# Patient Record
Sex: MALE | Race: BLACK OR AFRICAN AMERICAN | Employment: OTHER | URBAN - METROPOLITAN AREA
[De-identification: names, ages, dates, MRNs, and addresses within clinical notes are randomized per-mention and may not be internally consistent; named-entity substitution may affect disease eponyms.]

---

## 2017-01-09 RX ORDER — FUROSEMIDE 20 MG/1
TABLET ORAL
Qty: 30 TABLET | Refills: 5 | Status: SHIPPED | OUTPATIENT
Start: 2017-01-09 | End: 2017-07-06 | Stop reason: SDUPTHER

## 2017-01-09 RX ORDER — LISINOPRIL 40 MG/1
TABLET ORAL
Qty: 30 TABLET | Refills: 5 | Status: SHIPPED | OUTPATIENT
Start: 2017-01-09 | End: 2017-07-06 | Stop reason: SDUPTHER

## 2017-01-13 RX ORDER — HYDROCODONE BITARTRATE AND ACETAMINOPHEN 5; 325 MG/1; MG/1
TABLET ORAL
Qty: 120 TABLET | Refills: 0 | Status: SHIPPED | OUTPATIENT
Start: 2017-01-13 | End: 2017-02-13 | Stop reason: SDUPTHER

## 2017-01-26 RX ORDER — SPIRONOLACTONE 25 MG/1
25 TABLET ORAL DAILY
Qty: 30 TABLET | Refills: 3 | Status: SHIPPED | OUTPATIENT
Start: 2017-01-26 | End: 2017-05-26 | Stop reason: SDUPTHER

## 2017-01-26 RX ORDER — PRAVASTATIN SODIUM 40 MG
40 TABLET ORAL DAILY
Qty: 30 TABLET | Refills: 3 | Status: SHIPPED | OUTPATIENT
Start: 2017-01-26 | End: 2017-05-26 | Stop reason: SDUPTHER

## 2017-02-13 RX ORDER — HYDROCODONE BITARTRATE AND ACETAMINOPHEN 5; 325 MG/1; MG/1
TABLET ORAL
Qty: 120 TABLET | Refills: 0 | Status: SHIPPED | OUTPATIENT
Start: 2017-02-13 | End: 2017-03-13 | Stop reason: SDUPTHER

## 2017-03-08 RX ORDER — OMEPRAZOLE 20 MG/1
CAPSULE, DELAYED RELEASE ORAL
Qty: 30 CAPSULE | Refills: 3 | Status: SHIPPED | OUTPATIENT
Start: 2017-03-08 | End: 2017-07-06 | Stop reason: SDUPTHER

## 2017-03-13 RX ORDER — HYDROCODONE BITARTRATE AND ACETAMINOPHEN 5; 325 MG/1; MG/1
TABLET ORAL
Qty: 120 TABLET | Refills: 0 | Status: SHIPPED | OUTPATIENT
Start: 2017-03-13 | End: 2017-04-12 | Stop reason: SDUPTHER

## 2017-03-24 ENCOUNTER — CARE COORDINATION (OUTPATIENT)
Dept: CARE COORDINATION | Age: 52
End: 2017-03-24

## 2017-03-27 RX ORDER — HYDRALAZINE HYDROCHLORIDE 50 MG/1
TABLET, FILM COATED ORAL
Qty: 90 TABLET | Refills: 3 | Status: SHIPPED | OUTPATIENT
Start: 2017-03-27 | End: 2017-08-10 | Stop reason: SDUPTHER

## 2017-03-27 RX ORDER — DILTIAZEM HYDROCHLORIDE 180 MG/1
CAPSULE, COATED, EXTENDED RELEASE ORAL
Qty: 30 CAPSULE | Refills: 3 | Status: SHIPPED | OUTPATIENT
Start: 2017-03-27 | End: 2017-08-05 | Stop reason: SDUPTHER

## 2017-03-27 RX ORDER — CARVEDILOL 25 MG/1
TABLET ORAL
Qty: 120 TABLET | Refills: 0 | Status: SHIPPED | OUTPATIENT
Start: 2017-03-27 | End: 2017-04-26 | Stop reason: SDUPTHER

## 2017-03-27 RX ORDER — ISOSORBIDE MONONITRATE 30 MG/1
TABLET, EXTENDED RELEASE ORAL
Qty: 30 TABLET | Refills: 3 | Status: SHIPPED | OUTPATIENT
Start: 2017-03-27 | End: 2017-08-05 | Stop reason: SDUPTHER

## 2017-03-28 ENCOUNTER — OFFICE VISIT (OUTPATIENT)
Dept: FAMILY MEDICINE CLINIC | Age: 52
End: 2017-03-28
Payer: COMMERCIAL

## 2017-03-28 ENCOUNTER — HOSPITAL ENCOUNTER (OUTPATIENT)
Age: 52
Setting detail: SPECIMEN
Discharge: HOME | End: 2017-03-28
Payer: COMMERCIAL

## 2017-03-28 VITALS
SYSTOLIC BLOOD PRESSURE: 100 MMHG | HEART RATE: 84 BPM | WEIGHT: 251 LBS | BODY MASS INDEX: 33.27 KG/M2 | DIASTOLIC BLOOD PRESSURE: 62 MMHG | HEIGHT: 73 IN

## 2017-03-28 DIAGNOSIS — R74.8 ELEVATED LIPASE: ICD-10-CM

## 2017-03-28 DIAGNOSIS — I10 ESSENTIAL HYPERTENSION: ICD-10-CM

## 2017-03-28 DIAGNOSIS — Z23 NEED FOR PNEUMOCOCCAL VACCINATION: ICD-10-CM

## 2017-03-28 DIAGNOSIS — E11.42 CONTROLLED TYPE 2 DIABETES MELLITUS WITH DIABETIC POLYNEUROPATHY, WITH LONG-TERM CURRENT USE OF INSULIN (HCC): Primary | ICD-10-CM

## 2017-03-28 DIAGNOSIS — Z13.9 SCREENING: ICD-10-CM

## 2017-03-28 DIAGNOSIS — Z79.4 CONTROLLED TYPE 2 DIABETES MELLITUS WITH DIABETIC POLYNEUROPATHY, WITH LONG-TERM CURRENT USE OF INSULIN (HCC): Primary | ICD-10-CM

## 2017-03-28 DIAGNOSIS — I50.22 CHRONIC SYSTOLIC HEART FAILURE (HCC): ICD-10-CM

## 2017-03-28 LAB
ANION GAP SERPL CALCULATED.3IONS-SCNC: 18 MMOL/L (ref 9–17)
BUN BLDV-MCNC: 24 MG/DL (ref 6–20)
BUN/CREAT BLD: ABNORMAL (ref 9–20)
CALCIUM SERPL-MCNC: 8.9 MG/DL (ref 8.6–10.4)
CHLORIDE BLD-SCNC: 100 MMOL/L (ref 98–107)
CO2: 20 MMOL/L (ref 20–31)
CREAT SERPL-MCNC: 1.44 MG/DL (ref 0.7–1.2)
GFR AFRICAN AMERICAN: >60 ML/MIN
GFR NON-AFRICAN AMERICAN: 52 ML/MIN
GFR SERPL CREATININE-BSD FRML MDRD: ABNORMAL ML/MIN/{1.73_M2}
GFR SERPL CREATININE-BSD FRML MDRD: ABNORMAL ML/MIN/{1.73_M2}
GLUCOSE BLD-MCNC: 280 MG/DL (ref 70–99)
HBA1C MFR BLD: 7.3 %
LIPASE: 45 U/L (ref 13–60)
POTASSIUM SERPL-SCNC: 5.7 MMOL/L (ref 3.7–5.3)
SODIUM BLD-SCNC: 138 MMOL/L (ref 135–144)

## 2017-03-28 PROCEDURE — 83036 HEMOGLOBIN GLYCOSYLATED A1C: CPT | Performed by: FAMILY MEDICINE

## 2017-03-28 PROCEDURE — G0009 ADMIN PNEUMOCOCCAL VACCINE: HCPCS | Performed by: FAMILY MEDICINE

## 2017-03-28 PROCEDURE — 90670 PCV13 VACCINE IM: CPT | Performed by: FAMILY MEDICINE

## 2017-03-28 PROCEDURE — 99214 OFFICE O/P EST MOD 30 MIN: CPT | Performed by: FAMILY MEDICINE

## 2017-03-28 PROCEDURE — 36415 COLL VENOUS BLD VENIPUNCTURE: CPT | Performed by: FAMILY MEDICINE

## 2017-03-28 ASSESSMENT — ENCOUNTER SYMPTOMS
ABDOMINAL PAIN: 0
SHORTNESS OF BREATH: 0
EYES NEGATIVE: 1
COUGH: 0
CONSTIPATION: 0
BLURRED VISION: 0

## 2017-03-29 LAB
HEPATITIS C ANTIBODY: NONREACTIVE
HIV AG/AB: NONREACTIVE

## 2017-04-04 ENCOUNTER — CARE COORDINATION (OUTPATIENT)
Dept: CARE COORDINATION | Age: 52
End: 2017-04-04

## 2017-04-11 ENCOUNTER — CARE COORDINATION (OUTPATIENT)
Dept: CARE COORDINATION | Age: 52
End: 2017-04-11

## 2017-04-11 DIAGNOSIS — E08.42 DIABETIC POLYNEUROPATHY ASSOCIATED WITH DIABETES MELLITUS DUE TO UNDERLYING CONDITION (HCC): ICD-10-CM

## 2017-04-11 DIAGNOSIS — N28.9 NEPHROPATHY: ICD-10-CM

## 2017-04-12 RX ORDER — HYDROCODONE BITARTRATE AND ACETAMINOPHEN 5; 325 MG/1; MG/1
TABLET ORAL
Qty: 120 TABLET | Refills: 0 | Status: SHIPPED | OUTPATIENT
Start: 2017-04-12 | End: 2017-05-12 | Stop reason: SDUPTHER

## 2017-04-12 RX ORDER — HYDROCODONE BITARTRATE AND ACETAMINOPHEN 5; 325 MG/1; MG/1
TABLET ORAL
Qty: 120 TABLET | Refills: 0 | Status: SHIPPED | OUTPATIENT
Start: 2017-04-12 | End: 2017-04-13 | Stop reason: SDUPTHER

## 2017-04-13 RX ORDER — HYDROCODONE BITARTRATE AND ACETAMINOPHEN 5; 325 MG/1; MG/1
TABLET ORAL
Qty: 120 TABLET | Refills: 0 | Status: SHIPPED | OUTPATIENT
Start: 2017-04-13 | End: 2017-06-13 | Stop reason: SDUPTHER

## 2017-04-19 ENCOUNTER — CARE COORDINATION (OUTPATIENT)
Dept: CARE COORDINATION | Age: 52
End: 2017-04-19

## 2017-04-25 ENCOUNTER — CARE COORDINATION (OUTPATIENT)
Dept: CARE COORDINATION | Age: 52
End: 2017-04-25

## 2017-04-26 RX ORDER — GLIMEPIRIDE 4 MG/1
TABLET ORAL
Qty: 30 TABLET | Refills: 3 | Status: SHIPPED | OUTPATIENT
Start: 2017-04-26 | End: 2017-05-02 | Stop reason: SDUPTHER

## 2017-04-26 RX ORDER — CARVEDILOL 25 MG/1
TABLET ORAL
Qty: 120 TABLET | Refills: 3 | Status: SHIPPED | OUTPATIENT
Start: 2017-04-26 | End: 2017-08-24 | Stop reason: SDUPTHER

## 2017-04-28 ENCOUNTER — CARE COORDINATION (OUTPATIENT)
Dept: CARE COORDINATION | Age: 52
End: 2017-04-28

## 2017-05-02 RX ORDER — GLIMEPIRIDE 4 MG/1
TABLET ORAL
Qty: 30 TABLET | Refills: 5 | Status: SHIPPED | OUTPATIENT
Start: 2017-05-02 | End: 2018-01-24 | Stop reason: SDUPTHER

## 2017-05-09 ENCOUNTER — CARE COORDINATION (OUTPATIENT)
Dept: CARE COORDINATION | Age: 52
End: 2017-05-09

## 2017-05-12 RX ORDER — HYDROCODONE BITARTRATE AND ACETAMINOPHEN 5; 325 MG/1; MG/1
TABLET ORAL
Qty: 120 TABLET | Refills: 0 | Status: SHIPPED | OUTPATIENT
Start: 2017-05-12 | End: 2017-06-13 | Stop reason: SDUPTHER

## 2017-05-23 ENCOUNTER — CARE COORDINATION (OUTPATIENT)
Dept: CARE COORDINATION | Age: 52
End: 2017-05-23

## 2017-05-26 RX ORDER — SPIRONOLACTONE 25 MG/1
TABLET ORAL
Qty: 30 TABLET | Refills: 3 | Status: SHIPPED | OUTPATIENT
Start: 2017-05-26 | End: 2017-07-26 | Stop reason: ALTCHOICE

## 2017-05-26 RX ORDER — PRAVASTATIN SODIUM 40 MG
TABLET ORAL
Qty: 30 TABLET | Refills: 3 | Status: SHIPPED | OUTPATIENT
Start: 2017-05-26 | End: 2017-09-23 | Stop reason: SDUPTHER

## 2017-06-13 ENCOUNTER — CARE COORDINATION (OUTPATIENT)
Dept: CARE COORDINATION | Age: 52
End: 2017-06-13

## 2017-06-13 RX ORDER — HYDROCODONE BITARTRATE AND ACETAMINOPHEN 5; 325 MG/1; MG/1
TABLET ORAL
Qty: 120 TABLET | Refills: 0 | Status: SHIPPED | OUTPATIENT
Start: 2017-06-13 | End: 2017-07-13 | Stop reason: SDUPTHER

## 2017-06-19 ENCOUNTER — TELEPHONE (OUTPATIENT)
Dept: FAMILY MEDICINE CLINIC | Age: 52
End: 2017-06-19

## 2017-06-21 ENCOUNTER — CARE COORDINATION (OUTPATIENT)
Dept: CARE COORDINATION | Age: 52
End: 2017-06-21

## 2017-06-26 ENCOUNTER — TELEPHONE (OUTPATIENT)
Dept: FAMILY MEDICINE CLINIC | Age: 52
End: 2017-06-26

## 2017-06-29 ENCOUNTER — TELEPHONE (OUTPATIENT)
Dept: FAMILY MEDICINE CLINIC | Age: 52
End: 2017-06-29

## 2017-06-29 DIAGNOSIS — I95.9 HYPOTENSION, UNSPECIFIED HYPOTENSION TYPE: Primary | ICD-10-CM

## 2017-07-05 ENCOUNTER — CARE COORDINATION (OUTPATIENT)
Dept: CARE COORDINATION | Age: 52
End: 2017-07-05

## 2017-07-06 RX ORDER — LISINOPRIL 40 MG/1
TABLET ORAL
Qty: 30 TABLET | Refills: 1 | Status: SHIPPED | OUTPATIENT
Start: 2017-07-06 | End: 2017-07-26 | Stop reason: ALTCHOICE

## 2017-07-06 RX ORDER — OMEPRAZOLE 20 MG/1
CAPSULE, DELAYED RELEASE ORAL
Qty: 30 CAPSULE | Refills: 1 | Status: SHIPPED | OUTPATIENT
Start: 2017-07-06 | End: 2017-09-05 | Stop reason: SDUPTHER

## 2017-07-06 RX ORDER — FUROSEMIDE 20 MG/1
TABLET ORAL
Qty: 30 TABLET | Refills: 1 | Status: SHIPPED | OUTPATIENT
Start: 2017-07-06 | End: 2017-09-05 | Stop reason: SDUPTHER

## 2017-07-14 RX ORDER — HYDROCODONE BITARTRATE AND ACETAMINOPHEN 5; 325 MG/1; MG/1
TABLET ORAL
Qty: 120 TABLET | Refills: 0 | Status: SHIPPED | OUTPATIENT
Start: 2017-07-14 | End: 2017-08-11 | Stop reason: SDUPTHER

## 2017-07-18 ENCOUNTER — OFFICE VISIT (OUTPATIENT)
Dept: FAMILY MEDICINE CLINIC | Age: 52
End: 2017-07-18
Payer: COMMERCIAL

## 2017-07-18 VITALS
DIASTOLIC BLOOD PRESSURE: 76 MMHG | WEIGHT: 234 LBS | BODY MASS INDEX: 30.87 KG/M2 | HEART RATE: 80 BPM | SYSTOLIC BLOOD PRESSURE: 130 MMHG

## 2017-07-18 DIAGNOSIS — S22.41XD CLOSED FRACTURE OF MULTIPLE RIBS OF RIGHT SIDE WITH ROUTINE HEALING, SUBSEQUENT ENCOUNTER: ICD-10-CM
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
## 2017-07-18 PROCEDURE — 99213 OFFICE O/P EST LOW 20 MIN: CPT | Performed by: FAMILY MEDICINE

## 2017-07-18 ASSESSMENT — ENCOUNTER SYMPTOMS
EYE PAIN: 0
BLURRED VISION: 0
DOUBLE VISION: 0
BACK PAIN: 1
GASTROINTESTINAL NEGATIVE: 1
SHORTNESS OF BREATH: 1

## 2017-07-19 ENCOUNTER — CARE COORDINATION (OUTPATIENT)
Dept: CARE COORDINATION | Age: 52
End: 2017-07-19

## 2017-07-26 ENCOUNTER — CARE COORDINATION (OUTPATIENT)
Dept: CARE COORDINATION | Age: 52
End: 2017-07-26

## 2017-07-26 ENCOUNTER — TELEPHONE (OUTPATIENT)
Dept: FAMILY MEDICINE CLINIC | Age: 52
End: 2017-07-26

## 2017-08-05 RX ORDER — ISOSORBIDE MONONITRATE 30 MG/1
TABLET, EXTENDED RELEASE ORAL
Qty: 30 TABLET | Refills: 3 | Status: SHIPPED | OUTPATIENT
Start: 2017-08-05 | End: 2017-12-03 | Stop reason: SDUPTHER

## 2017-08-05 RX ORDER — DILTIAZEM HYDROCHLORIDE 180 MG/1
CAPSULE, EXTENDED RELEASE ORAL
Qty: 30 CAPSULE | Refills: 3 | Status: SHIPPED | OUTPATIENT
Start: 2017-08-05 | End: 2017-12-03 | Stop reason: SDUPTHER

## 2017-08-10 ENCOUNTER — CARE COORDINATION (OUTPATIENT)
Dept: CARE COORDINATION | Age: 52
End: 2017-08-10

## 2017-08-10 RX ORDER — HYDRALAZINE HYDROCHLORIDE 50 MG/1
TABLET, FILM COATED ORAL
Qty: 90 TABLET | Refills: 0 | Status: SHIPPED | OUTPATIENT
Start: 2017-08-10 | End: 2017-09-06 | Stop reason: SDUPTHER

## 2017-08-11 RX ORDER — HYDROCODONE BITARTRATE AND ACETAMINOPHEN 5; 325 MG/1; MG/1
TABLET ORAL
Qty: 120 TABLET | Refills: 0 | Status: SHIPPED | OUTPATIENT
Start: 2017-08-11 | End: 2017-09-12 | Stop reason: SDUPTHER

## 2017-08-18 ENCOUNTER — HOSPITAL ENCOUNTER (OUTPATIENT)
Age: 52
Setting detail: SPECIMEN
Discharge: HOME | End: 2017-08-18
Payer: COMMERCIAL

## 2017-08-18 ENCOUNTER — OFFICE VISIT (OUTPATIENT)
Dept: FAMILY MEDICINE CLINIC | Age: 52
End: 2017-08-18
Payer: COMMERCIAL

## 2017-08-18 VITALS
HEART RATE: 82 BPM | BODY MASS INDEX: 31 KG/M2 | WEIGHT: 235 LBS | SYSTOLIC BLOOD PRESSURE: 130 MMHG | DIASTOLIC BLOOD PRESSURE: 90 MMHG

## 2017-08-18 DIAGNOSIS — Z79.4 TYPE 2 DIABETES MELLITUS WITHOUT COMPLICATION, WITH LONG-TERM CURRENT USE OF INSULIN (HCC): ICD-10-CM

## 2017-08-18 DIAGNOSIS — M25.512 ACUTE PAIN OF LEFT SHOULDER: ICD-10-CM

## 2017-08-18 DIAGNOSIS — E78.2 MIXED HYPERLIPIDEMIA: ICD-10-CM

## 2017-08-18 DIAGNOSIS — G89.29 CHRONIC MIDLINE LOW BACK PAIN WITHOUT SCIATICA: ICD-10-CM

## 2017-08-18 DIAGNOSIS — I50.32 CHRONIC DIASTOLIC CONGESTIVE HEART FAILURE (HCC): ICD-10-CM

## 2017-08-18 DIAGNOSIS — E11.9 TYPE 2 DIABETES MELLITUS WITHOUT COMPLICATION, WITH LONG-TERM CURRENT USE OF INSULIN (HCC): ICD-10-CM

## 2017-08-18 DIAGNOSIS — M54.50 CHRONIC MIDLINE LOW BACK PAIN WITHOUT SCIATICA: ICD-10-CM

## 2017-08-18 DIAGNOSIS — C61 PROSTATE CANCER (HCC): ICD-10-CM

## 2017-08-18 DIAGNOSIS — G62.9 NEUROPATHY: ICD-10-CM

## 2017-08-18 PROBLEM — S22.49XA: Status: ACTIVE | Noted: 2017-06-01

## 2017-08-18 LAB
ALBUMIN SERPL-MCNC: 4.4 G/DL (ref 3.5–5.2)
ALBUMIN/GLOBULIN RATIO: 1.3 (ref 1–2.5)
ALP BLD-CCNC: 114 U/L (ref 40–129)
ALT SERPL-CCNC: 15 U/L (ref 5–41)
ANION GAP SERPL CALCULATED.3IONS-SCNC: 20 MMOL/L (ref 9–17)
AST SERPL-CCNC: 13 U/L
BILIRUB SERPL-MCNC: 0.31 MG/DL (ref 0.3–1.2)
BUN BLDV-MCNC: 14 MG/DL (ref 6–20)
BUN/CREAT BLD: ABNORMAL (ref 9–20)
CALCIUM SERPL-MCNC: 9.7 MG/DL (ref 8.6–10.4)
CHLORIDE BLD-SCNC: 97 MMOL/L (ref 98–107)
CHOLESTEROL/HDL RATIO: 4.5
CHOLESTEROL: 149 MG/DL
CO2: 23 MMOL/L (ref 20–31)
CREAT SERPL-MCNC: 0.99 MG/DL (ref 0.7–1.2)
CREATININE URINE POCT: 100
GFR AFRICAN AMERICAN: >60 ML/MIN
GFR NON-AFRICAN AMERICAN: >60 ML/MIN
GFR SERPL CREATININE-BSD FRML MDRD: ABNORMAL ML/MIN/{1.73_M2}
GFR SERPL CREATININE-BSD FRML MDRD: ABNORMAL ML/MIN/{1.73_M2}
GLUCOSE BLD-MCNC: 457 MG/DL (ref 70–99)
HBA1C MFR BLD: 8.4 %
HDLC SERPL-MCNC: 33 MG/DL
LDL CHOLESTEROL: 81 MG/DL (ref 0–130)
MICROALBUMIN/CREAT 24H UR: 10 MG/G{CREAT}
MICROALBUMIN/CREAT UR-RTO: 30
POTASSIUM SERPL-SCNC: 5.1 MMOL/L (ref 3.7–5.3)
SODIUM BLD-SCNC: 140 MMOL/L (ref 135–144)
TOTAL PROTEIN: 7.8 G/DL (ref 6.4–8.3)
TRIGL SERPL-MCNC: 175 MG/DL
VLDLC SERPL CALC-MCNC: ABNORMAL MG/DL (ref 1–30)

## 2017-08-18 PROCEDURE — 83036 HEMOGLOBIN GLYCOSYLATED A1C: CPT | Performed by: FAMILY MEDICINE

## 2017-08-18 PROCEDURE — 99215 OFFICE O/P EST HI 40 MIN: CPT | Performed by: FAMILY MEDICINE

## 2017-08-18 PROCEDURE — 82044 UR ALBUMIN SEMIQUANTITATIVE: CPT | Performed by: FAMILY MEDICINE

## 2017-08-18 PROCEDURE — 36415 COLL VENOUS BLD VENIPUNCTURE: CPT | Performed by: FAMILY MEDICINE

## 2017-08-18 ASSESSMENT — ENCOUNTER SYMPTOMS
BLOOD IN STOOL: 0
VISUAL CHANGE: 0
SHORTNESS OF BREATH: 0
EYES NEGATIVE: 1
BACK PAIN: 1
ABDOMINAL PAIN: 0
CONSTIPATION: 0
COUGH: 0

## 2017-08-24 RX ORDER — CARVEDILOL 25 MG/1
TABLET ORAL
Qty: 120 TABLET | Refills: 0 | Status: SHIPPED | OUTPATIENT
Start: 2017-08-24 | End: 2017-09-23 | Stop reason: SDUPTHER

## 2017-08-28 ENCOUNTER — CARE COORDINATION (OUTPATIENT)
Dept: CARE COORDINATION | Age: 52
End: 2017-08-28

## 2017-09-05 RX ORDER — FUROSEMIDE 20 MG/1
TABLET ORAL
Qty: 30 TABLET | Refills: 1 | Status: SHIPPED | OUTPATIENT
Start: 2017-09-05 | End: 2017-10-10 | Stop reason: SDUPTHER

## 2017-09-05 RX ORDER — OMEPRAZOLE 20 MG/1
CAPSULE, DELAYED RELEASE ORAL
Qty: 30 CAPSULE | Refills: 1 | Status: SHIPPED | OUTPATIENT
Start: 2017-09-05 | End: 2017-11-03 | Stop reason: SDUPTHER

## 2017-09-05 RX ORDER — LISINOPRIL 40 MG/1
40 TABLET ORAL DAILY
Qty: 30 TABLET | Refills: 3 | Status: SHIPPED | OUTPATIENT
Start: 2017-09-05 | End: 2018-01-02 | Stop reason: SDUPTHER

## 2017-09-06 RX ORDER — HYDRALAZINE HYDROCHLORIDE 50 MG/1
TABLET, FILM COATED ORAL
Qty: 90 TABLET | Refills: 3 | Status: SHIPPED | OUTPATIENT
Start: 2017-09-06 | End: 2018-01-16 | Stop reason: SDUPTHER

## 2017-09-12 RX ORDER — HYDROCODONE BITARTRATE AND ACETAMINOPHEN 5; 325 MG/1; MG/1
TABLET ORAL
Qty: 120 TABLET | Refills: 0 | Status: SHIPPED | OUTPATIENT
Start: 2017-09-12 | End: 2017-10-11 | Stop reason: SDUPTHER

## 2017-09-18 ENCOUNTER — CARE COORDINATION (OUTPATIENT)
Dept: CARE COORDINATION | Age: 52
End: 2017-09-18

## 2017-09-25 RX ORDER — SPIRONOLACTONE 25 MG/1
TABLET ORAL
Qty: 30 TABLET | Refills: 3 | Status: SHIPPED | OUTPATIENT
Start: 2017-09-25 | End: 2018-01-22 | Stop reason: SDUPTHER

## 2017-09-25 RX ORDER — CARVEDILOL 25 MG/1
TABLET ORAL
Qty: 120 TABLET | Refills: 3 | Status: SHIPPED | OUTPATIENT
Start: 2017-09-25 | End: 2018-01-22 | Stop reason: SDUPTHER

## 2017-09-25 RX ORDER — PRAVASTATIN SODIUM 40 MG
TABLET ORAL
Qty: 30 TABLET | Refills: 3 | Status: SHIPPED | OUTPATIENT
Start: 2017-09-25 | End: 2018-01-22 | Stop reason: SDUPTHER

## 2017-10-10 RX ORDER — FUROSEMIDE 20 MG/1
TABLET ORAL
Qty: 30 TABLET | Refills: 1 | Status: SHIPPED | OUTPATIENT
Start: 2017-10-10 | End: 2017-12-03 | Stop reason: SDUPTHER

## 2017-10-11 DIAGNOSIS — M54.50 CHRONIC MIDLINE LOW BACK PAIN WITHOUT SCIATICA: Primary | ICD-10-CM

## 2017-10-11 DIAGNOSIS — G89.29 CHRONIC MIDLINE LOW BACK PAIN WITHOUT SCIATICA: Primary | ICD-10-CM

## 2017-10-11 RX ORDER — HYDROCODONE BITARTRATE AND ACETAMINOPHEN 5; 325 MG/1; MG/1
TABLET ORAL
Qty: 120 TABLET | Refills: 0 | Status: SHIPPED | OUTPATIENT
Start: 2017-10-11 | End: 2017-11-09 | Stop reason: SDUPTHER

## 2017-10-11 NOTE — TELEPHONE ENCOUNTER
Patient Active Problem List:     Prostate cancer [facility])     Uncontrolled type 2 diabetes mellitus with complication, with long-term current use of insulin ([facility] [address]     H/O prostate cancer     Essential hypertension     Mixed hyperlipidemia     Obesity (BMI 30.0-34. 9)     Coronary artery disease involving native coronary artery of native heart without angina pectoris     Pacemaker     Obesity (BMI 30-39. 9)     Diabetic polyneuropathy associated with diabetes mellitus due to underlying condition ([address]     Systolic heart failure (HCC)     Neuropathy (HCC)     Type 2 diabetes mellitus without complication (HCC)     CHF (congestive heart failure) (HCC)     Chronic back pain     History of lung thrombosis     Traumatic closed fx of eight or more ribs with minimal displacement

## 2017-11-03 RX ORDER — OMEPRAZOLE 20 MG/1
CAPSULE, DELAYED RELEASE ORAL
Qty: 30 CAPSULE | Refills: 1 | Status: SHIPPED | OUTPATIENT
Start: 2017-11-03 | End: 2018-01-02 | Stop reason: SDUPTHER

## 2017-11-03 NOTE — TELEPHONE ENCOUNTER
Patient Active Problem List:     Prostate cancer [facility])     Uncontrolled type 2 diabetes mellitus with complication, with long-term current use of insulin ([facility] [address]     H/O prostate cancer     Essential hypertension     Mixed hyperlipidemia     Obesity (BMI 30.0-34. 9)     Coronary artery disease involving native coronary artery of native heart without angina pectoris     Pacemaker     Obesity (BMI 30-39. 9)     Diabetic polyneuropathy associated with diabetes mellitus due to underlying condition ([address]     Systolic heart failure (HCC)     Neuropathy (HCC)     Type 2 diabetes mellitus without complication (HCC)     CHF (congestive heart failure) (HCC)     Chronic back pain     History of lung thrombosis     Traumatic closed fx of eight or more ribs with minimal displacement

## 2017-11-06 NOTE — TELEPHONE ENCOUNTER
Rodolfo Mckinleyin            Patient Active Problem List:     Prostate cancer ([facility] [address]     Uncontrolled type 2 diabetes mellitus with complication, with long-term current use of insulin ([facility] [address]     H/O prostate cancer     Essential hypertension     Mixed hyperlipidemia     Obesity (BMI 30.0-34. 9)     Coronary artery disease involving native coronary artery of native heart without angina pectoris     Pacemaker     Obesity (BMI 30-39. 9)     Diabetic polyneuropathy associated with diabetes mellitus due to underlying condition ([facility] [address]     Systolic heart failure (HCC)     Neuropathy (HCC)     Type 2 diabetes mellitus without complication (HCC)     CHF (congestive heart failure) (HCC)     Chronic back pain     History of lung thrombosis     Traumatic closed fx of eight or more ribs with minimal displacement

## 2017-11-09 DIAGNOSIS — G89.29 CHRONIC MIDLINE LOW BACK PAIN WITHOUT SCIATICA: ICD-10-CM

## 2017-11-09 DIAGNOSIS — M54.50 CHRONIC MIDLINE LOW BACK PAIN WITHOUT SCIATICA: ICD-10-CM

## 2017-11-09 RX ORDER — HYDROCODONE BITARTRATE AND ACETAMINOPHEN 5; 325 MG/1; MG/1
TABLET ORAL
Qty: 120 TABLET | Refills: 0 | Status: SHIPPED | OUTPATIENT
Start: 2017-11-09 | End: 2017-12-11 | Stop reason: SDUPTHER

## 2017-11-09 NOTE — TELEPHONE ENCOUNTER
Patient requested refill last seen [phone]    Health Maintenance   Topic Date Due    Diabetic foot exam  07/13/2017    Flu vaccine (1) 09/01/2017    Diabetic retinal exam  02/01/2018 (Originally 7/22/1975)    Diabetic hemoglobin A1C test  08/18/2018    Diabetic microalbuminuria test  08/18/2018    Lipid screen  08/18/2018    Colon cancer screen colonoscopy  09/09/2024    DTaP/Tdap/Td vaccine (2 - Td) 01/01/2025    Pneumococcal med risk  Completed    Hepatitis C screen  Completed    HIV screen  Completed             (applicable per patient's age: Cancer Screenings, Depression Screening, Fall Risk Screening, Immunizations)    Hemoglobin A1C (%)   Date Value   08/18/2017 8.4   03/28/2017 7.3   11/28/2016 7.4     LDL Cholesterol (mg/dL)   Date Value   08/18/2017 81     AST (U/L)   Date Value   08/18/2017 13     ALT (U/L)   Date Value   08/18/2017 15     BUN (mg/dL)   Date Value   08/18/2017 14      (goal A1C is < 7)   (goal LDL is <100) need 30-50% reduction from baseline     BP Readings from Last 3 Encounters:   08/18/17 (!) 130/90   07/18/17 130/76   07/03/17 100/70    (goal /80)      All Future Testing planned in [facility]:  Lab Frequency Next Occurrence   Basic Metabolic Panel Once 40/88/6934   Basic Metabolic Panel Once 17/52/1155   Electrophoresis Protein, Serum without Reflex to Immunofixation Once 07/03/2017   PTH, Intact Once 10/01/2017   CBC Auto Differential Once 10/01/2017   C3 Complement Once 07/03/2017   C4 Complement Once 07/03/2017   CHAITANYA Once 07/03/2017   Anti-Neutrophilic Cytoplasmic Antibody Once 07/03/2017   Anti-DNA Antibody, Double-Stranded Once 07/03/2017   [place]/Lambda Free Lt Chains, Serum Quant Once 07/03/2017   Creatinine, Random Urine Once 07/03/2017   Protein, urine, random Once 07/03/2017   US Renal Complete Once 07/03/2017   Bladder scan Once 07/03/2017       Next Visit Date:  Future Appointments  Date Time Provider Elida Rice   11/17/2017 9:00 AM Ashish Mathew MD

## 2017-11-17 ENCOUNTER — OFFICE VISIT (OUTPATIENT)
Dept: FAMILY MEDICINE CLINIC | Age: 52
End: 2017-11-17
Payer: COMMERCIAL

## 2017-11-17 VITALS
DIASTOLIC BLOOD PRESSURE: 80 MMHG | SYSTOLIC BLOOD PRESSURE: 112 MMHG | BODY MASS INDEX: 29.29 KG/M2 | HEIGHT: 73 IN | HEART RATE: 80 BPM | WEIGHT: 221 LBS

## 2017-11-17 DIAGNOSIS — E08.42 DIABETIC POLYNEUROPATHY ASSOCIATED WITH DIABETES MELLITUS DUE TO UNDERLYING CONDITION (HCC): ICD-10-CM

## 2017-11-17 DIAGNOSIS — I25.10 CORONARY ARTERY DISEASE INVOLVING NATIVE CORONARY ARTERY OF NATIVE HEART WITHOUT ANGINA PECTORIS: ICD-10-CM

## 2017-11-17 DIAGNOSIS — E11.43 GASTROPARESIS DUE TO DM (HCC): ICD-10-CM

## 2017-11-17 DIAGNOSIS — E78.2 MIXED HYPERLIPIDEMIA: ICD-10-CM

## 2017-11-17 DIAGNOSIS — I10 ESSENTIAL HYPERTENSION: ICD-10-CM

## 2017-11-17 DIAGNOSIS — K31.84 GASTROPARESIS DUE TO DM (HCC): ICD-10-CM

## 2017-11-17 LAB — HBA1C MFR BLD: 14 %

## 2017-11-17 PROCEDURE — 83036 HEMOGLOBIN GLYCOSYLATED A1C: CPT | Performed by: FAMILY MEDICINE

## 2017-11-17 PROCEDURE — 99214 OFFICE O/P EST MOD 30 MIN: CPT | Performed by: FAMILY MEDICINE

## 2017-11-17 ASSESSMENT — ENCOUNTER SYMPTOMS
NAUSEA: 1
HEARTBURN: 1
EYES NEGATIVE: 1
BACK PAIN: 1
BLOOD IN STOOL: 0
COUGH: 0
CONSTIPATION: 1
BLURRED VISION: 0
ABDOMINAL PAIN: 0
SHORTNESS OF BREATH: 0

## 2017-11-17 ASSESSMENT — PATIENT HEALTH QUESTIONNAIRE - PHQ9
1. LITTLE INTEREST OR PLEASURE IN DOING THINGS: 0
SUM OF ALL RESPONSES TO PHQ QUESTIONS 1-9: 0
SUM OF ALL RESPONSES TO PHQ9 QUESTIONS 1 & 2: 0
2. FEELING DOWN, DEPRESSED OR HOPELESS: 0

## 2017-11-17 NOTE — PROGRESS NOTES
DIABETES and HYPERTENSION visit    BP Readings from Last 3 Encounters:   11/17/17 112/80   08/18/17 (!) 130/90   07/18/17 130/76        Hemoglobin A1C (%)   Date Value   08/18/2017 8.4   03/28/2017 7.3   11/28/2016 7.4     LDL Cholesterol (mg/dL)   Date Value   08/18/2017 81     HDL (mg/dL)   Date Value   08/18/2017 33 (L)     BUN (mg/dL)   Date Value   08/18/2017 14     CREATININE (mg/dL)   Date Value   08/18/2017 0.99     Glucose (mg/dL)   Date Value   08/18/2017 457 (HH)            Are you taking your medications for Diabetes? -  Yes   Are you having any side effects from Diabetes medications? - No    Are you taking your medications for hypertension? -   Yes   Are you having any side effects from hypertension medications? -  No    Are you taking any cholesterol medication?  -  No   Are you having any side effects from cholesterol medications? -  No    Are you taking an Aspirin daily? -  No               Are you taking all your prescribed medications?  Yes             If NO, why? -  N/A    Are you taking any over-the-counter medicines, vitamins, or herbal medicines? no     Have you changed or stopped any medications since your last visit including any over-the-counter medicines, vitamins, or herbal medicines? no     Have you seen any other physician or provider since your last visit no    Have you had any other diagnostic tests since your last visit? no    Have you had your annual diabetic retinal (eye) exam?  yes -     Diabetic Patient Self-Management Goal(s):    [] Increase activity-exercise for 30 min daily - 3 to 4 days per week   [] Improved diet - adhere to recommended diabetic diet/low cholesterol/low sodium diet    [] Take medications as prescribed and call the office if any troubles with medications develop   [] will check blood sugars at least one time daily and blood pressure weekly   [] schedule an appointment with a diabetes educator   [] schedule yearly diabetic eye exam   [] will work on weight

## 2017-11-17 NOTE — PROGRESS NOTES
blurred vision. Respiratory: Negative for cough and shortness of breath. Cardiovascular: Negative for chest pain, palpitations and leg swelling. Seeing cardiologist regularly. Gastrointestinal: Positive for constipation, heartburn and nausea. Negative for abdominal pain and blood in stool. Decreased appetite, not eating as much as previously   Genitourinary: Negative for frequency and urgency. Musculoskeletal: Positive for back pain and joint pain. Negative for falls. Skin: Negative. Neurological: Positive for dizziness and sensory change (bilat feet, stable on neurontin. ). Negative for speech change and weakness. Endo/Heme/Allergies: Negative. Does not bruise/bleed easily. Psychiatric/Behavioral: Negative for depression. The patient is not nervous/anxious and does not have insomnia.         PAST MEDICAL HISTORY    Past Medical History:   Diagnosis Date    Cardiomegaly     CHF (congestive heart failure) (HCC) EF 20%    Chronic back pain     Diabetes mellitus (HCC)     GERD (gastroesophageal reflux disease)     History of lung thrombosis     Hx of blood clots     Hyperlipidemia     Hypertension     Neuropathy ([address]     feet    Pacemaker     AICD    PE (pulmonary embolism)     \"about 5 yrs ago\"    Prostate cancer ([address] 2016    Traumatic closed fx of eight or more ribs with minimal displacement 06/2017    pneumothorax    Type 2 diabetes mellitus without complication ([address]        FAMILY HISTORY    Family History   Problem Relation Age of Onset    Diabetes Father     Hypertension Father     Heart Disease Father     Hypertension Mother     Prostate Cancer Brother     Diabetes Brother     Diabetes Brother     Diabetes Brother        SOCIAL HISTORY    Social History     Social History    Marital status:      Spouse name: N/A    Number of children: N/A    Years of education: N/A     Occupational History    disability      Social History Main Topics    180 MG extended release capsule take 1 capsule by mouth once daily 30 capsule 3    isosorbide mononitrate (IMDUR) 30 MG extended release tablet take 1 tablet by mouth once daily 30 tablet 3    glucose blood VI test strips (ASCENSIA AUTODISC VI;ONE TOUCH ULTRA TEST VI) strip 1 each by In Vitro route 3 times daily As needed. 100 each 2    glimepiride (AMARYL) 4 MG tablet take 1 tablet by mouth once daily 30 tablet 5    B-D INS SYR ULTRAFINE 1CC/31G 31G X 5/16\" 1 ML MISC use twice a day 100 each 6    tamsulosin (FLOMAX) 0.4 MG capsule take 1 capsule by mouth once daily 30 MINUTES AFTER SAME MEAL  0    docusate (COLACE, DULCOLAX) 100 MG CAPS Take 100 mg by mouth 2 times daily 20 capsule 0    gabapentin (NEURONTIN) 800 MG tablet Take 800 mg by mouth 3 times daily      DULoxetine (CYMBALTA) 60 MG capsule Take 60 mg by mouth daily      digoxin (LANOXIN) 0.125 MG tablet Take 125 mcg by mouth daily. No current facility-administered medications for this visit. ALLERGIES    Allergies   Allergen Reactions    Adhesive Tape Other (See Comments)     Blister,skin tears with silk tape       PHYSICAL EXAM   Physical Exam   Constitutional: He is oriented to person, place, and time. He appears well-developed and well-nourished. HENT:   Head: Normocephalic. Eyes: Pupils are equal, round, and reactive to light. Neck: Normal range of motion. Neck supple. No thyromegaly present. Cardiovascular: Normal rate, regular rhythm and normal heart sounds. No murmur heard. Venous stasis. Pvd. No open sores   Pulmonary/Chest: Effort normal and breath sounds normal. He has no wheezes. He has no rales. Abdominal: Soft. There is tenderness (mild epigastric). There is no rebound and no guarding. Musculoskeletal: Normal range of motion. He exhibits tenderness (low back). He exhibits no edema. Lymphadenopathy:     He has no cervical adenopathy. Neurological: He is alert and oriented to person, place, and time.

## 2017-12-04 RX ORDER — FUROSEMIDE 20 MG/1
TABLET ORAL
Qty: 30 TABLET | Refills: 3 | Status: SHIPPED | OUTPATIENT
Start: 2017-12-04 | End: 2018-03-08 | Stop reason: SDUPTHER

## 2017-12-04 RX ORDER — ISOSORBIDE MONONITRATE 30 MG/1
TABLET, EXTENDED RELEASE ORAL
Qty: 30 TABLET | Refills: 3 | Status: SHIPPED | OUTPATIENT
Start: 2017-12-04 | End: 2018-03-08 | Stop reason: SDUPTHER

## 2017-12-04 RX ORDER — DILTIAZEM HYDROCHLORIDE 180 MG/1
CAPSULE, EXTENDED RELEASE ORAL
Qty: 30 CAPSULE | Refills: 3 | Status: SHIPPED | OUTPATIENT
Start: 2017-12-04 | End: 2018-03-08 | Stop reason: SDUPTHER

## 2017-12-06 ENCOUNTER — TELEPHONE (OUTPATIENT)
Dept: FAMILY MEDICINE CLINIC | Age: 52
End: 2017-12-06

## 2017-12-06 NOTE — TELEPHONE ENCOUNTER
We received a fax stating that Humalog mix 75/25 will be removed from formulary in 2018, so patient will be switching to Novolog 70/30 Flex pen starting in 2018 per Dr Cora Avitia approval

## 2017-12-11 DIAGNOSIS — G89.29 CHRONIC MIDLINE LOW BACK PAIN WITHOUT SCIATICA: ICD-10-CM

## 2017-12-11 DIAGNOSIS — M54.50 CHRONIC MIDLINE LOW BACK PAIN WITHOUT SCIATICA: ICD-10-CM

## 2017-12-11 RX ORDER — HYDROCODONE BITARTRATE AND ACETAMINOPHEN 5; 325 MG/1; MG/1
TABLET ORAL
Qty: 120 TABLET | Refills: 0 | Status: SHIPPED | OUTPATIENT
Start: 2017-12-11 | End: 2018-01-10 | Stop reason: SDUPTHER

## 2017-12-11 NOTE — TELEPHONE ENCOUNTER
Active Problem List:     Prostate cancer ([facility] [address]     Uncontrolled type 2 diabetes mellitus with complication, with long-term current use of insulin ([facility]     H/O prostate cancer     Essential hypertension     Mixed hyperlipidemia     Obesity (BMI 30.0-34. 9)     Coronary artery disease involving native coronary artery of native heart without angina pectoris     Pacemaker     Obesity (BMI 30-39. 9)     Diabetic polyneuropathy associated with diabetes mellitus due to underlying condition ([facility] [address]     Systolic heart failure (HCC)     Neuropathy (HCC)     Type 2 diabetes mellitus without complication (HCC)     CHF (congestive heart failure) (HCC)     Chronic back pain     History of lung thrombosis     Traumatic closed fx of eight or more ribs with minimal displacement

## 2017-12-29 ENCOUNTER — TELEPHONE (OUTPATIENT)
Dept: FAMILY MEDICINE CLINIC | Age: 52
End: 2017-12-29

## 2018-01-10 DIAGNOSIS — G89.29 CHRONIC MIDLINE LOW BACK PAIN WITHOUT SCIATICA: ICD-10-CM

## 2018-01-10 DIAGNOSIS — M54.50 CHRONIC MIDLINE LOW BACK PAIN WITHOUT SCIATICA: ICD-10-CM

## 2018-01-10 RX ORDER — HYDROCODONE BITARTRATE AND ACETAMINOPHEN 5; 325 MG/1; MG/1
TABLET ORAL
Qty: 120 TABLET | Refills: 0 | Status: SHIPPED | OUTPATIENT
Start: 2018-01-10 | End: 2018-02-10

## 2018-01-10 NOTE — TELEPHONE ENCOUNTER
11/17/2017 last office visit.   Health Maintenance   Topic Date Due    Diabetic foot exam  07/13/2017    Diabetic retinal exam  02/01/2018 (Originally 7/22/1975)    A1C test (Diabetic or Prediabetic)  02/17/2018    Diabetic microalbuminuria test  08/18/2018    Lipid screen  08/18/2018    Potassium monitoring  08/18/2018    Creatinine monitoring  08/18/2018    Colon cancer screen colonoscopy  09/09/2024    DTaP/Tdap/Td vaccine (2 - Td) 01/01/2025    Flu vaccine  Completed    Pneumococcal med risk  Completed    Hepatitis C screen  Completed    HIV screen  Completed             (applicable per patient's age: Cancer Screenings, Depression Screening, Fall Risk Screening, Immunizations)    Hemoglobin A1C (%)   Date Value   11/17/2017 14.0   08/18/2017 8.4   03/28/2017 7.3     LDL Cholesterol (mg/dL)   Date Value   08/18/2017 81     AST (U/L)   Date Value   08/18/2017 13     ALT (U/L)   Date Value   08/18/2017 15     BUN (mg/dL)   Date Value   08/18/2017 14      (goal A1C is < 7)   (goal LDL is <100) need 30-50% reduction from baseline     BP Readings from Last 3 Encounters:   11/17/17 112/80   08/18/17 (!) 130/90   07/18/17 130/76    (goal /80)      All Future Testing planned in [facility]:  Lab Frequency Next Occurrence   Basic Metabolic Panel Once 41/22/2372   Electrophoresis Protein, Serum without Reflex to Immunofixation Once 07/03/2017   PTH, Intact Once 10/01/2017   CBC Auto Differential Once 10/01/2017   C3 Complement Once 07/03/2017   C4 Complement Once 07/03/2017   CHAITANYA Once 07/03/2017   Anti-Neutrophilic Cytoplasmic Antibody Once 07/03/2017   Anti-DNA Antibody, Double-Stranded Once 07/03/2017   [place]/Lambda Free Lt Chains, Serum Quant Once 07/03/2017   Creatinine, Random Urine Once 07/03/2017   Protein, urine, random Once 07/03/2017   US Renal Complete Once 07/03/2017   Bladder scan Once 07/03/2017       Next Visit Date:  Future Appointments  Date Time Provider Elida Rice   2/16/2018 9:15

## 2018-01-22 RX ORDER — CARVEDILOL 25 MG/1
TABLET ORAL
Qty: 120 TABLET | Refills: 3 | Status: SHIPPED | OUTPATIENT
Start: 2018-01-22 | End: 2018-05-22 | Stop reason: SDUPTHER

## 2018-01-22 RX ORDER — SPIRONOLACTONE 25 MG/1
TABLET ORAL
Qty: 30 TABLET | Refills: 3 | Status: SHIPPED | OUTPATIENT
Start: 2018-01-22 | End: 2018-06-10 | Stop reason: SDUPTHER

## 2018-01-22 RX ORDER — HYDRALAZINE HYDROCHLORIDE 50 MG/1
TABLET, FILM COATED ORAL
Qty: 90 TABLET | Refills: 3 | Status: SHIPPED | OUTPATIENT
Start: 2018-01-22 | End: 2018-05-10 | Stop reason: SDUPTHER

## 2018-01-22 RX ORDER — PRAVASTATIN SODIUM 40 MG
TABLET ORAL
Qty: 30 TABLET | Refills: 3 | Status: SHIPPED | OUTPATIENT
Start: 2018-01-22 | End: 2018-06-17 | Stop reason: SDUPTHER

## 2018-01-22 NOTE — TELEPHONE ENCOUNTER
MD noemi Vera [facility]            Patient Active Problem List:     Prostate cancer [facility])     Uncontrolled type 2 diabetes mellitus with complication, with long-term current use of insulin ([facility] [address]     H/O prostate cancer     Essential hypertension     Mixed hyperlipidemia     Obesity (BMI 30.0-34. 9)     Coronary artery disease involving native coronary artery of native heart without angina pectoris     Pacemaker     Obesity (BMI 30-39. 9)     Diabetic polyneuropathy associated with diabetes mellitus due to underlying condition ([facility] [address]     Systolic heart failure (HCC)     Neuropathy (HCC)     Type 2 diabetes mellitus without complication (HCC)     CHF (congestive heart failure) (HCC)     Chronic back pain     History of lung thrombosis     Traumatic closed fx of eight or more ribs with minimal displacement

## 2018-01-24 RX ORDER — GLIMEPIRIDE 4 MG/1
TABLET ORAL
Qty: 30 TABLET | Refills: 3 | Status: SHIPPED | OUTPATIENT
Start: 2018-01-24 | End: 2018-02-16 | Stop reason: ALTCHOICE

## 2018-01-24 NOTE — TELEPHONE ENCOUNTER
Fax from pharmacy   Health Maintenance   Topic Date Due    Diabetic foot exam  07/13/2017    Diabetic retinal exam  02/01/2018 (Originally 7/22/1975)    A1C test (Diabetic or Prediabetic)  02/17/2018    Diabetic microalbuminuria test  08/18/2018    Lipid screen  08/18/2018    Potassium monitoring  08/18/2018    Creatinine monitoring  08/18/2018    Colon cancer screen colonoscopy  09/09/2024    DTaP/Tdap/Td vaccine (2 - Td) 01/01/2025    Flu vaccine  Completed    Pneumococcal med risk  Completed    Hepatitis C screen  Completed    HIV screen  Completed             (applicable per patient's age: Cancer Screenings, Depression Screening, Fall Risk Screening, Immunizations)    Hemoglobin A1C (%)   Date Value   11/17/2017 14.0   08/18/2017 8.4   03/28/2017 7.3     LDL Cholesterol (mg/dL)   Date Value   08/18/2017 81     AST (U/L)   Date Value   08/18/2017 13     ALT (U/L)   Date Value   08/18/2017 15     BUN (mg/dL)   Date Value   08/18/2017 14      (goal A1C is < 7)   (goal LDL is <100) need 30-50% reduction from baseline     BP Readings from Last 3 Encounters:   11/17/17 112/80   08/18/17 (!) 130/90   07/18/17 130/76    (goal /80)      All Future Testing planned in [facility]:  Lab Frequency Next Occurrence   Basic Metabolic Panel Once 54/51/6240   Electrophoresis Protein, Serum without Reflex to Immunofixation Once 07/03/2017   PTH, Intact Once 10/01/2017   CBC Auto Differential Once 10/01/2017   C3 Complement Once 07/03/2017   C4 Complement Once 07/03/2017   CHAITANYA Once 07/03/2017   Anti-Neutrophilic Cytoplasmic Antibody Once 07/03/2017   Anti-DNA Antibody, Double-Stranded Once 07/03/2017   [place]/Lambda Free Lt Chains, Serum Quant Once 07/03/2017   Creatinine, Random Urine Once 07/03/2017   Protein, urine, random Once 07/03/2017   US Renal Complete Once 07/03/2017   Bladder scan Once 07/03/2017       Next Visit Date:  Future Appointments  Date Time Provider Elida Rice   2/16/2018 9:15 AM Keshav Shell MD noemi Villatoro  [facility]            Patient Active Problem List:     Prostate cancer [facility])     Uncontrolled type 2 diabetes mellitus with complication, with long-term current use of insulin ([facility] [address]     H/O prostate cancer     Essential hypertension     Mixed hyperlipidemia     Obesity (BMI 30.0-34. 9)     Coronary artery disease involving native coronary artery of native heart without angina pectoris     Pacemaker     Obesity (BMI 30-39. 9)     Diabetic polyneuropathy associated with diabetes mellitus due to underlying condition ([facility] [address]     Systolic heart failure (HCC)     Neuropathy (HCC)     Type 2 diabetes mellitus without complication (HCC)     CHF (congestive heart failure) (HCC)     Chronic back pain     History of lung thrombosis     Traumatic closed fx of eight or more ribs with minimal displacement

## 2018-02-12 DIAGNOSIS — M54.50 CHRONIC MIDLINE LOW BACK PAIN WITHOUT SCIATICA: Primary | ICD-10-CM

## 2018-02-12 DIAGNOSIS — G89.29 CHRONIC MIDLINE LOW BACK PAIN WITHOUT SCIATICA: Primary | ICD-10-CM

## 2018-02-12 RX ORDER — HYDROCODONE BITARTRATE AND ACETAMINOPHEN 5; 325 MG/1; MG/1
TABLET ORAL
Qty: 120 TABLET | Refills: 0 | Status: SHIPPED | OUTPATIENT
Start: 2018-02-12 | End: 2018-03-09 | Stop reason: SDUPTHER

## 2018-02-12 NOTE — TELEPHONE ENCOUNTER
11/17/2017 last seen in office.   Health Maintenance   Topic Date Due    Diabetic retinal exam  07/22/1975    Diabetic foot exam  07/13/2017    A1C test (Diabetic or Prediabetic)  02/17/2018    Diabetic microalbuminuria test  08/18/2018    Lipid screen  08/18/2018    Potassium monitoring  08/18/2018    Creatinine monitoring  08/18/2018    Colon cancer screen colonoscopy  09/09/2024    DTaP/Tdap/Td vaccine (2 - Td) 01/01/2025    Flu vaccine  Completed    Pneumococcal med risk  Completed    Hepatitis C screen  Completed    HIV screen  Completed             (applicable per patient's age: Cancer Screenings, Depression Screening, Fall Risk Screening, Immunizations)    Hemoglobin A1C (%)   Date Value   11/17/2017 14.0   08/18/2017 8.4   03/28/2017 7.3     LDL Cholesterol (mg/dL)   Date Value   08/18/2017 81     AST (U/L)   Date Value   08/18/2017 13     ALT (U/L)   Date Value   08/18/2017 15     BUN (mg/dL)   Date Value   08/18/2017 14      (goal A1C is < 7)   (goal LDL is <100) need 30-50% reduction from baseline     BP Readings from Last 3 Encounters:   11/17/17 112/80   08/18/17 (!) 130/90   07/18/17 130/76    (goal /80)      All Future Testing planned in [facility]:  Lab Frequency Next Occurrence   Basic Metabolic Panel Once 86/82/8479   Electrophoresis Protein, Serum without Reflex to Immunofixation Once 07/03/2017   PTH, Intact Once 10/01/2017   CBC Auto Differential Once 10/01/2017   C3 Complement Once 07/03/2017   C4 Complement Once 07/03/2017   CHAITANYA Once 07/03/2017   Anti-Neutrophilic Cytoplasmic Antibody Once 07/03/2017   Anti-DNA Antibody, Double-Stranded Once 07/03/2017   [place]/Lambda Free Lt Chains, Serum Quant Once 07/03/2017   Creatinine, Random Urine Once 07/03/2017   Protein, urine, random Once 07/03/2017   US Renal Complete Once 07/03/2017   Bladder scan Once 07/03/2017       Next Visit Date:  Future Appointments  Date Time Provider Elida Rice   2/16/2018 9:15 AM Keshav Esquivel MD

## 2018-02-16 ENCOUNTER — OFFICE VISIT (OUTPATIENT)
Dept: FAMILY MEDICINE CLINIC | Age: 53
End: 2018-02-16
Payer: COMMERCIAL

## 2018-02-16 VITALS
DIASTOLIC BLOOD PRESSURE: 70 MMHG | SYSTOLIC BLOOD PRESSURE: 110 MMHG | BODY MASS INDEX: 31.93 KG/M2 | HEART RATE: 78 BPM | WEIGHT: 242 LBS

## 2018-02-16 DIAGNOSIS — G89.29 CHRONIC MIDLINE LOW BACK PAIN WITHOUT SCIATICA: ICD-10-CM

## 2018-02-16 DIAGNOSIS — E78.2 MIXED HYPERLIPIDEMIA: ICD-10-CM

## 2018-02-16 DIAGNOSIS — Z86.711: ICD-10-CM

## 2018-02-16 DIAGNOSIS — M54.50 CHRONIC MIDLINE LOW BACK PAIN WITHOUT SCIATICA: ICD-10-CM

## 2018-02-16 DIAGNOSIS — I10 ESSENTIAL HYPERTENSION: ICD-10-CM

## 2018-02-16 DIAGNOSIS — C61 PROSTATE CANCER (HCC): ICD-10-CM

## 2018-02-16 DIAGNOSIS — I50.22 CHRONIC SYSTOLIC HEART FAILURE (HCC): ICD-10-CM

## 2018-02-16 PROBLEM — S22.49XA: Status: RESOLVED | Noted: 2017-06-01 | Resolved: 2018-02-16

## 2018-02-16 LAB — HBA1C MFR BLD: 8 %

## 2018-02-16 PROCEDURE — 99214 OFFICE O/P EST MOD 30 MIN: CPT | Performed by: FAMILY MEDICINE

## 2018-02-16 PROCEDURE — 83036 HEMOGLOBIN GLYCOSYLATED A1C: CPT | Performed by: FAMILY MEDICINE

## 2018-02-16 RX ORDER — ASPIRIN 325 MG
TABLET, DELAYED RELEASE (ENTERIC COATED) ORAL
Qty: 30 TABLET | Refills: 3 | COMMUNITY
Start: 2018-02-16 | End: 2020-01-15

## 2018-02-16 ASSESSMENT — ENCOUNTER SYMPTOMS
CONSTIPATION: 0
DIARRHEA: 0
BLOOD IN STOOL: 0
SHORTNESS OF BREATH: 0
HEARTBURN: 0
ABDOMINAL PAIN: 0
EYES NEGATIVE: 1
COUGH: 0

## 2018-03-09 DIAGNOSIS — G89.29 CHRONIC MIDLINE LOW BACK PAIN WITHOUT SCIATICA: ICD-10-CM

## 2018-03-09 DIAGNOSIS — M54.50 CHRONIC MIDLINE LOW BACK PAIN WITHOUT SCIATICA: ICD-10-CM

## 2018-03-09 RX ORDER — HYDROCODONE BITARTRATE AND ACETAMINOPHEN 5; 325 MG/1; MG/1
TABLET ORAL
Qty: 120 TABLET | Refills: 0 | Status: SHIPPED | OUTPATIENT
Start: 2018-03-09 | End: 2018-04-06

## 2018-03-09 NOTE — TELEPHONE ENCOUNTER
02/16/2018 last in office visit.   Health Maintenance   Topic Date Due    Shingles Vaccine (1 of 2 - 2 Dose Series) 07/22/2014    Diabetic retinal exam  06/01/2018    Diabetic microalbuminuria test  08/18/2018    Lipid screen  08/18/2018    Potassium monitoring  08/18/2018    Creatinine monitoring  08/18/2018    Diabetic foot exam  01/24/2019    A1C test (Diabetic or Prediabetic)  02/16/2019    Colon cancer screen colonoscopy  09/09/2024    DTaP/Tdap/Td vaccine (2 - Td) 01/01/2025    Flu vaccine  Completed    Pneumococcal med risk  Completed    Hepatitis C screen  Completed    HIV screen  Completed             (applicable per patient's age: Cancer Screenings, Depression Screening, Fall Risk Screening, Immunizations)    Hemoglobin A1C (%)   Date Value   02/16/2018 8.0   11/17/2017 14.0   08/18/2017 8.4     LDL Cholesterol (mg/dL)   Date Value   08/18/2017 81     AST (U/L)   Date Value   08/18/2017 13     ALT (U/L)   Date Value   08/18/2017 15     BUN (mg/dL)   Date Value   08/18/2017 14      (goal A1C is < 7)   (goal LDL is <100) need 30-50% reduction from baseline     BP Readings from Last 3 Encounters:   02/16/18 110/70   11/17/17 112/80   08/18/17 (!) 130/90    (goal /80)      All Future Testing planned in [facility]:  Lab Frequency Next Occurrence   Basic Metabolic Panel Once 22/30/2961   Electrophoresis Protein, Serum without Reflex to Immunofixation Once 07/03/2017   PTH, Intact Once 10/01/2017   CBC Auto Differential Once 10/01/2017   C3 Complement Once 07/03/2017   C4 Complement Once 07/03/2017   CHAITANYA Once 07/03/2017   Anti-Neutrophilic Cytoplasmic Antibody Once 07/03/2017   Anti-DNA Antibody, Double-Stranded Once 07/03/2017   [place]/Lambda Free Lt Chains, Serum Quant Once 07/03/2017   Creatinine, Random Urine Once 07/03/2017   Protein, urine, random Once 07/03/2017   US Renal Complete Once 07/03/2017   Bladder scan Once 07/03/2017       Next Visit Date:  Future Appointments  Date Time Provider Elida Camargoi   6/18/2018 9:15 AM MD noemi Key  [facility]            Patient Active Problem List:     Prostate cancer [facility])     Uncontrolled type 2 diabetes mellitus with complication, with long-term current use of insulin ([facility] [address]     H/O prostate cancer     Essential hypertension     Mixed hyperlipidemia     Obesity (BMI 30.0-34. 9)     Coronary artery disease involving native coronary artery of native heart without angina pectoris     Pacemaker     Diabetic polyneuropathy associated with diabetes mellitus due to underlying condition (HCC)     Systolic heart failure (HCC)     CHF (congestive heart failure) (HCC)     Chronic back pain     History of lung thrombosis

## 2018-03-23 ENCOUNTER — TELEPHONE (OUTPATIENT)
Dept: FAMILY MEDICINE CLINIC | Age: 53
End: 2018-03-23

## 2018-03-26 RX ORDER — INSULIN ASPART 100 [IU]/ML
45 INJECTION, SUSPENSION SUBCUTANEOUS 2 TIMES DAILY WITH MEALS
Qty: 1 VIAL | Refills: 3 | Status: SHIPPED | OUTPATIENT
Start: 2018-03-26 | End: 2018-05-22 | Stop reason: SDUPTHER

## 2018-04-09 RX ORDER — LISINOPRIL 40 MG/1
TABLET ORAL
Qty: 30 TABLET | Refills: 3 | Status: SHIPPED | OUTPATIENT
Start: 2018-04-09 | End: 2018-08-06 | Stop reason: SDUPTHER

## 2018-04-09 RX ORDER — SITAGLIPTIN 100 MG/1
TABLET, FILM COATED ORAL
Qty: 30 TABLET | Refills: 3 | Status: SHIPPED | OUTPATIENT
Start: 2018-04-09 | End: 2018-06-18 | Stop reason: SDUPTHER

## 2018-04-09 RX ORDER — OMEPRAZOLE 20 MG/1
CAPSULE, DELAYED RELEASE ORAL
Qty: 30 CAPSULE | Refills: 3 | Status: SHIPPED | OUTPATIENT
Start: 2018-04-09 | End: 2018-06-18 | Stop reason: SDUPTHER

## 2018-04-12 DIAGNOSIS — M54.50 CHRONIC MIDLINE LOW BACK PAIN WITHOUT SCIATICA: Primary | ICD-10-CM

## 2018-04-12 DIAGNOSIS — G89.29 CHRONIC MIDLINE LOW BACK PAIN WITHOUT SCIATICA: Primary | ICD-10-CM

## 2018-04-12 RX ORDER — HYDROCODONE BITARTRATE AND ACETAMINOPHEN 5; 325 MG/1; MG/1
1 TABLET ORAL EVERY 6 HOURS PRN
Qty: 120 TABLET | Refills: 0 | Status: SHIPPED | OUTPATIENT
Start: 2018-04-12 | End: 2018-05-11 | Stop reason: SDUPTHER

## 2018-05-10 RX ORDER — HYDRALAZINE HYDROCHLORIDE 50 MG/1
TABLET, FILM COATED ORAL
Qty: 90 TABLET | Refills: 0 | Status: SHIPPED | OUTPATIENT
Start: 2018-05-10 | End: 2018-10-30 | Stop reason: SDUPTHER

## 2018-05-11 DIAGNOSIS — G89.29 CHRONIC MIDLINE LOW BACK PAIN WITHOUT SCIATICA: ICD-10-CM

## 2018-05-11 DIAGNOSIS — M54.50 CHRONIC MIDLINE LOW BACK PAIN WITHOUT SCIATICA: ICD-10-CM

## 2018-05-11 RX ORDER — HYDROCODONE BITARTRATE AND ACETAMINOPHEN 5; 325 MG/1; MG/1
1 TABLET ORAL EVERY 6 HOURS PRN
Qty: 120 TABLET | Refills: 0 | Status: SHIPPED | OUTPATIENT
Start: 2018-05-11 | End: 2018-06-11 | Stop reason: SDUPTHER

## 2018-05-22 RX ORDER — GLIMEPIRIDE 4 MG/1
TABLET ORAL
Qty: 30 TABLET | Refills: 3 | Status: SHIPPED | OUTPATIENT
Start: 2018-05-22 | End: 2018-09-23 | Stop reason: SDUPTHER

## 2018-05-22 RX ORDER — CARVEDILOL 25 MG/1
TABLET ORAL
Qty: 120 TABLET | Refills: 3 | Status: SHIPPED | OUTPATIENT
Start: 2018-05-22 | End: 2018-09-23 | Stop reason: SDUPTHER

## 2018-05-23 RX ORDER — INSULIN ASPART 100 [IU]/ML
INJECTION, SUSPENSION SUBCUTANEOUS
Qty: 10 ML | Refills: 2 | Status: SHIPPED | OUTPATIENT
Start: 2018-05-23 | End: 2018-06-18 | Stop reason: SDUPTHER

## 2018-06-10 RX ORDER — SITAGLIPTIN 100 MG/1
TABLET, FILM COATED ORAL
Qty: 30 TABLET | Refills: 5 | Status: SHIPPED | OUTPATIENT
Start: 2018-06-10 | End: 2018-10-08 | Stop reason: SDUPTHER

## 2018-06-10 RX ORDER — SPIRONOLACTONE 25 MG/1
TABLET ORAL
Qty: 30 TABLET | Refills: 3 | Status: SHIPPED | OUTPATIENT
Start: 2018-06-10 | End: 2018-06-24 | Stop reason: SDUPTHER

## 2018-06-10 RX ORDER — OMEPRAZOLE 20 MG/1
CAPSULE, DELAYED RELEASE ORAL
Qty: 30 CAPSULE | Refills: 3 | Status: SHIPPED | OUTPATIENT
Start: 2018-06-10 | End: 2018-07-11 | Stop reason: SDUPTHER

## 2018-06-11 DIAGNOSIS — M54.50 CHRONIC MIDLINE LOW BACK PAIN WITHOUT SCIATICA: ICD-10-CM

## 2018-06-11 DIAGNOSIS — G89.29 CHRONIC MIDLINE LOW BACK PAIN WITHOUT SCIATICA: ICD-10-CM

## 2018-06-11 RX ORDER — BLOOD SUGAR DIAGNOSTIC
STRIP MISCELLANEOUS
Qty: 100 EACH | Refills: 0 | Status: SHIPPED | OUTPATIENT
Start: 2018-06-11 | End: 2018-10-11 | Stop reason: SDUPTHER

## 2018-06-11 RX ORDER — HYDROCODONE BITARTRATE AND ACETAMINOPHEN 5; 325 MG/1; MG/1
1 TABLET ORAL EVERY 6 HOURS PRN
Qty: 120 TABLET | Refills: 0 | Status: SHIPPED | OUTPATIENT
Start: 2018-06-11 | End: 2018-07-11 | Stop reason: SDUPTHER

## 2018-06-12 DIAGNOSIS — M54.50 CHRONIC MIDLINE LOW BACK PAIN WITHOUT SCIATICA: ICD-10-CM

## 2018-06-12 DIAGNOSIS — G89.29 CHRONIC MIDLINE LOW BACK PAIN WITHOUT SCIATICA: ICD-10-CM

## 2018-06-12 RX ORDER — HYDROCODONE BITARTRATE AND ACETAMINOPHEN 5; 325 MG/1; MG/1
1 TABLET ORAL EVERY 6 HOURS PRN
Qty: 120 TABLET | Refills: 0 | OUTPATIENT
Start: 2018-06-12 | End: 2018-07-12

## 2018-06-12 RX ORDER — BLOOD SUGAR DIAGNOSTIC
STRIP MISCELLANEOUS
Qty: 100 EACH | Refills: 0 | OUTPATIENT
Start: 2018-06-12

## 2018-06-18 ENCOUNTER — OFFICE VISIT (OUTPATIENT)
Dept: FAMILY MEDICINE CLINIC | Age: 53
End: 2018-06-18
Payer: COMMERCIAL

## 2018-06-18 VITALS
HEART RATE: 80 BPM | WEIGHT: 250 LBS | SYSTOLIC BLOOD PRESSURE: 102 MMHG | DIASTOLIC BLOOD PRESSURE: 74 MMHG | BODY MASS INDEX: 32.98 KG/M2

## 2018-06-18 DIAGNOSIS — I10 ESSENTIAL HYPERTENSION: ICD-10-CM

## 2018-06-18 DIAGNOSIS — I50.22 CHRONIC SYSTOLIC HEART FAILURE (HCC): ICD-10-CM

## 2018-06-18 DIAGNOSIS — E11.9 TYPE 2 DIABETES MELLITUS WITHOUT COMPLICATION, WITH LONG-TERM CURRENT USE OF INSULIN (HCC): Primary | ICD-10-CM

## 2018-06-18 DIAGNOSIS — M54.50 CHRONIC MIDLINE LOW BACK PAIN WITHOUT SCIATICA: ICD-10-CM

## 2018-06-18 DIAGNOSIS — E08.42 DIABETIC POLYNEUROPATHY ASSOCIATED WITH DIABETES MELLITUS DUE TO UNDERLYING CONDITION (HCC): ICD-10-CM

## 2018-06-18 DIAGNOSIS — G89.29 CHRONIC MIDLINE LOW BACK PAIN WITHOUT SCIATICA: ICD-10-CM

## 2018-06-18 DIAGNOSIS — E78.2 MIXED HYPERLIPIDEMIA: ICD-10-CM

## 2018-06-18 DIAGNOSIS — Z79.4 TYPE 2 DIABETES MELLITUS WITHOUT COMPLICATION, WITH LONG-TERM CURRENT USE OF INSULIN (HCC): Primary | ICD-10-CM

## 2018-06-18 LAB — HBA1C MFR BLD: 6.4 %

## 2018-06-18 PROCEDURE — 99214 OFFICE O/P EST MOD 30 MIN: CPT | Performed by: FAMILY MEDICINE

## 2018-06-18 PROCEDURE — 83036 HEMOGLOBIN GLYCOSYLATED A1C: CPT | Performed by: FAMILY MEDICINE

## 2018-06-18 RX ORDER — PRAVASTATIN SODIUM 40 MG
TABLET ORAL
Qty: 30 TABLET | Refills: 3 | Status: SHIPPED | OUTPATIENT
Start: 2018-06-18 | End: 2018-07-26 | Stop reason: SDUPTHER

## 2018-06-18 RX ORDER — INSULIN ASPART 100 [IU]/ML
INJECTION, SUSPENSION SUBCUTANEOUS
Qty: 10 ML | Refills: 5 | Status: SHIPPED | OUTPATIENT
Start: 2018-06-18 | End: 2018-09-17 | Stop reason: SDUPTHER

## 2018-06-18 ASSESSMENT — ENCOUNTER SYMPTOMS
BLOOD IN STOOL: 0
SPUTUM PRODUCTION: 0
EYES NEGATIVE: 1
BACK PAIN: 1
ABDOMINAL PAIN: 0
COUGH: 0
DIARRHEA: 0
HEARTBURN: 1
SHORTNESS OF BREATH: 1
CONSTIPATION: 0

## 2018-06-24 RX ORDER — SPIRONOLACTONE 25 MG/1
TABLET ORAL
Qty: 30 TABLET | Refills: 3 | Status: SHIPPED | OUTPATIENT
Start: 2018-06-24 | End: 2018-10-30 | Stop reason: SDUPTHER

## 2018-07-08 RX ORDER — FUROSEMIDE 20 MG/1
TABLET ORAL
Qty: 30 TABLET | Refills: 3 | Status: SHIPPED | OUTPATIENT
Start: 2018-07-08 | End: 2018-12-03 | Stop reason: SDUPTHER

## 2018-07-08 RX ORDER — DILTIAZEM HYDROCHLORIDE 180 MG/1
CAPSULE, EXTENDED RELEASE ORAL
Qty: 30 CAPSULE | Refills: 3 | Status: SHIPPED | OUTPATIENT
Start: 2018-07-08 | End: 2018-11-25 | Stop reason: SDUPTHER

## 2018-07-08 RX ORDER — ISOSORBIDE MONONITRATE 30 MG/1
TABLET, EXTENDED RELEASE ORAL
Qty: 30 TABLET | Refills: 3 | Status: SHIPPED | OUTPATIENT
Start: 2018-07-08 | End: 2018-11-25 | Stop reason: SDUPTHER

## 2018-07-11 DIAGNOSIS — M54.50 CHRONIC MIDLINE LOW BACK PAIN WITHOUT SCIATICA: ICD-10-CM

## 2018-07-11 DIAGNOSIS — G89.29 CHRONIC MIDLINE LOW BACK PAIN WITHOUT SCIATICA: ICD-10-CM

## 2018-07-11 RX ORDER — HYDROCODONE BITARTRATE AND ACETAMINOPHEN 5; 325 MG/1; MG/1
1 TABLET ORAL EVERY 6 HOURS PRN
Qty: 120 TABLET | Refills: 0 | Status: SHIPPED | OUTPATIENT
Start: 2018-07-11 | End: 2018-08-13 | Stop reason: SDUPTHER

## 2018-07-11 RX ORDER — OMEPRAZOLE 20 MG/1
CAPSULE, DELAYED RELEASE ORAL
Qty: 30 CAPSULE | Refills: 0 | Status: SHIPPED | OUTPATIENT
Start: 2018-07-11 | End: 2018-11-13 | Stop reason: SDUPTHER

## 2018-07-26 RX ORDER — PRAVASTATIN SODIUM 40 MG
TABLET ORAL
Qty: 30 TABLET | Refills: 3 | Status: SHIPPED | OUTPATIENT
Start: 2018-07-26 | End: 2019-02-20 | Stop reason: SDUPTHER

## 2018-08-06 RX ORDER — LISINOPRIL 40 MG/1
TABLET ORAL
Qty: 30 TABLET | Refills: 5 | Status: SHIPPED | OUTPATIENT
Start: 2018-08-06 | End: 2019-01-25 | Stop reason: SDUPTHER

## 2018-08-13 DIAGNOSIS — G89.29 CHRONIC MIDLINE LOW BACK PAIN WITHOUT SCIATICA: ICD-10-CM

## 2018-08-13 DIAGNOSIS — M54.50 CHRONIC MIDLINE LOW BACK PAIN WITHOUT SCIATICA: ICD-10-CM

## 2018-08-13 RX ORDER — HYDROCODONE BITARTRATE AND ACETAMINOPHEN 5; 325 MG/1; MG/1
1 TABLET ORAL EVERY 6 HOURS PRN
Qty: 120 TABLET | Refills: 0 | Status: SHIPPED | OUTPATIENT
Start: 2018-08-13 | End: 2018-09-13 | Stop reason: SDUPTHER

## 2018-08-13 NOTE — TELEPHONE ENCOUNTER
Patient request, last appt 6/18/18    Health Maintenance   Topic Date Due    Shingles Vaccine (1 of 2 - 2 Dose Series) 07/22/2014    Diabetic retinal exam  06/01/2018    Diabetic microalbuminuria test  08/18/2018    Lipid screen  08/18/2018    Potassium monitoring  08/18/2018    Creatinine monitoring  08/18/2018    Flu vaccine (1) 09/01/2018    Diabetic foot exam  01/24/2019    A1C test (Diabetic or Prediabetic)  06/18/2019    Colon cancer screen colonoscopy  09/09/2024    DTaP/Tdap/Td vaccine (2 - Td) 01/01/2025    Pneumococcal med risk  Completed    Hepatitis C screen  Completed    HIV screen  Completed             (applicable per patient's age: Cancer Screenings, Depression Screening, Fall Risk Screening, Immunizations)    Hemoglobin A1C (%)   Date Value   06/18/2018 6.4   02/16/2018 8.0   11/17/2017 14.0     LDL Cholesterol (mg/dL)   Date Value   08/18/2017 81     AST (U/L)   Date Value   08/18/2017 13     ALT (U/L)   Date Value   08/18/2017 15     BUN (mg/dL)   Date Value   08/18/2017 14      (goal A1C is < 7)   (goal LDL is <100) need 30-50% reduction from baseline     BP Readings from Last 3 Encounters:   06/18/18 102/74   02/16/18 110/70   11/17/17 112/80    (goal /80)      All Future Testing planned in [facility]:  Lab Frequency Next Occurrence       Next Visit Date:  Future Appointments  Date Time Provider Elida Rice   10/18/2018 9:30 AM [facility]            Patient Active Problem List:     Prostate cancer [facility])     Essential hypertension     Mixed hyperlipidemia     Obesity (BMI 30.0-34. 9)     Coronary artery disease involving native coronary artery of native heart without angina pectoris     Pacemaker     Diabetic polyneuropathy associated with diabetes mellitus due to underlying condition ([address]     Systolic heart failure (HCC)     Chronic back pain     History of lung thrombosis

## 2018-09-13 DIAGNOSIS — M54.50 CHRONIC MIDLINE LOW BACK PAIN WITHOUT SCIATICA: ICD-10-CM

## 2018-09-13 DIAGNOSIS — G89.29 CHRONIC MIDLINE LOW BACK PAIN WITHOUT SCIATICA: ICD-10-CM

## 2018-09-13 RX ORDER — HYDROCODONE BITARTRATE AND ACETAMINOPHEN 5; 325 MG/1; MG/1
1 TABLET ORAL EVERY 6 HOURS PRN
Qty: 120 TABLET | Refills: 0 | Status: SHIPPED | OUTPATIENT
Start: 2018-09-13 | End: 2018-10-11 | Stop reason: SDUPTHER

## 2018-09-23 RX ORDER — GLIMEPIRIDE 4 MG/1
TABLET ORAL
Qty: 30 TABLET | Refills: 3 | Status: SHIPPED | OUTPATIENT
Start: 2018-09-23 | End: 2019-01-13 | Stop reason: SDUPTHER

## 2018-09-23 RX ORDER — CARVEDILOL 25 MG/1
TABLET ORAL
Qty: 120 TABLET | Refills: 3 | Status: SHIPPED | OUTPATIENT
Start: 2018-09-23 | End: 2018-10-18 | Stop reason: SDUPTHER

## 2018-10-18 ENCOUNTER — OFFICE VISIT (OUTPATIENT)
Dept: FAMILY MEDICINE CLINIC | Age: 53
End: 2018-10-18

## 2018-10-18 VITALS
DIASTOLIC BLOOD PRESSURE: 54 MMHG | HEIGHT: 73 IN | SYSTOLIC BLOOD PRESSURE: 92 MMHG | HEART RATE: 76 BPM | BODY MASS INDEX: 33.8 KG/M2 | WEIGHT: 255 LBS

## 2018-10-18 DIAGNOSIS — E78.2 MIXED HYPERLIPIDEMIA: ICD-10-CM

## 2018-10-18 DIAGNOSIS — Z95.0 PACEMAKER: ICD-10-CM

## 2018-10-18 DIAGNOSIS — Z79.4 CONTROLLED TYPE 2 DIABETES MELLITUS WITH DIABETIC POLYNEUROPATHY, WITH LONG-TERM CURRENT USE OF INSULIN (HCC): Primary | ICD-10-CM

## 2018-10-18 DIAGNOSIS — M54.41 CHRONIC MIDLINE LOW BACK PAIN WITH RIGHT-SIDED SCIATICA: ICD-10-CM

## 2018-10-18 DIAGNOSIS — E66.09 CLASS 1 OBESITY DUE TO EXCESS CALORIES WITH SERIOUS COMORBIDITY AND BODY MASS INDEX (BMI) OF 33.0 TO 33.9 IN ADULT: ICD-10-CM

## 2018-10-18 DIAGNOSIS — I10 ESSENTIAL HYPERTENSION: ICD-10-CM

## 2018-10-18 DIAGNOSIS — I50.22 CHRONIC SYSTOLIC HEART FAILURE (HCC): ICD-10-CM

## 2018-10-18 DIAGNOSIS — G89.29 CHRONIC MIDLINE LOW BACK PAIN WITH RIGHT-SIDED SCIATICA: ICD-10-CM

## 2018-10-18 DIAGNOSIS — Z23 NEED FOR PNEUMOCOCCAL VACCINATION: ICD-10-CM

## 2018-10-18 DIAGNOSIS — I25.10 CORONARY ARTERY DISEASE INVOLVING NATIVE CORONARY ARTERY OF NATIVE HEART WITHOUT ANGINA PECTORIS: ICD-10-CM

## 2018-10-18 DIAGNOSIS — E11.42 CONTROLLED TYPE 2 DIABETES MELLITUS WITH DIABETIC POLYNEUROPATHY, WITH LONG-TERM CURRENT USE OF INSULIN (HCC): Primary | ICD-10-CM

## 2018-10-18 LAB — HBA1C MFR BLD: 6.4 %

## 2018-10-18 RX ORDER — CARVEDILOL 25 MG/1
25 TABLET ORAL 2 TIMES DAILY WITH MEALS
Qty: 60 TABLET | Refills: 3
Start: 2018-10-18 | End: 2019-02-20 | Stop reason: SDUPTHER

## 2018-10-18 ASSESSMENT — PATIENT HEALTH QUESTIONNAIRE - PHQ9
SUM OF ALL RESPONSES TO PHQ9 QUESTIONS 1 & 2: 0
2. FEELING DOWN, DEPRESSED OR HOPELESS: 0
1. LITTLE INTEREST OR PLEASURE IN DOING THINGS: 0
SUM OF ALL RESPONSES TO PHQ QUESTIONS 1-9: 0
SUM OF ALL RESPONSES TO PHQ QUESTIONS 1-9: 0

## 2018-10-18 ASSESSMENT — ENCOUNTER SYMPTOMS
SHORTNESS OF BREATH: 1
BACK PAIN: 1
COUGH: 0
EYES NEGATIVE: 1
BLOOD IN STOOL: 0
CONSTIPATION: 0
BOWEL INCONTINENCE: 0
DIARRHEA: 0
ABDOMINAL PAIN: 0

## 2018-10-18 NOTE — PROGRESS NOTES
History:   Diagnosis Date    Cardiomegaly     CHF (congestive heart failure) (HCC) EF 20%    Chronic back pain     Diabetes mellitus (HCC)     GERD (gastroesophageal reflux disease)     History of lung thrombosis     Hx of blood clots     Hyperlipidemia     Hypertension     Neuropathy     feet    Obesity     Pacemaker     AICD    PE (pulmonary embolism)     \"about 5 yrs ago\"    Prostate cancer ([facility] [address] 2016    Traumatic closed fx of eight or more ribs with minimal displacement 2017    pneumothorax    [facility]        FAMILY HISTORY    Family History   Problem Relation Age of Onset    Diabetes Father     Hypertension Father     Heart Disease Father     Hypertension Mother     Prostate Cancer Brother     Diabetes Brother     Diabetes Brother     Diabetes Brother        SOCIAL HISTORY    Social History     Social History    Marital status:      Spouse name: N/A    Number of children: N/A    Years of education: N/A     Occupational History    disability      Social History Main Topics    Smoking status: Current Some Day Smoker     Packs/day: 0.50     Years: 20.00     Last attempt to quit: 2017    Smokeless tobacco: Never Used    Alcohol use No    Drug use: No    Sexual activity: Yes     Partners: Female     Other Topics Concern    None     Social History Narrative    , grandchildren from  daughter live with him and his wife. SURGICAL HISTORY    Past Surgical History:   Procedure Laterality Date    CHEST TUBE INSERTION  2017    COLONOSCOPY  2016    EYE SURGERY Right     injury    OTHER SURGICAL HISTORY      [employer]     PACEMAKER PLACEMENT      ICD;  battery change ;  Dr. Killian Kilpatrick.  DEFIB NOT SAFE MRI    PROSTATE BIOPSY      PROSTATECTOMY  2016    PROSTATECTOMY  2016    carmen lymph node dissection    VENA CAVA FILTER PLACEMENT         CURRENT MEDICATIONS    Current Outpatient Prescriptions   Medication Sig Dispense Refill    carvedilol (COREG) 25 MG tablet Take 1 tablet by mouth 2 times daily (with meals) 60 tablet 3    metFORMIN (GLUCOPHAGE) 500 MG tablet take 1 tablet by mouth twice a day with food 60 tablet 3    B-D INS SYR ULTRAFINE 1CC/31G 31G X 5/16\" 1 ML MISC use twice a day 100 each 0    HYDROcodone-acetaminophen (NORCO) 5-325 MG per tablet Take 1 tablet by mouth every 6 hours as needed for Pain for up to 30 days. Intended supply: 30 days. 120 tablet 0    SITagliptin (JANUVIA) 100 MG tablet take 1 tablet by mouth once daily 30 tablet 3    glimepiride (AMARYL) 4 MG tablet take 1 tablet by mouth once daily 30 tablet 3    NOVOLOG MIX 70/30 (70-30) 100 UNIT/ML injection inject 45 units subcutaneously twice a day with food 10 mL 3    lisinopril (PRINIVIL;ZESTRIL) 40 MG tablet take 1 tablet by mouth once daily 30 tablet 5    pravastatin (PRAVACHOL) 40 MG tablet take 1 tablet by mouth once daily 30 tablet 3    omeprazole (PRILOSEC) 20 MG delayed release capsule take 1 capsule by mouth once daily 30 MINUTES PRIOR TO A MEAL 30 capsule 0    furosemide (LASIX) 20 MG tablet take 1 tablet by mouth once daily 30 tablet 3    CARTIA  MG extended release capsule take 1 capsule by mouth once daily 30 capsule 3    isosorbide mononitrate (IMDUR) 30 MG extended release tablet take 1 tablet by mouth once daily 30 tablet 3    Insulin Pen Needle 31G X 8 MM MISC 1 each by Does not apply route 2 times daily 100 each 3    aspirin 325 MG EC tablet 1/2 tab daily 30 tablet 3    glucose blood VI test strips (ASCENSIA AUTODISC VI;ONE TOUCH ULTRA TEST VI) strip 1 each by In Vitro route 3 times daily As needed.  100 each 2    tamsulosin (FLOMAX) 0.4 MG capsule take 1 capsule by mouth once daily 30 MINUTES AFTER SAME MEAL  0    docusate (COLACE, DULCOLAX) 100 MG CAPS Take 100 mg by mouth 2 times daily 20 capsule 0    gabapentin (NEURONTIN) 800 MG tablet Take 800 mg by mouth 3 stable, continue current medication and/or plan  Reduce dose of coreg due to hypotension and fatigue.   - Comprehensive Metabolic Panel; Future  - carvedilol (COREG) 25 MG tablet; Take 1 tablet by mouth 2 times daily (with meals)  Dispense: 60 tablet; Refill: 3    4. Mixed hyperlipidemia  Chronic, stable, continue current medication and/or plan    - Lipid Panel; Future    5. Chronic systolic heart failure (HCC)  Cont seeing cardiologist.   - CBC Auto Differential; Future  - Comprehensive Metabolic Panel; Future    6. Coronary artery disease involving native coronary artery of native heart without angina pectoris  See cardio     7. Pacemaker  Stable. 8. Class 1 obesity due to excess calories with serious comorbidity and body mass index (BMI) of 33.0 to 33.9 in adult  Cont weight loss efforts. Ray Jain received counseling on the following healthy behaviors: nutrition, exercise and medication adherence  Reviewed prior labs and health maintenance  Continue current medications, diet and exercise. Discussed use, benefit, and side effects of prescribed medications. Barriers to medication compliance addressed. Patient given educational materials - see patient instructions  Was a self-tracking handout given in paper form or via [employer]? Yes    Requested Prescriptions     Signed Prescriptions Disp Refills    carvedilol (COREG) 25 MG tablet 60 tablet 3     Sig: Take 1 tablet by mouth 2 times daily (with meals)       All patient questions answered. Patient voiced understanding. Quality Measures    Body mass index is 33.64 kg/m². Elevated. Weight control planned discussed Healthy diet and regular exercise. BP: (!) 92/54 Blood pressure is low. Treatment plan consists of bp med reduced. discuss with cardio also.     Lab Results   Component Value Date    LDLCHOLESTEROL 81 08/18/2017    (goal LDL reduction with dx if diabetes is 50% LDL reduction)      PHQ Scores 10/18/2018 11/17/2017 9/19/2016   PHQ2 Score 0 0 0

## 2018-10-19 ENCOUNTER — TELEPHONE (OUTPATIENT)
Dept: FAMILY MEDICINE CLINIC | Age: 53
End: 2018-10-19

## 2018-10-19 NOTE — TELEPHONE ENCOUNTER
Clive Batista is pt care cordinator and did request the notes from pt last two office visits.  These notes have been faxed to [phone]

## 2018-10-30 RX ORDER — SPIRONOLACTONE 25 MG/1
TABLET ORAL
Qty: 30 TABLET | Refills: 3 | Status: SHIPPED | OUTPATIENT
Start: 2018-10-30 | End: 2019-02-26 | Stop reason: ALTCHOICE

## 2018-10-30 RX ORDER — HYDRALAZINE HYDROCHLORIDE 50 MG/1
TABLET, FILM COATED ORAL
Qty: 90 TABLET | Refills: 3 | Status: SHIPPED | OUTPATIENT
Start: 2018-10-30 | End: 2019-02-26 | Stop reason: ALTCHOICE

## 2018-11-12 DIAGNOSIS — G89.29 CHRONIC MIDLINE LOW BACK PAIN WITHOUT SCIATICA: ICD-10-CM

## 2018-11-12 DIAGNOSIS — M54.50 CHRONIC MIDLINE LOW BACK PAIN WITHOUT SCIATICA: ICD-10-CM

## 2018-11-12 RX ORDER — HYDROCODONE BITARTRATE AND ACETAMINOPHEN 5; 325 MG/1; MG/1
1 TABLET ORAL EVERY 6 HOURS PRN
Qty: 120 TABLET | Refills: 0 | Status: SHIPPED | OUTPATIENT
Start: 2018-11-12 | End: 2018-12-11 | Stop reason: SDUPTHER

## 2018-11-25 RX ORDER — ISOSORBIDE MONONITRATE 30 MG/1
TABLET, EXTENDED RELEASE ORAL
Qty: 30 TABLET | Refills: 3 | Status: SHIPPED | OUTPATIENT
Start: 2018-11-25 | End: 2019-03-25 | Stop reason: SDUPTHER

## 2018-11-25 RX ORDER — DILTIAZEM HYDROCHLORIDE 180 MG/1
CAPSULE, EXTENDED RELEASE ORAL
Qty: 30 CAPSULE | Refills: 3 | Status: SHIPPED | OUTPATIENT
Start: 2018-11-25 | End: 2019-02-26 | Stop reason: ALTCHOICE

## 2018-12-05 LAB
ALBUMIN SERPL-MCNC: 4 G/DL
ALP BLD-CCNC: 52 U/L
ALT SERPL-CCNC: 10 U/L
ANION GAP SERPL CALCULATED.3IONS-SCNC: 7 MMOL/L
AST SERPL-CCNC: 13 U/L
BASOPHILS ABSOLUTE: 0.1 /ΜL
BASOPHILS RELATIVE PERCENT: 0.7 %
BILIRUB SERPL-MCNC: 0.3 MG/DL (ref 0.1–1.4)
BUN BLDV-MCNC: 46 MG/DL
CALCIUM SERPL-MCNC: 9.5 MG/DL
CHLORIDE BLD-SCNC: 108 MMOL/L
CHOLESTEROL, TOTAL: 130 MG/DL
CHOLESTEROL/HDL RATIO: 5.7
CO2: 23 MMOL/L
CREAT SERPL-MCNC: 2.23 MG/DL
EOSINOPHILS ABSOLUTE: 0.3 /ΜL
EOSINOPHILS RELATIVE PERCENT: 3.7 %
GFR CALCULATED: 37
GLUCOSE BLD-MCNC: 109 MG/DL
HCT VFR BLD CALC: 34 % (ref 41–53)
HDLC SERPL-MCNC: 23 MG/DL (ref 35–70)
HEMOGLOBIN: 11.4 G/DL (ref 13.5–17.5)
LDL CHOLESTEROL CALCULATED: 61 MG/DL (ref 0–160)
LYMPHOCYTES ABSOLUTE: 1.9 /ΜL
LYMPHOCYTES RELATIVE PERCENT: 21.8 %
MCH RBC QN AUTO: 29.2 PG
MCHC RBC AUTO-ENTMCNC: 33.6 G/DL
MCV RBC AUTO: 87 FL
MONOCYTES ABSOLUTE: 0.8 /ΜL
MONOCYTES RELATIVE PERCENT: 9.5 %
NEUTROPHILS ABSOLUTE: 5.6 /ΜL
NEUTROPHILS RELATIVE PERCENT: 64.3 %
PDW BLD-RTO: 14.7 %
PLATELET # BLD: 216 K/ΜL
PMV BLD AUTO: 7.6 FL
POTASSIUM SERPL-SCNC: 5.6 MMOL/L
RBC # BLD: 3.91 10^6/ΜL
SODIUM BLD-SCNC: 138 MMOL/L
TOTAL PROTEIN: 7.1
TRIGL SERPL-MCNC: 229 MG/DL
VLDLC SERPL CALC-MCNC: 46 MG/DL
WBC # BLD: 8.7 10^3/ML

## 2018-12-06 DIAGNOSIS — G89.29 CHRONIC MIDLINE LOW BACK PAIN WITH RIGHT-SIDED SCIATICA: ICD-10-CM

## 2018-12-06 DIAGNOSIS — M54.41 CHRONIC MIDLINE LOW BACK PAIN WITH RIGHT-SIDED SCIATICA: ICD-10-CM

## 2018-12-10 ENCOUNTER — TELEPHONE (OUTPATIENT)
Dept: FAMILY MEDICINE CLINIC | Age: 53
End: 2018-12-10

## 2018-12-10 DIAGNOSIS — N18.30 STAGE 3 CHRONIC KIDNEY DISEASE (HCC): ICD-10-CM

## 2018-12-10 DIAGNOSIS — I50.22 CHRONIC SYSTOLIC HEART FAILURE (HCC): ICD-10-CM

## 2018-12-10 DIAGNOSIS — I10 ESSENTIAL HYPERTENSION: ICD-10-CM

## 2018-12-10 DIAGNOSIS — E78.2 MIXED HYPERLIPIDEMIA: ICD-10-CM

## 2018-12-11 DIAGNOSIS — M54.50 CHRONIC MIDLINE LOW BACK PAIN WITHOUT SCIATICA: ICD-10-CM

## 2018-12-11 DIAGNOSIS — G89.29 CHRONIC MIDLINE LOW BACK PAIN WITHOUT SCIATICA: ICD-10-CM

## 2018-12-11 RX ORDER — HYDROCODONE BITARTRATE AND ACETAMINOPHEN 5; 325 MG/1; MG/1
1 TABLET ORAL EVERY 6 HOURS PRN
Qty: 120 TABLET | Refills: 0 | Status: SHIPPED | OUTPATIENT
Start: 2018-12-11 | End: 2019-01-11 | Stop reason: SDUPTHER

## 2019-01-07 DIAGNOSIS — Z79.4 TYPE 2 DIABETES MELLITUS WITHOUT COMPLICATION, WITH LONG-TERM CURRENT USE OF INSULIN (HCC): ICD-10-CM

## 2019-01-07 DIAGNOSIS — E11.9 TYPE 2 DIABETES MELLITUS WITHOUT COMPLICATION, WITH LONG-TERM CURRENT USE OF INSULIN (HCC): ICD-10-CM

## 2019-01-07 RX ORDER — INSULIN ASPART 100 [IU]/ML
INJECTION, SUSPENSION SUBCUTANEOUS
Qty: 10 ML | Refills: 3 | Status: SHIPPED | OUTPATIENT
Start: 2019-01-07 | End: 2019-03-14 | Stop reason: SDUPTHER

## 2019-01-11 DIAGNOSIS — M54.50 CHRONIC MIDLINE LOW BACK PAIN WITHOUT SCIATICA: ICD-10-CM

## 2019-01-11 DIAGNOSIS — G89.29 CHRONIC MIDLINE LOW BACK PAIN WITHOUT SCIATICA: ICD-10-CM

## 2019-01-11 RX ORDER — HYDROCODONE BITARTRATE AND ACETAMINOPHEN 5; 325 MG/1; MG/1
1 TABLET ORAL EVERY 6 HOURS PRN
Qty: 120 TABLET | Refills: 0 | Status: SHIPPED | OUTPATIENT
Start: 2019-01-11 | End: 2019-02-11 | Stop reason: SDUPTHER

## 2019-01-14 RX ORDER — GLIMEPIRIDE 4 MG/1
TABLET ORAL
Qty: 90 TABLET | Refills: 0 | Status: SHIPPED | OUTPATIENT
Start: 2019-01-14 | End: 2019-02-20 | Stop reason: SDUPTHER

## 2019-02-11 DIAGNOSIS — M54.50 CHRONIC MIDLINE LOW BACK PAIN WITHOUT SCIATICA: ICD-10-CM

## 2019-02-11 DIAGNOSIS — G89.29 CHRONIC MIDLINE LOW BACK PAIN WITHOUT SCIATICA: ICD-10-CM

## 2019-02-11 RX ORDER — HYDROCODONE BITARTRATE AND ACETAMINOPHEN 5; 325 MG/1; MG/1
1 TABLET ORAL EVERY 6 HOURS PRN
Qty: 120 TABLET | Refills: 0 | Status: SHIPPED | OUTPATIENT
Start: 2019-02-11 | End: 2019-03-11 | Stop reason: SDUPTHER

## 2019-02-18 ENCOUNTER — TELEPHONE (OUTPATIENT)
Dept: FAMILY MEDICINE CLINIC | Age: 54
End: 2019-02-18

## 2019-02-18 ENCOUNTER — HOSPITAL ENCOUNTER (OUTPATIENT)
Age: 54
Setting detail: SPECIMEN
Discharge: HOME | End: 2019-02-18
Payer: COMMERCIAL

## 2019-02-18 ENCOUNTER — OFFICE VISIT (OUTPATIENT)
Dept: FAMILY MEDICINE CLINIC | Age: 54
End: 2019-02-18
Payer: COMMERCIAL

## 2019-02-18 VITALS
WEIGHT: 247 LBS | HEART RATE: 92 BPM | DIASTOLIC BLOOD PRESSURE: 56 MMHG | SYSTOLIC BLOOD PRESSURE: 90 MMHG | BODY MASS INDEX: 32.59 KG/M2

## 2019-02-18 DIAGNOSIS — N18.30 STAGE 3 CHRONIC KIDNEY DISEASE (HCC): ICD-10-CM

## 2019-02-18 DIAGNOSIS — Z79.4 CONTROLLED TYPE 2 DIABETES MELLITUS WITH DIABETIC NEPHROPATHY, WITH LONG-TERM CURRENT USE OF INSULIN (HCC): Primary | ICD-10-CM

## 2019-02-18 DIAGNOSIS — I10 ESSENTIAL HYPERTENSION: ICD-10-CM

## 2019-02-18 DIAGNOSIS — E11.21 CONTROLLED TYPE 2 DIABETES MELLITUS WITH DIABETIC NEPHROPATHY, WITH LONG-TERM CURRENT USE OF INSULIN (HCC): ICD-10-CM

## 2019-02-18 DIAGNOSIS — I50.22 CHRONIC SYSTOLIC HEART FAILURE (HCC): ICD-10-CM

## 2019-02-18 DIAGNOSIS — Z79.4 CONTROLLED TYPE 2 DIABETES MELLITUS WITH DIABETIC NEPHROPATHY, WITH LONG-TERM CURRENT USE OF INSULIN (HCC): ICD-10-CM

## 2019-02-18 DIAGNOSIS — E08.42 DIABETIC POLYNEUROPATHY ASSOCIATED WITH DIABETES MELLITUS DUE TO UNDERLYING CONDITION (HCC): ICD-10-CM

## 2019-02-18 DIAGNOSIS — E11.21 CONTROLLED TYPE 2 DIABETES MELLITUS WITH DIABETIC NEPHROPATHY, WITH LONG-TERM CURRENT USE OF INSULIN (HCC): Primary | ICD-10-CM

## 2019-02-18 DIAGNOSIS — E78.2 MIXED HYPERLIPIDEMIA: ICD-10-CM

## 2019-02-18 LAB
ANION GAP SERPL CALCULATED.3IONS-SCNC: 16 MMOL/L (ref 9–17)
BUN BLDV-MCNC: 43 MG/DL (ref 6–20)
BUN/CREAT BLD: ABNORMAL (ref 9–20)
CALCIUM SERPL-MCNC: 9.4 MG/DL (ref 8.6–10.4)
CHLORIDE BLD-SCNC: 106 MMOL/L (ref 98–107)
CO2: 15 MMOL/L (ref 20–31)
CREAT SERPL-MCNC: 2.4 MG/DL (ref 0.7–1.2)
CREATININE URINE POCT: NORMAL
GFR AFRICAN AMERICAN: 34 ML/MIN
GFR NON-AFRICAN AMERICAN: 28 ML/MIN
GFR SERPL CREATININE-BSD FRML MDRD: ABNORMAL ML/MIN/{1.73_M2}
GFR SERPL CREATININE-BSD FRML MDRD: ABNORMAL ML/MIN/{1.73_M2}
GLUCOSE BLD-MCNC: 278 MG/DL (ref 70–99)
HBA1C MFR BLD: 6.5 %
MICROALBUMIN/CREAT 24H UR: NORMAL MG/G{CREAT}
MICROALBUMIN/CREAT UR-RTO: <30
POTASSIUM SERPL-SCNC: 7 MMOL/L (ref 3.7–5.3)
SODIUM BLD-SCNC: 137 MMOL/L (ref 135–144)

## 2019-02-18 PROCEDURE — 99214 OFFICE O/P EST MOD 30 MIN: CPT | Performed by: FAMILY MEDICINE

## 2019-02-18 PROCEDURE — 36415 COLL VENOUS BLD VENIPUNCTURE: CPT | Performed by: FAMILY MEDICINE

## 2019-02-18 PROCEDURE — 83036 HEMOGLOBIN GLYCOSYLATED A1C: CPT | Performed by: FAMILY MEDICINE

## 2019-02-18 PROCEDURE — 82044 UR ALBUMIN SEMIQUANTITATIVE: CPT | Performed by: FAMILY MEDICINE

## 2019-02-18 RX ORDER — BLOOD SUGAR DIAGNOSTIC
STRIP MISCELLANEOUS
Qty: 100 EACH | Refills: 3 | Status: SHIPPED | OUTPATIENT
Start: 2019-02-18 | End: 2020-05-26 | Stop reason: SDUPTHER

## 2019-02-18 ASSESSMENT — ENCOUNTER SYMPTOMS
EYES NEGATIVE: 1
VISUAL CHANGE: 0
CONSTIPATION: 0
SHORTNESS OF BREATH: 1
COUGH: 0
DIARRHEA: 0
ABDOMINAL PAIN: 0
WHEEZING: 0

## 2019-02-18 ASSESSMENT — PATIENT HEALTH QUESTIONNAIRE - PHQ9
SUM OF ALL RESPONSES TO PHQ QUESTIONS 1-9: 0
1. LITTLE INTEREST OR PLEASURE IN DOING THINGS: 0
SUM OF ALL RESPONSES TO PHQ9 QUESTIONS 1 & 2: 0
2. FEELING DOWN, DEPRESSED OR HOPELESS: 0
SUM OF ALL RESPONSES TO PHQ QUESTIONS 1-9: 0

## 2019-02-20 ENCOUNTER — TELEPHONE (OUTPATIENT)
Dept: FAMILY MEDICINE CLINIC | Age: 54
End: 2019-02-20

## 2019-02-26 ENCOUNTER — OFFICE VISIT (OUTPATIENT)
Dept: FAMILY MEDICINE CLINIC | Age: 54
End: 2019-02-26
Payer: COMMERCIAL

## 2019-02-26 VITALS
HEART RATE: 88 BPM | HEIGHT: 73 IN | WEIGHT: 251 LBS | DIASTOLIC BLOOD PRESSURE: 70 MMHG | SYSTOLIC BLOOD PRESSURE: 100 MMHG | BODY MASS INDEX: 33.27 KG/M2

## 2019-02-26 DIAGNOSIS — I10 ESSENTIAL HYPERTENSION: ICD-10-CM

## 2019-02-26 DIAGNOSIS — E11.21 CONTROLLED TYPE 2 DIABETES MELLITUS WITH DIABETIC NEPHROPATHY, WITH LONG-TERM CURRENT USE OF INSULIN (HCC): ICD-10-CM

## 2019-02-26 DIAGNOSIS — N18.30 STAGE 3 CHRONIC KIDNEY DISEASE (HCC): ICD-10-CM

## 2019-02-26 DIAGNOSIS — Z79.4 CONTROLLED TYPE 2 DIABETES MELLITUS WITH DIABETIC NEPHROPATHY, WITH LONG-TERM CURRENT USE OF INSULIN (HCC): ICD-10-CM

## 2019-02-26 DIAGNOSIS — Z09 HOSPITAL DISCHARGE FOLLOW-UP: Primary | ICD-10-CM

## 2019-02-26 PROCEDURE — 99214 OFFICE O/P EST MOD 30 MIN: CPT | Performed by: NURSE PRACTITIONER

## 2019-02-26 PROCEDURE — 1111F DSCHRG MED/CURRENT MED MERGE: CPT | Performed by: NURSE PRACTITIONER

## 2019-02-26 RX ORDER — CARVEDILOL 6.25 MG/1
6.25 TABLET ORAL
COMMUNITY
Start: 2019-02-20 | End: 2019-03-22

## 2019-02-26 ASSESSMENT — ENCOUNTER SYMPTOMS
ABDOMINAL PAIN: 0
DIARRHEA: 0
NAUSEA: 0
CONSTIPATION: 0
RESPIRATORY NEGATIVE: 1
EYES NEGATIVE: 1
SHORTNESS OF BREATH: 0
ALLERGIC/IMMUNOLOGIC NEGATIVE: 1
COUGH: 0
GASTROINTESTINAL NEGATIVE: 1
VOMITING: 0

## 2019-03-01 LAB
BASOPHILS ABSOLUTE: NORMAL /ΜL
BASOPHILS RELATIVE PERCENT: NORMAL %
BILIRUBIN URINE: NORMAL MG/DL
BLOOD, URINE: NORMAL
BUN BLDV-MCNC: NORMAL MG/DL
CALCIUM SERPL-MCNC: NORMAL MG/DL
CHLORIDE BLD-SCNC: NORMAL MMOL/L
CLARITY: NORMAL
CO2: NORMAL MMOL/L
COLOR: NORMAL
CREAT SERPL-MCNC: NORMAL MG/DL
EOSINOPHILS ABSOLUTE: NORMAL /ΜL
EOSINOPHILS RELATIVE PERCENT: NORMAL %
GFR CALCULATED: NORMAL
GLUCOSE BLD-MCNC: NORMAL MG/DL
GLUCOSE URINE: NORMAL
HCT VFR BLD CALC: NORMAL % (ref 41–53)
HEMOGLOBIN: NORMAL G/DL (ref 13.5–17.5)
KETONES, URINE: NORMAL
LEUKOCYTE ESTERASE, URINE: NORMAL
LYMPHOCYTES ABSOLUTE: NORMAL /ΜL
LYMPHOCYTES RELATIVE PERCENT: NORMAL %
MAGNESIUM: NORMAL MG/DL
MCH RBC QN AUTO: NORMAL PG
MCHC RBC AUTO-ENTMCNC: NORMAL G/DL
MCV RBC AUTO: NORMAL FL
MONOCYTES ABSOLUTE: NORMAL /ΜL
MONOCYTES RELATIVE PERCENT: NORMAL %
NEUTROPHILS ABSOLUTE: NORMAL /ΜL
NEUTROPHILS RELATIVE PERCENT: NORMAL %
NITRITE, URINE: NORMAL
PDW BLD-RTO: NORMAL %
PH UA: NORMAL (ref 4.5–8)
PHOSPHORUS: NORMAL MG/DL
PLATELET # BLD: NORMAL K/ΜL
PMV BLD AUTO: NORMAL FL
POTASSIUM SERPL-SCNC: NORMAL MMOL/L
PROTEIN UA: NORMAL
RBC # BLD: NORMAL 10^6/ΜL
SODIUM BLD-SCNC: NORMAL MMOL/L
SPECIFIC GRAVITY UA: NORMAL (ref 1–1.03)
UROBILINOGEN, URINE: NORMAL
WBC # BLD: NORMAL 10^3/ML

## 2019-03-07 DIAGNOSIS — N18.30 STAGE 3 CHRONIC KIDNEY DISEASE (HCC): ICD-10-CM

## 2019-03-07 DIAGNOSIS — I10 ESSENTIAL HYPERTENSION: ICD-10-CM

## 2019-03-11 DIAGNOSIS — G89.29 CHRONIC MIDLINE LOW BACK PAIN WITHOUT SCIATICA: ICD-10-CM

## 2019-03-11 DIAGNOSIS — M54.50 CHRONIC MIDLINE LOW BACK PAIN WITHOUT SCIATICA: ICD-10-CM

## 2019-03-11 RX ORDER — HYDROCODONE BITARTRATE AND ACETAMINOPHEN 5; 325 MG/1; MG/1
1 TABLET ORAL EVERY 6 HOURS PRN
Qty: 120 TABLET | Refills: 0 | Status: SHIPPED | OUTPATIENT
Start: 2019-03-11 | End: 2019-04-11 | Stop reason: SDUPTHER

## 2019-03-15 RX ORDER — SPIRONOLACTONE 25 MG/1
TABLET ORAL
Qty: 30 TABLET | Refills: 3 | Status: SHIPPED | OUTPATIENT
Start: 2019-03-15 | End: 2019-06-19 | Stop reason: ALTCHOICE

## 2019-04-02 RX ORDER — FUROSEMIDE 20 MG/1
TABLET ORAL
Qty: 30 TABLET | Refills: 3 | Status: SHIPPED | OUTPATIENT
Start: 2019-04-02 | End: 2019-06-19 | Stop reason: ALTCHOICE

## 2019-04-02 RX ORDER — SITAGLIPTIN 100 MG/1
TABLET, FILM COATED ORAL
Qty: 30 TABLET | Refills: 5 | Status: SHIPPED | OUTPATIENT
Start: 2019-04-02 | End: 2019-06-19 | Stop reason: SINTOL

## 2019-04-02 NOTE — TELEPHONE ENCOUNTER
Health Maintenance   Topic Date Due    Hepatitis B Vaccine (1 of 3 - Risk 3-dose series) 07/22/1983    Shingles Vaccine (1 of 2) 07/22/2014    Diabetic retinal exam  06/01/2018    Diabetic foot exam  01/24/2019    Flu vaccine (Season Ended) 09/01/2019    Lipid screen  12/05/2019    A1C test (Diabetic or Prediabetic)  02/18/2020    Diabetic microalbuminuria test  02/18/2020    Potassium monitoring  03/01/2020    Creatinine monitoring  03/01/2020    Colon cancer screen colonoscopy  09/09/2024    DTaP/Tdap/Td vaccine (2 - Td) 01/01/2025    Pneumococcal 0-64 years at Risk Vaccine  Completed    Hepatitis C screen  Completed    HIV screen  Completed             (applicable per patient's age: Cancer Screenings, Depression Screening, Fall Risk Screening, Immunizations)    Hemoglobin A1C (%)   Date Value   02/18/2019 6.5   10/18/2018 6.4   06/18/2018 6.4     LDL Cholesterol (mg/dL)   Date Value   08/18/2017 81     LDL Calculated (mg/dL)   Date Value   12/05/2018 61     AST (U/L)   Date Value   12/05/2018 13     ALT (U/L)   Date Value   12/05/2018 10     BUN (mg/dL)   Date Value   02/18/2019 43 (H)      (goal A1C is < 7)   (goal LDL is <100) need 30-50% reduction from baseline     BP Readings from Last 3 Encounters:   02/26/19 100/70   02/18/19 (!) 90/56   10/18/18 (!) 92/54    (goal /80)      All Future Testing planned in [facility]:  Lab Frequency Next Occurrence   Protein, Urine, Random Once 04/06/2019       Next Visit Date:  Future Appointments   Date Time Provider Elida Rice   6/19/2019 10:30 AM MD noemi Mehta  [email or web address]            Patient Active Problem List:     Prostate cancer [facility])     Controlled type 2 diabetes mellitus with diabetic nephropathy, with long-term current use of insulin ([facility] [address]     Essential hypertension     Mixed hyperlipidemia     Obesity (BMI 30.0-34. 9)     Coronary artery disease involving native coronary artery of native heart without angina pectoris Pacemaker     Diabetic polyneuropathy associated with diabetes mellitus due to underlying condition ([address]     Systolic heart failure (HCC)     Chronic back pain     History of lung thrombosis     Obesity     Stage 3 chronic kidney disease ([address]

## 2019-04-11 DIAGNOSIS — G89.29 CHRONIC MIDLINE LOW BACK PAIN WITHOUT SCIATICA: ICD-10-CM

## 2019-04-11 DIAGNOSIS — M54.50 CHRONIC MIDLINE LOW BACK PAIN WITHOUT SCIATICA: ICD-10-CM

## 2019-04-11 RX ORDER — HYDROCODONE BITARTRATE AND ACETAMINOPHEN 5; 325 MG/1; MG/1
1 TABLET ORAL EVERY 6 HOURS PRN
Qty: 120 TABLET | Refills: 0 | Status: SHIPPED | OUTPATIENT
Start: 2019-04-11 | End: 2019-05-10 | Stop reason: SDUPTHER

## 2019-04-11 NOTE — TELEPHONE ENCOUNTER
Patient request, last appt 2/26/19    Health Maintenance   Topic Date Due    Hepatitis B Vaccine (1 of 3 - Risk 3-dose series) 07/22/1983    Shingles Vaccine (1 of 2) 07/22/2014    Diabetic retinal exam  06/01/2018    Diabetic foot exam  01/24/2019    Flu vaccine (Season Ended) 09/01/2019    Lipid screen  12/05/2019    A1C test (Diabetic or Prediabetic)  02/18/2020    Diabetic microalbuminuria test  02/18/2020    Potassium monitoring  03/01/2020    Creatinine monitoring  03/01/2020    Colon cancer screen colonoscopy  09/09/2024    DTaP/Tdap/Td vaccine (2 - Td) 01/01/2025    Pneumococcal 0-64 years Vaccine  Completed    Hepatitis C screen  Completed    HIV screen  Completed             (applicable per patient's age: Cancer Screenings, Depression Screening, Fall Risk Screening, Immunizations)    Hemoglobin A1C (%)   Date Value   02/18/2019 6.5   10/18/2018 6.4   06/18/2018 6.4     LDL Cholesterol (mg/dL)   Date Value   08/18/2017 81     LDL Calculated (mg/dL)   Date Value   12/05/2018 61     AST (U/L)   Date Value   12/05/2018 13     ALT (U/L)   Date Value   12/05/2018 10     BUN (mg/dL)   Date Value   02/18/2019 43 (H)      (goal A1C is < 7)   (goal LDL is <100) need 30-50% reduction from baseline     BP Readings from Last 3 Encounters:   02/26/19 100/70   02/18/19 (!) 90/56   10/18/18 (!) 92/54    (goal /80)      All Future Testing planned in [facility]:  Lab Frequency Next Occurrence   Protein, Urine, Random Once 04/06/2019       Next Visit Date:  Future Appointments   Date Time Provider Elida Rice   6/19/2019 10:30 AM [facility]            Patient Active Problem List:     Prostate cancer [facility])     Controlled type 2 diabetes mellitus with diabetic nephropathy, with long-term current use of insulin ([facility] [address]     Essential hypertension     Mixed hyperlipidemia     Obesity (BMI 30.0-34. 9)     Coronary artery disease involving native coronary artery of native heart without angina pectoris     Pacemaker     Diabetic polyneuropathy associated with diabetes mellitus due to underlying condition ([facility] [address]     Systolic heart failure (HCC)     Chronic back pain     History of lung thrombosis     Obesity     Stage 3 chronic kidney disease ([facility] [address]

## 2019-05-10 DIAGNOSIS — G89.29 CHRONIC MIDLINE LOW BACK PAIN WITHOUT SCIATICA: ICD-10-CM

## 2019-05-10 DIAGNOSIS — M54.50 CHRONIC MIDLINE LOW BACK PAIN WITHOUT SCIATICA: ICD-10-CM

## 2019-05-10 RX ORDER — HYDROCODONE BITARTRATE AND ACETAMINOPHEN 5; 325 MG/1; MG/1
1 TABLET ORAL EVERY 6 HOURS PRN
Qty: 120 TABLET | Refills: 0 | Status: SHIPPED | OUTPATIENT
Start: 2019-05-10 | End: 2019-06-11 | Stop reason: SDUPTHER

## 2019-05-10 NOTE — TELEPHONE ENCOUNTER
pectoris     Pacemaker     Diabetic polyneuropathy associated with diabetes mellitus due to underlying condition ([facility] [address]     Systolic heart failure (HCC)     Chronic back pain     History of lung thrombosis     Obesity     Stage 3 chronic kidney disease ([facility] [address]

## 2019-06-11 DIAGNOSIS — G89.29 CHRONIC MIDLINE LOW BACK PAIN WITHOUT SCIATICA: ICD-10-CM

## 2019-06-11 DIAGNOSIS — M54.50 CHRONIC MIDLINE LOW BACK PAIN WITHOUT SCIATICA: ICD-10-CM

## 2019-06-11 RX ORDER — HYDROCODONE BITARTRATE AND ACETAMINOPHEN 5; 325 MG/1; MG/1
1 TABLET ORAL EVERY 6 HOURS PRN
Qty: 120 TABLET | Refills: 0 | Status: SHIPPED | OUTPATIENT
Start: 2019-06-11 | End: 2019-07-10 | Stop reason: SDUPTHER

## 2019-06-11 NOTE — TELEPHONE ENCOUNTER
Last appointment was on 02/18/19, next one is 06/19/19-
angina pectoris     Pacemaker     Diabetic polyneuropathy associated with diabetes mellitus due to underlying condition ([facility] [address]     Systolic heart failure (HCC)     Chronic back pain     History of lung thrombosis     Obesity     Stage 3 chronic kidney disease ([facility] [address]

## 2019-06-19 ENCOUNTER — CARE COORDINATION (OUTPATIENT)
Dept: CARE COORDINATION | Age: 54
End: 2019-06-19

## 2019-06-19 ENCOUNTER — OFFICE VISIT (OUTPATIENT)
Dept: FAMILY MEDICINE CLINIC | Age: 54
End: 2019-06-19
Payer: COMMERCIAL

## 2019-06-19 VITALS
DIASTOLIC BLOOD PRESSURE: 80 MMHG | BODY MASS INDEX: 32.46 KG/M2 | WEIGHT: 246 LBS | HEART RATE: 84 BPM | SYSTOLIC BLOOD PRESSURE: 130 MMHG

## 2019-06-19 DIAGNOSIS — E11.65 UNCONTROLLED TYPE 2 DIABETES MELLITUS WITH HYPERGLYCEMIA (HCC): Primary | ICD-10-CM

## 2019-06-19 DIAGNOSIS — M75.02 ADHESIVE CAPSULITIS OF LEFT SHOULDER: ICD-10-CM

## 2019-06-19 DIAGNOSIS — G89.29 CHRONIC MIDLINE LOW BACK PAIN WITHOUT SCIATICA: ICD-10-CM

## 2019-06-19 DIAGNOSIS — I10 ESSENTIAL HYPERTENSION: ICD-10-CM

## 2019-06-19 DIAGNOSIS — G89.29 CHRONIC MIDLINE LOW BACK PAIN WITH RIGHT-SIDED SCIATICA: ICD-10-CM

## 2019-06-19 DIAGNOSIS — M54.50 CHRONIC MIDLINE LOW BACK PAIN WITHOUT SCIATICA: ICD-10-CM

## 2019-06-19 DIAGNOSIS — F17.210 CIGARETTE NICOTINE DEPENDENCE WITHOUT COMPLICATION: ICD-10-CM

## 2019-06-19 DIAGNOSIS — E78.2 MIXED HYPERLIPIDEMIA: ICD-10-CM

## 2019-06-19 DIAGNOSIS — M54.41 CHRONIC MIDLINE LOW BACK PAIN WITH RIGHT-SIDED SCIATICA: ICD-10-CM

## 2019-06-19 DIAGNOSIS — N18.30 STAGE 3 CHRONIC KIDNEY DISEASE (HCC): ICD-10-CM

## 2019-06-19 DIAGNOSIS — E08.42 DIABETIC POLYNEUROPATHY ASSOCIATED WITH DIABETES MELLITUS DUE TO UNDERLYING CONDITION (HCC): ICD-10-CM

## 2019-06-19 LAB
CREATININE URINE POCT: 100
HBA1C MFR BLD: 9.5 %
MICROALBUMIN/CREAT 24H UR: 10 MG/G{CREAT}
MICROALBUMIN/CREAT UR-RTO: <30

## 2019-06-19 PROCEDURE — 83036 HEMOGLOBIN GLYCOSYLATED A1C: CPT | Performed by: FAMILY MEDICINE

## 2019-06-19 PROCEDURE — 99214 OFFICE O/P EST MOD 30 MIN: CPT | Performed by: FAMILY MEDICINE

## 2019-06-19 PROCEDURE — 82044 UR ALBUMIN SEMIQUANTITATIVE: CPT | Performed by: FAMILY MEDICINE

## 2019-06-19 RX ORDER — GABAPENTIN 800 MG/1
800 TABLET ORAL 2 TIMES DAILY
Qty: 60 TABLET | Refills: 0 | Status: SHIPPED | OUTPATIENT
Start: 2019-06-19 | End: 2019-08-09 | Stop reason: SDUPTHER

## 2019-06-19 RX ORDER — NICOTINE 21 MG/24HR
1 PATCH, TRANSDERMAL 24 HOURS TRANSDERMAL EVERY 24 HOURS
Qty: 14 PATCH | Refills: 0 | Status: SHIPPED | OUTPATIENT
Start: 2019-06-19 | End: 2020-01-15

## 2019-06-19 RX ORDER — INSULIN ASPART 100 [IU]/ML
50 INJECTION, SUSPENSION SUBCUTANEOUS 2 TIMES DAILY WITH MEALS
Qty: 30 ML | Refills: 3 | Status: SHIPPED | OUTPATIENT
Start: 2019-06-19 | End: 2019-10-04 | Stop reason: SDUPTHER

## 2019-06-19 ASSESSMENT — ENCOUNTER SYMPTOMS
BLOOD IN STOOL: 0
COUGH: 1
ABDOMINAL PAIN: 0
CONSTIPATION: 0
SHORTNESS OF BREATH: 1
BACK PAIN: 1
EYES NEGATIVE: 1

## 2019-06-19 NOTE — PROGRESS NOTES
[facility] PHYSICIANS  CHAVA PARMAR [facility]  [address] [facility] [address]  Dept: [phone]    6/19/2019    CHIEF COMPLAINT    Chief Complaint   Patient presents with    Hypertension    Diabetes       HPI    Lucas Muniz is a 46 y.o. male who presents   Chief Complaint   Patient presents with    Hypertension    Diabetes   . Was taken off many meds when he was in the hospital a few months ago with AKD. Seeing nephrologist. bs has been running high. Believes he is following a healthy diet. Seeing cardiologist, urologist. Has not had eye appt for over a year. Hypertension   This is a chronic problem. The current episode started more than 1 year ago. The problem is controlled. Associated symptoms include malaise/fatigue, peripheral edema (improved) and shortness of breath (with exertion). Pertinent negatives include no chest pain, headaches or palpitations. Risk factors for coronary artery disease include male gender, sedentary lifestyle, diabetes mellitus and dyslipidemia. Past treatments include beta blockers and direct vasodilators. The current treatment provides moderate improvement. Compliance problems include exercise. Hypertensive end-organ damage includes kidney disease, CAD/MI and heart failure. Identifiable causes of hypertension include chronic renal disease. Diabetes   He presents for his follow-up diabetic visit. He has type 2 diabetes mellitus. His disease course has been worsening. There are no hypoglycemic associated symptoms. Pertinent negatives for hypoglycemia include no dizziness, headaches or nervousness/anxiousness. Associated symptoms include foot paresthesias. Pertinent negatives for diabetes include no chest pain and no fatigue. There are no hypoglycemic complications. Diabetic complications include heart disease, nephropathy and peripheral neuropathy.  Risk factors for coronary artery disease include hypertension, male sex, diabetes mellitus, dyslipidemia and sedentary lifestyle. Current diabetic treatment includes insulin injections. He is compliant with treatment most of the time. His weight is stable. He is following a generally healthy diet. When asked about meal planning, he reported none. He has not had a previous visit with a dietitian. He rarely participates in exercise. An ACE inhibitor/angiotensin II receptor blocker is contraindicated. Eye exam is not current. Vitals:    06/19/19 1015   BP: 130/80   Pulse: 84   Weight: 246 lb (111.6 kg)       REVIEW OF SYSTEMS    Review of Systems   Constitutional: Positive for malaise/fatigue. Negative for fatigue, fever and unexpected weight change. HENT: Negative. Eyes: Negative. Respiratory: Positive for cough (chronic. still smoking) and shortness of breath (with exertion). Cardiovascular: Positive for leg swelling (mild, intermittent). Negative for chest pain and palpitations. Gastrointestinal: Negative for abdominal pain, blood in stool and constipation. Genitourinary: Negative for frequency and urgency. Seeing urologist, hx of prostate cancer   Musculoskeletal: Positive for arthralgias (left shoulder) and back pain (chronic, on pain meds. radiates down right leg). Skin: Negative. Neurological: Positive for numbness (feet). Negative for dizziness and headaches. Psychiatric/Behavioral: The patient is not nervous/anxious.         PAST MEDICAL HISTORY    Past Medical History:   Diagnosis Date    Cardiomegaly     CHF (congestive heart failure) (HCC) EF 20%    Chronic back pain     Chronic kidney disease     Diabetes mellitus (HCC)     GERD (gastroesophageal reflux disease)     History of lung thrombosis     Hx of blood clots     Hyperlipidemia     Hypertension     Neuropathy     feet    Obesity     Pacemaker     AICD    PE (pulmonary embolism)     \"about 5 yrs ago\"    Prostate cancer ([facility] [address] 2016    Stage 3 chronic kidney disease ([facility] [address] 12/10/2018    Traumatic closed fx of eight or more ribs with minimal displacement 2017    pneumothorax    Type 2 diabetes mellitus without complication ([facility] [address]        FAMILY HISTORY    Family History   Problem Relation Age of Onset    Diabetes Father     Hypertension Father     Heart Disease Father     Hypertension Mother     Prostate Cancer Brother     Diabetes Brother     Diabetes Brother     Diabetes Brother        SOCIAL HISTORY    Social History     Socioeconomic History    Marital status:      Spouse name: None    Number of children: None    Years of education: None    Highest education level: None   Occupational History    Occupation: disability   Social Needs    Financial resource strain: None    Food insecurity:     Worry: None     Inability: None    Transportation needs:     Medical: None     Non-medical: None   Tobacco Use    Smoking status: Current Some Day Smoker     Packs/day: 0.50     Years: 20.00     Pack years: 10.00     Last attempt to quit: 2017     Years since quittin.0    Smokeless tobacco: Never Used   Substance and Sexual Activity    Alcohol use: No    Drug use: No    Sexual activity: Yes     Partners: Female   Lifestyle    Physical activity:     Days per week: None     Minutes per session: None    Stress: None   Relationships    Social connections:     Talks on phone: None     Gets together: None     Attends Spiritism service: None     Active member of club or organization: None     Attends meetings of clubs or organizations: None     Relationship status: None    Intimate partner violence:     Fear of current or ex partner: None     Emotionally abused: None     Physically abused: None     Forced sexual activity: None   Other Topics Concern    None   Social History Narrative    , grandchildren from  daughter live with him and his wife.         SURGICAL HISTORY    Past Surgical History:   Procedure Laterality Date    CHEST TUBE INSERTION  06/2017    COLONOSCOPY  2016    EYE SURGERY Right     injury    OTHER SURGICAL HISTORY  2000    [employer]     PACEMAKER PLACEMENT  2008    ICD;  battery change 2016;  Dr. [place]roz[place]. DEFIB NOT SAFE MRI    PROSTATE BIOPSY      PROSTATECTOMY  08/31/2016    PROSTATECTOMY  08/31/2016    carmen lymph node dissection    VENA CAVA FILTER PLACEMENT  2000       CURRENT MEDICATIONS    Current Outpatient Medications   Medication Sig Dispense Refill    gabapentin (NEURONTIN) 800 MG tablet Take 1 tablet by mouth 2 times daily for 30 days. 60 tablet 0    insulin aspart protamine-insulin aspart (NOVOLOG MIX 70/30) (70-30) 100 UNIT/ML injection Inject 50 Units into the skin 2 times daily (with meals) 30 mL 3    nicotine (NICODERM CQ) 14 MG/24HR Place 1 patch onto the skin every 24 hours 14 patch 0    nicotine (NICODERM CQ) 7 MG/24HR Place 1 patch onto the skin every 24 hours 30 patch 3    HYDROcodone-acetaminophen (NORCO) 5-325 MG per tablet Take 1 tablet by mouth every 6 hours as needed for Pain for up to 30 days. Intended supply: 30 days 120 tablet 0    carvedilol (COREG) 25 MG tablet take 2 tablets by mouth twice a day 120 tablet 3    isosorbide mononitrate (IMDUR) 30 MG extended release tablet take 1 tablet by mouth once daily 30 tablet 3    pravastatin (PRAVACHOL) 40 MG tablet take 1 tablet by mouth once daily 90 tablet 1    Insulin Syringe-Needle U-100 (B-D INS SYR ULTRAFINE 1CC/31G) 31G X 5/16\" 1 ML MISC use twice a day 100 each 3    omeprazole (PRILOSEC) 20 MG delayed release capsule take 1 capsule by mouth once daily 30 MINUTES PRIOR TO A MEAL 30 capsule 3    Insulin Pen Needle 31G X 8 MM MISC 1 each by Does not apply route 2 times daily 100 each 3    aspirin 325 MG EC tablet 1/2 tab daily 30 tablet 3    glucose blood VI test strips (ASCENSIA AUTODISC VI;ONE TOUCH ULTRA TEST VI) strip 1 each by In Vitro route 3 times daily As needed.  100 each 2    DULoxetine (CYMBALTA) 60 MG capsule Take 60 mg by mouth daily       No current facility-administered medications for this visit. ALLERGIES    Allergies   Allergen Reactions    Adhesive Tape Other (See Comments)     Blister,skin tears with silk tape       PHYSICAL EXAM   Physical Exam   Constitutional: He is oriented to person, place, and time. He appears well-developed and well-nourished. HENT:   Head: Normocephalic. Eyes: Pupils are equal, round, and reactive to light. Neck: Normal range of motion. Neck supple. Cardiovascular: Normal rate, regular rhythm and normal heart sounds. No murmur heard. Pulmonary/Chest: Effort normal and breath sounds normal. He has no wheezes. He has no rales. Abdominal: Soft. There is no tenderness. There is no rebound and no guarding. Musculoskeletal: He exhibits tenderness (rt lumbar spine. left anterior shoulder). He exhibits no edema or deformity. Unable to adbuct left shoulder past 90 degrees   Lymphadenopathy:     He has no cervical adenopathy. Neurological: He is alert and oriented to person, place, and time. Skin: Skin is warm and dry. Venous stasis changes   Psychiatric: He has a normal mood and affect. His behavior is normal.   Vitals reviewed. Assessment/PLan  1. Uncontrolled type 2 diabetes mellitus with hyperglycemia (HCC)  Increase insulin dose. Monitor bs. He spoke with nurse care coordinator.  - POCT glycosylated hemoglobin (Hb A1C)-9.5  - insulin aspart protamine-insulin aspart (NOVOLOG MIX 70/30) (70-30) 100 UNIT/ML injection; Inject 50 Units into the skin 2 times daily (with meals)  Dispense: 30 mL; Refill: 3  - POCT microalbumin    2. Essential hypertension  Chronic, stable, continue current medication and/or plan      3. Stage 3 chronic kidney disease (HCC)  Chronic, stable, continue current medication and/or plan  Cont seeing nephrologist.     4. Chronic midline low back pain without sciatica  See ortho    5. Mixed hyperlipidemia  Cont statin    6.  Diabetic polyneuropathy associated with diabetes mellitus due to underlying condition ([facility] [address]  Cont med, protect feet  - gabapentin (NEURONTIN) 800 MG tablet; Take 1 tablet by mouth 2 times daily for 30 days. Dispense: 60 tablet; Refill: 0    7. Cigarette nicotine dependence without complication  Encouraged to quit with patches  - nicotine (NICODERM CQ) 14 MG/24HR; Place 1 patch onto the skin every 24 hours  Dispense: 14 patch; Refill: 0  - nicotine (NICODERM CQ) 7 MG/24HR; Place 1 patch onto the skin every 24 hours  Dispense: 30 patch; Refill: 3    8. Adhesive capsulitis of left shoulder  See ortho  - [address] [facility], [address] [facility], Orthopedic Surgery, [facility]    9. Chronic midline low back pain with right-sided sciatica    - Vanna Goodwin , Orthopedic Surgery, [facility] received counseling on the following healthy behaviors: nutrition, exercise, medication adherence and tobacco cessation  Reviewed prior labs and health maintenance. Continue current medications, diet and exercise. Discussed use, benefit, and side effects of prescribed medications. Barriers to medication compliance addressed. Patient given educational materials - see patient instructions. All patient questions answered. Patient voiced understanding. Return in about 3 months (around 9/19/2019) for diabetes.         Electronically signed by Brianna Castillo MD on 6/19/19 at 10:32 AM

## 2019-06-19 NOTE — CARE COORDINATION
Ambulatory Care Coordination Note  6/19/2019  CM Risk Score: 4  Bill Mortality Risk Score:      ACC: Sonido Conrad RN    Summary Note: met with pt who is here to see his pcp. His A1C is now 9.5 this is up from 6.5. He said he thought this could be related to his hospital admit back in Feb. Although non of his dm meds were changed at that time. Talked about his current eating habits and he does skip meals. He said he typically eats in the morning and at night. 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 He will try to eat small and frequent meals. He said right now his bs are around 140-167 in the am and 200-299 after meals. He has been having problems with his back and shoulder his pcp made a referal to ortho. He also see a kidney specialist.    1) fu on bs  2) fu on diet  3) fu on ortho referral dr Demetrius Gonzalez  4) fu on nephrology dr Halie Benjamin   5) fu on cardiology dr Simon Moreno   6) need to mail zone and diet info     Ambulatory Care Coordination Assessment    Care Coordination Protocol  Program Enrollment:  Rising Risk  Referral from Primary Care Provider:  Yes  Week 1 - Initial Assessment     Do you have all of your prescriptions and are they filled?:  Yes  Barriers to medication adherence:  None  Are you able to afford your medications?:  Yes  How often do you have trouble taking your medications the way you have been told to take them?:  I always take them as prescribed.      Do you have Home O2 Therapy?:  No      Is patient able to live independently?:  Yes     Current Housing:  Private Residence        Per the Fall Risk Screening, did the patient have 2 or more falls or 1 fall with injury in the past year?:  No     Frequent urination at night?:  No  Do you use rails/bars?:  No  Do you have a non-slip tub mat?:  Yes     Are you experiencing loss of meaning?:  No  Are you experiencing loss of hope and peace?:  No     Thinking about your patient's physical health needs, are there any symptoms or problems (risk indicators) you are unsure about that require further investigation?:  No identified areas of uncertainly or problems already being investigated   Are the patients physical health problems impacting on their mental well-being?:  No identified areas of concern   Are there any problems with your patients lifestyle behaviors (alcohol, drugs, diet, exercise) that are impacting on physical or mental well-being?:  No identified areas of concern   Do you have any other concerns about your patients mental well-being? How would you rate their severity and impact on the patient?:  No identified areas of concern   How would you rate their home environment in terms of safety and stability (including domestic violence, insecure housing, neighbor harassment)?:  Consistently safe, supportive, stable, no identified problems   How do daily activities impact on the patient's well-being? (include current or anticipated unemployment, work, caregiving, access to transportation or other):  No identified problems or perceived positive benefits   How would you rate their social network (family, work, friends)?:  Good participation with social networks   How would you rate their financial resources (including ability to afford all required medical care)?:  Financially secure, resources adequate, no identified problems   How wells does the patient now understand their health and well-being (symptoms, signs or risk factors) and what they need to do to manage their health?:  Reasonable to good understanding and already engages in managing health or is willing to undertake better management   How well do you think your patient can engage in healthcare discussions?  (Barriers include language, deafness, aphasia, alcohol or drug problems, learning difficulties, concentration):  Clear and open communication, no identified barriers   Do other services need to be involved to route 2 times daily 3/27/18   Alyse Beltran MD   aspirin 325 MG EC tablet 1/2 tab daily 2/16/18   Alyse Beltran MD   glucose blood VI test strips (ASCENSIA AUTODISC VI;ONE TOUCH ULTRA TEST VI) strip 1 each by In Vitro route 3 times daily As needed. 2/1/18   Alyse Beltran MD   DULoxetine (CYMBALTA) 60 MG capsule Take 60 mg by mouth daily    Historical Provider, MD       Future Appointments   Date Time Provider Elida Rice   9/19/2019 10:15 AM [facility]     ,   Diabetes Assessment    Medic Alert ID:  No  Meal Planning:  Avoidance of concentrated sweets   How often do you test your blood sugar?:  Daily   Do you have barriers with adherence to non-pharmacologic self-management interventions?  (Nutrition/Exercise/Self-Monitoring):  No   Have you ever had to go to the ED for symptoms of low blood sugar?:  No       No patient-reported symptoms       and   Congestive Heart Failure Assessment    Are you currently restricting fluids?:  No Restriction  Do you understand a low sodium diet?:  Yes  Do you understand how to read food labels?:  No  How many restaurant meals do you eat per week?:  0  Do you salt your food before tasting it?:  No     No patient-reported symptoms      Symptoms:   None:  Yes      Symptom course:  stable  Weight trend:  stable  Salt intake watch compared to last visit:  stable

## 2019-06-20 DIAGNOSIS — M25.512 LEFT SHOULDER PAIN, UNSPECIFIED CHRONICITY: Primary | ICD-10-CM

## 2019-06-24 ENCOUNTER — OFFICE VISIT (OUTPATIENT)
Dept: ORTHOPEDIC SURGERY | Age: 54
End: 2019-06-24
Payer: COMMERCIAL

## 2019-06-24 VITALS — HEIGHT: 73 IN | WEIGHT: 246.03 LBS | BODY MASS INDEX: 32.61 KG/M2

## 2019-06-24 DIAGNOSIS — G89.29 CHRONIC RIGHT-SIDED LOW BACK PAIN WITH RIGHT-SIDED SCIATICA: ICD-10-CM

## 2019-06-24 DIAGNOSIS — M54.41 CHRONIC RIGHT-SIDED LOW BACK PAIN WITH RIGHT-SIDED SCIATICA: ICD-10-CM

## 2019-06-24 DIAGNOSIS — M25.512 LEFT SHOULDER PAIN, UNSPECIFIED CHRONICITY: Primary | ICD-10-CM

## 2019-06-24 PROCEDURE — 99203 OFFICE O/P NEW LOW 30 MIN: CPT | Performed by: STUDENT IN AN ORGANIZED HEALTH CARE EDUCATION/TRAINING PROGRAM

## 2019-06-24 NOTE — PROGRESS NOTES
[address] [place] [id number]  Dept: [phone]    Ambulatory Orthopedic Office Visit      CHIEF COMPLAINT:    Chief Complaint   Patient presents with    Shoulder Pain     left    Lower Back Pain       HISTORY OF PRESENT ILLNESS:      The patient is a 46 y.o.male who is being seen at the request of  Cori Escamilla MD for consultation and evaluation of  Left shoulder pain, low back pain. Pt with 2 year hx of left shoulder pain after fall off a roof. Pt doesn't remember what he injured when he fell, but shoulder hasn't bee the same. Pain over anterior and lateral shoulder. Pain worse with overhead activities. Pt feels like the shoulder is more weak. Pt does not take NSAIDs. Pt has not been into physical therapy. Never had injections to the area. As for back, insidious onset around 5-6 years ago after working in [address] Deep, achy pain to right lumbar back. Pain worse with prolonged activity. Intermittent radiation to right knee. Denies bladder, bowel symptoms. Of note, pt with hx of prostate cancer with removal a few years ago. Pt has been in physical therapy, steroid injections a few years ago without much relief. Pain now worsening, affecting his activities of daily.        Past Medical History:    Past Medical History:   Diagnosis Date    Cardiomegaly     CHF (congestive heart failure) (HCC) EF 20%    Chronic back pain     Chronic kidney disease     Diabetes mellitus (HCC)     GERD (gastroesophageal reflux disease)     History of lung thrombosis     Hx of blood clots     Hyperlipidemia     Hypertension     Neuropathy     feet    Obesity     Pacemaker     AICD    PE (pulmonary embolism)     \"about 5 yrs ago\"    Prostate cancer ([facility] 2016    Stage 3 chronic kidney disease ([facility] [address] 12/10/2018    Traumatic closed fx of eight or more ribs with minimal displacement 06/2017    pneumothorax    Type 2 diabetes mellitus without complication ([address] Past Surgical History:    Past Surgical History:   Procedure Laterality Date    CHEST TUBE INSERTION  06/2017    COLONOSCOPY  2016    EYE SURGERY Right     injury    OTHER SURGICAL HISTORY  2000    [employer]     PACEMAKER PLACEMENT  2008    ICD;  battery change 2016;  Dr. Nir Wilson. DEFIB NOT SAFE MRI    PROSTATE BIOPSY      PROSTATECTOMY  08/31/2016    PROSTATECTOMY  08/31/2016    carmen lymph node dissection    VENA CAVA FILTER PLACEMENT  2000       Current Medications:   Current Outpatient Medications   Medication Sig Dispense Refill    omeprazole (PRILOSEC) 20 MG delayed release capsule take 1 capsule by mouth once daily 30 MINUTES PRIOR TO A MEAL 30 capsule 5    pravastatin (PRAVACHOL) 40 MG tablet take 1 tablet by mouth once daily 90 tablet 2    spironolactone (ALDACTONE) 25 MG tablet take 1 tablet by mouth once daily 30 tablet 5    gabapentin (NEURONTIN) 800 MG tablet Take 1 tablet by mouth 2 times daily for 30 days. 60 tablet 0    insulin aspart protamine-insulin aspart (NOVOLOG MIX 70/30) (70-30) 100 UNIT/ML injection Inject 50 Units into the skin 2 times daily (with meals) 30 mL 3    nicotine (NICODERM CQ) 14 MG/24HR Place 1 patch onto the skin every 24 hours 14 patch 0    nicotine (NICODERM CQ) 7 MG/24HR Place 1 patch onto the skin every 24 hours 30 patch 3    HYDROcodone-acetaminophen (NORCO) 5-325 MG per tablet Take 1 tablet by mouth every 6 hours as needed for Pain for up to 30 days.  Intended supply: 30 days 120 tablet 0    carvedilol (COREG) 25 MG tablet take 2 tablets by mouth twice a day 120 tablet 3    isosorbide mononitrate (IMDUR) 30 MG extended release tablet take 1 tablet by mouth once daily 30 tablet 3    Insulin Syringe-Needle U-100 (B-D INS SYR ULTRAFINE 1CC/31G) 31G X 5/16\" 1 ML MISC use twice a day 100 each 3    Insulin Pen Needle 31G X 8 MM MISC 1 each by Does not apply route 2 times daily 100 each 3    aspirin 325 MG EC tablet 1/2 tab daily 30 tablet 3    glucose blood VI test strips (ASCENSIA AUTODISC VI;ONE TOUCH ULTRA TEST VI) strip 1 each by In Vitro route 3 times daily As needed. 100 each 2    DULoxetine (CYMBALTA) 60 MG capsule Take 60 mg by mouth daily       No current facility-administered medications for this visit. Allergies:    Adhesive tape    Social History:   Social History     Socioeconomic History    Marital status:      Spouse name: Not on file    Number of children: Not on file    Years of education: Not on file    Highest education level: Not on file   Occupational History    Occupation: disability   Social Needs    Financial resource strain: Not on file    Food insecurity:     Worry: Not on file     Inability: Not on file    Transportation needs:     Medical: Not on file     Non-medical: Not on file   Tobacco Use    Smoking status: Current Some Day Smoker     Packs/day: 0.50     Years: 20.00     Pack years: 10.00     Last attempt to quit: 2017     Years since quittin.0    Smokeless tobacco: Never Used   Substance and Sexual Activity    Alcohol use: No    Drug use: No    Sexual activity: Yes     Partners: Female   Lifestyle    Physical activity:     Days per week: Not on file     Minutes per session: Not on file    Stress: Not on file   Relationships    Social connections:     Talks on phone: Not on file     Gets together: Not on file     Attends Sikhism service: Not on file     Active member of club or organization: Not on file     Attends meetings of clubs or organizations: Not on file     Relationship status: Not on file    Intimate partner violence:     Fear of current or ex partner: Not on file     Emotionally abused: Not on file     Physically abused: Not on file     Forced sexual activity: Not on file   Other Topics Concern    Not on file   Social History Narrative    , grandchildren from  daughter live with him and his wife.         Family History:  Family History   Problem Relation extension views of lumbar spine shows significant degenerative changes to L5-S1 disc space with narrowing, osteophytic changes present, no instability. Impression:  -Severe degenerative osteoarthritic changes to lumbar spine      ASSESSMENT:     1. Left shoulder pain, unspecified chronicity    2. Chronic right-sided low back pain with right-sided sciatica         PLAN:      -Left shoulder pain from rotator cuff tendonitis, decent strength and ROM. [facility] [place] joint and biceps tendon.   -Subacromial injection to see how much pain is taken away. If pain remains, consider AC joint injection  -Start PT  -Can't take NSAIDs  -As for back, recommend MRI for lumbar spine with back pain and prostate cancer history  -F/u after MRI to discuss results. If negative, will start conservative management of PT    No follow-ups on file. No orders of the defined types were placed in this encounter. Orders Placed This Encounter   Procedures    XR LUMBAR SPINE (MIN 4 VIEWS)     Include flex/extension views     Order Specific Question:   Reason for exam:     Answer:   right low back pain    MRI LUMBAR SPINE W WO CONTRAST     Standing Status:   Future     Standing Expiration Date:   6/24/2020     Order Specific Question:   Reason for exam:     Answer:   low back pain, hx prostate cancer 2 years ago   [facility]     Referral Priority:   Routine     Referral Type:   Eval and Treat     Referral Reason:   Specialty Services Required     Requested Specialty:   Physical Therapy     Number of Visits Requested:   1          Reviewed Subjective section with patient who did agree and confirmed everything documented. Discussed plan and patient expressed understanding of diagnosis andprognosis with plan as stated. All questions answered.      Eamon Malik DO   Orthopedic Surgery Resident PGY-1  [address] [facility]

## 2019-06-26 ENCOUNTER — CARE COORDINATION (OUTPATIENT)
Dept: CARE COORDINATION | Age: 54
End: 2019-06-26

## 2019-06-26 NOTE — CARE COORDINATION
Ambulatory Care Coordination Note  6/26/2019  CM Risk Score: 4  Bill Mortality Risk Score:      ACC: Sri Davis RN    Summary Note: spoke with pt who said he is doing ok at home. He did see Dr Lawrence David who is going to have him go to PT he will start this on Monday. He did not know he was supposed to have an MRI. He will get this scheduled. He has been working on his diet and he is taking his medications his bs was 163 this am. He does have upcoming apt with Dr Ellen Diallo and Dr Karina Villalba but did not have those apt in front of him at the time of the call. 1) fu on bs  2) fu on diet  3) fu on ortho referral dr Avel Mckeon  4) fu on nephrology dr Phi Prater   5) fu on cardiology dr Bhavin Henry   6) need to mail zone and diet info         Care Coordination Interventions    Program Enrollment:  Rising Risk  Referral from Primary Care Provider:  Yes  Suggested Interventions and Community Resources  Diabetes Education:  Not Started  Zone Management Tools:  Completed (Comment: chf dm)         Goals Addressed                 This Visit's Progress     Nutrition Plan   Improving     I will follow a nutritional plan as directed   Calorie Controlled:   2200 Calories    Barriers: overwhelmed by complexity of regimen  Plan for overcoming my barriers: care coordination   Confidence: 8/10  Anticipated Goal Completion Date: 9/19/19            Prior to Admission medications    Medication Sig Start Date End Date Taking? Authorizing Provider   omeprazole (PRILOSEC) 20 MG delayed release capsule take 1 capsule by mouth once daily 30 MINUTES PRIOR TO A MEAL 6/23/19   Naheed Logan MD   pravastatin (PRAVACHOL) 40 MG tablet take 1 tablet by mouth once daily 6/21/19   Naheed Logan MD   spironolactone (ALDACTONE) 25 MG tablet take 1 tablet by mouth once daily 6/21/19   Naheed Logan MD   gabapentin (NEURONTIN) 800 MG tablet Take 1 tablet by mouth 2 times daily for 30 days.  6/19/19 7/19/19  Naheed Logan MD   insulin aspart protamine-insulin aspart (NOVOLOG MIX 70/30) (70-30) 100 UNIT/ML injection Inject 50 Units into the skin 2 times daily (with meals) 6/19/19   Nabeel Garber MD   nicotine (NICODERM CQ) 14 MG/24HR Place 1 patch onto the skin every 24 hours 6/19/19 6/18/20  Nabeel Garber MD   nicotine (NICODERM CQ) 7 MG/24HR Place 1 patch onto the skin every 24 hours 6/19/19 6/18/20  Nabeel Garber MD   HYDROcodone-acetaminophen (NORCO) 5-325 MG per tablet Take 1 tablet by mouth every 6 hours as needed for Pain for up to 30 days. Intended supply: 30 days 6/11/19 7/11/19  Nabeel Garber MD   carvedilol (COREG) 25 MG tablet take 2 tablets by mouth twice a day 4/19/19   Nabeel Garber MD   isosorbide mononitrate (IMDUR) 30 MG extended release tablet take 1 tablet by mouth once daily 3/25/19   Nabeel Garber MD   Insulin Syringe-Needle U-100 (B-D INS SYR ULTRAFINE 1CC/31G) 31G X 5/16\" 1 ML MISC use twice a day 2/18/19   Nabeel Garber MD   Insulin Pen Needle 31G X 8 MM MISC 1 each by Does not apply route 2 times daily 3/27/18   Nabeel Garber MD   aspirin 325 MG EC tablet 1/2 tab daily 2/16/18   Nabeel Garber MD   glucose blood VI test strips (ASCENSIA AUTODISC VI;ONE TOUCH ULTRA TEST VI) strip 1 each by In Vitro route 3 times daily As needed. 2/1/18   Nabeel Garber MD   DULoxetine (CYMBALTA) 60 MG capsule Take 60 mg by mouth daily    Historical Provider, MD       Future Appointments   Date Time Provider Elida Rice   6/28/2019 11:30 AM Esau Cogan, PT [facility]   8/5/2019 10:30 AM SCHEDULE, [facility]   9/19/2019 10:15 AM MD noemi Deshpande [facility]     ,   Diabetes Assessment    Medic Alert ID:  No  Meal Planning:  Avoidance of concentrated sweets   How often do you test your blood sugar?:  Daily   Do you have barriers with adherence to non-pharmacologic self-management interventions?  (Nutrition/Exercise/Self-Monitoring):  No   Have you ever had to go to the ED for symptoms of low blood sugar?:  No       No patient-reported symptoms       and   Congestive Heart Failure Assessment    Are you currently restricting fluids?:  No Restriction  Do you understand a low sodium diet?:  Yes  Do you understand how to read food labels?:  No  How many restaurant meals do you eat per week?:  0  Do you salt your food before tasting it?:  No     No patient-reported symptoms      Symptoms:   None:  Yes

## 2019-06-28 ENCOUNTER — HOSPITAL ENCOUNTER (OUTPATIENT)
Dept: PHYSICAL THERAPY | Age: 54
Setting detail: THERAPIES SERIES
Discharge: HOME | End: 2019-06-28
Payer: COMMERCIAL

## 2019-06-28 PROCEDURE — 97162 PT EVAL MOD COMPLEX 30 MIN: CPT

## 2019-06-28 NOTE — CONSULTS
[x] [facility] [address]  [address]  [phone]  F: [phone]     Physical Therapy Upper Extremity Evaluation    Date:  2019  Patient: Anna Arita  : 1965  MRN: [id number]  Physician: Jerri Devries DO   Insurance: [employer]  Medical Diagnosis: Left shoulder pain, unspecified chronicity (M25.512)     Rehab Codes:stiffness left shoulder (M25.612); left shoulder pan (M25.512); muscle weakness (M62.81); abnormal posture (R29.3); spasms of muscle (M62.838); history of falls (Z91.81); disturbance of skin sensation (R20.8)      Onset Date: ~   Next 's appt: 2019    Subjective:   CC:pain, difficulty lying on his left side  HPI: (onset date) fell off balcony and fractured ribs on the right side of the chest and had subsequent left shoulder pain  Had injection this week for the left shoulder and feels it is improving  Has fallen in the last year - fell going up the stairs - slipped and missed a step and fell  PMHx: [] Unremarkable [] Diabetes [] HTN  [] Pacemaker   [] MI/Heart Problems [] Cancer [] Arthritis [] Other:              [x] Refer to full medical chart  In EPIC   Tests: [x] X-Ray: [] MRI:  [] Other:    Medications: [x] Refer to full medical record [] None [] Other:  Allergies:      [x] Refer to full medical record [] None [] Other:    Function:  Hand Dominance  [x] Right  [] Left  Working:  [] Normal Duty  [] Light Duty  [] Off D/T Condition  [] Retired    [] Not Employed    [x]  Disability  [] Other:             Return to work:   [employer]: walking the dog    Pain:  [x] Yes  [] No Location: superior left shoulder Pain Rating: (0-10 scale) 3-4/10  Pain altered Tx:  [] Yes  [] No  Action:  Symptoms:  [x] Improving [] Worsening [] Same  Better:  [] AM    [] PM    [x] Sit    [] Rise/Sit    []Stand    [] Walk    [] Lying    [] Other:  Worse: [] AM    [] PM    [] Sit    [] Rise/Sit    [x]Stand    [] Walk    [] Lying    [] Bend                             [] Valsalva    [] Other:  Sleep: [] OK    [x] Disturbed    Objective:     ROM  °A/P END FEEL STRENGTH TESTS (+/-) Left Right Not Tested    Left Right  Left Right Drop Arm   []   Sit Shld Flex 140 ° 175 °  4+/5 5/5 Sulcus Sign   []   Sit Shld Abd 140 ° 175 °  4+/5 5/5 Apprehension   []   Sit Shld IR      Falguni   []   Shoulder Flex      Speeds   []   Ext      Neer   []   ABD      Wu    []   ER @ 0 45 90 85 ° 85 °  3/5 5/5 Painful Arc   []   IR 85 ° 85 °  4/5 5/5 Tinel   []   Supraspinatus    3+/5 4-/5       Infraspinatus            Serratus Ant    5-/5 5/5       Pectoralis    5/5 5/5       Lats            Mid Trap            Lower Trap            Elbow Flex. 140 ° 140 °          Elbow Ext. 0 ° 0 °          Pronation 90 ° 90 °  5/5 5/5       Supination 90 ° 90 °  5/5 5/5       Wrist Flex. Wrist Ext. Rad. Dev. Ulnar Dev.                 OBSERVATION No Deficit Deficit Not Tested Comments   Forward Head [] [x] []    Rounded Shoulders [] [x] [] mild   Kyphosis [x] [] []    Scap Height/Position [x] [] []    Winging [x] [] []    SH Rhythm [] [x] [] left   INSPECTION/PALPATION       SC/AC Joint [] [x] [] left   Supraspinatus [] [x] [] Weakness bilateral   Biceps tendon/groove [x] [] []    Posterior shld [] [x] [] Pain, tenderness    Subscapularis [x] [] []    NEUROLOGICAL       Cervical ROM/Quadrant [] [x] [] cervical extension shift to the right   Reflexes [x] [] []    Compression/Distraction [] [] [x]    Sensation [x] [] []        FUNCTION Normal Difficult Unable   Overhead reach [] [] []   Underarm reach  [] [] []   Groom/Dress [] [] []   Bra/Shirt tuck [] [] []   Lift/Carry [] [] []    [] [] []     CommentsUpper Extremity Functional Index 53%  Assessment:  Problems:    [x] ? Pain: left shoulder  [x] ? ROM: left shoulder  [x] ? Strength:left shoulder and scapular muscles  [x] ?  Function::Upper Extremity visits    Todays Treatment:  Modalities:   Specific MD instructions:left rotator cuff tendonitis. Rx: ROM, strengthening  Precautions:  Exercises:  Exercise Reps/ Time Weight/ Level Comments                                 Other:pre-authorization needed prior to treatment    Specific Instructions for next treatment:assistive left shoulder exercises progressing to active exercises, range of motion progressing to strengthening for the left shoulder       Evaluation Complexity:  History (Personal factors, comorbidities) [] 0 [] 1-2 [x] 3+   Exam (limitations, restrictions) [] 1-2 [] 3 [x] 4+   Clinical presentation (progression) [] Stable [x] Evolving  [] Unstable   Decision Making [] Low [x] Moderate [] High    [] Low Complexity [x] Moderate Complexity [] High Complexity       Treatment Charges: Mins Units   [x] Evaluation       []  Low       [x]  Moderate       []  High 30 1   []  Modalities     []  Ther Exercise     []  Manual Therapy     []  Ther Activities     []  Aquatics     []  Vasocompression     []  Other       TOTAL TREATMENT TIME: 40    Time in: 1130 am    Time Out: 1200    Electronically signed by: Feliz Escobar PT        Physician Signature:________________________________Date:__________________  By signing above or cosigning this note, I have reviewed this plan of care and certify a need for medically necessary rehabilitation services.      *PLEASE SIGN ABOVE AND FAX BACK ALL PAGES*

## 2019-06-28 NOTE — PRE-CERTIFICATION NOTE
Medicare Cap   [x] Physical Therapy  [] Speech Therapy  [] Occupational therapy  *PT and Speech caps combine      $2040 Cap limit < kx modifier needed        Patient Name: Joselyn Ripa: 1965    Note:  This is an estimate of charges billed.      Date of [address] Name # units/ charge $$$ charge Daily Total Charge Ongoing Total $$$   6/28/2019  PT eval    1    $81.73    $81.73   $81.73

## 2019-06-28 NOTE — FLOWSHEET NOTE
Raquel Fall Risk Assessment    Patient Name:  Katalina Cherry  : 1965        Risk Factor Scale  Score   History of Falls [x] Yes  [] No 25  0 25   Secondary Diagnosis [] Yes  [] No 15  0 15   Ambulatory Aid [] Furniture  [x] Crutches/cane/walker  [] None/bedrest/wheelchair/nurse 30  15  0 15   IV/Heparin Lock [] Yes  [x] No 20  0 0   Gait/Transferring [x] Impaired  [] Weak  [] Normal/bedrest/immobile 20  10  0 20   Mental Status [] Forgets limitations  [x] Oriented to own ability 15  0 0      Total:75     Based on the Assessment score: check the appropriate box. [x]  No intervention needed   Low =   Score of 0-24    [x]  Use standard prevention interventions Moderate =  Score of 24-44   [] Give patient handout and discuss fall prevention strategies   [] Establish goal of education for patient/family RE: fall prevention strategies    [x]  Use high risk prevention interventions High = Score of 45 and higher   [] Give patient handout and discuss fall prevention strategies   [] Establish goal of education for patient/family Re: fall prevention strategies   [] Discuss lifeline / other resources    Electronically signed by:    Carmina Hutchison PT  Date: 2019

## 2019-07-01 NOTE — PRE-CERTIFICATION NOTE
[x] [address] [facility]  [address]  [phone]  F: [phone]          Therapy Pre-certification Note      7/1/2019    Julio Newton  1965   [id number]      Insurance approval was received for Physical Therapy from  [place] on 7/1//2019. [employer] Approval was received for 12 visits, from 6/28/2019 to 8/21/2019. Authorization number [id number]. Authorizatin was for [id number] , [id number], [id number] x3 visits per week for 4 weeks totaal of 12 visits, and [phone] x 1 visit    Patient was contacted to be scheduled and he stated he was on his way out the door and he would call us back  Later this afternoon to schedule.       Electronically signed by Greta Herr PT on 7/1/2019 at 12:02 PM

## 2019-07-10 ENCOUNTER — CARE COORDINATION (OUTPATIENT)
Dept: CARE COORDINATION | Age: 54
End: 2019-07-10

## 2019-07-10 DIAGNOSIS — M54.50 CHRONIC MIDLINE LOW BACK PAIN WITHOUT SCIATICA: ICD-10-CM

## 2019-07-10 DIAGNOSIS — G89.29 CHRONIC MIDLINE LOW BACK PAIN WITHOUT SCIATICA: ICD-10-CM

## 2019-07-10 RX ORDER — HYDROCODONE BITARTRATE AND ACETAMINOPHEN 5; 325 MG/1; MG/1
1 TABLET ORAL EVERY 6 HOURS PRN
Qty: 120 TABLET | Refills: 0 | Status: SHIPPED | OUTPATIENT
Start: 2019-07-10 | End: 2019-08-09 | Stop reason: SDUPTHER

## 2019-07-10 NOTE — TELEPHONE ENCOUNTER
Coronary artery disease involving native coronary artery of native heart without angina pectoris     Pacemaker     Diabetic polyneuropathy associated with diabetes mellitus due to underlying condition ([facility] [address]     Systolic heart failure (HCC)     Chronic back pain     History of lung thrombosis     Obesity     Stage 3 chronic kidney disease ([facility] [address]

## 2019-07-11 DIAGNOSIS — M54.41 CHRONIC RIGHT-SIDED LOW BACK PAIN WITH RIGHT-SIDED SCIATICA: ICD-10-CM

## 2019-07-11 DIAGNOSIS — G89.29 CHRONIC RIGHT-SIDED LOW BACK PAIN WITH RIGHT-SIDED SCIATICA: ICD-10-CM

## 2019-07-11 DIAGNOSIS — M25.512 LEFT SHOULDER PAIN, UNSPECIFIED CHRONICITY: Primary | ICD-10-CM

## 2019-07-17 ENCOUNTER — CARE COORDINATION (OUTPATIENT)
Dept: CARE COORDINATION | Age: 54
End: 2019-07-17

## 2019-07-17 NOTE — CARE COORDINATION
Jose Carlos from [facility] called back and will look into getting mri order changed.  And call the pt back and let him know

## 2019-07-18 DIAGNOSIS — G89.29 CHRONIC RIGHT-SIDED LOW BACK PAIN WITH RIGHT-SIDED SCIATICA: Primary | ICD-10-CM

## 2019-07-18 DIAGNOSIS — M54.41 CHRONIC RIGHT-SIDED LOW BACK PAIN WITH RIGHT-SIDED SCIATICA: Primary | ICD-10-CM

## 2019-07-20 RX ORDER — OMEPRAZOLE 20 MG/1
CAPSULE, DELAYED RELEASE ORAL
Qty: 30 CAPSULE | Refills: 3 | Status: SHIPPED | OUTPATIENT
Start: 2019-07-20 | End: 2019-08-23 | Stop reason: CLARIF

## 2019-07-20 RX ORDER — ISOSORBIDE MONONITRATE 30 MG/1
TABLET, EXTENDED RELEASE ORAL
Qty: 30 TABLET | Refills: 3 | Status: SHIPPED | OUTPATIENT
Start: 2019-07-20 | End: 2019-10-21 | Stop reason: SDUPTHER

## 2019-07-24 ENCOUNTER — HOSPITAL ENCOUNTER (OUTPATIENT)
Dept: CT IMAGING | Age: 54
Discharge: HOME | End: 2019-07-26
Payer: COMMERCIAL

## 2019-07-24 ENCOUNTER — HOSPITAL ENCOUNTER (OUTPATIENT)
Dept: INTERVENTIONAL RADIOLOGY/VASCULAR | Age: 54
Discharge: HOME | End: 2019-07-26
Payer: COMMERCIAL

## 2019-07-24 VITALS
RESPIRATION RATE: 18 BRPM | DIASTOLIC BLOOD PRESSURE: 86 MMHG | OXYGEN SATURATION: 99 % | WEIGHT: 244 LBS | SYSTOLIC BLOOD PRESSURE: 136 MMHG | BODY MASS INDEX: 32.34 KG/M2 | HEART RATE: 74 BPM | HEIGHT: 73 IN | TEMPERATURE: 97.1 F

## 2019-07-24 DIAGNOSIS — G89.29 CHRONIC RIGHT-SIDED LOW BACK PAIN WITH RIGHT-SIDED SCIATICA: ICD-10-CM

## 2019-07-24 DIAGNOSIS — Z98.890 STATUS POST MYELOGRAM: ICD-10-CM

## 2019-07-24 DIAGNOSIS — M54.41 CHRONIC RIGHT-SIDED LOW BACK PAIN WITH RIGHT-SIDED SCIATICA: ICD-10-CM

## 2019-07-24 LAB
INR BLD: 0.9
PARTIAL THROMBOPLASTIN TIME: 22.9 SEC (ref 20.5–30.5)
PLATELET # BLD: 218 K/UL (ref 138–453)
PROTHROMBIN TIME: 9.9 SEC (ref 9–12)

## 2019-07-24 PROCEDURE — 85610 PROTHROMBIN TIME: CPT

## 2019-07-24 PROCEDURE — 7100000010 HC PHASE II RECOVERY - FIRST 15 MIN

## 2019-07-24 PROCEDURE — 85049 AUTOMATED PLATELET COUNT: CPT

## 2019-07-24 PROCEDURE — 85730 THROMBOPLASTIN TIME PARTIAL: CPT

## 2019-07-24 PROCEDURE — 72132 CT LUMBAR SPINE W/DYE: CPT

## 2019-07-24 PROCEDURE — 7100000011 HC PHASE II RECOVERY - ADDTL 15 MIN

## 2019-07-24 PROCEDURE — 2580000003 HC RX 258: Performed by: RADIOLOGY

## 2019-07-24 PROCEDURE — 6360000004 HC RX CONTRAST MEDICATION: Performed by: RADIOLOGY

## 2019-07-24 PROCEDURE — 72265 MYELOGRAPHY L-S SPINE: CPT | Performed by: RADIOLOGY

## 2019-07-24 PROCEDURE — 2709999900 HC NON-CHARGEABLE SUPPLY

## 2019-07-24 RX ORDER — ACETAMINOPHEN 325 MG/1
650 TABLET ORAL EVERY 4 HOURS PRN
Status: DISCONTINUED | OUTPATIENT
Start: 2019-07-24 | End: 2019-07-27 | Stop reason: HOSPADM

## 2019-07-24 RX ORDER — SODIUM CHLORIDE 9 MG/ML
INJECTION, SOLUTION INTRAVENOUS CONTINUOUS
Status: DISCONTINUED | OUTPATIENT
Start: 2019-07-24 | End: 2019-07-27 | Stop reason: HOSPADM

## 2019-07-24 RX ADMIN — IOPAMIDOL 11 ML: 408 INJECTION, SOLUTION INTRATHECAL at 10:39

## 2019-07-24 RX ADMIN — SODIUM CHLORIDE: 9 INJECTION, SOLUTION INTRAVENOUS at 09:16

## 2019-07-24 ASSESSMENT — PAIN SCALES - GENERAL: PAINLEVEL_OUTOF10: 6

## 2019-07-24 ASSESSMENT — PAIN - FUNCTIONAL ASSESSMENT: PAIN_FUNCTIONAL_ASSESSMENT: 0-10

## 2019-07-24 ASSESSMENT — PAIN DESCRIPTION - DESCRIPTORS: DESCRIPTORS: ACHING

## 2019-07-24 ASSESSMENT — PAIN DESCRIPTION - ORIENTATION: ORIENTATION: RIGHT

## 2019-07-24 ASSESSMENT — PAIN DESCRIPTION - PAIN TYPE: TYPE: CHRONIC PAIN

## 2019-07-24 ASSESSMENT — PAIN DESCRIPTION - LOCATION: LOCATION: BACK

## 2019-07-24 NOTE — PROGRESS NOTES
Pt sitting up drinking, one band aide remains dry and intact to low right back  Discharge instructions reviewed

## 2019-07-24 NOTE — PROGRESS NOTES
Pt sitting up drinking fluids, one band aide remains dry and intact to low right back  Discharge instructions reviewed

## 2019-07-25 RX ORDER — LISINOPRIL 40 MG/1
TABLET ORAL
Qty: 30 TABLET | Refills: 5 | Status: SHIPPED | OUTPATIENT
Start: 2019-07-25 | End: 2020-01-15

## 2019-08-07 ENCOUNTER — CARE COORDINATION (OUTPATIENT)
Dept: CARE COORDINATION | Age: 54
End: 2019-08-07

## 2019-08-07 NOTE — CARE COORDINATION
30 days 7/10/19 8/9/19  Daniel Burnham MD   ONE TOUCH ULTRA TEST strip TEST three times a day if needed 7/7/19   Daniel Burnham MD   omeprazole (PRILOSEC) 20 MG delayed release capsule take 1 capsule by mouth once daily 30 MINUTES PRIOR TO A MEAL 6/23/19   Daniel Burnham MD   pravastatin (PRAVACHOL) 40 MG tablet take 1 tablet by mouth once daily 6/21/19   Daniel Burnham MD   spironolactone (ALDACTONE) 25 MG tablet take 1 tablet by mouth once daily 6/21/19   Daniel Burnham MD   gabapentin (NEURONTIN) 800 MG tablet Take 1 tablet by mouth 2 times daily for 30 days. 6/19/19 7/19/19  Daniel Burnham MD   insulin aspart protamine-insulin aspart (NOVOLOG MIX 70/30) (70-30) 100 UNIT/ML injection Inject 50 Units into the skin 2 times daily (with meals) 6/19/19   Daniel Burnham MD   nicotine (NICODERM CQ) 14 MG/24HR Place 1 patch onto the skin every 24 hours 6/19/19 6/18/20  Daniel Burnham MD   nicotine (NICODERM CQ) 7 MG/24HR Place 1 patch onto the skin every 24 hours 6/19/19 6/18/20  Daniel Burnham MD   carvedilol (COREG) 25 MG tablet take 2 tablets by mouth twice a day 4/19/19   Daniel Burnham MD   Insulin Syringe-Needle U-100 (B-D INS SYR ULTRAFINE 1CC/31G) 31G X 5/16\" 1 ML MISC use twice a day 2/18/19   Daniel Burnham MD   Insulin Pen Needle 31G X 8 MM MISC 1 each by Does not apply route 2 times daily 3/27/18   Daniel Burnham MD   aspirin 325 MG EC tablet 1/2 tab daily 2/16/18   Daniel Burnham MD   glucose blood VI test strips (ASCENSIA AUTODISC VI;ONE TOUCH ULTRA TEST VI) strip 1 each by In Vitro route 3 times daily As needed.  2/1/18   Daniel Burnham MD   DULoxetine (CYMBALTA) 60 MG capsule Take 60 mg by mouth daily    Historical Provider, MD       Future Appointments   Date Time Provider Elida Rice   9/19/2019 10:15 AM MD noemi Vega [facility]     ,   Diabetes Assessment    Medic Alert ID:  No  Meal Planning:  Avoidance of concentrated sweets   How often do you test your blood sugar?: Daily   Do you have barriers with adherence to non-pharmacologic self-management interventions?  (Nutrition/Exercise/Self-Monitoring):  No   Have you ever had to go to the ED for symptoms of low blood sugar?:  No       No patient-reported symptoms       and   Congestive Heart Failure Assessment    Are you currently restricting fluids?:  No Restriction  Do you understand a low sodium diet?:  Yes  Do you understand how to read food labels?:  No  How many restaurant meals do you eat per week?:  0  Do you salt your food before tasting it?:  No     No patient-reported symptoms      Symptoms:   None:  Yes

## 2019-08-09 DIAGNOSIS — G89.29 CHRONIC MIDLINE LOW BACK PAIN WITHOUT SCIATICA: ICD-10-CM

## 2019-08-09 DIAGNOSIS — E08.42 DIABETIC POLYNEUROPATHY ASSOCIATED WITH DIABETES MELLITUS DUE TO UNDERLYING CONDITION (HCC): ICD-10-CM

## 2019-08-09 DIAGNOSIS — M54.50 CHRONIC MIDLINE LOW BACK PAIN WITHOUT SCIATICA: ICD-10-CM

## 2019-08-09 RX ORDER — HYDROCODONE BITARTRATE AND ACETAMINOPHEN 5; 325 MG/1; MG/1
1 TABLET ORAL EVERY 6 HOURS PRN
Qty: 120 TABLET | Refills: 0 | Status: SHIPPED | OUTPATIENT
Start: 2019-08-09 | End: 2019-09-10 | Stop reason: SDUPTHER

## 2019-08-09 RX ORDER — GABAPENTIN 800 MG/1
TABLET ORAL
Qty: 60 TABLET | Refills: 0 | Status: SHIPPED | OUTPATIENT
Start: 2019-08-09 | End: 2019-08-31 | Stop reason: SDUPTHER

## 2019-08-12 ENCOUNTER — OFFICE VISIT (OUTPATIENT)
Dept: ORTHOPEDIC SURGERY | Age: 54
End: 2019-08-12
Payer: COMMERCIAL

## 2019-08-12 VITALS — HEIGHT: 73 IN | BODY MASS INDEX: 32.34 KG/M2 | WEIGHT: 244.05 LBS

## 2019-08-12 DIAGNOSIS — M54.41 CHRONIC RIGHT-SIDED LOW BACK PAIN WITH RIGHT-SIDED SCIATICA: ICD-10-CM

## 2019-08-12 DIAGNOSIS — M25.512 LEFT SHOULDER PAIN, UNSPECIFIED CHRONICITY: Primary | ICD-10-CM

## 2019-08-12 DIAGNOSIS — G89.29 CHRONIC RIGHT-SIDED LOW BACK PAIN WITH RIGHT-SIDED SCIATICA: ICD-10-CM

## 2019-08-12 PROCEDURE — 99213 OFFICE O/P EST LOW 20 MIN: CPT | Performed by: STUDENT IN AN ORGANIZED HEALTH CARE EDUCATION/TRAINING PROGRAM

## 2019-08-12 ASSESSMENT — ENCOUNTER SYMPTOMS
NAUSEA: 0
VOMITING: 0
BACK PAIN: 0
SHORTNESS OF BREATH: 0

## 2019-08-14 DIAGNOSIS — I10 ESSENTIAL HYPERTENSION: ICD-10-CM

## 2019-08-15 RX ORDER — CARVEDILOL 25 MG/1
TABLET ORAL
Qty: 120 TABLET | Refills: 3 | Status: SHIPPED | OUTPATIENT
Start: 2019-08-15 | End: 2020-01-15

## 2019-08-21 ENCOUNTER — CARE COORDINATION (OUTPATIENT)
Dept: CARE COORDINATION | Age: 54
End: 2019-08-21

## 2019-08-21 NOTE — CARE COORDINATION
often do you test your blood sugar?:  Daily   Do you have barriers with adherence to non-pharmacologic self-management interventions?  (Nutrition/Exercise/Self-Monitoring):  No   Have you ever had to go to the ED for symptoms of low blood sugar?:  No       No patient-reported symptoms       and   Congestive Heart Failure Assessment    Are you currently restricting fluids?:  No Restriction  Do you understand a low sodium diet?:  Yes  Do you understand how to read food labels?:  No  How many restaurant meals do you eat per week?:  0  Do you salt your food before tasting it?:  No     No patient-reported symptoms      Symptoms:   None:  Yes

## 2019-08-23 ENCOUNTER — OFFICE VISIT (OUTPATIENT)
Dept: FAMILY MEDICINE CLINIC | Age: 54
End: 2019-08-23
Payer: COMMERCIAL

## 2019-08-23 VITALS
BODY MASS INDEX: 33.25 KG/M2 | DIASTOLIC BLOOD PRESSURE: 90 MMHG | SYSTOLIC BLOOD PRESSURE: 132 MMHG | WEIGHT: 252 LBS | HEART RATE: 84 BPM

## 2019-08-23 DIAGNOSIS — M54.41 CHRONIC MIDLINE LOW BACK PAIN WITH RIGHT-SIDED SCIATICA: ICD-10-CM

## 2019-08-23 DIAGNOSIS — Z13.5 SCREENING FOR DIABETIC RETINOPATHY: ICD-10-CM

## 2019-08-23 DIAGNOSIS — I10 ESSENTIAL HYPERTENSION: ICD-10-CM

## 2019-08-23 DIAGNOSIS — G89.29 CHRONIC MIDLINE LOW BACK PAIN WITH RIGHT-SIDED SCIATICA: ICD-10-CM

## 2019-08-23 DIAGNOSIS — E04.1 THYROID NODULE: Primary | ICD-10-CM

## 2019-08-23 DIAGNOSIS — E11.21 CONTROLLED TYPE 2 DIABETES MELLITUS WITH DIABETIC NEPHROPATHY, WITH LONG-TERM CURRENT USE OF INSULIN (HCC): ICD-10-CM

## 2019-08-23 DIAGNOSIS — Z79.4 CONTROLLED TYPE 2 DIABETES MELLITUS WITH DIABETIC NEPHROPATHY, WITH LONG-TERM CURRENT USE OF INSULIN (HCC): ICD-10-CM

## 2019-08-23 PROCEDURE — 99213 OFFICE O/P EST LOW 20 MIN: CPT | Performed by: FAMILY MEDICINE

## 2019-08-23 ASSESSMENT — ENCOUNTER SYMPTOMS
EYES NEGATIVE: 1
ABDOMINAL PAIN: 0
CONSTIPATION: 0
SHORTNESS OF BREATH: 0
COUGH: 0

## 2019-08-23 NOTE — PROGRESS NOTES
[facility] PHYSICIANS  [facility]  [address]  [facility] [address]  Dept: [phone]    8/23/2019    CHIEF COMPLAINT    Chief Complaint   Patient presents with    Thyroid Problem       HPI    Randa Cuevas is a 53 y.o. male who presents   Chief Complaint   Patient presents with    Thyroid Problem   . Was seen by his cardiologist who ordered a thyroid us due to enlarged rt thyroid. US showed small nodule rt lobe, absent left lobe. Pt has never had any thyroid surgery or problem that he is aware of. Thyroid function tests were normal. He has occasional hoarse voice, no dysphagia or sore neck. Vitals:    08/23/19 1126 08/23/19 1149   BP: (!) 140/90 (!) 132/90   Pulse: 84    Weight: 252 lb (114.3 kg)        REVIEW OF SYSTEMS    Review of Systems   Constitutional: Negative for diaphoresis, fatigue, fever and unexpected weight change. HENT: Negative. Eyes: Negative. Respiratory: Negative for cough and shortness of breath. Cardiovascular: Negative for chest pain and leg swelling. Gastrointestinal: Negative for abdominal pain and constipation. Musculoskeletal: Negative. Skin: Negative. Neurological: Negative for dizziness and headaches. Psychiatric/Behavioral: The patient is not nervous/anxious.         PAST MEDICAL HISTORY    Past Medical History:   Diagnosis Date    Cardiomegaly     CHF (congestive heart failure) (HCC) EF 20%    Chronic back pain     Chronic kidney disease     Diabetes mellitus (HCC)     GERD (gastroesophageal reflux disease)     History of lung thrombosis     Hx of blood clots     Hyperlipidemia     Hypertension     Neuropathy     feet    Obesity     Pacemaker     AICD    PE (pulmonary embolism)     \"about 5 yrs ago\"    Prostate cancer ([address] 2016    Stage 3 chronic kidney disease ([address] 12/10/2018    Traumatic closed fx of eight or more ribs with minimal displacement 06/2017    pneumothorax    Type 2 diabetes mellitus without complication (HCC)        FAMILY HISTORY    Family History   Problem Relation Age of Onset    Diabetes Father     Hypertension Father     Heart Disease Father     Hypertension Mother     Prostate Cancer Brother     Diabetes Brother     Diabetes Brother     Diabetes Brother        SOCIAL HISTORY    Social History     Socioeconomic History    Marital status:      Spouse name: None    Number of children: None    Years of education: None    Highest education level: None   Occupational History    Occupation: disability   Social Needs    Financial resource strain: None    Food insecurity:     Worry: None     Inability: None    Transportation needs:     Medical: None     Non-medical: None   Tobacco Use    Smoking status: Current Some Day Smoker     Packs/day: 0.50     Years: 20.00     Pack years: 10.00     Last attempt to quit: 2017     Years since quittin.1    Smokeless tobacco: Never Used   Substance and Sexual Activity    Alcohol use: No    Drug use: No    Sexual activity: Yes     Partners: Female   Lifestyle    Physical activity:     Days per week: None     Minutes per session: None    Stress: None   Relationships    Social connections:     Talks on phone: None     Gets together: None     Attends Quaker service: None     Active member of club or organization: None     Attends meetings of clubs or organizations: None     Relationship status: None    Intimate partner violence:     Fear of current or ex partner: None     Emotionally abused: None     Physically abused: None     Forced sexual activity: None   Other Topics Concern    None   Social History Narrative    , grandchildren from  daughter live with him and his wife.         SURGICAL HISTORY    Past Surgical History:   Procedure Laterality Date    CHEST TUBE INSERTION  2017    COLONOSCOPY  2016    EYE SURGERY Right     injury    OTHER SURGICAL HISTORY

## 2019-08-30 RX ORDER — FUROSEMIDE 20 MG/1
TABLET ORAL
Qty: 30 TABLET | Refills: 3 | Status: SHIPPED | OUTPATIENT
Start: 2019-08-30 | End: 2019-12-29

## 2019-08-31 DIAGNOSIS — E08.42 DIABETIC POLYNEUROPATHY ASSOCIATED WITH DIABETES MELLITUS DUE TO UNDERLYING CONDITION (HCC): ICD-10-CM

## 2019-09-01 RX ORDER — GABAPENTIN 800 MG/1
TABLET ORAL
Qty: 60 TABLET | Refills: 0 | Status: SHIPPED | OUTPATIENT
Start: 2019-09-01 | End: 2019-10-03 | Stop reason: SDUPTHER

## 2019-09-04 ENCOUNTER — CARE COORDINATION (OUTPATIENT)
Dept: CARE COORDINATION | Age: 54
End: 2019-09-04

## 2019-09-04 NOTE — CARE COORDINATION
Ambulatory Care Coordination Note  9/4/2019  CM Risk Score: 4  Charlson 10 Year Mortality Risk Score: 100%     ACC: Fitz Nunez RN    Summary Note: spoke with pt who said he is doing ok at home. He said he has all his medications and is taking them as prescribed. He said his bs are around 124-150. He will have a repeat us of thyroid in 6 months. He will see ortho sept 24. He denies any needs will graduate from care coordination if he continues to do well     1) fu on bs- better   2) fu on diet doing well   3) fu on ortho referral dr Jessika Gómez  4) fu on nephrology dr Mercy Tanner   5) fu on cardiology dr Ze Rodgers   6) fu ortho will see dr Nydia Simpson 24       Care Coordination Interventions    Program Enrollment:  Rising Risk  Referral from Primary Care Provider:  Yes  Suggested Interventions and Community Resources  Diabetes Education:  Not Started  Zone Management Tools:  Completed (Comment: chf dm)         Goals Addressed                 This Visit's Progress     Nutrition Plan   On track     I will follow a nutritional plan as directed   Calorie Controlled:   2200 Calories    Barriers: overwhelmed by complexity of regimen  Plan for overcoming my barriers: care coordination   Confidence: 8/10  Anticipated Goal Completion Date: 9/19/19            Prior to Admission medications    Medication Sig Start Date End Date Taking? Authorizing Provider   gabapentin (NEURONTIN) 800 MG tablet take 1 tablet by mouth twice a day 9/1/19 10/1/19  Cesar Pugh MD   furosemide (LASIX) 20 MG tablet take 1 tablet by mouth once daily 8/30/19   Cesar Pugh MD   carvedilol (COREG) 25 MG tablet take 2 tablets by mouth twice a day 8/15/19   Cesar Pugh MD   HYDROcodone-acetaminophen (NORCO) 5-325 MG per tablet Take 1 tablet by mouth every 6 hours as needed for Pain for up to 30 days.  Intended supply: 30 days 8/9/19 9/8/19  Cesar Pugh MD   lisinopril (PRINIVIL;ZESTRIL) 40 MG tablet take 1 tablet by mouth once daily (Nutrition/Exercise/Self-Monitoring):  No   Have you ever had to go to the ED for symptoms of low blood sugar?:  No       No patient-reported symptoms      ,   Congestive Heart Failure Assessment    Are you currently restricting fluids?:  No Restriction  Do you understand a low sodium diet?:  Yes  Do you understand how to read food labels?:  No  How many restaurant meals do you eat per week?:  0  Do you salt your food before tasting it?:  No     No patient-reported symptoms      Symptoms:   None:  Yes          and   COPD Assessment              Symptoms:

## 2019-09-10 DIAGNOSIS — G89.29 CHRONIC MIDLINE LOW BACK PAIN WITHOUT SCIATICA: ICD-10-CM

## 2019-09-10 DIAGNOSIS — M54.50 CHRONIC MIDLINE LOW BACK PAIN WITHOUT SCIATICA: ICD-10-CM

## 2019-09-10 RX ORDER — HYDROCODONE BITARTRATE AND ACETAMINOPHEN 5; 325 MG/1; MG/1
1 TABLET ORAL EVERY 6 HOURS PRN
Qty: 120 TABLET | Refills: 0 | Status: SHIPPED | OUTPATIENT
Start: 2019-09-10 | End: 2019-10-09 | Stop reason: SDUPTHER

## 2019-09-22 RX ORDER — OMEPRAZOLE 20 MG/1
CAPSULE, DELAYED RELEASE ORAL
Qty: 120 CAPSULE | Refills: 0 | Status: SHIPPED | OUTPATIENT
Start: 2019-09-22 | End: 2019-10-28 | Stop reason: SDUPTHER

## 2019-09-25 ENCOUNTER — OFFICE VISIT (OUTPATIENT)
Dept: FAMILY MEDICINE CLINIC | Age: 54
End: 2019-09-25
Payer: COMMERCIAL

## 2019-09-25 VITALS
SYSTOLIC BLOOD PRESSURE: 138 MMHG | HEART RATE: 100 BPM | BODY MASS INDEX: 33.65 KG/M2 | DIASTOLIC BLOOD PRESSURE: 80 MMHG | WEIGHT: 255 LBS

## 2019-09-25 DIAGNOSIS — Z23 FLU VACCINE NEED: ICD-10-CM

## 2019-09-25 DIAGNOSIS — M54.41 CHRONIC MIDLINE LOW BACK PAIN WITH RIGHT-SIDED SCIATICA: ICD-10-CM

## 2019-09-25 DIAGNOSIS — I10 ESSENTIAL HYPERTENSION: ICD-10-CM

## 2019-09-25 DIAGNOSIS — M48.061 SPINAL STENOSIS AT L4-L5 LEVEL: ICD-10-CM

## 2019-09-25 DIAGNOSIS — N18.30 STAGE 3 CHRONIC KIDNEY DISEASE (HCC): ICD-10-CM

## 2019-09-25 DIAGNOSIS — I50.22 CHRONIC SYSTOLIC HEART FAILURE (HCC): ICD-10-CM

## 2019-09-25 DIAGNOSIS — E78.2 MIXED HYPERLIPIDEMIA: ICD-10-CM

## 2019-09-25 DIAGNOSIS — Z79.4 CONTROLLED TYPE 2 DIABETES MELLITUS WITH DIABETIC NEPHROPATHY, WITH LONG-TERM CURRENT USE OF INSULIN (HCC): Primary | ICD-10-CM

## 2019-09-25 DIAGNOSIS — I25.10 CORONARY ARTERY DISEASE INVOLVING NATIVE CORONARY ARTERY OF NATIVE HEART WITHOUT ANGINA PECTORIS: ICD-10-CM

## 2019-09-25 DIAGNOSIS — G89.29 CHRONIC MIDLINE LOW BACK PAIN WITH RIGHT-SIDED SCIATICA: ICD-10-CM

## 2019-09-25 DIAGNOSIS — E11.21 CONTROLLED TYPE 2 DIABETES MELLITUS WITH DIABETIC NEPHROPATHY, WITH LONG-TERM CURRENT USE OF INSULIN (HCC): Primary | ICD-10-CM

## 2019-09-25 LAB — HBA1C MFR BLD: 8 %

## 2019-09-25 PROCEDURE — 99214 OFFICE O/P EST MOD 30 MIN: CPT | Performed by: FAMILY MEDICINE

## 2019-09-25 PROCEDURE — G0008 ADMIN INFLUENZA VIRUS VAC: HCPCS | Performed by: FAMILY MEDICINE

## 2019-09-25 PROCEDURE — 83036 HEMOGLOBIN GLYCOSYLATED A1C: CPT | Performed by: FAMILY MEDICINE

## 2019-09-25 PROCEDURE — 90686 IIV4 VACC NO PRSV 0.5 ML IM: CPT | Performed by: FAMILY MEDICINE

## 2019-09-25 ASSESSMENT — ENCOUNTER SYMPTOMS
BLOOD IN STOOL: 0
CONSTIPATION: 0
EYES NEGATIVE: 1
COUGH: 0
BACK PAIN: 1
WHEEZING: 0
SHORTNESS OF BREATH: 0
ABDOMINAL PAIN: 0
BLURRED VISION: 0

## 2019-09-25 NOTE — PROGRESS NOTES
Financial resource strain: None    Food insecurity:     Worry: None     Inability: None    Transportation needs:     Medical: None     Non-medical: None   Tobacco Use    Smoking status: Current Some Day Smoker     Packs/day: 0.50     Years: 20.00     Pack years: 10.00     Last attempt to quit: 2017     Years since quittin.2    Smokeless tobacco: Never Used   Substance and Sexual Activity    Alcohol use: No    Drug use: No    Sexual activity: Yes     Partners: Female   Lifestyle    Physical activity:     Days per week: None     Minutes per session: None    Stress: None   Relationships    Social connections:     Talks on phone: None     Gets together: None     Attends Mu-ism service: None     Active member of club or organization: None     Attends meetings of clubs or organizations: None     Relationship status: None    Intimate partner violence:     Fear of current or ex partner: None     Emotionally abused: None     Physically abused: None     Forced sexual activity: None   Other Topics Concern    None   Social History Narrative    , grandchildren from  daughter live with him and his wife. SURGICAL HISTORY    Past Surgical History:   Procedure Laterality Date    CHEST TUBE INSERTION  2017    COLONOSCOPY      EYE SURGERY Right     injury    OTHER SURGICAL HISTORY      [employer]     PACEMAKER PLACEMENT      ICD;  battery change ;  Dr. Janna Mayen. DEFIB NOT SAFE MRI    PROSTATE BIOPSY      PROSTATECTOMY  2016    PROSTATECTOMY  2016    carmen lymph node dissection    VENA CAVA FILTER PLACEMENT         CURRENT MEDICATIONS    Current Outpatient Medications   Medication Sig Dispense Refill    HYDROcodone-acetaminophen (NORCO) 5-325 MG per tablet Take 1 tablet by mouth every 6 hours as needed for Pain for up to 30 days.  Intended supply: 30 days 120 tablet 0    gabapentin (NEURONTIN) 800 MG tablet take 1 tablet by mouth twice a day 60 tape       PHYSICAL EXAM   Physical Exam   Constitutional: He is oriented to person, place, and time. He appears well-developed and well-nourished. HENT:   Head: Normocephalic. Mouth/Throat: Oropharynx is clear and moist.   Eyes: Pupils are equal, round, and reactive to light. Neck: Normal range of motion. Neck supple. No thyromegaly present. Cardiovascular: Normal rate, regular rhythm and normal heart sounds. No murmur heard. Pulmonary/Chest: Effort normal and breath sounds normal. He has no wheezes. He has no rales. Abdominal: Soft. There is no tenderness. There is no rebound and no guarding. Musculoskeletal: Normal range of motion. He exhibits edema (trace, rigoberto appearance. ), tenderness (low back) and deformity (oa changes). Lymphadenopathy:     He has no cervical adenopathy. Neurological: He is alert and oriented to person, place, and time. Skin: Skin is warm and dry. Psychiatric: He has a normal mood and affect. His behavior is normal.   Vitals reviewed. Assessment/PLan  1. Controlled type 2 diabetes mellitus with diabetic nephropathy, with long-term current use of insulin (HCC)  Chronic, improved. Cont insulin and diet. Activity as tolerated. - POCT glycosylated hemoglobin (Hb A1C)-8.0    2. Spinal stenosis at L4-L5 level  See specialist. May be able to have surgery as A1C has improved. 3. Stage 3 chronic kidney disease (HCC)  Cont control of bs.     4. Chronic systolic heart failure (HCC)  Stable, cont meds and cardiologist appts. 5. Mixed hyperlipidemia  Chronic, stable, continue current medication and/or plan      6. Essential hypertension  Chronic, stable, continue current medication and/or plan      7. Chronic midline low back pain with right-sided sciatica  As above. 8. Coronary artery disease involving native coronary artery of native heart without angina pectoris  No sx. Stable.      9. Flu vaccine need    - INFLUENZA, QUADV, 3 YRS AND OLDER, IM PF, PREFILL

## 2019-09-27 RX ORDER — SITAGLIPTIN 100 MG/1
TABLET, FILM COATED ORAL
Qty: 30 TABLET | Refills: 5 | Status: SHIPPED | OUTPATIENT
Start: 2019-09-27 | End: 2020-03-26

## 2019-09-27 NOTE — TELEPHONE ENCOUNTER
native heart without angina pectoris     Pacemaker     Diabetic polyneuropathy associated with diabetes mellitus due to underlying condition (HCC)     Systolic heart failure (HCC)     Chronic back pain     History of lung thrombosis     Obesity     Stage 3 chronic kidney disease ([address]     Spinal stenosis at L4-L5 level

## 2019-10-02 ENCOUNTER — CARE COORDINATION (OUTPATIENT)
Dept: CARE COORDINATION | Age: 54
End: 2019-10-02

## 2019-10-03 DIAGNOSIS — E08.42 DIABETIC POLYNEUROPATHY ASSOCIATED WITH DIABETES MELLITUS DUE TO UNDERLYING CONDITION (HCC): ICD-10-CM

## 2019-10-03 RX ORDER — GABAPENTIN 800 MG/1
TABLET ORAL
Qty: 60 TABLET | Refills: 0 | Status: SHIPPED | OUTPATIENT
Start: 2019-10-03 | End: 2019-11-05 | Stop reason: SDUPTHER

## 2019-10-09 DIAGNOSIS — M54.50 CHRONIC MIDLINE LOW BACK PAIN WITHOUT SCIATICA: ICD-10-CM

## 2019-10-09 DIAGNOSIS — G89.29 CHRONIC MIDLINE LOW BACK PAIN WITHOUT SCIATICA: ICD-10-CM

## 2019-10-09 RX ORDER — HYDROCODONE BITARTRATE AND ACETAMINOPHEN 5; 325 MG/1; MG/1
1 TABLET ORAL EVERY 6 HOURS PRN
Qty: 120 TABLET | Refills: 0 | Status: SHIPPED | OUTPATIENT
Start: 2019-10-09 | End: 2019-11-08

## 2019-10-14 NOTE — TELEPHONE ENCOUNTER
of native heart without angina pectoris     Pacemaker     Diabetic polyneuropathy associated with diabetes mellitus due to underlying condition ([facility]     Systolic heart failure (HCC)     Chronic back pain     History of lung thrombosis     Obesity     Stage 3 chronic kidney disease ([facility] contact guard

## 2019-10-21 RX ORDER — ISOSORBIDE MONONITRATE 30 MG/1
TABLET, EXTENDED RELEASE ORAL
Qty: 120 TABLET | Refills: 0 | Status: SHIPPED | OUTPATIENT
Start: 2019-10-21 | End: 2019-12-23

## 2019-10-28 RX ORDER — OMEPRAZOLE 20 MG/1
CAPSULE, DELAYED RELEASE ORAL
Qty: 120 CAPSULE | Refills: 0 | Status: SHIPPED | OUTPATIENT
Start: 2019-10-28 | End: 2020-01-15

## 2019-11-11 DIAGNOSIS — G89.29 CHRONIC MIDLINE LOW BACK PAIN WITHOUT SCIATICA: ICD-10-CM

## 2019-11-11 DIAGNOSIS — M54.50 CHRONIC MIDLINE LOW BACK PAIN WITHOUT SCIATICA: ICD-10-CM

## 2019-11-11 RX ORDER — HYDROCODONE BITARTRATE AND ACETAMINOPHEN 5; 325 MG/1; MG/1
1 TABLET ORAL EVERY 6 HOURS PRN
Qty: 120 TABLET | Refills: 0 | Status: SHIPPED | OUTPATIENT
Start: 2019-11-11 | End: 2019-12-11 | Stop reason: SDUPTHER

## 2019-12-11 ENCOUNTER — TELEPHONE (OUTPATIENT)
Dept: FAMILY MEDICINE CLINIC | Age: 54
End: 2019-12-11

## 2019-12-11 DIAGNOSIS — G89.29 CHRONIC MIDLINE LOW BACK PAIN WITHOUT SCIATICA: ICD-10-CM

## 2019-12-11 DIAGNOSIS — M54.50 CHRONIC MIDLINE LOW BACK PAIN WITHOUT SCIATICA: ICD-10-CM

## 2019-12-11 RX ORDER — HYDROCODONE BITARTRATE AND ACETAMINOPHEN 5; 325 MG/1; MG/1
1 TABLET ORAL EVERY 6 HOURS PRN
Qty: 120 TABLET | Refills: 0 | Status: SHIPPED | OUTPATIENT
Start: 2019-12-11 | End: 2020-01-10 | Stop reason: SDUPTHER

## 2019-12-23 RX ORDER — ISOSORBIDE MONONITRATE 30 MG/1
TABLET, EXTENDED RELEASE ORAL
Qty: 120 TABLET | Refills: 0 | Status: SHIPPED | OUTPATIENT
Start: 2019-12-23 | End: 2020-03-26

## 2019-12-29 RX ORDER — FUROSEMIDE 20 MG/1
TABLET ORAL
Qty: 30 TABLET | Refills: 3 | Status: SHIPPED | OUTPATIENT
Start: 2019-12-29 | End: 2020-06-15

## 2020-01-10 RX ORDER — HYDROCODONE BITARTRATE AND ACETAMINOPHEN 5; 325 MG/1; MG/1
1 TABLET ORAL EVERY 6 HOURS PRN
Qty: 120 TABLET | Refills: 0 | Status: ON HOLD | OUTPATIENT
Start: 2020-01-10 | End: 2020-01-30 | Stop reason: HOSPADM

## 2020-01-10 NOTE — TELEPHONE ENCOUNTER
Patient request, last appt 9/25/19    Health Maintenance   Topic Date Due    Shingles Vaccine (1 of 2) 07/22/2014    Diabetic retinal exam  06/01/2018    Annual Wellness Visit (AWV)  05/29/2019    Lipid screen  12/05/2019    Hepatitis B vaccine (1 of 3 - Risk 3-dose series) 08/22/2020 (Originally 7/22/1984)    Potassium monitoring  03/01/2020    Creatinine monitoring  03/01/2020    Diabetic foot exam  03/07/2020    A1C test (Diabetic or Prediabetic)  09/25/2020    Colon cancer screen colonoscopy  09/09/2024    DTaP/Tdap/Td vaccine (2 - Td) 01/01/2025    Flu vaccine  Completed    Pneumococcal 0-64 years Vaccine  Completed    Hepatitis C screen  Completed    HIV screen  Completed             (applicable per patient's age: Cancer Screenings, Depression Screening, Fall Risk Screening, Immunizations)    Hemoglobin A1C (%)   Date Value   09/25/2019 8.0   06/19/2019 9.5   02/18/2019 6.5     LDL Cholesterol (mg/dL)   Date Value   08/18/2017 81     LDL Calculated (mg/dL)   Date Value   12/05/2018 61     AST (U/L)   Date Value   12/05/2018 13     ALT (U/L)   Date Value   12/05/2018 10     BUN (mg/dL)   Date Value   02/18/2019 43 (H)      (goal A1C is < 7)   (goal LDL is <100) need 30-50% reduction from baseline     BP Readings from Last 3 Encounters:   09/25/19 138/80   08/23/19 (!) 132/90   07/24/19 136/86    (goal /80)      All Future Testing planned in [facility]:  Lab Frequency Next Occurrence   Protein, Urine, Random Once 04/06/2019   MRI LUMBAR SPINE WO CONTRAST Once 07/11/2019       Next Visit Date:  Future Appointments   Date Time Provider Elida Rice   1/15/2020 10:00 AM STA PAT RM 1 [facility]   1/17/2020 11:30 AM MD noemi Sexton [facility]   1/28/2020 10:00 [facility]            Patient Active Problem List:     Prostate cancer ([facility]     Controlled type 2 diabetes mellitus with diabetic nephropathy, with long-term current use of insulin

## 2020-01-15 ENCOUNTER — HOSPITAL ENCOUNTER (OUTPATIENT)
Dept: GENERAL RADIOLOGY | Age: 55
Discharge: HOME | End: 2020-01-17
Payer: COMMERCIAL

## 2020-01-15 ENCOUNTER — HOSPITAL ENCOUNTER (OUTPATIENT)
Dept: PREADMISSION TESTING | Age: 55
Discharge: HOME | End: 2020-01-19
Payer: COMMERCIAL

## 2020-01-15 VITALS
BODY MASS INDEX: 34.21 KG/M2 | DIASTOLIC BLOOD PRESSURE: 93 MMHG | SYSTOLIC BLOOD PRESSURE: 144 MMHG | OXYGEN SATURATION: 96 % | RESPIRATION RATE: 18 BRPM | HEART RATE: 99 BPM | WEIGHT: 258.16 LBS | HEIGHT: 73 IN

## 2020-01-15 LAB
-: NORMAL
ABO/RH: NORMAL
ABSOLUTE EOS #: 0.17 K/UL (ref 0–0.44)
ABSOLUTE IMMATURE GRANULOCYTE: 0.03 K/UL (ref 0–0.3)
ABSOLUTE LYMPH #: 2.15 K/UL (ref 1.1–3.7)
ABSOLUTE MONO #: 0.72 K/UL (ref 0.1–1.2)
AMORPHOUS: NORMAL
ANION GAP SERPL CALCULATED.3IONS-SCNC: 11 MMOL/L (ref 9–17)
ANTIBODY SCREEN: NEGATIVE
ARM BAND NUMBER: NORMAL
BACTERIA: NORMAL
BASOPHILS # BLD: 1 % (ref 0–2)
BASOPHILS ABSOLUTE: 0.04 K/UL (ref 0–0.2)
BILIRUBIN URINE: NEGATIVE
BUN BLDV-MCNC: 10 MG/DL (ref 6–20)
BUN/CREAT BLD: 10 (ref 9–20)
CALCIUM SERPL-MCNC: 9.2 MG/DL (ref 8.6–10.4)
CASTS UA: NORMAL /LPF
CHLORIDE BLD-SCNC: 100 MMOL/L (ref 98–107)
CO2: 22 MMOL/L (ref 20–31)
COLOR: YELLOW
COMMENT UA: ABNORMAL
CREAT SERPL-MCNC: 1 MG/DL (ref 0.7–1.2)
CRYSTALS, UA: NORMAL /HPF
DIFFERENTIAL TYPE: ABNORMAL
EOSINOPHILS RELATIVE PERCENT: 2 % (ref 1–4)
EPITHELIAL CELLS UA: NORMAL /HPF (ref 0–5)
EXPIRATION DATE: NORMAL
GFR AFRICAN AMERICAN: >60 ML/MIN
GFR NON-AFRICAN AMERICAN: >60 ML/MIN
GFR SERPL CREATININE-BSD FRML MDRD: ABNORMAL ML/MIN/{1.73_M2}
GFR SERPL CREATININE-BSD FRML MDRD: ABNORMAL ML/MIN/{1.73_M2}
GLUCOSE BLD-MCNC: 254 MG/DL (ref 70–99)
GLUCOSE URINE: ABNORMAL
HCT VFR BLD CALC: 46.3 % (ref 40.7–50.3)
HEMOGLOBIN: 14.6 G/DL (ref 13–17)
IMMATURE GRANULOCYTES: 0 %
INR BLD: 1
KETONES, URINE: NEGATIVE
LEUKOCYTE ESTERASE, URINE: NEGATIVE
LYMPHOCYTES # BLD: 29 % (ref 24–43)
MCH RBC QN AUTO: 27 PG (ref 25.2–33.5)
MCHC RBC AUTO-ENTMCNC: 31.5 G/DL (ref 28.4–34.8)
MCV RBC AUTO: 85.7 FL (ref 82.6–102.9)
MONOCYTES # BLD: 10 % (ref 3–12)
MRSA, DNA, NASAL: NORMAL
MUCUS: NORMAL
NITRITE, URINE: NEGATIVE
NRBC AUTOMATED: 0 PER 100 WBC
OTHER OBSERVATIONS UA: NORMAL
PARTIAL THROMBOPLASTIN TIME: 26 SEC (ref 23–31)
PDW BLD-RTO: 14.9 % (ref 11.8–14.4)
PH UA: 5.5 (ref 5–8)
PLATELET # BLD: 205 K/UL (ref 138–453)
PLATELET ESTIMATE: ABNORMAL
PMV BLD AUTO: 9 FL (ref 8.1–13.5)
POTASSIUM SERPL-SCNC: 4.4 MMOL/L (ref 3.7–5.3)
PROTEIN UA: ABNORMAL
PROTHROMBIN TIME: 10 SEC (ref 9.7–11.6)
RBC # BLD: 5.4 M/UL (ref 4.21–5.77)
RBC # BLD: ABNORMAL 10*6/UL
RBC UA: NORMAL /HPF (ref 0–2)
RENAL EPITHELIAL, UA: NORMAL /HPF
SEG NEUTROPHILS: 58 % (ref 36–65)
SEGMENTED NEUTROPHILS ABSOLUTE COUNT: 4.39 K/UL (ref 1.5–8.1)
SODIUM BLD-SCNC: 133 MMOL/L (ref 135–144)
SPECIFIC GRAVITY UA: 1.03 (ref 1–1.03)
SPECIMEN DESCRIPTION: NORMAL
TRICHOMONAS: NORMAL
TURBIDITY: CLEAR
URINE HGB: NEGATIVE
UROBILINOGEN, URINE: NORMAL
WBC # BLD: 7.5 K/UL (ref 3.5–11.3)
WBC # BLD: ABNORMAL 10*3/UL
WBC UA: NORMAL /HPF (ref 0–5)
YEAST: NORMAL

## 2020-01-15 PROCEDURE — 81001 URINALYSIS AUTO W/SCOPE: CPT

## 2020-01-15 PROCEDURE — 36415 COLL VENOUS BLD VENIPUNCTURE: CPT

## 2020-01-15 PROCEDURE — 85730 THROMBOPLASTIN TIME PARTIAL: CPT

## 2020-01-15 PROCEDURE — 85610 PROTHROMBIN TIME: CPT

## 2020-01-15 PROCEDURE — 87186 SC STD MICRODIL/AGAR DIL: CPT

## 2020-01-15 PROCEDURE — 87088 URINE BACTERIA CULTURE: CPT

## 2020-01-15 PROCEDURE — 86901 BLOOD TYPING SEROLOGIC RH(D): CPT

## 2020-01-15 PROCEDURE — 85025 COMPLETE CBC W/AUTO DIFF WBC: CPT

## 2020-01-15 PROCEDURE — 87086 URINE CULTURE/COLONY COUNT: CPT

## 2020-01-15 PROCEDURE — 80048 BASIC METABOLIC PNL TOTAL CA: CPT

## 2020-01-15 PROCEDURE — 87641 MR-STAPH DNA AMP PROBE: CPT

## 2020-01-15 PROCEDURE — 86900 BLOOD TYPING SEROLOGIC ABO: CPT

## 2020-01-15 PROCEDURE — 93005 ELECTROCARDIOGRAM TRACING: CPT | Performed by: ORTHOPAEDIC SURGERY

## 2020-01-15 PROCEDURE — 86850 RBC ANTIBODY SCREEN: CPT

## 2020-01-15 PROCEDURE — 71046 X-RAY EXAM CHEST 2 VIEWS: CPT

## 2020-01-15 RX ORDER — PRAVASTATIN SODIUM 40 MG
40 TABLET ORAL NIGHTLY
COMMUNITY
End: 2020-01-17 | Stop reason: CLARIF

## 2020-01-15 ASSESSMENT — PAIN - FUNCTIONAL ASSESSMENT: PAIN_FUNCTIONAL_ASSESSMENT: PREVENTS OR INTERFERES SOME ACTIVE ACTIVITIES AND ADLS

## 2020-01-15 ASSESSMENT — PAIN DESCRIPTION - LOCATION: LOCATION: BACK

## 2020-01-15 ASSESSMENT — PAIN DESCRIPTION - ONSET: ONSET: AWAKENED FROM SLEEP

## 2020-01-15 ASSESSMENT — PAIN SCALES - GENERAL: PAINLEVEL_OUTOF10: 8

## 2020-01-15 ASSESSMENT — PAIN DESCRIPTION - ORIENTATION: ORIENTATION: RIGHT

## 2020-01-15 ASSESSMENT — PAIN DESCRIPTION - PROGRESSION: CLINICAL_PROGRESSION: GRADUALLY WORSENING

## 2020-01-15 ASSESSMENT — PAIN DESCRIPTION - FREQUENCY: FREQUENCY: CONTINUOUS

## 2020-01-15 ASSESSMENT — PAIN DESCRIPTION - PAIN TYPE: TYPE: CHRONIC PAIN

## 2020-01-15 NOTE — PRE-PROCEDURE INSTRUCTIONS
[facility] at 11:30 AM    Once you enter the hospital lobby, take the elevators to the second floor. Check-In is at the surgery registration desk. If you have been given a blood band, you must bring it with you the day of surgery. Continue to take your home medications as you normally do up to and including the night before surgery with the exception of any blood thinning medications. Please stop any blood thinning medications as directed by your surgeon or prescribing physician. Failure to stop certain medications may interfere with your scheduled surgery. These may include:  Aspirin, Warfarin (Coumadin), Clopidogrel (Plavix), Ibuprofen (Motrin, Advil), Naproxen (Aleve), Meloxicam (Mobic), Celecoxib (Celebrex), Eliquis, Pradaxa, Xarelto, Effient, Fish Oil, Herbal supplements. Stop aspirin one week prior to surgery, tylenol is okay to take    If you are diabetic, do not take any of your diabetic medications by mouth the morning of surgery. If you are taking insulin contact the doctor that manages your diabetes for instructions about any changes to your insulin dosages the day before surgery. Do not inject insulin or other injectable diabetic medications the morning of surgery unless otherwise instructed by the doctor who manages your diabetes. Please take the following medication(s) the day of surgery with a small sip of water:  vicodin (if needed),isosorbide mononitrate,cymbalta, and coreg  Please use your inhalers at home the day of surgery. PREPARING FOR YOUR SURGERY:     Before surgery, you can play an important role in your own health. Because skin is not sterile, we need to be sure that your skin is as free of germs as possible before surgery by carefully washing before surgery. Preparing or prepping skin before surgery can reduce the risk of a surgical site infection.   Do not shave the area of your body where your surgery will be performed unless you received specific permission from your physician. You will need to shower at home the night before surgery and the morning of surgery with a special soap called chlorhexidine gluconate (CHG*). *Not to be used by people allergic to Chlorhexidine Gluconate (CHG). Following these instructions will help you be sure that your skin is clean before surgery. Instructions on cleaning your skin before surgery: The night before your surgery:      You will need to shower with warm water (not hot) and the CHG soap.  Use a clean wash cloth and a clean towel. Have clean clothes available to put on after the shower.    First wash your hair with regular shampoo. Rinse your hair and body thoroughly to remove the shampoo. [facility] Wash your face with your regular soap or water only. Thoroughly rinse your body with warm water from the neck down.  Turn water off to prevent rinsing the soap off too soon.  With a clean wet washcloth and half of the CHG soap in the bottle, lather your entire body from the neck down. Do not use CHG soap near your eyes or ears to avoid injury to those areas.  Wash thoroughly, paying special attention to the area where your surgery will be performed.  Wash your body gently for five (5) minutes. Avoid scrubbing your skin too hard.  Turn the water back on and rinse your body thoroughly.  Pat yourself dry with a clean, soft towel. Do not apply lotion, cream or powder.  Dress with clean freshly washed clothes. The morning of surgery:     Repeat shower following steps above - using remaining half of CHG soap in bottle. Patient Instructions:    [facility] If you are having any type of anesthesia you are to have nothing to eat or drink after midnight the night before your surgery.   This includes gum, mints, water or smoking or chewing tobacco.  The only exception to this is a small sip of water to take with any morning dose of heart, blood

## 2020-01-15 NOTE — H&P
History and Physical Service   [facility] - INPATIENT    HISTORY AND PHYSICAL EXAMINATION            Date of Evaluation: 1/15/2020  Patient name:  Rachael Charles  MRN:   [id number]  YOB: 1965  PCP:    Allison Cartwright MD    History Obtained From:     Patient    History of Present Illness: This is Rachael Charles a 53 y.o. male who presents for a pre-admission testing appointment for an upcoming minimally invasive right L5-S1 TLIF by Dr. Carol Burton scheduled on 01/29/2020 at 1330 due to lumbar spondylolisthesis. The patient's chief complaint is constant, 8-10/10 low back pain that radiates down the right leg. The back pain has progressively worsened over the past several years. The pain became significantly worse after the pt fell 50 feet from a balcony in 06/2017. Back pain is aggravated by standing; and is minimally relieved with Norco. The right leg has numbness and tingling. Pt denies loss of bowel or urinary function. Pt tripped over his dog 2 days ago, but denies injuries from the fall. History of CHF, cardiomegaly, irregular heart beat, hyperlipidemia, hypertension, WILLA, PE in 2001, stage 3 CKD, and diabetes. S/p vena cava filter placement in 2000. S/p AICD placement in 2008. Alert and oriented without apparent distress. Denies chest pain, dyspnea at rest, dizziness, and palpitations. Pt follows-up with Dr. LAMIN SEO from cardiology and a nephrologist at [facility]. Functional Capacity:   1) Pt is not able to walk 2 city blocks or more on level ground due to SOB. Pt becomes SOB while doing household chores. 2) Pt is not able to climb 2 flights of stairs due to SOB. 3) Pt is not able to walk up a hill for 1-2 city blocks due to SOB.     Past Medical History:     Past Medical History:   Diagnosis Date    Anxiety     no treatment    Arthritis     left shoulder    Cardiomegaly     CHF (congestive heart failure) (HCC) EF 20%    Chronic back pain     Diabetes mellitus ([facility] [address]     GERD (gastroesophageal reflux disease)     History of lung thrombosis     Hx of blood clots     Hyperkalemia     Hyperlipidemia     Hypertension     Irregular heart beat     Neuropathy     feet    Obesity     WILLA (obstructive sleep apnea)     not using the machine    Pacemaker     AICD    PE (pulmonary embolism) 2001    \"about 5 yrs ago\"    Prostate cancer ([facility] [address] 2016    Spinal stenosis at L4-L5 level 2019    Stage 3 chronic kidney disease ([facility] [address] 12/10/2018    Traumatic closed fx of eight or more ribs with minimal displacement 06/2017    pneumothorax. Pt fell 50 feet from a balcony. Pt denies history of a head injury.  Type 2 diabetes mellitus without complication [facility])         Past Surgical History:     Past Surgical History:   Procedure Laterality Date    CHEST TUBE INSERTION  06/2017    COLONOSCOPY  2016    EYE SURGERY Right     injury    OTHER SURGICAL HISTORY  2000    Greenfilter     PACEMAKER PLACEMENT  2008    ICD;  battery change 2016;  Dr. Angelito Noland. DEFIB NOT SAFE MRI    PROSTATE BIOPSY      PROSTATECTOMY  08/31/2016    PROSTATECTOMY  08/31/2016    carmen lymph node dissection    VENA CAVA FILTER PLACEMENT  2000        Medications Prior to Admission:     Prior to Admission medications    Medication Sig Start Date End Date Taking? Authorizing Provider   pravastatin (PRAVACHOL) 40 MG tablet Take 40 mg by mouth nightly   Yes Historical Provider, MD   HYDROcodone-acetaminophen (NORCO) 5-325 MG per tablet Take 1 tablet by mouth every 6 hours as needed for Pain for up to 30 days.  Intended supply: 30 days 1/10/20 2/9/20  Lea Loera MD   furosemide (LASIX) 20 MG tablet take 1 tablet by mouth once daily 12/29/19   Lea Loera MD   isosorbide mononitrate (IMDUR) 30 MG extended release tablet take 1 tablet by mouth once daily 12/23/19   Lea Loera MD   gabapentin (NEURONTIN) 800 MG tablet take 1 tablet by mouth twice a day 11/5/19 12/5/19  GWENDOLYN Pena - CNP   carvedilol (COREG) 6.25 MG tablet take 1 tablet by mouth twice a day 10/23/19   Marissa Rojas MD   NOVOLOG MIX 70/30 (70-30) 100 UNIT/ML injection inject 50 units subcutaneously twice a day with meals 10/4/19   Marissa Rojas MD   JANUVIA 100 MG tablet take 1 tablet by mouth once daily 9/27/19   Marissa Rojas MD   ONE TOUCH ULTRA TEST strip TEST three times a day if needed 7/7/19   Marissa Rojas MD   spironolactone (ALDACTONE) 25 MG tablet take 1 tablet by mouth once daily 6/21/19   Marissa Rojas MD   nicotine (NICODERM CQ) 7 MG/24HR Place 1 patch onto the skin every 24 hours 6/19/19 6/18/20  Marissa Rojas MD   Insulin Syringe-Needle U-100 (B-D INS SYR ULTRAFINE 1CC/31G) 31G X 5/16\" 1 ML MISC use twice a day 2/18/19   Marissa Rojas MD   Insulin Pen Needle 31G X 8 MM MISC 1 each by Does not apply route 2 times daily 3/27/18   Marissa Rojas MD   glucose blood VI test strips (ASCENSIA AUTODISC VI;ONE TOUCH ULTRA TEST VI) strip 1 each by In Vitro route 3 times daily As needed. 2/1/18   Marissa Rojas MD   DULoxetine (CYMBALTA) 60 MG capsule Take 60 mg by mouth daily    Historical Provider, MD        Allergies:     Adhesive tape    Social History:     Tobacco:    reports that he has been smoking. He has a 10.00 pack-year smoking history. He has never used smokeless tobacco.  Alcohol:      reports no history of alcohol use. Drug Use:  reports no history of drug use. Family History:     Family History   Problem Relation Age of Onset    Diabetes Father     Hypertension Father     Heart Disease Father     Hypertension Mother     Prostate Cancer Brother     Diabetes Brother     Diabetes Brother     Diabetes Brother        Review of Systems:     Positive and Negative as described in HPI. CONSTITUTIONAL: Negative for fevers, chills, sweats, fatigue, and weight loss. HEENT: Negative for glasses, hearing changes, rhinorrhea, and throat pain. RESPIRATORY: SOB with exertion.  Negative for current shortness of breath, cough, congestion, and wheezing. Pt denies history of asthma and COPD. CARDIOVASCULAR: History of blood clots and irregular heartbeat. Negative for chest pain and palpitations. Pt denies history of A-fib. GASTROINTESTINAL: GERD. Negative for nausea, vomiting, diarrhea, constipation, change in bowel habits, and abdominal pain. GENITOURINARY: Negative for difficulty of urination, burning with urination, and frequency. INTEGUMENT: Negative for rash, skin lesions, and easy bruising. HEMATOLOGIC/LYMPHATIC: Negative for swelling/edema. ALLERGIC/IMMUNOLOGIC: Negative for urticaria and itching. ENDOCRINE: Diabetes. Last A1C was around 7.0% per pt. Negative for increase in thirst, increase in urination, and heat or cold intolerance. MUSCULOSKELETAL: See HPI. NEUROLOGICAL: Numbness and tingling in the right leg. Negative for headaches, dizziness, and lightheadedness. Pt denies history of strokes and seizures. BEHAVIOR/PSYCH: Anxiety. Negative for depression. Physical Exam:   BP (!) 144/93   Pulse 99   Resp 18   Ht 6' 1\" (1.854 m)   Wt 258 lb 2.5 oz (117.1 kg)   SpO2 96%   BMI 34.06 kg/m²     No results for input(s): POCGLU in the last 72 hours. General Appearance:  Alert, well appearing, and in no acute distress. Obese. Mental status:  Oriented to person, place, and time. Head:  Normocephalic and atraumatic. Eye:  No icterus, redness, pupils equal and reactive, extraocular eye movements intact, and conjunctiva clear. Ear:  Hearing grossly intact. Nose:  No drainage noted. Mouth:  Mucous membranes moist.  Neck: Distant bilateral carotid sounds. Supple and no carotid bruits noted. Lungs: Bilateral equal air entry, clear to auscultation, no wheezing, rales or rhonchi, and normal effort. Cardiovascular: AICD in the left upper chest. Normal rate, regular rhythm, no murmur, gallop, or rub.   Abdomen:  Soft, non-tender, non-distended, and active bowel GWENDOLYN Max - CNP  1/15/2020  11:21 AM

## 2020-01-16 LAB
CULTURE: ABNORMAL
EKG ATRIAL RATE: 99 BPM
EKG P AXIS: 62 DEGREES
EKG P-R INTERVAL: 144 MS
EKG Q-T INTERVAL: 374 MS
EKG QRS DURATION: 88 MS
EKG QTC CALCULATION (BAZETT): 479 MS
EKG R AXIS: 46 DEGREES
EKG T AXIS: 53 DEGREES
EKG VENTRICULAR RATE: 99 BPM
Lab: ABNORMAL
SPECIMEN DESCRIPTION: ABNORMAL

## 2020-01-16 PROCEDURE — 93010 ELECTROCARDIOGRAM REPORT: CPT | Performed by: INTERNAL MEDICINE

## 2020-01-17 ENCOUNTER — PROCEDURE VISIT (OUTPATIENT)
Dept: FAMILY MEDICINE CLINIC | Age: 55
End: 2020-01-17
Payer: COMMERCIAL

## 2020-01-17 VITALS
BODY MASS INDEX: 34.3 KG/M2 | DIASTOLIC BLOOD PRESSURE: 88 MMHG | SYSTOLIC BLOOD PRESSURE: 120 MMHG | WEIGHT: 260 LBS | HEART RATE: 80 BPM

## 2020-01-17 PROBLEM — N52.31 ERECTILE DYSFUNCTION FOLLOWING RADICAL PROSTATECTOMY: Status: ACTIVE | Noted: 2020-01-17

## 2020-01-17 PROBLEM — E11.42 TYPE 2 DIABETES MELLITUS WITH DIABETIC POLYNEUROPATHY (HCC): Status: ACTIVE | Noted: 2020-01-17

## 2020-01-17 PROBLEM — I10 HTN (HYPERTENSION): Status: ACTIVE | Noted: 2020-01-17

## 2020-01-17 PROBLEM — M54.41 CHRONIC RIGHT-SIDED LOW BACK PAIN WITH RIGHT-SIDED SCIATICA: Status: ACTIVE | Noted: 2020-01-17

## 2020-01-17 PROBLEM — G89.29 CHRONIC RIGHT-SIDED LOW BACK PAIN WITH RIGHT-SIDED SCIATICA: Status: ACTIVE | Noted: 2020-01-17

## 2020-01-17 LAB — HBA1C MFR BLD: 8.4 %

## 2020-01-17 PROCEDURE — 83036 HEMOGLOBIN GLYCOSYLATED A1C: CPT | Performed by: FAMILY MEDICINE

## 2020-01-17 PROCEDURE — 99214 OFFICE O/P EST MOD 30 MIN: CPT | Performed by: FAMILY MEDICINE

## 2020-01-17 RX ORDER — COLESEVELAM 180 1/1
1875 TABLET ORAL 2 TIMES DAILY WITH MEALS
COMMUNITY
End: 2020-07-14 | Stop reason: ALTCHOICE

## 2020-01-17 RX ORDER — INSULIN ASPART 100 [IU]/ML
INJECTION, SUSPENSION SUBCUTANEOUS
Qty: 30 ML | Refills: 3
Start: 2020-01-17 | End: 2020-01-21

## 2020-01-17 RX ORDER — LISINOPRIL 2.5 MG/1
2.5 TABLET ORAL DAILY
COMMUNITY
End: 2021-06-24

## 2020-01-17 RX ORDER — OMEPRAZOLE 20 MG/1
20 CAPSULE, DELAYED RELEASE ORAL DAILY
COMMUNITY
End: 2020-01-31

## 2020-01-17 RX ORDER — TAMSULOSIN HYDROCHLORIDE 0.4 MG/1
0.4 CAPSULE ORAL DAILY
Status: ON HOLD | COMMUNITY
End: 2021-08-26 | Stop reason: HOSPADM

## 2020-01-17 ASSESSMENT — ENCOUNTER SYMPTOMS
ALLERGIC/IMMUNOLOGIC NEGATIVE: 1
SHORTNESS OF BREATH: 1
ABDOMINAL PAIN: 0
DIARRHEA: 0
BACK PAIN: 1
CONSTIPATION: 0
COUGH: 0
EYES NEGATIVE: 1
BLOOD IN STOOL: 0
WHEEZING: 0

## 2020-01-17 ASSESSMENT — PATIENT HEALTH QUESTIONNAIRE - PHQ9
SUM OF ALL RESPONSES TO PHQ QUESTIONS 1-9: 0
SUM OF ALL RESPONSES TO PHQ9 QUESTIONS 1 & 2: 0
1. LITTLE INTEREST OR PLEASURE IN DOING THINGS: 0
2. FEELING DOWN, DEPRESSED OR HOPELESS: 0
SUM OF ALL RESPONSES TO PHQ QUESTIONS 1-9: 0

## 2020-01-17 NOTE — LETTER
[facility], [facility], [address] [place] [address], [facility]  [phone]    Dr. Ellis Sharma MD        2020      RE: Efren BILLS 1965    Dear Dr. Marcos Lora was evaluated in my office for pre-operative evaluation. Based on review of information available to me at this time, the patient appears to be at moderate increased risk for the planned procedure. Pre op labs have been reviewed. EKG has been reviewed and unchanged. Pt has hx of heart failure, htn, diabetes and lung thrombosis. He is seeing a cardiologist who should also clear him. Based on the time of his surgery, 1:30pm, I have advised him to have a small breakfast at 2-3 am and use half of his usual insulin dose at that time. Then remain NPO. This will give him at least 10 hours NPO prior to his surgery. He will take other meds as advised on his pre-op form with a small amount of water also at 2am.   He should receive prophylactic anticoagulant which I will presume you will arrange. Current Outpatient Medications on File Prior to Visit   Medication Sig Dispense Refill    omeprazole (PRILOSEC) 20 MG delayed release capsule Take 20 mg by mouth daily      tamsulosin (FLOMAX) 0.4 MG capsule Take 0.4 mg by mouth daily      lisinopril (PRINIVIL;ZESTRIL) 2.5 MG tablet Take 2.5 mg by mouth daily      colesevelam (WELCHOL) 625 MG tablet Take 1,875 mg by mouth 2 times daily (with meals) Three tablets bid      HYDROcodone-acetaminophen (NORCO) 5-325 MG per tablet Take 1 tablet by mouth every 6 hours as needed for Pain for up to 30 days.  Intended supply: 30 days 120 tablet 0    furosemide (LASIX) 20 MG tablet take 1 tablet by mouth once daily 30 tablet 3    isosorbide mononitrate (IMDUR) 30 MG extended release tablet take 1 tablet by mouth once daily 120 tablet 0    gabapentin (NEURONTIN) 800 MG tablet take 1 tablet by mouth twice a day 60 tablet 5    carvedilol (COREG) 6.25 MG tablet take 1 tablet by mouth twice a day 60 tablet 3    JANUVIA 100 MG tablet take 1 tablet by mouth once daily 30 tablet 5    ONE TOUCH ULTRA TEST strip TEST three times a day if needed 100 strip 2    Insulin Syringe-Needle U-100 (B-D INS SYR ULTRAFINE 1CC/31G) 31G X 5/16\" 1 ML MISC use twice a day 100 each 3    Insulin Pen Needle 31G X 8 MM MISC 1 each by Does not apply route 2 times daily 100 each 3    glucose blood VI test strips (ASCENSIA AUTODISC VI;ONE TOUCH ULTRA TEST VI) strip 1 each by In Vitro route 3 times daily As needed. 100 each 2    DULoxetine (CYMBALTA) 60 MG capsule Take 60 mg by mouth daily       No current facility-administered medications on file prior to visit. Past Surgical History:   Procedure Laterality Date    CHEST TUBE INSERTION  06/2017    COLONOSCOPY  2016    EYE SURGERY Right     injury    OTHER SURGICAL HISTORY  2000    Greenfilter     PACEMAKER PLACEMENT  2008    ICD;  battery change 2016;  Dr. Shelva Ahumada. DEFIB NOT SAFE MRI    PROSTATE BIOPSY      PROSTATECTOMY  08/31/2016    PROSTATECTOMY  08/31/2016    carmen lymph node dissection    VENA CAVA FILTER PLACEMENT  2000         If you have any questions, please feel free to contact me.     Sincerely,        Dr. Martin Arriola MD

## 2020-01-17 NOTE — PROGRESS NOTES
[facility]  [facility]  [address]  [facility] [address] [place] [id number]  Dept: [phone]    1/17/2020    CHIEF COMPLAINT    Chief Complaint   Patient presents with   Santosh Rivera Exam       HPI    Rachael Charles is a 53 y.o. male who presents   Chief Complaint   Patient presents with    Pre-op Exam   .  Pre-op clearance for lumbar laminectomy on 1/29 with Dr. Nayeli Ledesma at [address]. Has had general anesthesia in the past with no complications. Hx of WILLA, not using cpap. Using insulin and oral meds for diabetes. Smoking 6 cigs per day. Trying to quit. Has nicoderm patches. Hx of lung thrombosis. Has used lovenox in the after surgery. Seeing cardiologist for hx of heart failure, sx controlled. Diabetes   Pertinent negatives for hypoglycemia include no dizziness, headaches or nervousness/anxiousness. Associated symptoms include fatigue. Pertinent negatives for diabetes include no chest pain. Hypertension   Associated symptoms include shortness of breath (with exertion). Pertinent negatives include no chest pain, headaches or palpitations. Vitals:    01/17/20 1105   BP: 120/88   Pulse: 80   Weight: 260 lb (117.9 kg)       REVIEW OF SYSTEMS    Review of Systems   Constitutional: Positive for fatigue. Negative for fever and unexpected weight change. HENT: Positive for congestion. Eyes: Negative. Respiratory: Positive for shortness of breath (with exertion). Negative for cough and wheezing. Cardiovascular: Positive for leg swelling (mild, wearing compression sock on rt lower leg). Negative for chest pain and palpitations. Gastrointestinal: Negative for abdominal pain, blood in stool, constipation and diarrhea. Endocrine: Negative. Genitourinary: Negative for frequency and urgency. Musculoskeletal: Positive for arthralgias, back pain and gait problem. Skin: Negative. Allergic/Immunologic: Negative.     Neurological: 2017     Years since quittin.6    Smokeless tobacco: Never Used   Substance and Sexual Activity    Alcohol use: No    Drug use: No    Sexual activity: Yes     Partners: Female   Lifestyle    Physical activity:     Days per week: None     Minutes per session: None    Stress: None   Relationships    Social connections:     Talks on phone: None     Gets together: None     Attends Buddhism service: None     Active member of club or organization: None     Attends meetings of clubs or organizations: None     Relationship status: None    Intimate partner violence:     Fear of current or ex partner: None     Emotionally abused: None     Physically abused: None     Forced sexual activity: None   Other Topics Concern    None   Social History Narrative    , grandchildren from  daughter live with him and his wife. SURGICAL HISTORY    Past Surgical History:   Procedure Laterality Date    CHEST TUBE INSERTION  2017    COLONOSCOPY      EYE SURGERY Right     injury    OTHER SURGICAL HISTORY      Greenfilter     PACEMAKER PLACEMENT      ICD;  battery change ;  Dr. Michelle Giordano.  DEFIB NOT SAFE MRI    PROSTATE BIOPSY      PROSTATECTOMY  2016    PROSTATECTOMY  2016    carmen lymph node dissection    VENA CAVA FILTER PLACEMENT         CURRENT MEDICATIONS    Current Outpatient Medications   Medication Sig Dispense Refill    omeprazole (PRILOSEC) 20 MG delayed release capsule Take 20 mg by mouth daily      tamsulosin (FLOMAX) 0.4 MG capsule Take 0.4 mg by mouth daily      lisinopril (PRINIVIL;ZESTRIL) 2.5 MG tablet Take 2.5 mg by mouth daily      colesevelam (WELCHOL) 625 MG tablet Take 1,875 mg by mouth 2 times daily (with meals) Three tablets bid      nicotine (NICODERM CQ) 7 MG/24HR Place 1 patch onto the skin every 24 hours 30 patch 3    insulin aspart protamine-insulin aspart (NOVOLOG MIX 70/30) (70-30) 100 UNIT/ML injection 60 units am, 50 units pm prior to meals 30 mL 3    HYDROcodone-acetaminophen (NORCO) 5-325 MG per tablet Take 1 tablet by mouth every 6 hours as needed for Pain for up to 30 days. Intended supply: 30 days 120 tablet 0    furosemide (LASIX) 20 MG tablet take 1 tablet by mouth once daily 30 tablet 3    isosorbide mononitrate (IMDUR) 30 MG extended release tablet take 1 tablet by mouth once daily 120 tablet 0    gabapentin (NEURONTIN) 800 MG tablet take 1 tablet by mouth twice a day 60 tablet 5    carvedilol (COREG) 6.25 MG tablet take 1 tablet by mouth twice a day 60 tablet 3    JANUVIA 100 MG tablet take 1 tablet by mouth once daily 30 tablet 5    ONE TOUCH ULTRA TEST strip TEST three times a day if needed 100 strip 2    Insulin Syringe-Needle U-100 (B-D INS SYR ULTRAFINE 1CC/31G) 31G X 5/16\" 1 ML MISC use twice a day 100 each 3    Insulin Pen Needle 31G X 8 MM MISC 1 each by Does not apply route 2 times daily 100 each 3    glucose blood VI test strips (ASCENSIA AUTODISC VI;ONE TOUCH ULTRA TEST VI) strip 1 each by In Vitro route 3 times daily As needed. 100 each 2    DULoxetine (CYMBALTA) 60 MG capsule Take 60 mg by mouth daily       No current facility-administered medications for this visit. ALLERGIES    Allergies   Allergen Reactions    Adhesive Tape Other (See Comments)     Blister,skin tears with silk tape       PHYSICAL EXAM   Physical Exam  Vitals signs reviewed. Constitutional:       Appearance: He is well-developed. He is obese. HENT:      Head: Normocephalic. Nose: Congestion present. Eyes:      Conjunctiva/sclera: Conjunctivae normal.      Pupils: Pupils are equal, round, and reactive to light. Neck:      Musculoskeletal: Normal range of motion and neck supple. Thyroid: No thyromegaly. Cardiovascular:      Rate and Rhythm: Normal rate and regular rhythm. Heart sounds: Normal heart sounds. No murmur.    Pulmonary:      Effort: Pulmonary effort is normal.      Breath sounds: Normal breath sounds. No wheezing or rales. Comments: Coarse breath sounds  Abdominal:      Palpations: Abdomen is soft. Tenderness: There is no tenderness. There is no guarding or rebound. Musculoskeletal: Normal range of motion. General: Tenderness (oa changes, tender low back. ) and deformity (using a cane for stability. ) present. Right lower le+ Edema present. Left lower le+ Edema present. Lymphadenopathy:      Cervical: No cervical adenopathy. Skin:     General: Skin is warm and dry. Neurological:      Mental Status: He is alert and oriented to person, place, and time. Psychiatric:         Behavior: Behavior normal.         Assessment/PLan  1. Controlled type 2 diabetes mellitus with diabetic nephropathy, with long-term current use of insulin ([facility] [address]  Not as well controlled as needed. Increase insulin to 60 units in am, cont 50 units in pm. Monitor for low bs.   - POCT glycosylated hemoglobin (Hb A1C)-8.4    2. Pre-operative clearance  Advised to eat small breakfast at 2am and use half the dose of insulin then remain NPO until surgery at 1:30pm.   Discussed need for prophylactic anticoagulation due to hx of thrombosis. Cont compression socks. 3. DDD (degenerative disc disease), lumbar  Proceed with planned procedure. 4. Essential hypertension  Chronic, stable, continue current medication and/or plan      5. Cigarette nicotine dependence without complication  Encouraged to quit completely prior to surgery. He will use patches to assist  - nicotine (NICODERM CQ) 7 MG/24HR; Place 1 patch onto the skin every 24 hours  Dispense: 30 patch; Refill: 3    6. Chronic systolic heart failure (HCC)  Cont meds and see cardiologist.     7. Prostate cancer ([facility] [address]  No current sx    8.  Uncontrolled type 2 diabetes mellitus with hyperglycemia ([facility] [address]  See #1  - insulin aspart protamine-insulin aspart (NOVOLOG MIX 70/30) (70-30) 100 UNIT/ML injection; 60 units am, 50 units pm prior to meals

## 2020-01-17 NOTE — PATIENT INSTRUCTIONS
good, quick way to make sure that you have a balanced meal. It also helps you spread carbs throughout the day. ? Divide your plate by types of foods. Put non-starchy vegetables on half the plate, meat or other protein food on one-quarter of the plate, and a grain or starchy vegetable in the final quarter of the plate. To this you can add a small piece of fruit and 1 cup of milk or yogurt, depending on how many carbs you are supposed to eat at a meal.  · Try to eat about the same amount of carbs at each meal. Do not \"save up\" your daily allowance of carbs to eat at one meal.  · Proteins have very little or no carbs per serving. Examples of proteins are beef, chicken, turkey, fish, eggs, tofu, cheese, cottage cheese, and peanut butter. A serving size of meat is 3 ounces, which is about the size of a deck of cards. Examples of meat substitute serving sizes (equal to 1 ounce of meat) are 1/4 cup of cottage cheese, 1 egg, 1 tablespoon of peanut butter, and ½ cup of tofu. How can you eat out and still eat healthy? · Learn to estimate the serving sizes of foods that have carbohydrate. If you measure food at home, it will be easier to estimate the amount in a serving of restaurant food. · If the meal you order has too much carbohydrate (such as potatoes, corn, or baked beans), ask to have a low-carbohydrate food instead. Ask for a salad or green vegetables. · If you use insulin, check your blood sugar before and after eating out to help you plan how much to eat in the future. · If you eat more carbohydrate at a meal than you had planned, take a walk or do other exercise. This will help lower your blood sugar. What else should you know? · Limit saturated fat, such as the fat from meat and dairy products. This is a healthy choice because people who have diabetes are at higher risk of heart disease. So choose lean cuts of meat and nonfat or low-fat dairy products.  Use olive or canola oil instead of butter or shortening snacks too. · Use cookbooks or online recipes to plan several main meals. Plan some quick meals for busy nights. You also can double some recipes that freeze well. Then you can save half for other busy nights when you don't have time to cook. · Make sure you have the ingredients you need for your recipes. If you're running low on basic items, put these items on your shopping list too. · List foods that you use to make breakfasts, lunches, and snacks. List plenty of fruits and vegetables. · Post this list on the refrigerator. Add to it as you think of more things you need. · Take the list to the store to do your weekly shopping. Follow-up care is a key part of your treatment and safety. Be sure to make and go to all appointments, and call your doctor if you are having problems. It's also a good idea to know your test results and keep a list of the medicines you take. Where can you learn more? Go to https://Wh[employer]charlyTrekCafe.[employer]. org and sign in to your [employer] account. Enter R118 in the BuzzElement box to learn more about \"Learning About Meal Planning for Diabetes. \"     If you do not have an account, please click on the \"Sign Up Now\" link. Current as of: April 16, 2019  Content Version: 12.3  © 5662-4898 Healthwise, Incorporated. Care instructions adapted under license by [facility] ([facility]). If you have questions about a medical condition or this instruction, always ask your healthcare professional. [place] [address] any warranty or liability for your use of this information. Patient Education        Learning About Low-Carbohydrate Foods  What foods are low in carbohydrate? The foods you eat contain nutrients, such as vitamins and minerals. Carbohydrate is a nutrient. Your body needs the right amount to stay healthy and work as it should. You can use the list below to help you make choices about which foods to eat.   Some foods that are lower in carbohydrate

## 2020-01-28 ENCOUNTER — ANESTHESIA EVENT (OUTPATIENT)
Dept: OPERATING ROOM | Age: 55
DRG: 454 | End: 2020-01-28
Payer: COMMERCIAL

## 2020-01-29 ENCOUNTER — APPOINTMENT (OUTPATIENT)
Dept: GENERAL RADIOLOGY | Age: 55
DRG: 454 | End: 2020-01-29
Attending: ORTHOPAEDIC SURGERY
Payer: COMMERCIAL

## 2020-01-29 ENCOUNTER — ANESTHESIA (OUTPATIENT)
Dept: OPERATING ROOM | Age: 55
DRG: 454 | End: 2020-01-29
Payer: COMMERCIAL

## 2020-01-29 ENCOUNTER — HOSPITAL ENCOUNTER (INPATIENT)
Age: 55
LOS: 1 days | Discharge: HOME | DRG: 454 | End: 2020-01-30
Attending: ORTHOPAEDIC SURGERY | Admitting: ORTHOPAEDIC SURGERY
Payer: COMMERCIAL

## 2020-01-29 VITALS — OXYGEN SATURATION: 98 % | TEMPERATURE: 99.7 F | SYSTOLIC BLOOD PRESSURE: 164 MMHG | DIASTOLIC BLOOD PRESSURE: 80 MMHG

## 2020-01-29 PROBLEM — M48.061 FORAMINAL STENOSIS OF LUMBAR REGION: Chronic | Status: ACTIVE | Noted: 2020-01-29

## 2020-01-29 PROBLEM — M43.10 SPONDYLOLISTHESIS, ACQUIRED: Chronic | Status: ACTIVE | Noted: 2020-01-29

## 2020-01-29 LAB
GLUCOSE BLD-MCNC: 144 MG/DL (ref 75–110)
GLUCOSE BLD-MCNC: 164 MG/DL (ref 75–110)
GLUCOSE BLD-MCNC: 191 MG/DL (ref 75–110)
GLUCOSE BLD-MCNC: 232 MG/DL (ref 75–110)
GLUCOSE BLD-MCNC: 300 MG/DL (ref 75–110)
GLUCOSE BLD-MCNC: 67 MG/DL (ref 75–110)
HCT VFR BLD CALC: 43 % (ref 40.7–50.3)
HEMOGLOBIN: 14 G/DL (ref 13–17)
LACTIC ACID: 2.9 MMOL/L (ref 0.5–2.2)
MCH RBC QN AUTO: 27.7 PG (ref 25.2–33.5)
MCHC RBC AUTO-ENTMCNC: 32.6 G/DL (ref 28.4–34.8)
MCV RBC AUTO: 85.1 FL (ref 82.6–102.9)
NRBC AUTOMATED: 0 PER 100 WBC
PDW BLD-RTO: 14.7 % (ref 11.8–14.4)
PLATELET # BLD: 193 K/UL (ref 138–453)
PMV BLD AUTO: 9.1 FL (ref 8.1–13.5)
RBC # BLD: 5.05 M/UL (ref 4.21–5.77)
WBC # BLD: 10.3 K/UL (ref 3.5–11.3)

## 2020-01-29 PROCEDURE — 6360000002 HC RX W HCPCS: Performed by: NURSE ANESTHETIST, CERTIFIED REGISTERED

## 2020-01-29 PROCEDURE — 3700000000 HC ANESTHESIA ATTENDED CARE: Performed by: ORTHOPAEDIC SURGERY

## 2020-01-29 PROCEDURE — 01NR0ZZ RELEASE SACRAL NERVE, OPEN APPROACH: ICD-10-PCS | Performed by: ORTHOPAEDIC SURGERY

## 2020-01-29 PROCEDURE — 6370000000 HC RX 637 (ALT 250 FOR IP): Performed by: ORTHOPAEDIC SURGERY

## 2020-01-29 PROCEDURE — 1200000000 HC SEMI PRIVATE

## 2020-01-29 PROCEDURE — 01NB0ZZ RELEASE LUMBAR NERVE, OPEN APPROACH: ICD-10-PCS | Performed by: ORTHOPAEDIC SURGERY

## 2020-01-29 PROCEDURE — 6360000002 HC RX W HCPCS: Performed by: ORTHOPAEDIC SURGERY

## 2020-01-29 PROCEDURE — 7100000001 HC PACU RECOVERY - ADDTL 15 MIN: Performed by: ORTHOPAEDIC SURGERY

## 2020-01-29 PROCEDURE — 7100000000 HC PACU RECOVERY - FIRST 15 MIN: Performed by: ORTHOPAEDIC SURGERY

## 2020-01-29 PROCEDURE — 2580000003 HC RX 258: Performed by: ANESTHESIOLOGY

## 2020-01-29 PROCEDURE — C1713 ANCHOR/SCREW BN/BN,TIS/BN: HCPCS | Performed by: ORTHOPAEDIC SURGERY

## 2020-01-29 PROCEDURE — 87040 BLOOD CULTURE FOR BACTERIA: CPT

## 2020-01-29 PROCEDURE — 2720000010 HC SURG SUPPLY STERILE: Performed by: ORTHOPAEDIC SURGERY

## 2020-01-29 PROCEDURE — 94660 CPAP INITIATION&MGMT: CPT

## 2020-01-29 PROCEDURE — 3600000014 HC SURGERY LEVEL 4 ADDTL 15MIN: Performed by: ORTHOPAEDIC SURGERY

## 2020-01-29 PROCEDURE — 72100 X-RAY EXAM L-S SPINE 2/3 VWS: CPT

## 2020-01-29 PROCEDURE — 2580000003 HC RX 258: Performed by: ORTHOPAEDIC SURGERY

## 2020-01-29 PROCEDURE — 2580000003 HC RX 258: Performed by: NURSE ANESTHETIST, CERTIFIED REGISTERED

## 2020-01-29 PROCEDURE — 3209999900 FLUORO FOR SURGICAL PROCEDURES

## 2020-01-29 PROCEDURE — 3700000001 HC ADD 15 MINUTES (ANESTHESIA): Performed by: ORTHOPAEDIC SURGERY

## 2020-01-29 PROCEDURE — 2500000003 HC RX 250 WO HCPCS: Performed by: ORTHOPAEDIC SURGERY

## 2020-01-29 PROCEDURE — 82947 ASSAY GLUCOSE BLOOD QUANT: CPT

## 2020-01-29 PROCEDURE — 2709999900 HC NON-CHARGEABLE SUPPLY: Performed by: ORTHOPAEDIC SURGERY

## 2020-01-29 PROCEDURE — 0SB40ZZ EXCISION OF LUMBOSACRAL DISC, OPEN APPROACH: ICD-10-PCS | Performed by: ORTHOPAEDIC SURGERY

## 2020-01-29 PROCEDURE — 3600000004 HC SURGERY LEVEL 4 BASE: Performed by: ORTHOPAEDIC SURGERY

## 2020-01-29 PROCEDURE — 6370000000 HC RX 637 (ALT 250 FOR IP): Performed by: NURSE ANESTHETIST, CERTIFIED REGISTERED

## 2020-01-29 PROCEDURE — 6360000002 HC RX W HCPCS: Performed by: ANESTHESIOLOGY

## 2020-01-29 PROCEDURE — 83036 HEMOGLOBIN GLYCOSYLATED A1C: CPT

## 2020-01-29 PROCEDURE — 36415 COLL VENOUS BLD VENIPUNCTURE: CPT

## 2020-01-29 PROCEDURE — 2780000010 HC IMPLANT OTHER: Performed by: ORTHOPAEDIC SURGERY

## 2020-01-29 PROCEDURE — 85027 COMPLETE CBC AUTOMATED: CPT

## 2020-01-29 PROCEDURE — 0SG3071 FUSION OF LUMBOSACRAL JOINT WITH AUTOLOGOUS TISSUE SUBSTITUTE, POSTERIOR APPROACH, POSTERIOR COLUMN, OPEN APPROACH: ICD-10-PCS | Performed by: ORTHOPAEDIC SURGERY

## 2020-01-29 PROCEDURE — 83605 ASSAY OF LACTIC ACID: CPT

## 2020-01-29 PROCEDURE — 2500000003 HC RX 250 WO HCPCS: Performed by: ANESTHESIOLOGY

## 2020-01-29 PROCEDURE — 0SG30AJ FUSION OF LUMBOSACRAL JOINT WITH INTERBODY FUSION DEVICE, POSTERIOR APPROACH, ANTERIOR COLUMN, OPEN APPROACH: ICD-10-PCS | Performed by: ORTHOPAEDIC SURGERY

## 2020-01-29 PROCEDURE — 2500000003 HC RX 250 WO HCPCS: Performed by: NURSE ANESTHETIST, CERTIFIED REGISTERED

## 2020-01-29 PROCEDURE — 6370000000 HC RX 637 (ALT 250 FOR IP): Performed by: INTERNAL MEDICINE

## 2020-01-29 DEVICE — TIM-TISSUE CANCELLOUS 30.0CC CRUSHED: Type: IMPLANTABLE DEVICE | Site: BACK | Status: FUNCTIONAL

## 2020-01-29 DEVICE — SCREW SPNL L45MM DIA6.5MM POST THORACOLUMBOSACRAL MOD SHANK: Type: IMPLANTABLE DEVICE | Site: BACK | Status: FUNCTIONAL

## 2020-01-29 DEVICE — K WIRE FIX NIT BVL BLNT TIP PRECEPT: Type: IMPLANTABLE DEVICE | Site: BACK | Status: FUNCTIONAL

## 2020-01-29 DEVICE — SCREW SPNL MOD TULIP RELINE: Type: IMPLANTABLE DEVICE | Site: BACK | Status: FUNCTIONAL

## 2020-01-29 DEVICE — GRAFT BNE SUB 5ML MTRX CELLULAR OSTEOCEL +: Type: IMPLANTABLE DEVICE | Site: BACK | Status: FUNCTIONAL

## 2020-01-29 DEVICE — ROD SPNL L45MM DIA5.5MM POST THORACOLUMBOSACRAL TI LORD: Type: IMPLANTABLE DEVICE | Site: BACK | Status: FUNCTIONAL

## 2020-01-29 DEVICE — SCREW SPNL DIA5.5MM OPN TULIP LOK RELINE: Type: IMPLANTABLE DEVICE | Site: BACK | Status: FUNCTIONAL

## 2020-01-29 DEVICE — CAGE SPNL 4 W8XH10XL30MM OBLQ TRANSFORAMINAL LUM INTBDY FUS: Type: IMPLANTABLE DEVICE | Site: BACK | Status: FUNCTIONAL

## 2020-01-29 DEVICE — SCREW SPNL L40MM DIA6.5MM POLYAX 2C RELINE MAS: Type: IMPLANTABLE DEVICE | Site: BACK | Status: FUNCTIONAL

## 2020-01-29 DEVICE — SCREW SPNL L50MM DIA6.5MM 2C POLYAX RELINE MAS: Type: IMPLANTABLE DEVICE | Site: BACK | Status: FUNCTIONAL

## 2020-01-29 DEVICE — AGENT HEMSTAT 8ML FLX TIP MTRX + DISP SURGIFLO: Type: IMPLANTABLE DEVICE | Site: BACK | Status: FUNCTIONAL

## 2020-01-29 DEVICE — SCREW SPNL L40MM DIA6.5MM POST THORACOLUMBOSACRAL MOD SHANK: Type: IMPLANTABLE DEVICE | Site: BACK | Status: FUNCTIONAL

## 2020-01-29 RX ORDER — SODIUM CHLORIDE, SODIUM LACTATE, POTASSIUM CHLORIDE, CALCIUM CHLORIDE 600; 310; 30; 20 MG/100ML; MG/100ML; MG/100ML; MG/100ML
INJECTION, SOLUTION INTRAVENOUS CONTINUOUS PRN
Status: DISCONTINUED | OUTPATIENT
Start: 2020-01-29 | End: 2020-01-29 | Stop reason: SDUPTHER

## 2020-01-29 RX ORDER — ONDANSETRON 2 MG/ML
4 INJECTION INTRAMUSCULAR; INTRAVENOUS EVERY 6 HOURS PRN
Status: DISCONTINUED | OUTPATIENT
Start: 2020-01-29 | End: 2020-01-30 | Stop reason: HOSPADM

## 2020-01-29 RX ORDER — ONDANSETRON 2 MG/ML
INJECTION INTRAMUSCULAR; INTRAVENOUS PRN
Status: DISCONTINUED | OUTPATIENT
Start: 2020-01-29 | End: 2020-01-29 | Stop reason: SDUPTHER

## 2020-01-29 RX ORDER — TAMSULOSIN HYDROCHLORIDE 0.4 MG/1
0.4 CAPSULE ORAL DAILY
Status: DISCONTINUED | OUTPATIENT
Start: 2020-01-29 | End: 2020-01-30 | Stop reason: HOSPADM

## 2020-01-29 RX ORDER — ONDANSETRON 2 MG/ML
4 INJECTION INTRAMUSCULAR; INTRAVENOUS EVERY 6 HOURS PRN
Status: DISCONTINUED | OUTPATIENT
Start: 2020-01-29 | End: 2020-01-29 | Stop reason: CLARIF

## 2020-01-29 RX ORDER — SODIUM CHLORIDE 0.9 % (FLUSH) 0.9 %
10 SYRINGE (ML) INJECTION PRN
Status: DISCONTINUED | OUTPATIENT
Start: 2020-01-29 | End: 2020-01-30 | Stop reason: HOSPADM

## 2020-01-29 RX ORDER — LIDOCAINE HYDROCHLORIDE 10 MG/ML
1 INJECTION, SOLUTION EPIDURAL; INFILTRATION; INTRACAUDAL; PERINEURAL
Status: DISCONTINUED | OUTPATIENT
Start: 2020-01-30 | End: 2020-01-29 | Stop reason: HOSPADM

## 2020-01-29 RX ORDER — DOCUSATE SODIUM 100 MG/1
100 CAPSULE, LIQUID FILLED ORAL 2 TIMES DAILY
Status: DISCONTINUED | OUTPATIENT
Start: 2020-01-29 | End: 2020-01-30 | Stop reason: HOSPADM

## 2020-01-29 RX ORDER — ONDANSETRON 2 MG/ML
4 INJECTION INTRAMUSCULAR; INTRAVENOUS
Status: DISCONTINUED | OUTPATIENT
Start: 2020-01-29 | End: 2020-01-29 | Stop reason: HOSPADM

## 2020-01-29 RX ORDER — SODIUM CHLORIDE, SODIUM LACTATE, POTASSIUM CHLORIDE, CALCIUM CHLORIDE 600; 310; 30; 20 MG/100ML; MG/100ML; MG/100ML; MG/100ML
INJECTION, SOLUTION INTRAVENOUS CONTINUOUS
Status: DISCONTINUED | OUTPATIENT
Start: 2020-01-30 | End: 2020-01-29

## 2020-01-29 RX ORDER — SODIUM CHLORIDE 9 MG/ML
INJECTION, SOLUTION INTRAVENOUS CONTINUOUS
Status: DISCONTINUED | OUTPATIENT
Start: 2020-01-30 | End: 2020-01-29

## 2020-01-29 RX ORDER — DULOXETIN HYDROCHLORIDE 60 MG/1
60 CAPSULE, DELAYED RELEASE ORAL DAILY
Status: DISCONTINUED | OUTPATIENT
Start: 2020-01-29 | End: 2020-01-30 | Stop reason: HOSPADM

## 2020-01-29 RX ORDER — DEXTROSE MONOHYDRATE 50 MG/ML
100 INJECTION, SOLUTION INTRAVENOUS PRN
Status: DISCONTINUED | OUTPATIENT
Start: 2020-01-29 | End: 2020-01-30 | Stop reason: HOSPADM

## 2020-01-29 RX ORDER — TIZANIDINE 4 MG/1
4 TABLET ORAL EVERY 8 HOURS PRN
Status: DISCONTINUED | OUTPATIENT
Start: 2020-01-29 | End: 2020-01-30 | Stop reason: HOSPADM

## 2020-01-29 RX ORDER — MIDAZOLAM HYDROCHLORIDE 1 MG/ML
INJECTION INTRAMUSCULAR; INTRAVENOUS PRN
Status: DISCONTINUED | OUTPATIENT
Start: 2020-01-29 | End: 2020-01-29 | Stop reason: SDUPTHER

## 2020-01-29 RX ORDER — POLYETHYLENE GLYCOL 3350 17 G/17G
17 POWDER, FOR SOLUTION ORAL DAILY PRN
Status: DISCONTINUED | OUTPATIENT
Start: 2020-01-29 | End: 2020-01-30 | Stop reason: HOSPADM

## 2020-01-29 RX ORDER — FUROSEMIDE 20 MG/1
20 TABLET ORAL DAILY
Status: DISCONTINUED | OUTPATIENT
Start: 2020-01-29 | End: 2020-01-30 | Stop reason: HOSPADM

## 2020-01-29 RX ORDER — ESMOLOL HYDROCHLORIDE 10 MG/ML
INJECTION INTRAVENOUS PRN
Status: DISCONTINUED | OUTPATIENT
Start: 2020-01-29 | End: 2020-01-29 | Stop reason: SDUPTHER

## 2020-01-29 RX ORDER — OXYCODONE HYDROCHLORIDE AND ACETAMINOPHEN 5; 325 MG/1; MG/1
2 TABLET ORAL EVERY 4 HOURS PRN
Status: DISCONTINUED | OUTPATIENT
Start: 2020-01-29 | End: 2020-01-30 | Stop reason: HOSPADM

## 2020-01-29 RX ORDER — CARVEDILOL 6.25 MG/1
6.25 TABLET ORAL 2 TIMES DAILY WITH MEALS
Status: DISCONTINUED | OUTPATIENT
Start: 2020-01-29 | End: 2020-01-30 | Stop reason: HOSPADM

## 2020-01-29 RX ORDER — OXYCODONE HYDROCHLORIDE AND ACETAMINOPHEN 5; 325 MG/1; MG/1
1 TABLET ORAL EVERY 4 HOURS PRN
Status: DISCONTINUED | OUTPATIENT
Start: 2020-01-29 | End: 2020-01-30 | Stop reason: HOSPADM

## 2020-01-29 RX ORDER — SODIUM CHLORIDE 0.9 % (FLUSH) 0.9 %
10 SYRINGE (ML) INJECTION PRN
Status: DISCONTINUED | OUTPATIENT
Start: 2020-01-29 | End: 2020-01-29 | Stop reason: HOSPADM

## 2020-01-29 RX ORDER — SODIUM CHLORIDE 0.9 % (FLUSH) 0.9 %
10 SYRINGE (ML) INJECTION EVERY 12 HOURS SCHEDULED
Status: DISCONTINUED | OUTPATIENT
Start: 2020-01-29 | End: 2020-01-30 | Stop reason: HOSPADM

## 2020-01-29 RX ORDER — MORPHINE SULFATE 2 MG/ML
2 INJECTION, SOLUTION INTRAMUSCULAR; INTRAVENOUS
Status: DISCONTINUED | OUTPATIENT
Start: 2020-01-29 | End: 2020-01-30 | Stop reason: HOSPADM

## 2020-01-29 RX ORDER — DEXTROSE MONOHYDRATE 25 G/50ML
12.5 INJECTION, SOLUTION INTRAVENOUS ONCE
Status: COMPLETED | OUTPATIENT
Start: 2020-01-29 | End: 2020-01-29

## 2020-01-29 RX ORDER — KETAMINE HYDROCHLORIDE 100 MG/ML
25 INJECTION, SOLUTION INTRAMUSCULAR; INTRAVENOUS ONCE
Status: COMPLETED | OUTPATIENT
Start: 2020-01-29 | End: 2020-01-29

## 2020-01-29 RX ORDER — PROPOFOL 10 MG/ML
INJECTION, EMULSION INTRAVENOUS PRN
Status: DISCONTINUED | OUTPATIENT
Start: 2020-01-29 | End: 2020-01-29 | Stop reason: SDUPTHER

## 2020-01-29 RX ORDER — LISINOPRIL 2.5 MG/1
2.5 TABLET ORAL DAILY
Status: DISCONTINUED | OUTPATIENT
Start: 2020-01-29 | End: 2020-01-30 | Stop reason: HOSPADM

## 2020-01-29 RX ORDER — LIDOCAINE HYDROCHLORIDE 20 MG/ML
INJECTION, SOLUTION EPIDURAL; INFILTRATION; INTRACAUDAL; PERINEURAL PRN
Status: DISCONTINUED | OUTPATIENT
Start: 2020-01-29 | End: 2020-01-29 | Stop reason: SDUPTHER

## 2020-01-29 RX ORDER — PHENYLEPHRINE HCL IN 0.9% NACL 1 MG/10 ML
SYRINGE (ML) INTRAVENOUS PRN
Status: DISCONTINUED | OUTPATIENT
Start: 2020-01-29 | End: 2020-01-29 | Stop reason: SDUPTHER

## 2020-01-29 RX ORDER — ACETAMINOPHEN 10 MG/ML
1000 INJECTION, SOLUTION INTRAVENOUS ONCE
Status: COMPLETED | OUTPATIENT
Start: 2020-01-29 | End: 2020-01-29

## 2020-01-29 RX ORDER — FENTANYL CITRATE 50 UG/ML
25 INJECTION, SOLUTION INTRAMUSCULAR; INTRAVENOUS EVERY 5 MIN PRN
Status: COMPLETED | OUTPATIENT
Start: 2020-01-29 | End: 2020-01-29

## 2020-01-29 RX ORDER — ALBUTEROL SULFATE 90 UG/1
AEROSOL, METERED RESPIRATORY (INHALATION) PRN
Status: DISCONTINUED | OUTPATIENT
Start: 2020-01-29 | End: 2020-01-29 | Stop reason: SDUPTHER

## 2020-01-29 RX ORDER — ISOSORBIDE MONONITRATE 30 MG/1
30 TABLET, EXTENDED RELEASE ORAL DAILY
Status: DISCONTINUED | OUTPATIENT
Start: 2020-01-29 | End: 2020-01-30 | Stop reason: HOSPADM

## 2020-01-29 RX ORDER — MORPHINE SULFATE 15 MG/1
15 TABLET, FILM COATED, EXTENDED RELEASE ORAL EVERY 12 HOURS SCHEDULED
Status: DISCONTINUED | OUTPATIENT
Start: 2020-01-29 | End: 2020-01-30 | Stop reason: HOSPADM

## 2020-01-29 RX ORDER — BUPIVACAINE HYDROCHLORIDE AND EPINEPHRINE 5; 5 MG/ML; UG/ML
INJECTION, SOLUTION EPIDURAL; INTRACAUDAL; PERINEURAL PRN
Status: DISCONTINUED | OUTPATIENT
Start: 2020-01-29 | End: 2020-01-29 | Stop reason: HOSPADM

## 2020-01-29 RX ORDER — NALOXONE HYDROCHLORIDE 0.4 MG/ML
INJECTION, SOLUTION INTRAMUSCULAR; INTRAVENOUS; SUBCUTANEOUS PRN
Status: DISCONTINUED | OUTPATIENT
Start: 2020-01-29 | End: 2020-01-29 | Stop reason: SDUPTHER

## 2020-01-29 RX ORDER — HYDROMORPHONE HCL 110MG/55ML
0.5 PATIENT CONTROLLED ANALGESIA SYRINGE INTRAVENOUS EVERY 5 MIN PRN
Status: DISCONTINUED | OUTPATIENT
Start: 2020-01-29 | End: 2020-01-29 | Stop reason: HOSPADM

## 2020-01-29 RX ORDER — PANTOPRAZOLE SODIUM 40 MG/1
40 TABLET, DELAYED RELEASE ORAL
Status: DISCONTINUED | OUTPATIENT
Start: 2020-01-30 | End: 2020-01-30 | Stop reason: HOSPADM

## 2020-01-29 RX ORDER — FENTANYL CITRATE 50 UG/ML
INJECTION, SOLUTION INTRAMUSCULAR; INTRAVENOUS PRN
Status: DISCONTINUED | OUTPATIENT
Start: 2020-01-29 | End: 2020-01-29 | Stop reason: SDUPTHER

## 2020-01-29 RX ORDER — CHOLESTYRAMINE LIGHT 4 G/5.7G
4 POWDER, FOR SUSPENSION ORAL DAILY
Status: DISCONTINUED | OUTPATIENT
Start: 2020-01-29 | End: 2020-01-30 | Stop reason: HOSPADM

## 2020-01-29 RX ORDER — MORPHINE SULFATE 4 MG/ML
4 INJECTION, SOLUTION INTRAMUSCULAR; INTRAVENOUS
Status: DISCONTINUED | OUTPATIENT
Start: 2020-01-29 | End: 2020-01-30 | Stop reason: HOSPADM

## 2020-01-29 RX ORDER — FENTANYL CITRATE 50 UG/ML
50 INJECTION, SOLUTION INTRAMUSCULAR; INTRAVENOUS EVERY 5 MIN PRN
Status: DISCONTINUED | OUTPATIENT
Start: 2020-01-29 | End: 2020-01-29 | Stop reason: HOSPADM

## 2020-01-29 RX ORDER — SODIUM CHLORIDE 0.9 % (FLUSH) 0.9 %
10 SYRINGE (ML) INJECTION EVERY 12 HOURS SCHEDULED
Status: DISCONTINUED | OUTPATIENT
Start: 2020-01-29 | End: 2020-01-29 | Stop reason: HOSPADM

## 2020-01-29 RX ORDER — SUCCINYLCHOLINE/SOD CL,ISO/PF 100 MG/5ML
SYRINGE (ML) INTRAVENOUS PRN
Status: DISCONTINUED | OUTPATIENT
Start: 2020-01-29 | End: 2020-01-29 | Stop reason: SDUPTHER

## 2020-01-29 RX ORDER — NICOTINE POLACRILEX 4 MG
15 LOZENGE BUCCAL PRN
Status: DISCONTINUED | OUTPATIENT
Start: 2020-01-29 | End: 2020-01-30 | Stop reason: HOSPADM

## 2020-01-29 RX ORDER — DEXTROSE MONOHYDRATE 25 G/50ML
12.5 INJECTION, SOLUTION INTRAVENOUS PRN
Status: DISCONTINUED | OUTPATIENT
Start: 2020-01-29 | End: 2020-01-30 | Stop reason: HOSPADM

## 2020-01-29 RX ORDER — DEXAMETHASONE SODIUM PHOSPHATE 10 MG/ML
INJECTION INTRAMUSCULAR; INTRAVENOUS PRN
Status: DISCONTINUED | OUTPATIENT
Start: 2020-01-29 | End: 2020-01-29 | Stop reason: SDUPTHER

## 2020-01-29 RX ORDER — ONDANSETRON 4 MG/1
4 TABLET, ORALLY DISINTEGRATING ORAL EVERY 6 HOURS PRN
Status: DISCONTINUED | OUTPATIENT
Start: 2020-01-29 | End: 2020-01-30 | Stop reason: HOSPADM

## 2020-01-29 RX ORDER — SODIUM CHLORIDE 9 MG/ML
INJECTION, SOLUTION INTRAVENOUS CONTINUOUS
Status: DISCONTINUED | OUTPATIENT
Start: 2020-01-29 | End: 2020-01-30 | Stop reason: HOSPADM

## 2020-01-29 RX ORDER — HYDROMORPHONE HCL 110MG/55ML
0.25 PATIENT CONTROLLED ANALGESIA SYRINGE INTRAVENOUS EVERY 5 MIN PRN
Status: COMPLETED | OUTPATIENT
Start: 2020-01-29 | End: 2020-01-29

## 2020-01-29 RX ADMIN — HYDROMORPHONE HYDROCHLORIDE 0.25 MG: 2 INJECTION, SOLUTION INTRAMUSCULAR; INTRAVENOUS; SUBCUTANEOUS at 15:50

## 2020-01-29 RX ADMIN — PROPOFOL 100 MG: 10 INJECTION, EMULSION INTRAVENOUS at 11:12

## 2020-01-29 RX ADMIN — CARVEDILOL 6.25 MG: 6.25 TABLET, FILM COATED ORAL at 18:06

## 2020-01-29 RX ADMIN — Medication 100 MCG: at 11:36

## 2020-01-29 RX ADMIN — FENTANYL CITRATE 150 MCG: 50 INJECTION, SOLUTION INTRAMUSCULAR; INTRAVENOUS at 11:01

## 2020-01-29 RX ADMIN — MIDAZOLAM 2 MG: 1 INJECTION INTRAMUSCULAR; INTRAVENOUS at 10:59

## 2020-01-29 RX ADMIN — Medication 100 MCG: at 11:34

## 2020-01-29 RX ADMIN — HYDROMORPHONE HYDROCHLORIDE 0.25 MG: 2 INJECTION, SOLUTION INTRAMUSCULAR; INTRAVENOUS; SUBCUTANEOUS at 16:20

## 2020-01-29 RX ADMIN — FENTANYL CITRATE 25 MCG: 50 INJECTION, SOLUTION INTRAMUSCULAR; INTRAVENOUS at 15:04

## 2020-01-29 RX ADMIN — FENTANYL CITRATE 100 MCG: 50 INJECTION, SOLUTION INTRAMUSCULAR; INTRAVENOUS at 12:56

## 2020-01-29 RX ADMIN — Medication 6 PUFF: at 13:57

## 2020-01-29 RX ADMIN — FENTANYL CITRATE 25 MCG: 50 INJECTION, SOLUTION INTRAMUSCULAR; INTRAVENOUS at 14:38

## 2020-01-29 RX ADMIN — ONDANSETRON 4 MG: 2 INJECTION, SOLUTION INTRAMUSCULAR; INTRAVENOUS at 13:30

## 2020-01-29 RX ADMIN — FENTANYL CITRATE 25 MCG: 50 INJECTION, SOLUTION INTRAMUSCULAR; INTRAVENOUS at 14:47

## 2020-01-29 RX ADMIN — INSULIN LISPRO 2 UNITS: 100 INJECTION, SOLUTION INTRAVENOUS; SUBCUTANEOUS at 18:19

## 2020-01-29 RX ADMIN — TAMSULOSIN HYDROCHLORIDE 0.4 MG: 0.4 CAPSULE ORAL at 20:26

## 2020-01-29 RX ADMIN — FENTANYL CITRATE 25 MCG: 50 INJECTION, SOLUTION INTRAMUSCULAR; INTRAVENOUS at 14:53

## 2020-01-29 RX ADMIN — HYDROMORPHONE HYDROCHLORIDE 0.25 MG: 2 INJECTION, SOLUTION INTRAMUSCULAR; INTRAVENOUS; SUBCUTANEOUS at 15:39

## 2020-01-29 RX ADMIN — DEXAMETHASONE SODIUM PHOSPHATE 10 MG: 10 INJECTION INTRAMUSCULAR; INTRAVENOUS at 11:01

## 2020-01-29 RX ADMIN — OXYCODONE AND ACETAMINOPHEN 2 TABLET: 5; 325 TABLET ORAL at 22:49

## 2020-01-29 RX ADMIN — FUROSEMIDE 20 MG: 20 TABLET ORAL at 18:05

## 2020-01-29 RX ADMIN — POLYETHYLENE GLYCOL (3350) 17 G: 17 POWDER, FOR SOLUTION ORAL at 18:06

## 2020-01-29 RX ADMIN — ESMOLOL HYDROCHLORIDE 40 MG: 10 INJECTION, SOLUTION INTRAVENOUS at 14:10

## 2020-01-29 RX ADMIN — FENTANYL CITRATE 25 MCG: 50 INJECTION, SOLUTION INTRAMUSCULAR; INTRAVENOUS at 15:12

## 2020-01-29 RX ADMIN — DEXTROSE 50 % IN WATER (D50W) INTRAVENOUS SYRINGE 12.5 G: at 10:43

## 2020-01-29 RX ADMIN — DEXTROSE MONOHYDRATE 2 G: 50 INJECTION, SOLUTION INTRAVENOUS at 11:11

## 2020-01-29 RX ADMIN — PROPOFOL 200 MG: 10 INJECTION, EMULSION INTRAVENOUS at 11:01

## 2020-01-29 RX ADMIN — DOCUSATE SODIUM 100 MG: 100 CAPSULE, LIQUID FILLED ORAL at 20:26

## 2020-01-29 RX ADMIN — Medication 100 MCG: at 11:30

## 2020-01-29 RX ADMIN — HYDROMORPHONE HYDROCHLORIDE 0.5 MG: 2 INJECTION, SOLUTION INTRAMUSCULAR; INTRAVENOUS; SUBCUTANEOUS at 16:56

## 2020-01-29 RX ADMIN — Medication 100 MG: at 11:01

## 2020-01-29 RX ADMIN — Medication 100 MCG: at 12:09

## 2020-01-29 RX ADMIN — DEXTROSE MONOHYDRATE 2 G: 50 INJECTION, SOLUTION INTRAVENOUS at 17:41

## 2020-01-29 RX ADMIN — Medication 100 MCG: at 12:19

## 2020-01-29 RX ADMIN — LIDOCAINE HYDROCHLORIDE 60 MG: 20 INJECTION, SOLUTION EPIDURAL; INFILTRATION; INTRACAUDAL; PERINEURAL at 11:01

## 2020-01-29 RX ADMIN — INSULIN LISPRO 40 UNITS: 100 INJECTION, SUSPENSION SUBCUTANEOUS at 20:33

## 2020-01-29 RX ADMIN — KETAMINE HYDROCHLORIDE 25 MG: 100 INJECTION INTRAMUSCULAR; INTRAVENOUS at 14:42

## 2020-01-29 RX ADMIN — Medication 100 MCG: at 11:31

## 2020-01-29 RX ADMIN — LINAGLIPTIN 5 MG: 5 TABLET, FILM COATED ORAL at 18:19

## 2020-01-29 RX ADMIN — LISINOPRIL 2.5 MG: 2.5 TABLET ORAL at 18:05

## 2020-01-29 RX ADMIN — HYDROMORPHONE HYDROCHLORIDE 0.25 MG: 2 INJECTION, SOLUTION INTRAMUSCULAR; INTRAVENOUS; SUBCUTANEOUS at 16:08

## 2020-01-29 RX ADMIN — MORPHINE SULFATE 15 MG: 15 TABLET, FILM COATED, EXTENDED RELEASE ORAL at 20:26

## 2020-01-29 RX ADMIN — SODIUM CHLORIDE: 9 INJECTION, SOLUTION INTRAVENOUS at 17:43

## 2020-01-29 RX ADMIN — FENTANYL CITRATE 25 MCG: 50 INJECTION, SOLUTION INTRAMUSCULAR; INTRAVENOUS at 14:31

## 2020-01-29 RX ADMIN — FENTANYL CITRATE 25 MCG: 50 INJECTION, SOLUTION INTRAMUSCULAR; INTRAVENOUS at 15:31

## 2020-01-29 RX ADMIN — NALOXONE HYDROCHLORIDE 0.4 MG: 0.4 INJECTION, SOLUTION INTRAMUSCULAR; INTRAVENOUS; SUBCUTANEOUS at 14:10

## 2020-01-29 RX ADMIN — SODIUM CHLORIDE, POTASSIUM CHLORIDE, SODIUM LACTATE AND CALCIUM CHLORIDE: 600; 310; 30; 20 INJECTION, SOLUTION INTRAVENOUS at 12:02

## 2020-01-29 RX ADMIN — INSULIN LISPRO 4 UNITS: 100 INJECTION, SOLUTION INTRAVENOUS; SUBCUTANEOUS at 20:32

## 2020-01-29 RX ADMIN — SODIUM CHLORIDE, POTASSIUM CHLORIDE, SODIUM LACTATE AND CALCIUM CHLORIDE: 600; 310; 30; 20 INJECTION, SOLUTION INTRAVENOUS at 10:59

## 2020-01-29 RX ADMIN — FENTANYL CITRATE 25 MCG: 50 INJECTION, SOLUTION INTRAMUSCULAR; INTRAVENOUS at 16:30

## 2020-01-29 RX ADMIN — HYDROMORPHONE HYDROCHLORIDE 0.5 MG: 2 INJECTION, SOLUTION INTRAMUSCULAR; INTRAVENOUS; SUBCUTANEOUS at 17:04

## 2020-01-29 RX ADMIN — ACETAMINOPHEN 1000 MG: 10 INJECTION, SOLUTION INTRAVENOUS at 14:28

## 2020-01-29 RX ADMIN — SODIUM CHLORIDE, POTASSIUM CHLORIDE, SODIUM LACTATE AND CALCIUM CHLORIDE: 600; 310; 30; 20 INJECTION, SOLUTION INTRAVENOUS at 10:36

## 2020-01-29 RX ADMIN — ESMOLOL HYDROCHLORIDE 40 MG: 10 INJECTION, SOLUTION INTRAVENOUS at 14:14

## 2020-01-29 RX ADMIN — ISOSORBIDE MONONITRATE 30 MG: 30 TABLET ORAL at 18:06

## 2020-01-29 RX ADMIN — DULOXETINE HYDROCHLORIDE 60 MG: 60 CAPSULE, DELAYED RELEASE ORAL at 18:05

## 2020-01-29 RX ADMIN — CHOLESTYRAMINE 4 G: 4 POWDER, FOR SUSPENSION ORAL at 18:06

## 2020-01-29 RX ADMIN — HYDROMORPHONE HYDROCHLORIDE 0.5 MG: 2 INJECTION, SOLUTION INTRAMUSCULAR; INTRAVENOUS; SUBCUTANEOUS at 16:44

## 2020-01-29 RX ADMIN — TIZANIDINE 4 MG: 4 TABLET ORAL at 17:41

## 2020-01-29 RX ADMIN — OXYCODONE AND ACETAMINOPHEN 2 TABLET: 5; 325 TABLET ORAL at 17:41

## 2020-01-29 RX ADMIN — FENTANYL CITRATE 100 MCG: 50 INJECTION, SOLUTION INTRAMUSCULAR; INTRAVENOUS at 11:12

## 2020-01-29 ASSESSMENT — PULMONARY FUNCTION TESTS
PIF_VALUE: 3
PIF_VALUE: 2
PIF_VALUE: 11
PIF_VALUE: 22
PIF_VALUE: 23
PIF_VALUE: 22
PIF_VALUE: 0
PIF_VALUE: 3
PIF_VALUE: 3
PIF_VALUE: 20
PIF_VALUE: 23
PIF_VALUE: 21
PIF_VALUE: 22
PIF_VALUE: 23
PIF_VALUE: 24
PIF_VALUE: 32
PIF_VALUE: 23
PIF_VALUE: 21
PIF_VALUE: 22
PIF_VALUE: 23
PIF_VALUE: 23
PIF_VALUE: 20
PIF_VALUE: 29
PIF_VALUE: 22
PIF_VALUE: 24
PIF_VALUE: 23
PIF_VALUE: 16
PIF_VALUE: 1
PIF_VALUE: 23
PIF_VALUE: 24
PIF_VALUE: 22
PIF_VALUE: 30
PIF_VALUE: 34
PIF_VALUE: 20
PIF_VALUE: 23
PIF_VALUE: 23
PIF_VALUE: 3
PIF_VALUE: 6
PIF_VALUE: 22
PIF_VALUE: 20
PIF_VALUE: 20
PIF_VALUE: 23
PIF_VALUE: 29
PIF_VALUE: 23
PIF_VALUE: 23
PIF_VALUE: 22
PIF_VALUE: 23
PIF_VALUE: 22
PIF_VALUE: 21
PIF_VALUE: 22
PIF_VALUE: 3
PIF_VALUE: 29
PIF_VALUE: 20
PIF_VALUE: 12
PIF_VALUE: 28
PIF_VALUE: 22
PIF_VALUE: 3
PIF_VALUE: 23
PIF_VALUE: 25
PIF_VALUE: 21
PIF_VALUE: 2
PIF_VALUE: 22
PIF_VALUE: 23
PIF_VALUE: 23
PIF_VALUE: 25
PIF_VALUE: 23
PIF_VALUE: 22
PIF_VALUE: 22
PIF_VALUE: 21
PIF_VALUE: 23
PIF_VALUE: 22
PIF_VALUE: 21
PIF_VALUE: 25
PIF_VALUE: 22
PIF_VALUE: 0
PIF_VALUE: 23
PIF_VALUE: 22
PIF_VALUE: 2
PIF_VALUE: 2
PIF_VALUE: 22
PIF_VALUE: 0
PIF_VALUE: 20
PIF_VALUE: 30
PIF_VALUE: 24
PIF_VALUE: 22
PIF_VALUE: 22
PIF_VALUE: 1
PIF_VALUE: 23
PIF_VALUE: 0
PIF_VALUE: 9
PIF_VALUE: 30
PIF_VALUE: 8
PIF_VALUE: 21
PIF_VALUE: 21
PIF_VALUE: 23
PIF_VALUE: 23
PIF_VALUE: 20
PIF_VALUE: 25
PIF_VALUE: 22
PIF_VALUE: 0
PIF_VALUE: 23
PIF_VALUE: 20
PIF_VALUE: 22
PIF_VALUE: 23
PIF_VALUE: 22
PIF_VALUE: 24
PIF_VALUE: 18
PIF_VALUE: 22
PIF_VALUE: 4
PIF_VALUE: 22
PIF_VALUE: 21
PIF_VALUE: 23
PIF_VALUE: 23
PIF_VALUE: 22
PIF_VALUE: 1
PIF_VALUE: 22
PIF_VALUE: 21
PIF_VALUE: 22
PIF_VALUE: 22
PIF_VALUE: 23
PIF_VALUE: 28
PIF_VALUE: 12
PIF_VALUE: 22
PIF_VALUE: 22
PIF_VALUE: 29
PIF_VALUE: 25
PIF_VALUE: 20
PIF_VALUE: 22
PIF_VALUE: 21
PIF_VALUE: 21
PIF_VALUE: 22
PIF_VALUE: 22
PIF_VALUE: 23
PIF_VALUE: 2
PIF_VALUE: 22
PIF_VALUE: 24
PIF_VALUE: 23
PIF_VALUE: 22
PIF_VALUE: 0
PIF_VALUE: 18
PIF_VALUE: 36
PIF_VALUE: 26
PIF_VALUE: 2
PIF_VALUE: 21
PIF_VALUE: 22
PIF_VALUE: 22
PIF_VALUE: 29
PIF_VALUE: 23
PIF_VALUE: 12
PIF_VALUE: 3
PIF_VALUE: 22
PIF_VALUE: 14
PIF_VALUE: 23
PIF_VALUE: 19
PIF_VALUE: 21
PIF_VALUE: 23
PIF_VALUE: 0
PIF_VALUE: 24
PIF_VALUE: 22
PIF_VALUE: 21
PIF_VALUE: 23
PIF_VALUE: 13
PIF_VALUE: 22
PIF_VALUE: 21
PIF_VALUE: 21
PIF_VALUE: 10
PIF_VALUE: 22
PIF_VALUE: 2
PIF_VALUE: 22
PIF_VALUE: 31
PIF_VALUE: 21
PIF_VALUE: 10
PIF_VALUE: 22
PIF_VALUE: 22
PIF_VALUE: 4
PIF_VALUE: 34
PIF_VALUE: 22
PIF_VALUE: 23
PIF_VALUE: 22
PIF_VALUE: 20
PIF_VALUE: 25
PIF_VALUE: 23
PIF_VALUE: 23
PIF_VALUE: 1
PIF_VALUE: 2
PIF_VALUE: 22
PIF_VALUE: 30
PIF_VALUE: 22
PIF_VALUE: 23
PIF_VALUE: 21
PIF_VALUE: 3
PIF_VALUE: 24
PIF_VALUE: 11
PIF_VALUE: 22
PIF_VALUE: 23
PIF_VALUE: 22
PIF_VALUE: 22
PIF_VALUE: 5
PIF_VALUE: 34
PIF_VALUE: 1
PIF_VALUE: 23

## 2020-01-29 ASSESSMENT — PAIN SCALES - GENERAL
PAINLEVEL_OUTOF10: 10
PAINLEVEL_OUTOF10: 8
PAINLEVEL_OUTOF10: 6
PAINLEVEL_OUTOF10: 7
PAINLEVEL_OUTOF10: 10
PAINLEVEL_OUTOF10: 10
PAINLEVEL_OUTOF10: 8
PAINLEVEL_OUTOF10: 10
PAINLEVEL_OUTOF10: 8
PAINLEVEL_OUTOF10: 10
PAINLEVEL_OUTOF10: 8
PAINLEVEL_OUTOF10: 10
PAINLEVEL_OUTOF10: 10
PAINLEVEL_OUTOF10: 8
PAINLEVEL_OUTOF10: 10
PAINLEVEL_OUTOF10: 5
PAINLEVEL_OUTOF10: 10
PAINLEVEL_OUTOF10: 8
PAINLEVEL_OUTOF10: 10
PAINLEVEL_OUTOF10: 8
PAINLEVEL_OUTOF10: 10

## 2020-01-29 ASSESSMENT — PAIN DESCRIPTION - LOCATION
LOCATION: BACK

## 2020-01-29 ASSESSMENT — PAIN DESCRIPTION - PAIN TYPE
TYPE: SURGICAL PAIN
TYPE: CHRONIC PAIN;SURGICAL PAIN

## 2020-01-29 ASSESSMENT — PAIN DESCRIPTION - DESCRIPTORS
DESCRIPTORS: ACHING

## 2020-01-29 ASSESSMENT — LIFESTYLE VARIABLES: SMOKING_STATUS: 1

## 2020-01-29 ASSESSMENT — PAIN DESCRIPTION - ORIENTATION
ORIENTATION: MID
ORIENTATION: LOWER

## 2020-01-29 ASSESSMENT — PAIN DESCRIPTION - FREQUENCY
FREQUENCY: CONTINUOUS

## 2020-01-29 ASSESSMENT — PAIN DESCRIPTION - PROGRESSION
CLINICAL_PROGRESSION: NOT CHANGED
CLINICAL_PROGRESSION: GRADUALLY WORSENING
CLINICAL_PROGRESSION: NOT CHANGED

## 2020-01-29 ASSESSMENT — PAIN - FUNCTIONAL ASSESSMENT
PAIN_FUNCTIONAL_ASSESSMENT: PREVENTS OR INTERFERES SOME ACTIVE ACTIVITIES AND ADLS

## 2020-01-29 ASSESSMENT — PAIN DESCRIPTION - ONSET: ONSET: ON-GOING

## 2020-01-29 NOTE — H&P
History and Physical Update    Pt Name: Aster Francis  MRN: [id number]  YOB: 1965  Date of evaluation: 1/29/2020      [x] I have reviewed the H&P by Jonatan Carballo NP on 1/15/20  which meets the criteria for an Interval History and Physical note and is attached below. [x] I have examined  Aster Francis  There are no changes to the patient who is scheduled for a right L5-S1 TLIF by Dr. Willie Topete. The patient denies health changes, fever, chills, productive cough, SOB, skin changes or chest pain. Vital signs: BP (!) 135/91   Pulse 98   Temp 98.2 °F (36.8 °C) (Oral)   Resp 16   Ht 6' 1\" (1.854 m)   Wt 258 lb 2.5 oz (117.1 kg)   SpO2 96%   BMI 34.06 kg/m²     Allergies:  Adhesive tape    Medications:    Prior to Admission medications    Medication Sig Start Date End Date Taking? Authorizing Provider   insulin aspart protamine-insulin aspart (NOVOLOG MIX 70/30) (70-30) 100 UNIT/ML injection inject 50 units subcutaneously twice a day with meals 1/21/20   Terrell Hi MD   omeprazole (PRILOSEC) 20 MG delayed release capsule Take 20 mg by mouth daily    Historical Provider, MD   tamsulosin (FLOMAX) 0.4 MG capsule Take 0.4 mg by mouth daily    Historical Provider, MD   lisinopril (PRINIVIL;ZESTRIL) 2.5 MG tablet Take 2.5 mg by mouth daily    Historical Provider, MD   colesevelam (WELCHOL) 625 MG tablet Take 1,875 mg by mouth 2 times daily (with meals) Three tablets bid    Historical Provider, MD   nicotine (NICODERM CQ) 7 MG/24HR Place 1 patch onto the skin every 24 hours 1/17/20 1/16/21  Terrell Hi MD   HYDROcodone-acetaminophen (NORCO) 5-325 MG per tablet Take 1 tablet by mouth every 6 hours as needed for Pain for up to 30 days.  Intended supply: 30 days 1/10/20 2/9/20  Terrell Hi MD   furosemide (LASIX) 20 MG tablet take 1 tablet by mouth once daily 12/29/19   Terrell Hi MD   isosorbide mononitrate (IMDUR) 30 MG extended release tablet take 1 tablet by mouth once daily Diagnosis Date    Anxiety       no treatment    Arthritis       left shoulder    Cardiomegaly      CHF (congestive heart failure) (HCC) EF 20%    Chronic back pain      Diabetes mellitus (HCC)      GERD (gastroesophageal reflux disease)      History of lung thrombosis      Hx of blood clots      Hyperkalemia      Hyperlipidemia      Hypertension      Irregular heart beat      Neuropathy       feet    Obesity      WILLA (obstructive sleep apnea)       not using the machine    Pacemaker       AICD    PE (pulmonary embolism) 2001     \"about 5 yrs ago\"    Prostate cancer ([facility] [address] 2016    Spinal stenosis at L4-L5 level 2019    Stage 3 chronic kidney disease ([facility] [address] 12/10/2018    Traumatic closed fx of eight or more ribs with minimal displacement 06/2017     pneumothorax. Pt fell 50 feet from a balcony. Pt denies history of a head injury.  Type 2 diabetes mellitus without complication [facility])              Past Surgical History:      Past Surgical History         Past Surgical History:   Procedure Laterality Date    CHEST TUBE INSERTION   06/2017    COLONOSCOPY   2016    EYE SURGERY Right       injury    OTHER SURGICAL HISTORY   2000     Greenfilter     PACEMAKER PLACEMENT   2008     ICD;  battery change 2016;  Dr. Wil Etienne. DEFIB NOT SAFE MRI    PROSTATE BIOPSY        PROSTATECTOMY   08/31/2016    PROSTATECTOMY   08/31/2016     carmen lymph node dissection    VENA CAVA FILTER PLACEMENT   2000            Medications Prior to Admission:      Home Medications           Prior to Admission medications    Medication Sig Start Date End Date Taking? Authorizing Provider   pravastatin (PRAVACHOL) 40 MG tablet Take 40 mg by mouth nightly     Yes Historical Provider, MD   HYDROcodone-acetaminophen (NORCO) 5-325 MG per tablet Take 1 tablet by mouth every 6 hours as needed for Pain for up to 30 days.  Intended supply: 30 days 1/10/20 2/9/20   Tash Prado MD   furosemide (LASIX) 20 MG tablet take 1 tablet by Diabetes Brother      Diabetes Brother      Diabetes Brother              Review of Systems:      Positive and Negative as described in HPI. CONSTITUTIONAL: Negative for fevers, chills, sweats, fatigue, and weight loss. HEENT: Negative for glasses, hearing changes, rhinorrhea, and throat pain. RESPIRATORY: SOB with exertion. Negative for current shortness of breath, cough, congestion, and wheezing. Pt denies history of asthma and COPD. CARDIOVASCULAR: History of blood clots and irregular heartbeat. Negative for chest pain and palpitations. Pt denies history of A-fib. GASTROINTESTINAL: GERD. Negative for nausea, vomiting, diarrhea, constipation, change in bowel habits, and abdominal pain. GENITOURINARY: Negative for difficulty of urination, burning with urination, and frequency. INTEGUMENT: Negative for rash, skin lesions, and easy bruising. HEMATOLOGIC/LYMPHATIC: Negative for swelling/edema. ALLERGIC/IMMUNOLOGIC: Negative for urticaria and itching. ENDOCRINE: Diabetes. Last A1C was around 7.0% per pt. Negative for increase in thirst, increase in urination, and heat or cold intolerance. MUSCULOSKELETAL: See HPI. NEUROLOGICAL: Numbness and tingling in the right leg. Negative for headaches, dizziness, and lightheadedness. Pt denies history of strokes and seizures. BEHAVIOR/PSYCH: Anxiety. Negative for depression. Physical Exam:   BP (!) 144/93   Pulse 99   Resp 18   Ht 6' 1\" (1.854 m)   Wt 258 lb 2.5 oz (117.1 kg)   SpO2 96%   BMI 34.06 kg/m²     No results for input(s): POCGLU in the last 72 hours. General Appearance:  Alert, well appearing, and in no acute distress. Obese. Mental status:  Oriented to person, place, and time. Head:  Normocephalic and atraumatic. Eye:  No icterus, redness, pupils equal and reactive, extraocular eye movements intact, and conjunctiva clear. Ear:  Hearing grossly intact. Nose:  No drainage noted.   Mouth:  Mucous membranes moist.  Neck: Distant bilateral carotid sounds. Supple and no carotid bruits noted. Lungs: Bilateral equal air entry, clear to auscultation, no wheezing, rales or rhonchi, and normal effort. Cardiovascular: AICD in the left upper chest. Normal rate, regular rhythm, no murmur, gallop, or rub. Abdomen:  Soft, non-tender, non-distended, and active bowel sounds. Neurologic:  Normal speech and cranial nerves II through XII grossly intact. Strength 5/5 bilaterally. Skin:  No gross lesions, rashes, bruising, or bleeding on exposed skin area. Extremities:  Posterior tibial pulses 2+ bilaterally. No pedal edema. No calf tenderness with palpation. Psych:  Normal affect.       Investigations:       Laboratory Testing:  Recent Results         Recent Results (from the past 24 hour(s))   EKG 12 Lead     Collection Time: 01/15/20 10:58 AM   Result Value Ref Range     Ventricular Rate 99 BPM     Atrial Rate 99 BPM     P-R Interval 144 ms     QRS Duration 88 ms     Q-T Interval 374 ms     QTc Calculation (Bazett) 479 ms     P Axis 62 degrees     R Axis 46 degrees     T Axis 53 degrees   CBC Auto Differential     Collection Time: 01/15/20 10:59 AM   Result Value Ref Range     WBC 7.5 3.5 - 11.3 k/uL     RBC 5.40 4.21 - 5.77 m/uL     Hemoglobin 14.6 13.0 - 17.0 g/dL     Hematocrit 46.3 40.7 - 50.3 %     MCV 85.7 82.6 - 102.9 fL     MCH 27.0 25.2 - 33.5 pg     MCHC 31.5 28.4 - 34.8 g/dL     RDW 14.9 (H) 11.8 - 14.4 %     Platelets 953 639 - 298 k/uL     MPV 9.0 8.1 - 13.5 fL     NRBC Automated 0.0 0.0 per 100 WBC     Differential Type NOT REPORTED       Seg Neutrophils 58 36 - 65 %     Lymphocytes 29 24 - 43 %     Monocytes 10 3 - 12 %     Eosinophils % 2 1 - 4 %     Basophils 1 0 - 2 %     Immature Granulocytes 0 0 %     Segs Absolute 4.39 1.50 - 8.10 k/uL     Absolute Lymph # 2.15 1.10 - 3.70 k/uL     Absolute Mono # 0.72 0.10 - 1.20 k/uL     Absolute Eos # 0.17 0.00 - 0.44 k/uL     Basophils Absolute 0.04 0.00 - 0.20 k/uL     Absolute

## 2020-01-29 NOTE — ANESTHESIA POSTPROCEDURE EVALUATION
Department of Anesthesiology  Postprocedure Note    Patient: Radha Devries  MRN: [id number]  YOB: 1965  Date of evaluation: 1/29/2020  Time:  4:05 PM     Procedure Summary     Date:  01/29/20 Room / Location:  [facility] OR 01 / [facility] - INPATIENT    Anesthesia Start:  1059 Anesthesia Stop:  2705    Procedure:  RIGHT L5 -S1 TLIF MINIMALLY INVASIVE- 2CARMS,  NUVASIVE, NO CELLSAVER (Right Back) Diagnosis:  (DX LUMBAR SPONDYLOLISTHESIS   PT HAS PACEMAKER  DEFIBULATOR AND SALINAS FILTER)    Surgeon:  Pernell Farmer MD Responsible Provider:  Andi Hu MD    Anesthesia Type:  general ASA Status:  3          Anesthesia Type: general    Patrick Phase I: Patrick Score: 9    Patrick Phase II:      Last vitals: Reviewed and per EMR flowsheets.        Anesthesia Post Evaluation    Patient location during evaluation: PACU  Level of consciousness: awake and alert  Complications: no

## 2020-01-29 NOTE — ANESTHESIA PRE PROCEDURE
times a day if needed 7/7/19   Roxann Booth MD   Insulin Syringe-Needle U-100 (B-D INS SYR ULTRAFINE 1CC/31G) 31G X 5/16\" 1 ML MISC use twice a day 2/18/19   Roxann Booth MD   Insulin Pen Needle 31G X 8 MM MISC 1 each by Does not apply route 2 times daily 3/27/18   Roxann Booth MD   glucose blood VI test strips (ASCENSIA AUTODISC VI;ONE TOUCH ULTRA TEST VI) strip 1 each by In Vitro route 3 times daily As needed. 2/1/18   Roxann Booth MD   DULoxetine (CYMBALTA) 60 MG capsule Take 60 mg by mouth daily    Historical Provider, MD       Current medications:    No current facility-administered medications for this encounter. Allergies: Allergies   Allergen Reactions    Adhesive Tape Other (See Comments)     Blister,skin tears with silk tape       Problem List:    Patient Active Problem List   Diagnosis Code    Prostate cancer ([facility] [address] C61    Controlled type 2 diabetes mellitus with diabetic nephropathy, with long-term current use of insulin ([facility] [address] E11.21, Z79.4    Mixed hyperlipidemia E78.2    Obesity (BMI 30.0-34. 9) E66.9    Coronary artery disease involving native coronary artery of native heart without angina pectoris I25.10    Pacemaker P74.5    Systolic heart failure (HCC) I50.20    Chronic back pain M54.9, G89.29    History of lung thrombosis Z86.711    Obesity E66.9    Stage 3 chronic kidney disease (HCC) N18.3    Spinal stenosis at L4-L5 level M48.061    Chronic right-sided low back pain with right-sided sciatica M54.41, G89.29    Erectile dysfunction following radical prostatectomy N52.31    Type 2 diabetes mellitus with diabetic polyneuropathy (HCC) E11.42    HTN (hypertension) I10       Past Medical History:        Diagnosis Date    Anxiety     no treatment    Arthritis     left shoulder    Cardiomegaly     CHF (congestive heart failure) (HCC) EF 20%    Chronic back pain     Diabetes mellitus (HCC)     GERD (gastroesophageal reflux disease)     History of lung thrombosis     Hx of blood clots     Hyperkalemia     Hyperlipidemia     Hypertension     Irregular heart beat     Neuropathy     feet    Obesity     WILLA (obstructive sleep apnea)     not using the machine    Pacemaker     AICD    PE (pulmonary embolism)     \"about 5 yrs ago\"    Prostate cancer ([facility] [address] 2016    Spinal stenosis at L4-L5 level 2019    Stage 3 chronic kidney disease ([facility] [address] 12/10/2018    Traumatic closed fx of eight or more ribs with minimal displacement 2017    pneumothorax. Pt fell 50 feet from a balcony. Pt denies history of a head injury.  Type 2 diabetes mellitus without complication [facility])        Past Surgical History:        Procedure Laterality Date    CHEST TUBE INSERTION  2017    COLONOSCOPY      EYE SURGERY Right     injury    OTHER SURGICAL HISTORY      Greenfilter     PACEMAKER PLACEMENT      ICD;  battery change ;  Dr. Franko Poole. DEFIB NOT SAFE MRI    PROSTATE BIOPSY      PROSTATECTOMY  2016    PROSTATECTOMY  2016    carmen lymph node dissection    VENA CAVA FILTER PLACEMENT         Social History:    Social History     Tobacco Use    Smoking status: Current Some Day Smoker     Packs/day: 0.50     Years: 20.00     Pack years: 10.00     Last attempt to quit: 2017     Years since quittin.6    Smokeless tobacco: Never Used   Substance Use Topics    Alcohol use: No                                Ready to quit: Not Answered  Counseling given: Not Answered      Vital Signs (Current): There were no vitals filed for this visit.                                            BP Readings from Last 3 Encounters:   01/15/20 (!) 144/93   20 120/88   19 138/80       NPO Status:                                                                                 BMI:   Wt Readings from Last 3 Encounters:   01/15/20 258 lb 2.5 oz (117.1 kg)   20 260 lb (117.9 kg)   19 255 lb (115.7 kg)     There is no height or weight on file to calculate BMI.    CBC:   Lab Results   Component Value Date    WBC 7.5 01/15/2020    RBC 5.40 01/15/2020    HGB 14.6 01/15/2020    HCT 46.3 01/15/2020    MCV 85.7 01/15/2020    RDW 14.9 01/15/2020     01/15/2020       CMP:   Lab Results   Component Value Date     01/15/2020    K 4.4 01/15/2020     01/15/2020    CO2 22 01/15/2020    BUN 10 01/15/2020    CREATININE 1.00 01/15/2020    GFRAA >60 01/15/2020    LABGLOM >60 01/15/2020    GLUCOSE 254 01/15/2020    PROT 7.8 08/18/2017    CALCIUM 9.2 01/15/2020    BILITOT 0.3 12/05/2018    ALKPHOS 52 12/05/2018    AST 13 12/05/2018    ALT 10 12/05/2018       POC Tests: No results for input(s): POCGLU, POCNA, POCK, POCCL, POCBUN, POCHEMO, POCHCT in the last 72 hours. Coags:   Lab Results   Component Value Date    PROTIME 10.0 01/15/2020    INR 1.0 01/15/2020    APTT 26.0 01/15/2020       HCG (If Applicable): No results found for: PREGTESTUR, PREGSERUM, HCG, HCGQUANT     ABGs: No results found for: PHART, PO2ART, FSN9UPK, NAU3KKC, BEART, O3BBFUOG     Type & Screen (If Applicable):  No results found for: LABABO, LABRH    Anesthesia Evaluation   no history of anesthetic complications:   Airway: Mallampati: II  TM distance: >3 FB   Neck ROM: full  Mouth opening: > = 3 FB Dental:          Pulmonary: breath sounds clear to auscultation  (+) sleep apnea: on CPAP and noncompliant,  current smoker                           Cardiovascular:    (+) hypertension:, pacemaker: AICD, CAD:,     (-)  angina and  MURRAY    ECG reviewed  Rhythm: regular  Rate: normal  Echocardiogram reviewed    Cleared by cardiology              Neuro/Psych:               GI/Hepatic/Renal:   (+) GERD: well controlled,           Endo/Other:    (+) DiabetesType II DM, , .                 Abdominal:           Vascular:   + DVT, . Anesthesia Plan      general     ASA 3       Induction: intravenous. Anesthetic plan and risks discussed with patient.     Use

## 2020-01-29 NOTE — BRIEF OP NOTE
5.5X45MM  NUVASIVE INC  Right 2         Drains:   Urethral Catheter Non-latex;Straight-tip 16 fr (Active)         Jonathan Hartman MD  Date: 1/29/2020  Time: 1:58 PM

## 2020-01-30 VITALS
BODY MASS INDEX: 34.21 KG/M2 | WEIGHT: 258.16 LBS | SYSTOLIC BLOOD PRESSURE: 111 MMHG | RESPIRATION RATE: 18 BRPM | DIASTOLIC BLOOD PRESSURE: 67 MMHG | HEIGHT: 73 IN | OXYGEN SATURATION: 95 % | HEART RATE: 107 BPM | TEMPERATURE: 98.8 F

## 2020-01-30 LAB
ANION GAP SERPL CALCULATED.3IONS-SCNC: 11 MMOL/L (ref 9–17)
BUN BLDV-MCNC: 15 MG/DL (ref 6–20)
BUN/CREAT BLD: 17 (ref 9–20)
CALCIUM SERPL-MCNC: 8.7 MG/DL (ref 8.6–10.4)
CHLORIDE BLD-SCNC: 101 MMOL/L (ref 98–107)
CO2: 23 MMOL/L (ref 20–31)
CREAT SERPL-MCNC: 0.9 MG/DL (ref 0.7–1.2)
ESTIMATED AVERAGE GLUCOSE: 186 MG/DL
GFR AFRICAN AMERICAN: >60 ML/MIN
GFR NON-AFRICAN AMERICAN: >60 ML/MIN
GFR SERPL CREATININE-BSD FRML MDRD: ABNORMAL ML/MIN/{1.73_M2}
GFR SERPL CREATININE-BSD FRML MDRD: ABNORMAL ML/MIN/{1.73_M2}
GLUCOSE BLD-MCNC: 159 MG/DL (ref 75–110)
GLUCOSE BLD-MCNC: 167 MG/DL (ref 75–110)
GLUCOSE BLD-MCNC: 227 MG/DL (ref 70–99)
HBA1C MFR BLD: 8.1 % (ref 4–6)
HCT VFR BLD CALC: 38.4 % (ref 40.7–50.3)
HEMOGLOBIN: 12.5 G/DL (ref 13–17)
LACTIC ACID: 2.2 MMOL/L (ref 0.5–2.2)
MCH RBC QN AUTO: 27.7 PG (ref 25.2–33.5)
MCHC RBC AUTO-ENTMCNC: 32.6 G/DL (ref 28.4–34.8)
MCV RBC AUTO: 85.1 FL (ref 82.6–102.9)
NRBC AUTOMATED: 0 PER 100 WBC
PDW BLD-RTO: 14.6 % (ref 11.8–14.4)
PLATELET # BLD: 167 K/UL (ref 138–453)
PMV BLD AUTO: 9.2 FL (ref 8.1–13.5)
POTASSIUM SERPL-SCNC: 4.1 MMOL/L (ref 3.7–5.3)
RBC # BLD: 4.51 M/UL (ref 4.21–5.77)
SODIUM BLD-SCNC: 135 MMOL/L (ref 135–144)
WBC # BLD: 11.5 K/UL (ref 3.5–11.3)

## 2020-01-30 PROCEDURE — 2580000003 HC RX 258: Performed by: ORTHOPAEDIC SURGERY

## 2020-01-30 PROCEDURE — 97162 PT EVAL MOD COMPLEX 30 MIN: CPT

## 2020-01-30 PROCEDURE — 2580000003 HC RX 258: Performed by: INTERNAL MEDICINE

## 2020-01-30 PROCEDURE — 36415 COLL VENOUS BLD VENIPUNCTURE: CPT

## 2020-01-30 PROCEDURE — 97530 THERAPEUTIC ACTIVITIES: CPT

## 2020-01-30 PROCEDURE — 80048 BASIC METABOLIC PNL TOTAL CA: CPT

## 2020-01-30 PROCEDURE — 83605 ASSAY OF LACTIC ACID: CPT

## 2020-01-30 PROCEDURE — 85027 COMPLETE CBC AUTOMATED: CPT

## 2020-01-30 PROCEDURE — 97116 GAIT TRAINING THERAPY: CPT

## 2020-01-30 PROCEDURE — 6370000000 HC RX 637 (ALT 250 FOR IP): Performed by: ORTHOPAEDIC SURGERY

## 2020-01-30 PROCEDURE — 82947 ASSAY GLUCOSE BLOOD QUANT: CPT

## 2020-01-30 PROCEDURE — 6360000002 HC RX W HCPCS: Performed by: ORTHOPAEDIC SURGERY

## 2020-01-30 RX ORDER — PSEUDOEPHEDRINE HCL 30 MG
100 TABLET ORAL 2 TIMES DAILY
Qty: 60 CAPSULE | Refills: 0 | Status: SHIPPED | OUTPATIENT
Start: 2020-01-30 | End: 2021-06-30

## 2020-01-30 RX ORDER — MORPHINE SULFATE 15 MG/1
15 TABLET, FILM COATED, EXTENDED RELEASE ORAL EVERY 12 HOURS SCHEDULED
Qty: 6 TABLET | Refills: 0 | Status: SHIPPED | OUTPATIENT
Start: 2020-01-30 | End: 2020-02-02

## 2020-01-30 RX ORDER — OXYCODONE HYDROCHLORIDE AND ACETAMINOPHEN 5; 325 MG/1; MG/1
1-2 TABLET ORAL EVERY 4 HOURS PRN
Qty: 70 TABLET | Refills: 0 | Status: SHIPPED | OUTPATIENT
Start: 2020-01-30 | End: 2020-02-06

## 2020-01-30 RX ORDER — TIZANIDINE 4 MG/1
4 TABLET ORAL EVERY 8 HOURS PRN
Qty: 50 TABLET | Refills: 0 | Status: SHIPPED | OUTPATIENT
Start: 2020-01-30 | End: 2021-06-24

## 2020-01-30 RX ADMIN — PANTOPRAZOLE SODIUM 40 MG: 40 TABLET, DELAYED RELEASE ORAL at 05:51

## 2020-01-30 RX ADMIN — INSULIN LISPRO 40 UNITS: 100 INJECTION, SUSPENSION SUBCUTANEOUS at 17:07

## 2020-01-30 RX ADMIN — MORPHINE SULFATE 15 MG: 15 TABLET, FILM COATED, EXTENDED RELEASE ORAL at 09:47

## 2020-01-30 RX ADMIN — FUROSEMIDE 20 MG: 20 TABLET ORAL at 08:17

## 2020-01-30 RX ADMIN — CARVEDILOL 6.25 MG: 6.25 TABLET, FILM COATED ORAL at 08:17

## 2020-01-30 RX ADMIN — DULOXETINE HYDROCHLORIDE 60 MG: 60 CAPSULE, DELAYED RELEASE ORAL at 08:17

## 2020-01-30 RX ADMIN — DEXTROSE MONOHYDRATE 2 G: 50 INJECTION, SOLUTION INTRAVENOUS at 00:48

## 2020-01-30 RX ADMIN — OXYCODONE AND ACETAMINOPHEN 2 TABLET: 5; 325 TABLET ORAL at 17:02

## 2020-01-30 RX ADMIN — ISOSORBIDE MONONITRATE 30 MG: 30 TABLET ORAL at 08:16

## 2020-01-30 RX ADMIN — OXYCODONE AND ACETAMINOPHEN 2 TABLET: 5; 325 TABLET ORAL at 08:17

## 2020-01-30 RX ADMIN — LINAGLIPTIN 5 MG: 5 TABLET, FILM COATED ORAL at 08:17

## 2020-01-30 RX ADMIN — MORPHINE SULFATE 2 MG: 2 INJECTION, SOLUTION INTRAMUSCULAR; INTRAVENOUS at 02:06

## 2020-01-30 RX ADMIN — CARVEDILOL 6.25 MG: 6.25 TABLET, FILM COATED ORAL at 17:02

## 2020-01-30 RX ADMIN — OXYCODONE AND ACETAMINOPHEN 2 TABLET: 5; 325 TABLET ORAL at 03:47

## 2020-01-30 RX ADMIN — INSULIN LISPRO 40 UNITS: 100 INJECTION, SUSPENSION SUBCUTANEOUS at 08:20

## 2020-01-30 RX ADMIN — LISINOPRIL 2.5 MG: 2.5 TABLET ORAL at 08:17

## 2020-01-30 RX ADMIN — INSULIN LISPRO 2 UNITS: 100 INJECTION, SOLUTION INTRAVENOUS; SUBCUTANEOUS at 17:07

## 2020-01-30 RX ADMIN — DOCUSATE SODIUM 100 MG: 100 CAPSULE, LIQUID FILLED ORAL at 08:17

## 2020-01-30 RX ADMIN — INSULIN LISPRO 2 UNITS: 100 INJECTION, SOLUTION INTRAVENOUS; SUBCUTANEOUS at 12:38

## 2020-01-30 RX ADMIN — INSULIN LISPRO 4 UNITS: 100 INJECTION, SOLUTION INTRAVENOUS; SUBCUTANEOUS at 08:20

## 2020-01-30 RX ADMIN — SODIUM CHLORIDE: 9 INJECTION, SOLUTION INTRAVENOUS at 05:57

## 2020-01-30 RX ADMIN — OXYCODONE AND ACETAMINOPHEN 2 TABLET: 5; 325 TABLET ORAL at 12:35

## 2020-01-30 RX ADMIN — CHOLESTYRAMINE 4 G: 4 POWDER, FOR SUSPENSION ORAL at 08:17

## 2020-01-30 ASSESSMENT — PAIN DESCRIPTION - PAIN TYPE
TYPE: SURGICAL PAIN

## 2020-01-30 ASSESSMENT — PAIN DESCRIPTION - ONSET
ONSET: ON-GOING

## 2020-01-30 ASSESSMENT — PAIN DESCRIPTION - PROGRESSION
CLINICAL_PROGRESSION: NOT CHANGED
CLINICAL_PROGRESSION: GRADUALLY IMPROVING
CLINICAL_PROGRESSION: NOT CHANGED
CLINICAL_PROGRESSION: NOT CHANGED

## 2020-01-30 ASSESSMENT — PAIN SCALES - GENERAL
PAINLEVEL_OUTOF10: 8
PAINLEVEL_OUTOF10: 5
PAINLEVEL_OUTOF10: 10
PAINLEVEL_OUTOF10: 3
PAINLEVEL_OUTOF10: 8
PAINLEVEL_OUTOF10: 10
PAINLEVEL_OUTOF10: 8

## 2020-01-30 ASSESSMENT — PAIN DESCRIPTION - DESCRIPTORS
DESCRIPTORS: CONSTANT
DESCRIPTORS: PINS AND NEEDLES;SHARP
DESCRIPTORS: CONSTANT
DESCRIPTORS: PINS AND NEEDLES;SHARP
DESCRIPTORS: ACHING
DESCRIPTORS: ACHING

## 2020-01-30 ASSESSMENT — PAIN DESCRIPTION - FREQUENCY
FREQUENCY: CONTINUOUS

## 2020-01-30 ASSESSMENT — PAIN DESCRIPTION - LOCATION
LOCATION: BACK

## 2020-01-30 ASSESSMENT — PAIN DESCRIPTION - ORIENTATION
ORIENTATION: LOWER

## 2020-01-30 NOTE — PLAN OF CARE
Problem: Falls - Risk of:  Goal: Will remain free from falls  Description  Will remain free from falls  1/30/2020 1222 by Linnette Murillo RN  Outcome: Ongoing     Problem: Falls - Risk of:  Goal: Absence of physical injury  Description  Absence of physical injury  1/30/2020 1222 by Linnette Murillo RN  Outcome: Ongoing     Problem: Pain:  Goal: Pain level will decrease  Description  Pain level will decrease  1/30/2020 1222 by Linnette Murillo RN  Outcome: Ongoing     Problem: Pain:  Goal: Control of acute pain  Description  Control of acute pain  1/30/2020 1222 by Linnette Murillo RN  Outcome: Ongoing     Problem: Cerebrospinal Fluid Leakage - Risk Of:  Goal: Absence of cerebrospinal fluid drainage at surgical site  Description  Absence of cerebrospinal fluid drainage at surgical site  Outcome: Ongoing     Problem: Infection - Surgical Site:  Goal: Will show no infection signs and symptoms  Description  Will show no infection signs and symptoms  Outcome: Ongoing     Problem: Mobility - Impaired:  Goal: Mobility will improve to maximum level  Description  Mobility will improve to maximum level  Outcome: Ongoing     Problem: Sensory Perception - Impaired:  Goal: Sensory function intact, lower extremity  Description  Sensory function intact, lower extremity  Outcome: Ongoing     Problem: Tobacco Use:  Goal: Inpatient tobacco use cessation counseling participation  Description  Inpatient tobacco use cessation counseling participation  Outcome: Ongoing

## 2020-01-30 NOTE — PROGRESS NOTES
Physical Therapy  Facility/Department: [facility] MED SURG  Daily Treatment Note  NAME: Tom Thornton  : 1965  MRN: [id number]    Date of Service: 2020    Discharge Recommendations:  Home with assist PRN, Outpatient PT        Assessment   Body structures, Functions, Activity limitations: Decreased functional mobility ; Decreased strength;Decreased endurance  Assessment: Pt able to progress mobility, & by end of visit pt demonstrating safe functional mobility, gait & steps. Pt Ed on spinal precautions, safety & energy principles, prevention of sedentary complications & home walking program. Pt issued written pt education. Pt is safe to D/C home & progression to OP PT for reconditioing when appropriate by Dr Marisabel Wall                           Prognosis: Good  Decision Making: Medium Complexity  Exam: ROM, MMT, functional mobility, activity tolerance, Balance, & [employer] AM-PAC 6 Clicks Basic Mobility   Clinical Presentation: stable  PT Education: Functional Mobility Training;Transfer Training;Gait Training  Patient Education: safe functional mobility, spinal precautions, safety & energy principles, prevention of sedentary complications & home walking program  REQUIRES PT FOLLOW UP: Yes  Activity Tolerance  Activity Tolerance: Patient limited by endurance; Patient limited by fatigue;Patient limited by pain     Patient Diagnosis(es): The primary encounter diagnosis was Spondylolisthesis, acquired. Diagnoses of Chronic midline low back pain with right-sided sciatica and Chronic right-sided low back pain with right-sided sciatica were also pertinent to this visit.      has a past medical history of Anxiety, Arthritis, Cardiomegaly, CHF (congestive heart failure) ([address] Chronic back pain, Diabetes mellitus ([address] GERD (gastroesophageal reflux disease), History of lung thrombosis, Hx of blood clots, Hyperkalemia, Hyperlipidemia, Hypertension, Irregular heart beat, Neuropathy, Obesity, WILLA (obstructive sleep Sit: Contact guard assistance  Sit to Supine: Minimal assistance(to bring legs into bed & for technique)  Comment: Ed log rolling for adherence to spinal precautions, cues/assist to complete correctly  Transfers  Sit to Stand: Stand by assistance  Stand to sit: Supervision  Bed to Chair: Supervision  Stand Pivot Transfers: Supervision  Lateral Transfers: Supervision  Comment: good use of UB forsupport with correct hand placement  Ambulation  Ambulation?: Yes  More Ambulation?: Yes  Ambulation 1  Surface: level tile  Device: Rolling Walker  Assistance: Supervision  Quality of Gait: step through pattern  Gait Deviations: Slow Anita  Distance: 200ft  Ambulation 2  Surface - 2: level tile  Device 2: Rolling Walker  Assistance 2: Supervision  Quality of Gait 2: step through pattern  Gait Deviations: Slow Anita  Distance: 400ft  Stairs/Curb  Stairs?: Yes  Stairs  # Steps : 9  Stairs Height: 6\"  Rails: Left ascending  Assistance: Stand by assistance  Comment: used RAMOS UE support on same side rail     Balance  Sitting - Static: Good  Sitting - Dynamic: Good  Standing - Static: Good;-  Standing - Dynamic: Good;-  Comments: stand balance assessed with R/walker  Exercises  Comments: spinal precautions, donning & doffing of lumbar corsett brace, posture, energy conservation & safety principles, prevention sedentary complications & home walking program   AROM RLE (degrees)  RLE AROM: WFL  AROM LLE (degrees)  LLE AROM : WFL  AROM RUE (degrees)  RUE AROM : WFL  AROM LUE (degrees)  LUE AROM : WFL  Strength RLE  Comment: 4/5 hip  Strength LLE  Strength LLE: WFL  Strength RUE  Strength RUE: WFL  Strength LUE  Strength LUE: WFL                 G-Code     OutComes Score                                                     AM-PAC Score  AM-PAC Inpatient Mobility Raw Score : 24 (01/30/20 1325)  AM-PAC Inpatient T-Scale Score : 61.14 (01/30/20 1325)  Mobility Inpatient CMS 0-100% Score: 0 (01/30/20 1325)  Mobility Inpatient CMS G-Code

## 2020-01-30 NOTE — CARE COORDINATION
Follow Up  Future Appointments   Date Time Provider Elida Rice   4/17/2020 10:45 AM MD noemi Gar [address] Maintenance  Health Maintenance Due   Topic Date Due    Lipid screen  12/05/2019       Alecia Albert RN

## 2020-01-30 NOTE — PROGRESS NOTES
Traumatic closed fx of eight or more ribs with minimal displacement, and Type 2 diabetes mellitus without complication ([facility] [address] has a past surgical history that includes pacemaker placement (2008); Prostate biopsy; Eye surgery (Right); other surgical history (2000); Prostatectomy (08/31/2016); Prostatectomy (08/31/2016); Colonoscopy (2016); chest tube insertion (06/2017); Vena Cava Filter Placement (2000); lumbar fusion (01/29/2020); and Lumbar spine surgery (Right, 1/29/2020).     Restrictions  Restrictions/Precautions  Restrictions/Precautions: General Precautions, Fall Risk, Surgical Protocols  Required Braces or Orthoses?: Yes  Implants present? : Pacemaker  Required Braces or Orthoses  Spinal: Lumbar Corset  Position Activity Restriction  Spinal Precautions: No Bending, No Lifting, No Twisting  Other position/activity restrictions: ambulate, activity as tolerated, pt to wear brace, telemetry  Vision/Hearing  Vision: Within Functional Limits  Hearing: Within functional limits     Subjective  General  Chart Reviewed: Yes  Patient assessed for rehabilitation services?: Yes  Additional Pertinent Hx: CHF, chronic back pain, DM, neuropathy feet, WILLA, pacemaker, CKD3, DM  Response To Previous Treatment: Not applicable  General Comment  Comments: RN, Fabricio Llanos PT  Subjective  Subjective: Pt agreeable to PT  Pain Screening  Patient Currently in Pain: Yes  Pain Assessment  Pain Assessment: 0-10  Pain Type: Surgical pain  Pain Location: Back  Vital Signs  Patient Currently in Pain: Yes       Orientation  Orientation  Overall Orientation Status: Within Normal Limits  Social/Functional History  Social/Functional History  Lives With: Spouse(& 4 children ages 11-13)  Type of Home: House  Home Layout: One level  Home Access: Stairs to enter with rails  Entrance Stairs - Number of Steps: 17(has landing partway)  Entrance Stairs - Rails: Left  Bathroom Shower/Tub: Tub/Shower unit  Bathroom Toilet: Standard  Bathroom Equipment: Grab bars in shower  Home Equipment: Rolling walker, Cane  ADL Assistance: Needs assistance(for lower body dressing)  Homemaking Assistance: Needs assistance  Homemaking Responsibilities: No  Ambulation Assistance: Independent(cane)  Transfer Assistance: Independent  Active : Yes  Mode of Transportation: Car  Occupation: On disability  Cognition   Cognition  Overall Cognitive Status: WFL    Objective          AROM RLE (degrees)  RLE AROM: WFL  AROM LLE (degrees)  LLE AROM : WFL  AROM RUE (degrees)  RUE AROM : WFL  AROM LUE (degrees)  LUE AROM : WFL  Strength RLE  Comment: 4/5 hip  Strength LLE  Strength LLE: WFL  Strength RUE  Strength RUE: WFL  Strength LUE  Strength LUE: WFL  Tone RLE  RLE Tone: Normotonic  Tone LLE  LLE Tone: Normotonic  Coordination  Movements Are Fluid And Coordinated: Yes  Motor Control  Gross Motor?: WFL  Sensation  Overall Sensation Status: WFL  Bed mobility  Bridging: Minimal assistance  Rolling to Left: Contact guard assistance  Rolling to Right: Contact guard assistance  Sit to Supine: Minimal assistance(to bring legs into bed & for technique)  Comment: Ed log rolling for adherence to spinal precautions, cues/assist to complete correctly  Transfers  Sit to Stand: Minimal Assistance  Stand to sit: Contact guard assistance  Bed to Chair: Contact guard assistance  Stand Pivot Transfers: Stand by assistance  Lateral Transfers: Stand by assistance  Comment: Ed use of UB for correct hand placement  Ambulation  Ambulation?: Yes  More Ambulation?: Yes  Ambulation 1  Surface: level tile  Device: Rolling Walker  Assistance: Stand by assistance  Quality of Gait: step through pattern  Gait Deviations: Slow Anita  Distance: 150ft  Ambulation 2  Surface - 2: level tile  Device 2: Rolling Walker  Assistance 2: Stand by assistance  Quality of Gait 2: step through pattern  Gait Deviations: Slow Anita  Distance: 300ft     Balance  Sitting - Static: Good  Sitting - Dynamic: Good  Standing - Static: Good;-  Standing - Dynamic: Good;-  Comments: stand balance assessed with R/walker   Pt sat EOB after gait, Ed technique to don/doff lumbar corset & for bed mobility with spinal precautions. Pt returned to bed & pillows propped under knees to relieve spinal presure in supine  Exercises  Comments: spinal precautions, donning & doffing of lumbar corsett brace, & safety principles     Plan   Plan  Times per week: 1-2x/D,6-7D/week  Current Treatment Recommendations: Transfer Training, Functional Mobility Training, Gait Training, Stair training, Safety Education & Training, Patient/Caregiver Education & Training  Safety Devices  Type of devices: All fall risk precautions in place, Call light within reach, Gait belt, Patient at risk for falls, Left in bed    G-Code       OutComes Score                                                  AM-PAC Score  AM-PAC Inpatient Mobility Raw Score : 16 (01/30/20 0907)  AM-PAC Inpatient T-Scale Score : 40.78 (01/30/20 0907)  Mobility Inpatient CMS 0-100% Score: 54.16 (01/30/20 0907)  Mobility Inpatient CMS G-Code Modifier : CK (01/30/20 0907)          Goals  Short term goals  Time Frame for Short term goals: 4 visits  Short term goal 1: Inc   Short term goal 2: Inc bed-mobility & transfers to independent to enable pt to safely get in/OOB   Short term goal 3: Inc gait to amb 400ft or > indep w/ RW to enable pt to return to previous level of independence & for promotion of home walking program;  Short term goal 4: Pt able to go up/down 10 steps with one rail & supervision;   Short term goal 5: Ed spinal precautions, donning & doffing of lumbar corsett brace, posture, energy conservation & safety principles, prevention sedentary complications & home walking program, & issue written pt eduction       Therapy Time   Individual Concurrent Group Co-treatment   Time In 0830         Time Out 0908         Minutes 38         Timed Code Treatment Minutes: 30 Minutes    Additional 10 minutes for chart review          [address], PT

## 2020-01-30 NOTE — OP NOTE
his bed. He was intubated and placed under  general anesthesia by anesthesiologist. He was given  preoperative antibiotic prophylaxis. Moreira catheter was placed. Neuromonitoring leads were placed in his bilateral lower extremities. At this point, he was then placed  prone on the Vivek table. All bony prominences were padded. Eyes were kept free of any pressure and brachial plexus and  elbows were padded as well. Back was then prepped and draped in  a sterile fashion. Biplanar fluoroscopy was then brought in and  localized over the L5-S1 level. Pedicles were then marked with a  marking pen bilaterally and this area was then infiltrated with  0.5% Marcaine with epinephrine. Approximately 3 cm incisions  were made lateral to the pedicles at L5-S1. Dissection was  carried down through the lumbar fascia. At this point, Jamshidi needles  were used to place percutaneous screws with guidewires at  L5 and S1 and under standard conditions, the neuromonitoring was  utilized with the Jamshidi needles and remained stable throughout  placement and guidewires were then placed. Screws were then  placed over the guidewires and guidewires were then removed. Retractor blades were replaced with this screw shanks at the right  L5-S1 and tower devices on the left side. At this point, the  retractor was then assembled at the right L5-S1 level and attached  to the table arm. Medial blade was then inserted. Dissection was then performed. The L5-S1 facet was then exposed and osteotome was used to remove the  inferior articular process and this bone was harvested for later  use. A bur with a bone trap was then used to further remove the  lamina and the superior articular process and this bone was again  also harvested. A Kerrison was then used to remove further bone  as well as the ligamentum flavum and expose the underlying dura,  as well as the exiting and traversing nerve roots, which were  completely freed at this point in time. Once this was done, the  disk space was then incised. Disk was then removed partially. Sizers were then utilized and it was found that a 8 x 10 x 30 mm  4-degree cage would be appropriate. The disk space prep was then  completed and bone graft was then placed into the disk space  until fully packed with bone utilizing the Osteocel Plus and the  local bone, which was previously harvested. Once this was fully  packed with bone and the cage was also packed with the same bone  mixture, the cage was then inserted under C-arm guidance into the  L5-S1 disk space until it was fully seated. Once this was done,   was then removed. Normal saline with bacitracin was  used to irrigate the wound. Gel-Foam with thrombin, neuro  patties, and FloSeal were used to maintain epidural hemostasis. At this point, posterolateral dissection was then performed  exposing the transverse process of L5, as well as the sacral ala. This area was then burred. The remainder of the bone graft was  then placed in the posterolateral gutter for posterolateral  fusion from L5-S1. Once this was done, the shims were then  removed from the screw shanks. Screw heads were then placed on  the screw shanks and the screws were then fully seated with the  screwdriver. Charanjit was then measured and placed into the heads of  the screws and set screws were then placed and subsequently final  tightened. At this point, the Cleotha Riedel was used to verify that  the decompression was complete and the retractor was then  removed. At this point, the charanjit was measured for the left side. It was placed on a percutaneous  and placed through the  tower devices and set screws were then placed and subsequently  final tightened. At this point, C-arm confirmed that the rods  were in good position. The tower devices were removed and charanjit   was removed.  Final pictures were then taken in AP and  lateral views, which showed all instrumentation to be in  excellent position at L5-S1. At this point, the wounds were then  irrigated with sterile saline. Closure with 0-Vicryl, 2-0 Vicryl, and a running 4-0  Monocryl suture with Dermabond glue. Standard dressings were  then applied. He was then placed supine on her bed, extubated,  and taken to recovery room in stable condition.

## 2020-01-31 ENCOUNTER — CARE COORDINATION (OUTPATIENT)
Dept: CASE MANAGEMENT | Age: 55
End: 2020-01-31

## 2020-01-31 PROCEDURE — 1111F DSCHRG MED/CURRENT MED MERGE: CPT | Performed by: FAMILY MEDICINE

## 2020-02-05 LAB
CULTURE: NORMAL
CULTURE: NORMAL
Lab: NORMAL
Lab: NORMAL
SPECIMEN DESCRIPTION: NORMAL
SPECIMEN DESCRIPTION: NORMAL

## 2020-02-07 ENCOUNTER — CARE COORDINATION (OUTPATIENT)
Dept: CASE MANAGEMENT | Age: 55
End: 2020-02-07

## 2020-02-11 ENCOUNTER — CARE COORDINATION (OUTPATIENT)
Dept: CASE MANAGEMENT | Age: 55
End: 2020-02-11

## 2020-02-11 NOTE — CARE COORDINATION
[facility] Transitions Follow Up Call- final call     2020    Patient: Cielo Reyes  Patient : 1965   MRN: [id number]  Reason for Admission: right L5- S1 TLIF (Ji Jones    Discharge Date: 20 RARS: Readmission Risk Score: 15         Spoke with: wife Nayla Lunch     Call to pt wife who states pt is doing well. States he is getting around well and is now using cane more. States he is walking a bit faster but not too fast  States pain is more controlled by muscle relaxer than pain pill   Confirms appt with Dr Leidy Pérez on Mindi Morales  but may reschedule depending on snow (expected a lot coming in over night Wed to [place])  Denies needs  Writer informs this is final ([facility]) phone call- v/u. Encouraged call writer/ [facility] or providers if questions or concerns- v/u. Episode closed      Care Transitions Subsequent and Final Call    Subsequent and Final Calls  Do you have any ongoing symptoms?:  No  Have your medications changed?:  No  Do you have any questions related to your medications?:  No  Do you currently have any active services?:  No  Do you have any needs or concerns that I can assist you with?:  No  Identified Barriers:  Lack of Education  Care Transitions Interventions                          Other Interventions:             Follow Up  Future Appointments   Date Time Provider Elida Rice   2020 10:15 AM MD noemi Jeter [facility]   2020 10:45 AM MD noemi Jeter RN

## 2020-02-13 RX ORDER — OXYCODONE HYDROCHLORIDE AND ACETAMINOPHEN 5; 325 MG/1; MG/1
1-2 TABLET ORAL EVERY 4 HOURS PRN
Qty: 70 TABLET | Refills: 0 | OUTPATIENT
Start: 2020-02-13 | End: 2020-02-20

## 2020-02-20 ENCOUNTER — OFFICE VISIT (OUTPATIENT)
Dept: FAMILY MEDICINE CLINIC | Age: 55
End: 2020-02-20
Payer: COMMERCIAL

## 2020-02-20 VITALS
BODY MASS INDEX: 33.13 KG/M2 | DIASTOLIC BLOOD PRESSURE: 89 MMHG | SYSTOLIC BLOOD PRESSURE: 120 MMHG | HEIGHT: 73 IN | WEIGHT: 250 LBS

## 2020-02-20 PROCEDURE — 99213 OFFICE O/P EST LOW 20 MIN: CPT | Performed by: FAMILY MEDICINE

## 2020-02-20 PROCEDURE — 3052F HG A1C>EQUAL 8.0%<EQUAL 9.0%: CPT | Performed by: FAMILY MEDICINE

## 2020-02-20 RX ORDER — NICOTINE 21 MG/24HR
1 PATCH, TRANSDERMAL 24 HOURS TRANSDERMAL EVERY 24 HOURS
Qty: 14 PATCH | Refills: 3 | Status: SHIPPED | OUTPATIENT
Start: 2020-02-20 | End: 2021-06-24 | Stop reason: ALTCHOICE

## 2020-02-20 RX ORDER — OXYCODONE HYDROCHLORIDE AND ACETAMINOPHEN 5; 325 MG/1; MG/1
TABLET ORAL
COMMUNITY
Start: 2020-02-13 | End: 2020-07-14 | Stop reason: ALTCHOICE

## 2020-02-20 ASSESSMENT — ENCOUNTER SYMPTOMS
SHORTNESS OF BREATH: 0
CONSTIPATION: 0
EYES NEGATIVE: 1
ALLERGIC/IMMUNOLOGIC NEGATIVE: 1
DIARRHEA: 0
COUGH: 0
BLOOD IN STOOL: 0
BACK PAIN: 1
ABDOMINAL PAIN: 0

## 2020-02-20 NOTE — PROGRESS NOTES
[facility]  [facility]  [address]  [address]  [place] [address]  Dept: [phone]    2/20/2020    CHIEF COMPLAINT    Chief Complaint   Patient presents with    Post-Op Check     Back surgery 1/29/2020       HPI    Marlen Beltrán is a 53 y.o. male who presents   Chief Complaint   Patient presents with    Post-Op Check     Back surgery 1/29/2020   . Recheck following lumbar fusion 3 weeks ago. Continues to take percocet per surgeon, Dr. Ghulam Hurd. bs has improved. Not due for A1C. Stopped smoking for 9 days then restarted. Vitals:    02/20/20 1016   BP: 120/89   Weight: 250 lb (113.4 kg)   Height: 6' 1\" (1.854 m)       REVIEW OF SYSTEMS    Review of Systems   Constitutional: Negative for fatigue, fever and unexpected weight change. HENT: Negative. Eyes: Negative. Respiratory: Negative for cough and shortness of breath. Cardiovascular: Positive for leg swelling (mild). Negative for chest pain and palpitations. Gastrointestinal: Negative for abdominal pain, blood in stool, constipation and diarrhea. Endocrine: Negative. Genitourinary: Negative for frequency and urgency. Musculoskeletal: Positive for back pain. Skin: Negative. Allergic/Immunologic: Negative. Neurological: Negative for dizziness and headaches. Hematological: Negative. Psychiatric/Behavioral: Negative for sleep disturbance. The patient is not nervous/anxious.         PAST MEDICAL HISTORY    Past Medical History:   Diagnosis Date    Anxiety     no treatment    Arthritis     left shoulder    Cardiomegaly     CHF (congestive heart failure) (HCC) EF 20%    Chronic back pain     Diabetes mellitus (HCC)     GERD (gastroesophageal reflux disease)     History of lung thrombosis     Hx of blood clots     Hyperkalemia     Hyperlipidemia     Hypertension     Irregular heart beat     Neuropathy     feet    Obesity     WILLA (obstructive sleep apnea)     not using the machine    Pacemaker     AICD    PE (pulmonary embolism)     \"about 5 yrs ago\"    Prostate cancer ([facility] [address] 2016    Spinal stenosis at L4-L5 level 2019    Stage 3 chronic kidney disease ([facility] [address] 12/10/2018    Traumatic closed fx of eight or more ribs with minimal displacement 2017    pneumothorax. Pt fell 50 feet from a balcony. Pt denies history of a head injury.     Type 2 diabetes mellitus without complication ([facility] [address]        FAMILY HISTORY    Family History   Problem Relation Age of Onset    Diabetes Father     Hypertension Father     Heart Disease Father     Hypertension Mother     Prostate Cancer Brother     Diabetes Brother     Diabetes Brother     Diabetes Brother        SOCIAL HISTORY    Social History     Socioeconomic History    Marital status:      Spouse name: Not on file    Number of children: Not on file    Years of education: Not on file    Highest education level: Not on file   Occupational History    Occupation: disability   Social Needs    Financial resource strain: Not on file    Food insecurity:     Worry: Not on file     Inability: Not on file    Transportation needs:     Medical: Not on file     Non-medical: Not on file   Tobacco Use    Smoking status: Current Some Day Smoker     Packs/day: 0.50     Years: 20.00     Pack years: 10.00     Last attempt to quit: 2017     Years since quittin.6    Smokeless tobacco: Never Used   Substance and Sexual Activity    Alcohol use: No    Drug use: No    Sexual activity: Yes     Partners: Female   Lifestyle    Physical activity:     Days per week: Not on file     Minutes per session: Not on file    Stress: Not on file   Relationships    Social connections:     Talks on phone: Not on file     Gets together: Not on file     Attends Hindu service: Not on file     Active member of club or organization: Not on file     Attends meetings of clubs or organizations: Not on file tablet Take 1,875 mg by mouth 2 times daily (with meals) Three tablets bid      nicotine (NICODERM CQ) 7 MG/24HR Place 1 patch onto the skin every 24 hours 30 patch 3    furosemide (LASIX) 20 MG tablet take 1 tablet by mouth once daily 30 tablet 3    isosorbide mononitrate (IMDUR) 30 MG extended release tablet take 1 tablet by mouth once daily 120 tablet 0    gabapentin (NEURONTIN) 800 MG tablet take 1 tablet by mouth twice a day 60 tablet 5    carvedilol (COREG) 6.25 MG tablet take 1 tablet by mouth twice a day 60 tablet 3    JANUVIA 100 MG tablet take 1 tablet by mouth once daily 30 tablet 5    ONE TOUCH ULTRA TEST strip TEST three times a day if needed 100 strip 2    Insulin Syringe-Needle U-100 (B-D INS SYR ULTRAFINE 1CC/31G) 31G X 5/16\" 1 ML MISC use twice a day 100 each 3    Insulin Pen Needle 31G X 8 MM MISC 1 each by Does not apply route 2 times daily 100 each 3    glucose blood VI test strips (ASCENSIA AUTODISC VI;ONE TOUCH ULTRA TEST VI) strip 1 each by In Vitro route 3 times daily As needed. 100 each 2    DULoxetine (CYMBALTA) 60 MG capsule Take 60 mg by mouth daily       No current facility-administered medications for this visit. ALLERGIES    Allergies   Allergen Reactions    Adhesive Tape Other (See Comments)     Blister,skin tears with silk tape       PHYSICAL EXAM   Physical Exam  Vitals signs reviewed. Constitutional:       Appearance: He is well-developed. He is obese. HENT:      Head: Normocephalic. Eyes:      Pupils: Pupils are equal, round, and reactive to light. Neck:      Musculoskeletal: Normal range of motion and neck supple. Thyroid: No thyromegaly. Cardiovascular:      Rate and Rhythm: Normal rate and regular rhythm. Heart sounds: Normal heart sounds. No murmur. Pulmonary:      Effort: Pulmonary effort is normal.      Breath sounds: Normal breath sounds. No wheezing or rales. Abdominal:      Palpations: Abdomen is soft. Tenderness:  There is no abdominal tenderness. There is no guarding or rebound. Musculoskeletal: Normal range of motion. General: No tenderness or deformity. Right lower le+ Edema present. Left lower le+ Edema present. Lymphadenopathy:      Cervical: No cervical adenopathy. Skin:     General: Skin is warm and dry. Neurological:      Mental Status: He is alert and oriented to person, place, and time. Psychiatric:         Behavior: Behavior normal.         Assessment/PLan  1. Post-operative pain  Cont percocet per surgeon for now. Cont gentle activity. 2. Essential hypertension  Chronic, stable, continue current medication and/or plan      3. Cigarette nicotine dependence without complication  Use patches and continue efforts to quit completely. - nicotine (NICODERM CQ) 14 MG/24HR; Place 1 patch onto the skin every 24 hours for 14 days  Dispense: 14 patch; Refill: 3    4. Controlled type 2 diabetes mellitus with diabetic nephropathy, with long-term current use of insulin ([address]  Cont to monitor and recheck a1c next appt    5. Chronic midline low back pain without sciatica  Plans to do water therapy when released. Molly Colbertald received counseling on the following healthy behaviors: nutrition, exercise, medication adherence and tobacco cessation  Reviewed prior labs and health maintenance. Continue current medications, diet and exercise. Discussed use, benefit, and side effects of prescribed medications. Barriers to medication compliance addressed. Patient given educational materials - see patient instructions. All patient questions answered. Patient voiced understanding. Return if symptoms worsen or fail to improve.         Electronically signed by Tash Prado MD on 20 at 10:34 AM

## 2020-02-24 ENCOUNTER — TELEPHONE (OUTPATIENT)
Dept: FAMILY MEDICINE CLINIC | Age: 55
End: 2020-02-24

## 2020-02-24 NOTE — TELEPHONE ENCOUNTER
Referral faxed, patient spouse informed
printed
Date:  Future Appointments   Date Time Provider Elida Rice   4/17/2020 10:45 AM MD noemi Muñiz fp [facility]            Patient Active Problem List:     Prostate cancer [facility])     Controlled type 2 diabetes mellitus with diabetic nephropathy, with long-term current use of insulin ([address]     Mixed hyperlipidemia     Obesity (BMI 30.0-34. 9)     Coronary artery disease involving native coronary artery of native heart without angina pectoris     Pacemaker     Systolic heart failure (HCC)     Chronic back pain     History of lung thrombosis     Obesity     Stage 3 chronic kidney disease ([address]     Spinal stenosis at L4-L5 level     Chronic right-sided low back pain with right-sided sciatica     Erectile dysfunction following radical prostatectomy     Type 2 diabetes mellitus with diabetic polyneuropathy (HCC)     HTN (hypertension)     Spondylolisthesis, acquired     Foraminal stenosis of lumbar region

## 2020-03-03 NOTE — TELEPHONE ENCOUNTER
Pt was requesting refill of pain meds pt was informed that he can only get his pain medication from one Dr.

## 2020-03-04 RX ORDER — CARVEDILOL 6.25 MG/1
TABLET ORAL
Qty: 60 TABLET | Refills: 3 | Status: SHIPPED | OUTPATIENT
Start: 2020-03-04 | End: 2020-06-17

## 2020-03-11 RX ORDER — DULOXETIN HYDROCHLORIDE 60 MG/1
60 CAPSULE, DELAYED RELEASE ORAL DAILY
Qty: 30 CAPSULE | Refills: 5 | Status: SHIPPED | OUTPATIENT
Start: 2020-03-11 | End: 2020-08-17

## 2020-03-11 NOTE — TELEPHONE ENCOUNTER
Patient request, last appt 2/2020    Health Maintenance   Topic Date Due    Shingles Vaccine (1 of 2) 07/22/2014    PSA counseling  09/19/2017    Diabetic retinal exam  06/01/2018    Annual Wellness Visit (AWV)  05/29/2019    Lipid screen  12/05/2019    Diabetic foot exam  03/07/2020    Hepatitis B vaccine (1 of 3 - Risk 3-dose series) 08/22/2020 (Originally 7/22/1984)    A1C test (Diabetic or Prediabetic)  01/29/2021    Potassium monitoring  01/30/2021    Creatinine monitoring  01/30/2021    Colon cancer screen colonoscopy  09/09/2024    DTaP/Tdap/Td vaccine (2 - Td) 01/01/2025    Flu vaccine  Completed    Pneumococcal 0-64 years Vaccine  Completed    Hepatitis C screen  Completed    HIV screen  Completed    Hepatitis A vaccine  Aged Out    Hib vaccine  Aged Out    Meningococcal (ACWY) vaccine  Aged Out             (applicable per patient's age: Cancer Screenings, Depression Screening, Fall Risk Screening, Immunizations)    Hemoglobin A1C (%)   Date Value   01/29/2020 8.1 (H)   01/17/2020 8.4   09/25/2019 8.0     LDL Cholesterol (mg/dL)   Date Value   08/18/2017 81     LDL Calculated (mg/dL)   Date Value   12/05/2018 61     AST (U/L)   Date Value   12/05/2018 13     ALT (U/L)   Date Value   12/05/2018 10     BUN (mg/dL)   Date Value   01/30/2020 15      (goal A1C is < 7)   (goal LDL is <100) need 30-50% reduction from baseline     BP Readings from Last 3 Encounters:   02/20/20 120/89   01/30/20 111/67   01/29/20 (!) 164/80    (goal /80)      All Future Testing planned in [facility]:  Lab Frequency Next Occurrence   MRI LUMBAR SPINE WO CONTRAST Once 07/11/2019       Next Visit Date:  Future Appointments   Date Time Provider Elida Rice   4/17/2020 10:45 AM MD noemi Villar  [facility]            Patient Active Problem List:     Prostate cancer [facility])     Controlled type 2 diabetes mellitus with diabetic nephropathy, with long-term current use of insulin ([address]     Mixed hyperlipidemia     Obesity (BMI 30.0-34. 9)     Coronary artery disease involving native coronary artery of native heart without angina pectoris     Pacemaker     Systolic heart failure (HCC)     Chronic back pain     History of lung thrombosis     Obesity     Stage 3 chronic kidney disease ([address]     Spinal stenosis at L4-L5 level     Chronic right-sided low back pain with right-sided sciatica     Erectile dysfunction following radical prostatectomy     Type 2 diabetes mellitus with diabetic polyneuropathy (HCC)     HTN (hypertension)     Spondylolisthesis, acquired     Foraminal stenosis of lumbar region

## 2020-03-13 ENCOUNTER — OFFICE VISIT (OUTPATIENT)
Dept: FAMILY MEDICINE CLINIC | Age: 55
End: 2020-03-13
Payer: COMMERCIAL

## 2020-03-13 VITALS
SYSTOLIC BLOOD PRESSURE: 130 MMHG | HEART RATE: 86 BPM | DIASTOLIC BLOOD PRESSURE: 86 MMHG | BODY MASS INDEX: 35.09 KG/M2 | WEIGHT: 266 LBS

## 2020-03-13 PROCEDURE — 99213 OFFICE O/P EST LOW 20 MIN: CPT | Performed by: FAMILY MEDICINE

## 2020-03-13 ASSESSMENT — ENCOUNTER SYMPTOMS
EYES NEGATIVE: 1
COUGH: 0
ALLERGIC/IMMUNOLOGIC NEGATIVE: 1
BACK PAIN: 1
SHORTNESS OF BREATH: 0
ABDOMINAL PAIN: 0

## 2020-03-13 NOTE — PROGRESS NOTES
Forced sexual activity: None   Other Topics Concern    None   Social History Narrative    , grandchildren from  daughter live with him and his wife. SURGICAL HISTORY    Past Surgical History:   Procedure Laterality Date    CHEST TUBE INSERTION  2017    COLONOSCOPY  2016    EYE SURGERY Right     injury    LUMBAR FUSION  2020    L5-S1    LUMBAR SPINE SURGERY Right 2020    RIGHT L5 -S1 TLIF MINIMALLY INVASIVE- 2CARMS,  NUVASIVE, NO CELLSAVER performed by Stephanie Walker MD at [address] [place] [address]      ICD;  battery change ;  Dr. Nena Sevilla.  DEFIB NOT SAFE MRI    PROSTATE BIOPSY      PROSTATECTOMY  2016    PROSTATECTOMY  2016    carmen lymph node dissection    VENA CAVA FILTER PLACEMENT         CURRENT MEDICATIONS    Current Outpatient Medications   Medication Sig Dispense Refill    DULoxetine (CYMBALTA) 60 MG extended release capsule Take 1 capsule by mouth daily 30 capsule 5    carvedilol (COREG) 6.25 MG tablet take 1 tablet by mouth twice a day 60 tablet 3    ciclopirox (LOPROX) 0.77 % cream Apply cream to both feet twice daily 1 Tube 0    oxyCODONE-acetaminophen (PERCOCET) 5-325 MG per tablet       omeprazole (PRILOSEC) 20 MG delayed release capsule take 1 capsule by mouth once daily 30 MINUTES before meals 120 capsule 2    docusate sodium (COLACE, DULCOLAX) 100 MG CAPS Take 100 mg by mouth 2 times daily 60 capsule 0    tiZANidine (ZANAFLEX) 4 MG tablet Take 1 tablet by mouth every 8 hours as needed (muscle spasm) 50 tablet 0    insulin aspart protamine-insulin aspart (NOVOLOG MIX 70/30) (70-30) 100 UNIT/ML injection inject 50 units subcutaneously twice a day with meals 30 mL 5    tamsulosin (FLOMAX) 0.4 MG capsule Take 0.4 mg by mouth daily      lisinopril (PRINIVIL;ZESTRIL) 2.5 MG tablet Take 2.5 mg by mouth daily      colesevelam (WELCHOL) 625 MG tablet Take 1,875 mg by mouth 2 is present. Genitourinary:     Scrotum/Testes: Normal.       Musculoskeletal: Normal range of motion. General: No tenderness or deformity. Lymphadenopathy:      Cervical: No cervical adenopathy. Skin:     General: Skin is warm and dry. Neurological:      Mental Status: He is alert and oriented to person, place, and time. Psychiatric:         Behavior: Behavior normal.         Assessment/PLan  1. Groin strain, right, initial encounter  Monitor for swelling, tenderness. If severe go to ER. 2. Essential hypertension  Chronic, stable, continue current medication and/or plan      3. Tobacco abuse  Cont nicotine patches and abstain from smoking. Aguila Winters received counseling on the following healthy behaviors: nutrition, exercise, medication adherence and tobacco cessation  Reviewed prior labs and health maintenance. Continue current medications, diet and exercise. Discussed use, benefit, and side effects of prescribed medications. Barriers to medication compliance addressed. Patient given educational materials - see patient instructions. All patient questions answered. Patient voiced understanding. Return if symptoms worsen or fail to improve.         Electronically signed by Riky Goldsmith MD on 3/13/20 at 11:44 AM EDT

## 2020-04-21 RX ORDER — PRAVASTATIN SODIUM 40 MG
40 TABLET ORAL NIGHTLY
Qty: 90 TABLET | Refills: 0 | Status: SHIPPED | OUTPATIENT
Start: 2020-04-21

## 2020-04-21 NOTE — TELEPHONE ENCOUNTER
hyperlipidemia     Obesity (BMI 30.0-34. 9)     Coronary artery disease involving native coronary artery of native heart without angina pectoris     Pacemaker     Systolic heart failure (HCC)     Chronic back pain     History of lung thrombosis     Obesity     Stage 3 chronic kidney disease ([address]     Spinal stenosis at L4-L5 level     Chronic right-sided low back pain with right-sided sciatica     Erectile dysfunction following radical prostatectomy     Type 2 diabetes mellitus with diabetic polyneuropathy (HCC)     HTN (hypertension)     Spondylolisthesis, acquired     Foraminal stenosis of lumbar region

## 2020-05-15 RX ORDER — GABAPENTIN 800 MG/1
TABLET ORAL
Qty: 60 TABLET | Refills: 3 | Status: SHIPPED | OUTPATIENT
Start: 2020-05-15 | End: 2020-08-28

## 2020-05-26 RX ORDER — BLOOD SUGAR DIAGNOSTIC
STRIP MISCELLANEOUS
Qty: 100 EACH | Refills: 3 | Status: SHIPPED | OUTPATIENT
Start: 2020-05-26 | End: 2021-07-27

## 2020-06-04 RX ORDER — INSULIN ASPART 100 [IU]/ML
INJECTION, SUSPENSION SUBCUTANEOUS
Qty: 30 ML | Refills: 5 | Status: SHIPPED | OUTPATIENT
Start: 2020-06-04 | End: 2020-07-02 | Stop reason: SDUPTHER

## 2020-06-15 RX ORDER — FUROSEMIDE 20 MG/1
TABLET ORAL
Qty: 30 TABLET | Refills: 3 | Status: SHIPPED | OUTPATIENT
Start: 2020-06-15

## 2020-06-17 RX ORDER — CARVEDILOL 6.25 MG/1
TABLET ORAL
Qty: 60 TABLET | Refills: 3 | Status: SHIPPED | OUTPATIENT
Start: 2020-06-17 | End: 2021-06-24

## 2020-07-02 RX ORDER — INSULIN ASPART 100 [IU]/ML
INJECTION, SUSPENSION SUBCUTANEOUS
Qty: 30 ML | Refills: 0 | Status: SHIPPED | OUTPATIENT
Start: 2020-07-02 | End: 2020-07-14 | Stop reason: SDUPTHER

## 2020-07-02 RX ORDER — BLOOD SUGAR DIAGNOSTIC
STRIP MISCELLANEOUS
Qty: 100 STRIP | Refills: 2 | Status: SHIPPED | OUTPATIENT
Start: 2020-07-02

## 2020-07-02 NOTE — TELEPHONE ENCOUNTER
Pt called requesting refill for Novolog Mix 70/30 and test strips be sent to [facility] [phone]    Last ov-3/13/20  Next ov-7/14/20

## 2020-07-02 NOTE — TELEPHONE ENCOUNTER
Last visit: 3/13/2020  Last Med refill: 7/7/19  Does patient have enough medication for 72 hours: No:     Next Visit Date:  Future Appointments   Date Time Provider Elida Rice   7/14/2020 10:00 AM MD noemi [address] Maintenance   Topic Date Due    Shingles Vaccine (1 of 2) 07/22/2014    PSA counseling  09/19/2017    Diabetic retinal exam  06/01/2018    Annual Wellness Visit (AWV)  05/29/2019    Lipid screen  12/05/2019    Diabetic foot exam  03/07/2020    Hepatitis B vaccine (1 of 3 - Risk 3-dose series) 08/22/2020 (Originally 7/22/1984)    Flu vaccine (1) 09/01/2020    A1C test (Diabetic or Prediabetic)  01/29/2021    Potassium monitoring  01/30/2021    Creatinine monitoring  01/30/2021    Colon cancer screen colonoscopy  09/09/2024    DTaP/Tdap/Td vaccine (2 - Td) 01/01/2025    Pneumococcal 0-64 years Vaccine  Completed    Hepatitis C screen  Completed    HIV screen  Completed    Hepatitis A vaccine  Aged Out    Hib vaccine  Aged Out    Meningococcal (ACWY) vaccine  Aged Out       Hemoglobin A1C (%)   Date Value   01/29/2020 8.1 (H)   01/17/2020 8.4   09/25/2019 8.0             ( goal A1C is < 7)   No results found for: LABMICR  LDL Cholesterol (mg/dL)   Date Value   08/18/2017 81   09/19/2016 65     LDL Calculated (mg/dL)   Date Value   12/05/2018 61       (goal LDL is <100)   AST (U/L)   Date Value   12/05/2018 13     ALT (U/L)   Date Value   12/05/2018 10     BUN (mg/dL)   Date Value   01/30/2020 15     BP Readings from Last 3 Encounters:   03/13/20 130/86   02/20/20 120/89   01/30/20 111/67          (goal 120/80)    All Future Testing planned in [facility]  Lab Frequency Next Occurrence   MRI LUMBAR SPINE WO CONTRAST Once 07/11/2019               Patient Active Problem List:     Prostate cancer ([address]     Controlled type 2 diabetes mellitus with diabetic nephropathy, with long-term current use of insulin ([address]     Mixed hyperlipidemia     Obesity (BMI

## 2020-07-02 NOTE — TELEPHONE ENCOUNTER
Health Maintenance   Topic Date Due    Shingles Vaccine (1 of 2) 07/22/2014    PSA counseling  09/19/2017    Diabetic retinal exam  06/01/2018    Annual Wellness Visit (AWV)  05/29/2019    Lipid screen  12/05/2019    Diabetic foot exam  03/07/2020    Hepatitis B vaccine (1 of 3 - Risk 3-dose series) 08/22/2020 (Originally 7/22/1984)    Flu vaccine (1) 09/01/2020    A1C test (Diabetic or Prediabetic)  01/29/2021    Potassium monitoring  01/30/2021    Creatinine monitoring  01/30/2021    Colon cancer screen colonoscopy  09/09/2024    DTaP/Tdap/Td vaccine (2 - Td) 01/01/2025    Pneumococcal 0-64 years Vaccine  Completed    Hepatitis C screen  Completed    HIV screen  Completed    Hepatitis A vaccine  Aged Out    Hib vaccine  Aged Out    Meningococcal (ACWY) vaccine  Aged Out             (applicable per patient's age: Cancer Screenings, Depression Screening, Fall Risk Screening, Immunizations)    Hemoglobin A1C (%)   Date Value   01/29/2020 8.1 (H)   01/17/2020 8.4   09/25/2019 8.0     LDL Cholesterol (mg/dL)   Date Value   08/18/2017 81     LDL Calculated (mg/dL)   Date Value   12/05/2018 61     AST (U/L)   Date Value   12/05/2018 13     ALT (U/L)   Date Value   12/05/2018 10     BUN (mg/dL)   Date Value   01/30/2020 15      (goal A1C is < 7)   (goal LDL is <100) need 30-50% reduction from baseline     BP Readings from Last 3 Encounters:   03/13/20 130/86   02/20/20 120/89   01/30/20 111/67    (goal /80)      All Future Testing planned in [facility]:  Lab Frequency Next Occurrence   MRI LUMBAR SPINE WO CONTRAST Once 07/11/2019       Next Visit Date:  Future Appointments   Date Time Provider Elida Rice   7/14/2020 10:00 AM [facility]            Patient Active Problem List:     Prostate cancer [facility])     Controlled type 2 diabetes mellitus with diabetic nephropathy, with long-term current use of insulin ([address]     Mixed hyperlipidemia     Obesity (BMI 30.0-34. 9) Coronary artery disease involving native coronary artery of native heart without angina pectoris     Pacemaker     Systolic heart failure (HCC)     Chronic back pain     History of lung thrombosis     Obesity     Stage 3 chronic kidney disease ([address]     Spinal stenosis at L4-L5 level     Chronic right-sided low back pain with right-sided sciatica     Erectile dysfunction following radical prostatectomy     Type 2 diabetes mellitus with diabetic polyneuropathy (HCC)     HTN (hypertension)     Spondylolisthesis, acquired     Foraminal stenosis of lumbar region

## 2020-07-14 ENCOUNTER — OFFICE VISIT (OUTPATIENT)
Dept: FAMILY MEDICINE CLINIC | Age: 55
End: 2020-07-14
Payer: COMMERCIAL

## 2020-07-14 VITALS
SYSTOLIC BLOOD PRESSURE: 120 MMHG | DIASTOLIC BLOOD PRESSURE: 80 MMHG | HEART RATE: 97 BPM | BODY MASS INDEX: 35.92 KG/M2 | HEIGHT: 73 IN | TEMPERATURE: 97.1 F | WEIGHT: 271 LBS | OXYGEN SATURATION: 99 %

## 2020-07-14 PROBLEM — R35.1 NOCTURIA: Status: ACTIVE | Noted: 2020-07-14

## 2020-07-14 PROBLEM — B35.3 TINEA PEDIS: Status: ACTIVE | Noted: 2020-07-14

## 2020-07-14 LAB — HBA1C MFR BLD: 8.6 %

## 2020-07-14 PROCEDURE — 99214 OFFICE O/P EST MOD 30 MIN: CPT | Performed by: FAMILY MEDICINE

## 2020-07-14 PROCEDURE — 83036 HEMOGLOBIN GLYCOSYLATED A1C: CPT | Performed by: FAMILY MEDICINE

## 2020-07-14 PROCEDURE — 3052F HG A1C>EQUAL 8.0%<EQUAL 9.0%: CPT | Performed by: FAMILY MEDICINE

## 2020-07-14 RX ORDER — INSULIN ASPART 100 [IU]/ML
60 INJECTION, SUSPENSION SUBCUTANEOUS 2 TIMES DAILY WITH MEALS
Qty: 30 ML | Refills: 0
Start: 2020-07-14 | End: 2020-07-22 | Stop reason: SDUPTHER

## 2020-07-14 ASSESSMENT — ENCOUNTER SYMPTOMS
EYES NEGATIVE: 1
SHORTNESS OF BREATH: 0
ABDOMINAL PAIN: 0
BLOOD IN STOOL: 0
ALLERGIC/IMMUNOLOGIC NEGATIVE: 1
DIARRHEA: 0
COUGH: 0
CONSTIPATION: 0
BACK PAIN: 1

## 2020-07-14 NOTE — PROGRESS NOTES
[facility]  [facility]  [address]  [facility] [address]  Dept: [phone]    7/14/2020    CHIEF COMPLAINT    Chief Complaint   Patient presents with    Hypertension    Diabetes       HPI    Kori Matias is a 53 y.o. male who presents   Chief Complaint   Patient presents with    Hypertension    Diabetes   . Recheck chronic medications and conditions. Taking medications as prescribed for diabetes, htn, cholesterol with no side effects and good effectiveness. Seeing cardiologist for hx of cad, pacemaker, heart failure. No chest pain or sob. Is quitting smoking with nicotine. Has gained weight. Hypertension   This is a chronic problem. The current episode started more than 1 year ago. The problem is controlled. Associated symptoms include peripheral edema (varies). Pertinent negatives include no chest pain, headaches, malaise/fatigue, palpitations or shortness of breath. Risk factors for coronary artery disease include male gender, obesity, diabetes mellitus, dyslipidemia and smoking/tobacco exposure. Past treatments include ACE inhibitors, beta blockers and diuretics. The current treatment provides moderate improvement. Compliance problems include exercise and diet. Hypertensive end-organ damage includes kidney disease, CAD/MI and heart failure. Diabetes   He presents for his follow-up diabetic visit. He has type 2 diabetes mellitus. His disease course has been fluctuating. There are no hypoglycemic associated symptoms. Pertinent negatives for hypoglycemia include no dizziness, headaches or nervousness/anxiousness. Associated symptoms include foot paresthesias. Pertinent negatives for diabetes include no chest pain, no fatigue and no foot ulcerations. There are no hypoglycemic complications. Symptoms are stable. Diabetic complications include heart disease, nephropathy and peripheral neuropathy.  Risk factors for coronary artery disease include hypertension, male sex, obesity, dyslipidemia, diabetes mellitus, sedentary lifestyle and tobacco exposure. Current diabetic treatment includes insulin injections and oral agent (monotherapy). He is compliant with treatment most of the time. His weight is increasing steadily. He is following a generally healthy diet. He has not had a previous visit with a dietitian. He rarely participates in exercise. An ACE inhibitor/angiotensin II receptor blocker is being taken. He sees a podiatrist.Eye exam is not current. Vitals:    07/14/20 1000   BP: 120/80   Pulse: 97   Temp: 97.1 °F (36.2 °C)   SpO2: 99%   Weight: 271 lb (122.9 kg)   Height: 6' 1\" (1.854 m)       REVIEW OF SYSTEMS    Review of Systems   Constitutional: Negative for fatigue, fever, malaise/fatigue and unexpected weight change. HENT: Negative. Eyes: Negative. Respiratory: Negative for cough and shortness of breath. Cardiovascular: Positive for leg swelling (left worse). Negative for chest pain and palpitations. Gastrointestinal: Negative for abdominal pain, blood in stool, constipation and diarrhea. Endocrine: Negative. Genitourinary: Negative for frequency and urgency. Seeing urologist for hx of prostate cancer. No current sx   Musculoskeletal: Positive for arthralgias and back pain. Stable, improved   Skin: Negative. Allergic/Immunologic: Negative. Neurological: Positive for numbness (feet). Negative for dizziness and headaches. Hematological: Negative. Psychiatric/Behavioral: Negative for sleep disturbance. The patient is not nervous/anxious.         PAST MEDICAL HISTORY    Past Medical History:   Diagnosis Date    Anxiety     no treatment    Arthritis     left shoulder    Cardiomegaly     CHF (congestive heart failure) (HCC) EF 20%    Chronic back pain     Diabetes mellitus (HCC)     GERD (gastroesophageal reflux disease)     History of lung thrombosis     Hx of blood clots     Hyperkalemia     Hyperlipidemia     Hypertension     Irregular heart beat     Neuropathy     feet    Obesity     WILLA (obstructive sleep apnea)     not using the machine    Pacemaker     AICD    PE (pulmonary embolism) 2001    \"about 5 yrs ago\"    Prostate cancer ([address] 2016    Spinal stenosis at L4-L5 level 2019    Stage 3 chronic kidney disease ([facility] 12/10/2018    Traumatic closed fx of eight or more ribs with minimal displacement 06/2017    pneumothorax. Pt fell 50 feet from a balcony. Pt denies history of a head injury.     Type 2 diabetes mellitus without complication ([address]        FAMILY HISTORY    Family History   Problem Relation Age of Onset    Diabetes Father     Hypertension Father     Heart Disease Father     Hypertension Mother     Prostate Cancer Brother     Diabetes Brother     Diabetes Brother     Diabetes Brother        SOCIAL HISTORY    Social History     Socioeconomic History    Marital status:      Spouse name: None    Number of children: None    Years of education: None    Highest education level: None   Occupational History    Occupation: disability   Social Needs    Financial resource strain: None    Food insecurity     Worry: None     Inability: None    Transportation needs     Medical: None     Non-medical: None   Tobacco Use    Smoking status: Former Smoker     Packs/day: 0.50     Years: 20.00     Pack years: 10.00     Last attempt to quit: 6/12/2017     Years since quitting: 3.0    Smokeless tobacco: Never Used   Substance and Sexual Activity    Alcohol use: No    Drug use: No    Sexual activity: Yes     Partners: Female   Lifestyle    Physical activity     Days per week: None     Minutes per session: None    Stress: None   Relationships    Social connections     Talks on phone: None     Gets together: None     Attends [employer] service: None     Active member of club or organization: None     Attends meetings of clubs or organizations: None Relationship status: None    Intimate partner violence     Fear of current or ex partner: None     Emotionally abused: None     Physically abused: None     Forced sexual activity: None   Other Topics Concern    None   Social History Narrative    , grandchildren from  daughter live with him and his wife. SURGICAL HISTORY    Past Surgical History:   Procedure Laterality Date    CHEST TUBE INSERTION  2017    COLONOSCOPY  2016    EYE SURGERY Right     injury    LUMBAR FUSION  2020    L5-S1    LUMBAR SPINE SURGERY Right 2020    RIGHT L5 -S1 TLIF MINIMALLY INVASIVE- 2CARMS,  NUVASIVE, NO CELLSAVER performed by Machelle Leger MD at [address] [place] [address]      ICD;  battery change ;  Dr. Angelic Roberts. DEFIB NOT SAFE MRI    PROSTATE BIOPSY      PROSTATECTOMY  2016    PROSTATECTOMY  2016    carmen lymph node dissection    VENA CAVA FILTER PLACEMENT         CURRENT MEDICATIONS    Current Outpatient Medications   Medication Sig Dispense Refill    ciclopirox (LOPROX) 0.77 % cream Apply topically 2 times daily To feet 90 g 2    insulin aspart protamine-insulin aspart (NOVOLOG MIX 70/30) (70-30) 100 UNIT/ML injection Inject 60 Units into the skin 2 times daily (with meals) 30 mL 0    ONETOUCH ULTRA strip TEST three times a day if needed 100 strip 2    blood glucose test strips (ASCENSIA AUTODISC VI;ONE TOUCH ULTRA TEST VI) strip 1 each by In Vitro route 3 times daily As needed.  100 each 0    carvedilol (COREG) 6.25 MG tablet take 1 tablet by mouth twice a day 60 tablet 3    furosemide (LASIX) 20 MG tablet take 1 tablet by mouth once daily 30 tablet 3    Insulin Pen Needle 31G X 8 MM MISC 1 each by Does not apply route 2 times daily 100 each 0    Insulin Syringe-Needle U-100 (B-D INS SYR ULTRAFINE 1CC/31G) 31G X 5/16\" 1 ML MISC use twice a day 100 each 3    gabapentin (NEURONTIN) 800 MG tablet take 1 tablet by mouth twice a day 60 tablet 3    pravastatin (PRAVACHOL) 40 MG tablet Take 1 tablet by mouth nightly 90 tablet 0    isosorbide mononitrate (IMDUR) 30 MG extended release tablet take 1 tablet by mouth once daily 120 tablet 0    JANUVIA 100 MG tablet take 1 tablet by mouth once daily 30 tablet 5    DULoxetine (CYMBALTA) 60 MG extended release capsule Take 1 capsule by mouth daily 30 capsule 5    nicotine (NICODERM CQ) 14 MG/24HR Place 1 patch onto the skin every 24 hours for 14 days 14 patch 3    omeprazole (PRILOSEC) 20 MG delayed release capsule take 1 capsule by mouth once daily 30 MINUTES before meals 120 capsule 2    docusate sodium (COLACE, DULCOLAX) 100 MG CAPS Take 100 mg by mouth 2 times daily 60 capsule 0    tiZANidine (ZANAFLEX) 4 MG tablet Take 1 tablet by mouth every 8 hours as needed (muscle spasm) 50 tablet 0    tamsulosin (FLOMAX) 0.4 MG capsule Take 0.4 mg by mouth daily      lisinopril (PRINIVIL;ZESTRIL) 2.5 MG tablet Take 2.5 mg by mouth daily       No current facility-administered medications for this visit. ALLERGIES    Allergies   Allergen Reactions    Adhesive Tape Other (See Comments)     Blister,skin tears with silk tape       PHYSICAL EXAM   Physical Exam  Vitals signs reviewed. Constitutional:       Appearance: He is well-developed. He is obese. HENT:      Head: Normocephalic. Eyes:      Pupils: Pupils are equal, round, and reactive to light. Neck:      Musculoskeletal: Normal range of motion and neck supple. Thyroid: No thyromegaly. Cardiovascular:      Rate and Rhythm: Normal rate and regular rhythm. Heart sounds: Normal heart sounds. No murmur. Comments: Scarring and venous stasis left lower leg, no open sores  Pulmonary:      Effort: Pulmonary effort is normal.      Breath sounds: Normal breath sounds. No wheezing or rales. Abdominal:      Palpations: Abdomen is soft. Tenderness: There is no abdominal tenderness.  There is no guarding or rebound. Musculoskeletal: Normal range of motion. General: No tenderness or deformity. Right lower le+ Edema present. Left lower le+ Edema present. Lymphadenopathy:      Cervical: No cervical adenopathy. Skin:     General: Skin is warm and dry. Neurological:      Mental Status: He is alert and oriented to person, place, and time. Psychiatric:         Behavior: Behavior normal.         Assessment/PLan  1. Controlled type 2 diabetes mellitus with diabetic nephropathy, with long-term current use of insulin (HCC)  Chronic, slightly higher A1C. Encouraged to improve diet and exercise. Results for orders placed or performed in visit on 20   POCT glycosylated hemoglobin (Hb A1C)   Result Value Ref Range    Hemoglobin A1C 8.6 %       - POCT glycosylated hemoglobin (Hb A1C)  - insulin aspart protamine-insulin aspart (NOVOLOG MIX 70/30) (70-30) 100 UNIT/ML injection; Inject 60 Units into the skin 2 times daily (with meals)  Dispense: 30 mL; Refill: 0    2. Edema of both legs  Cont current diuretic. Report open sores. 3. Essential hypertension  Chronic, stable, continue current medication and/or plan      4. Dermatitis  Cont topical  - ciclopirox (LOPROX) 0.77 % cream; Apply topically 2 times daily To feet  Dispense: 90 g; Refill: 2    5. Prostate cancer ([facility]  Stable. Seeing urologist.     6. Stage 3 chronic kidney disease ([facility]  Chronic, stable, continue current medication and/or plan  Seeing nephrologist    7. Coronary artery disease involving native coronary artery of native heart without angina pectoris  No current sx.     8. Chronic systolic heart failure (HCC)  Stable on meds. Delisa Malhotra received counseling on the following healthy behaviors: nutrition, exercise and medication adherence  Reviewed prior labs and health maintenance. Continue current medications, diet and exercise. Discussed use, benefit, and side effects of prescribed medications.  Barriers to

## 2020-07-22 RX ORDER — INSULIN ASPART 100 [IU]/ML
60 INJECTION, SUSPENSION SUBCUTANEOUS 2 TIMES DAILY WITH MEALS
Qty: 30 ML | Refills: 3 | Status: SHIPPED | OUTPATIENT
Start: 2020-07-22 | End: 2021-01-21 | Stop reason: SDUPTHER

## 2020-08-06 ENCOUNTER — APPOINTMENT (OUTPATIENT)
Dept: GENERAL RADIOLOGY | Age: 55
End: 2020-08-06
Payer: COMMERCIAL

## 2020-08-06 ENCOUNTER — HOSPITAL ENCOUNTER (EMERGENCY)
Age: 55
Discharge: HOME | End: 2020-08-06
Attending: EMERGENCY MEDICINE
Payer: COMMERCIAL

## 2020-08-06 VITALS
WEIGHT: 270 LBS | SYSTOLIC BLOOD PRESSURE: 128 MMHG | DIASTOLIC BLOOD PRESSURE: 82 MMHG | TEMPERATURE: 98.2 F | RESPIRATION RATE: 12 BRPM | BODY MASS INDEX: 35.78 KG/M2 | OXYGEN SATURATION: 95 % | HEART RATE: 102 BPM | HEIGHT: 73 IN

## 2020-08-06 PROCEDURE — 99283 EMERGENCY DEPT VISIT LOW MDM: CPT

## 2020-08-06 PROCEDURE — 73130 X-RAY EXAM OF HAND: CPT

## 2020-08-06 RX ORDER — IBUPROFEN 800 MG/1
800 TABLET ORAL EVERY 8 HOURS PRN
Qty: 20 TABLET | Refills: 0 | Status: SHIPPED | OUTPATIENT
Start: 2020-08-06 | End: 2020-11-19 | Stop reason: SDUPTHER

## 2020-08-06 RX ORDER — IBUPROFEN 800 MG/1
800 TABLET ORAL EVERY 8 HOURS PRN
Qty: 20 TABLET | Refills: 0 | Status: SHIPPED | OUTPATIENT
Start: 2020-08-06 | End: 2021-06-24 | Stop reason: ALTCHOICE

## 2020-08-06 ASSESSMENT — ENCOUNTER SYMPTOMS
SHORTNESS OF BREATH: 0
FACIAL SWELLING: 0
COUGH: 0
EYE DISCHARGE: 0
COLOR CHANGE: 0
ABDOMINAL PAIN: 0
VOMITING: 0
DIARRHEA: 0
EYE REDNESS: 0
CONSTIPATION: 0

## 2020-08-06 ASSESSMENT — PAIN DESCRIPTION - LOCATION: LOCATION: HAND

## 2020-08-06 ASSESSMENT — PAIN DESCRIPTION - DESCRIPTORS: DESCRIPTORS: ACHING

## 2020-08-06 ASSESSMENT — PAIN SCALES - GENERAL: PAINLEVEL_OUTOF10: 7

## 2020-08-06 ASSESSMENT — PAIN DESCRIPTION - PROGRESSION: CLINICAL_PROGRESSION: GRADUALLY WORSENING

## 2020-08-06 ASSESSMENT — PAIN DESCRIPTION - PAIN TYPE: TYPE: ACUTE PAIN

## 2020-08-06 ASSESSMENT — PAIN DESCRIPTION - ONSET: ONSET: ON-GOING

## 2020-08-06 ASSESSMENT — PAIN DESCRIPTION - FREQUENCY: FREQUENCY: CONTINUOUS

## 2020-08-06 ASSESSMENT — PAIN DESCRIPTION - ORIENTATION: ORIENTATION: LEFT

## 2020-08-06 NOTE — ED PROVIDER NOTES
[address] [place] [address] ED  EMERGENCY DEPARTMENT ENCOUNTER      Pt Name: Yue Montero  MRN: [id number]  [place] 1965  Date of evaluation: 8/6/2020  Provider: Helene Aceves MD    CHIEF COMPLAINT       Chief Complaint   Patient presents with    Hand Injury     fall hit hand against wall  1 week ago         HISTORY OF PRESENT ILLNESS  (Location/Symptom, Timing/Onset, Context/Setting, Quality, Duration, Modifying Factors, Severity.)   Yue Montero is a 63 y.o. male who presents to the emergency department for pain in his left hand. He is complaining of pain in the dorsum of his hand and at the base of the thumb. He was with his dog and he hit his hand up against a wall a few days ago, the dorsum of his hand. Additionally the ring finger has been locking up from time to time. That started a few weeks ago. No other injuries and no pain in the wrist and he rated the pain as a 7. Nursing Notes were reviewed. ALLERGIES     Adhesive tape    CURRENT MEDICATIONS       Previous Medications    BLOOD GLUCOSE TEST STRIPS (ASCENSIA AUTODISC VI;ONE TOUCH ULTRA TEST VI) STRIP    1 each by In Vitro route 3 times daily As needed.     CARVEDILOL (COREG) 6.25 MG TABLET    take 1 tablet by mouth twice a day    CICLOPIROX (LOPROX) 0.77 % CREAM    Apply topically 2 times daily To feet    DOCUSATE SODIUM (COLACE, DULCOLAX) 100 MG CAPS    Take 100 mg by mouth 2 times daily    DULOXETINE (CYMBALTA) 60 MG EXTENDED RELEASE CAPSULE    Take 1 capsule by mouth daily    FUROSEMIDE (LASIX) 20 MG TABLET    take 1 tablet by mouth once daily    GABAPENTIN (NEURONTIN) 800 MG TABLET    take 1 tablet by mouth twice a day    INSULIN ASPART PROTAMINE-INSULIN ASPART (NOVOLOG MIX 70/30) (70-30) 100 UNIT/ML INJECTION    Inject 60 Units into the skin 2 times daily (with meals)    INSULIN PEN NEEDLE 31G X 8 MM MISC    1 each by Does not apply route 2 times daily    INSULIN SYRINGE-NEEDLE U-100 (B-D INS SYR ULTRAFINE 1CC/31G) 31G X 5/16\" 1 ML MISC    use twice a day    ISOSORBIDE MONONITRATE (IMDUR) 30 MG EXTENDED RELEASE TABLET    take 1 tablet by mouth once daily    JANUVIA 100 MG TABLET    take 1 tablet by mouth once daily    LISINOPRIL (PRINIVIL;ZESTRIL) 2.5 MG TABLET    Take 2.5 mg by mouth daily    NICOTINE (NICODERM CQ) 14 MG/24HR    Place 1 patch onto the skin every 24 hours for 14 days    OMEPRAZOLE (PRILOSEC) 20 MG DELAYED RELEASE CAPSULE    take 1 capsule by mouth once daily 30 MINUTES before meals    ONETOUCH ULTRA STRIP    TEST three times a day if needed    PRAVASTATIN (PRAVACHOL) 40 MG TABLET    Take 1 tablet by mouth nightly    TAMSULOSIN (FLOMAX) 0.4 MG CAPSULE    Take 0.4 mg by mouth daily    TIZANIDINE (ZANAFLEX) 4 MG TABLET    Take 1 tablet by mouth every 8 hours as needed (muscle spasm)       PAST MEDICAL HISTORY         Diagnosis Date    Anxiety     no treatment    Arthritis     left shoulder    Cardiomegaly     CHF (congestive heart failure) (HCC) EF 20%    Chronic back pain     Diabetes mellitus (HCC)     GERD (gastroesophageal reflux disease)     History of lung thrombosis     Hx of blood clots     Hyperkalemia     Hyperlipidemia     Hypertension     Irregular heart beat     Neuropathy     feet    Obesity     WILLA (obstructive sleep apnea)     not using the machine    Pacemaker     AICD    PE (pulmonary embolism) 2001    \"about 5 yrs ago\"    Prostate cancer ([address] 2016    Spinal stenosis at L4-L5 level 2019    Stage 3 chronic kidney disease ([address] 12/10/2018    Traumatic closed fx of eight or more ribs with minimal displacement 06/2017    pneumothorax. Pt fell 50 feet from a balcony. Pt denies history of a head injury.     Type 2 diabetes mellitus without complication [facility])        SURGICAL HISTORY           Procedure Laterality Date    CHEST TUBE INSERTION  06/2017    COLONOSCOPY  2016    EYE SURGERY Right     injury    LUMBAR FUSION  01/29/2020    L5-S1    LUMBAR SPINE SURGERY Right 1/29/2020    RIGHT L5 -S1 TLIF MINIMALLY INVASIVE- 2CARMS,  NUVASIVE, NO CELLSAVER performed by Celeste Collier MD at [facility]      ICD;  battery change 2016;  Dr. Morillo Carry. DEFIB NOT SAFE MRI    PROSTATE BIOPSY      PROSTATECTOMY  2016    PROSTATECTOMY  2016    carmen lymph node dissection    VENA CAVA FILTER PLACEMENT           FAMILY HISTORY           Problem Relation Age of Onset    Diabetes Father     Hypertension Father     Heart Disease Father     Hypertension Mother     Prostate Cancer Brother     Diabetes Brother     Diabetes Brother     Diabetes Brother      Family Status   Relation Name Status    Father      Mother  [de-identified], age 80y    Brother  Alive    Claire      Other 2 granchildren custody Alive    Claire  Alive    Brother  Alive    Brother  Alive        SOCIAL HISTORY      reports that he quit smoking about 3 years ago. He has a 10.00 pack-year smoking history. He has never used smokeless tobacco. He reports that he does not drink alcohol or use drugs. REVIEW OF SYSTEMS    (2-9 systems for level 4, 10 or more for level 5)     Review of Systems   Constitutional: Negative for chills, fatigue and fever. HENT: Negative for congestion, ear discharge and facial swelling. Eyes: Negative for discharge and redness. Respiratory: Negative for cough and shortness of breath. Cardiovascular: Negative for chest pain. Gastrointestinal: Negative for abdominal pain, constipation, diarrhea and vomiting. Genitourinary: Negative for dysuria and hematuria. Musculoskeletal: Negative for arthralgias. Skin: Negative for color change and rash. Neurological: Negative for syncope, numbness and headaches. Hematological: Negative for adenopathy. Psychiatric/Behavioral: Negative for confusion. The patient is not nervous/anxious.          Except as noted above the remainder of the review of systems was reviewed and negative. PHYSICAL EXAM    (up to 7 for level 4, 8 or more for level 5)     Vitals:    08/06/20 0926   BP: 128/82   Pulse: 102   Resp: 12   Temp: 98.2 °F (36.8 °C)   SpO2: 95%   Weight: 270 lb (122.5 kg)   Height: 6' 1\" (1.854 m)       Physical Exam  Constitutional:       General: He is not in acute distress. Appearance: He is well-developed. He is not diaphoretic. HENT:      Head: Normocephalic and atraumatic. Eyes:      General: No scleral icterus. Right eye: No discharge. Left eye: No discharge. Neck:      Musculoskeletal: Neck supple. Cardiovascular:      Rate and Rhythm: Normal rate and regular rhythm. Pulmonary:      Effort: Pulmonary effort is normal. No respiratory distress. Breath sounds: Normal breath sounds. No stridor. No wheezing or rales. Abdominal:      General: There is no distension. Palpations: Abdomen is soft. Tenderness: There is no abdominal tenderness. Musculoskeletal: Normal range of motion. Comments: Mild swelling on the dorsum of his left hand. Skin intact. Fingers have full range of motion. Wrist nontender and has full range of motion. Lymphadenopathy:      Cervical: No cervical adenopathy. Skin:     General: Skin is warm and dry. Findings: No erythema or rash. Neurological:      Mental Status: He is alert and oriented to person, place, and time.    Psychiatric:         Behavior: Behavior normal.             DIAGNOSTIC RESULTS     EKG: All EKG's are interpreted by the Emergency Department Physician who either signs or Co-signs this chart in the absence of a cardiologist.    Not indicated    RADIOLOGY:   Non-plain film images such as CT, Ultrasound and MRI are read by the radiologist. Plain radiographic images are visualized and preliminarily interpreted by the emergency physician with the below findings:    Interpretation per the Radiologist below, if available at the time of this note:    Xr Hand Left (min 3 Views)    Result Date: 8/6/2020  EXAMINATION: THREE XRAY VIEWS OF THE LEFT HAND 8/6/2020 10:05 am COMPARISON: None HISTORY: ORDERING SYSTEM PROVIDED HISTORY: Pain TECHNOLOGIST PROVIDED HISTORY: Pain Reason for Exam: Pt c/o pain to left hand, s/p injury. Acuity: Acute Type of Exam: Initial Acute left hand pain. Initial encounter. FINDINGS: Alignment is anatomic. No fracture, dislocation, or periosteal change. Mild soft tissue swelling of the hand dorsum. 1. Mild soft tissue swelling of the hand dorsum with no associated acute osseous abnormality. LABS:  Labs Reviewed - No data to display    All other labs were within normal range or not returned as of this dictation. EMERGENCY DEPARTMENT COURSE and DIFFERENTIAL DIAGNOSIS/MDM:   Vitals:    Vitals:    08/06/20 0926   BP: 128/82   Pulse: 102   Resp: 12   Temp: 98.2 °F (36.8 °C)   SpO2: 95%   Weight: 270 lb (122.5 kg)   Height: 6' 1\" (1.854 m)       Orders Placed This Encounter   Medications    ibuprofen (ADVIL;MOTRIN) 800 MG tablet     Sig: Take 1 tablet by mouth every 8 hours as needed for Pain     Dispense:  20 tablet     Refill:  0    ibuprofen (ADVIL;MOTRIN) 800 MG tablet     Sig: Take 1 tablet by mouth every 8 hours as needed for Pain     Dispense:  20 tablet     Refill:  0       Medical Decision Making: Ace wrap applied and application checked by me and found to be appropriate, he is neurovascular intact. Trigger finger is unrelated to the hand contusion and he is referred to orthopedics for that issue. Treatment diagnosis and follow-up were discussed with the patient      CONSULTS:  None    PROCEDURES:  None    FINAL IMPRESSION      1. Contusion of left hand, initial encounter    2.  Trigger ring finger of left hand          DISPOSITION/PLAN   DISPOSITION Decision To Discharge 08/06/2020 10:23:30 AM      PATIENT REFERRED TO:   Best Lucero MD  [address] [place] [address] [phone]      As needed    [facility]

## 2020-08-28 RX ORDER — GABAPENTIN 800 MG/1
TABLET ORAL
Qty: 60 TABLET | Refills: 0 | Status: SHIPPED | OUTPATIENT
Start: 2020-08-28 | End: 2020-09-30

## 2020-08-28 NOTE — TELEPHONE ENCOUNTER
Last visit: 7/14/2020  Last Med refill: 8/15/2020  Does patient have enough medication for 72 hours: Yes    Next Visit Date:  Future Appointments   Date Time Provider Elida Rice   9/10/2020  1:45 PM MD noemi [address] fp [id number]   11/19/2020 10:00 AM MD noemi [address] fp [address] Maintenance   Topic Date Due    Hepatitis B vaccine (1 of 3 - Risk 3-dose series) 07/22/1983    Shingles Vaccine (1 of 2) 07/22/2014    PSA counseling  09/19/2017    Diabetic retinal exam  06/01/2018    Annual Wellness Visit (AWV)  05/29/2019    Lipid screen  12/05/2019    Diabetic foot exam  03/07/2020    Flu vaccine (1) 09/01/2020    Potassium monitoring  01/30/2021    Creatinine monitoring  01/30/2021    A1C test (Diabetic or Prediabetic)  07/14/2021    Colon cancer screen colonoscopy  09/09/2024    DTaP/Tdap/Td vaccine (2 - Td) 01/01/2025    Pneumococcal 0-64 years Vaccine  Completed    Hepatitis C screen  Completed    HIV screen  Completed    Hepatitis A vaccine  Aged Out    Hib vaccine  Aged Out    Meningococcal (ACWY) vaccine  Aged Out       Hemoglobin A1C (%)   Date Value   07/14/2020 8.6   01/29/2020 8.1 (H)   01/17/2020 8.4             ( goal A1C is < 7)   No results found for: LABMICR  LDL Cholesterol (mg/dL)   Date Value   08/18/2017 81   09/19/2016 65     LDL Calculated (mg/dL)   Date Value   12/05/2018 61       (goal LDL is <100)   AST (U/L)   Date Value   12/05/2018 13     ALT (U/L)   Date Value   12/05/2018 10     BUN (mg/dL)   Date Value   01/30/2020 15     BP Readings from Last 3 Encounters:   08/06/20 128/82   07/14/20 120/80   03/13/20 130/86          (goal 120/80)    All Future Testing planned in [facility]  Lab Frequency Next Occurrence               Patient Active Problem List:     Prostate cancer ([address]     Controlled type 2 diabetes mellitus with diabetic nephropathy, with long-term current use of insulin (HCC)     Mixed hyperlipidemia     Obesity (BMI 30.0-34. 9)     Coronary artery disease involving native coronary artery of native heart without angina pectoris     Pacemaker     Systolic heart failure (HCC)     Chronic back pain     History of lung thrombosis     Stage 3 chronic kidney disease ([address]     Spinal stenosis at L4-L5 level     Chronic right-sided low back pain with right-sided sciatica     Erectile dysfunction following radical prostatectomy     Type 2 diabetes mellitus with diabetic polyneuropathy (HCC)     HTN (hypertension)     Spondylolisthesis, acquired     Foraminal stenosis of lumbar region     Degeneration of intervertebral disc     Nocturia     Tinea pedis

## 2020-09-03 ENCOUNTER — TELEPHONE (OUTPATIENT)
Dept: FAMILY MEDICINE CLINIC | Age: 55
End: 2020-09-03

## 2020-09-10 ENCOUNTER — OFFICE VISIT (OUTPATIENT)
Dept: FAMILY MEDICINE CLINIC | Age: 55
End: 2020-09-10
Payer: COMMERCIAL

## 2020-09-10 VITALS
OXYGEN SATURATION: 100 % | WEIGHT: 269.2 LBS | BODY MASS INDEX: 35.52 KG/M2 | TEMPERATURE: 98.7 F | DIASTOLIC BLOOD PRESSURE: 70 MMHG | HEART RATE: 90 BPM | SYSTOLIC BLOOD PRESSURE: 110 MMHG

## 2020-09-10 PROCEDURE — 90686 IIV4 VACC NO PRSV 0.5 ML IM: CPT | Performed by: FAMILY MEDICINE

## 2020-09-10 PROCEDURE — G0008 ADMIN INFLUENZA VIRUS VAC: HCPCS | Performed by: FAMILY MEDICINE

## 2020-09-10 PROCEDURE — 99213 OFFICE O/P EST LOW 20 MIN: CPT | Performed by: FAMILY MEDICINE

## 2020-09-10 PROCEDURE — 3052F HG A1C>EQUAL 8.0%<EQUAL 9.0%: CPT | Performed by: FAMILY MEDICINE

## 2020-09-10 RX ORDER — DULOXETIN HYDROCHLORIDE 60 MG/1
60 CAPSULE, DELAYED RELEASE ORAL DAILY
Qty: 30 CAPSULE | Refills: 5 | Status: SHIPPED | OUTPATIENT
Start: 2020-09-10 | End: 2021-08-27

## 2020-09-10 ASSESSMENT — ENCOUNTER SYMPTOMS
EYES NEGATIVE: 1
COUGH: 0
SHORTNESS OF BREATH: 0
ALLERGIC/IMMUNOLOGIC NEGATIVE: 1

## 2020-09-10 NOTE — PROGRESS NOTES
[facility]  [facility]  [address]  [facility] [address]  Dept: [phone]    9/10/2020    CHIEF COMPLAINT    Chief Complaint   Patient presents with    Trigger finger     left hand       HPI    Dewey Lawrence is a 63 y.o. male who presents   Chief Complaint   Patient presents with    Trigger finger     left hand   . Presents with complaints of finger issues in his left hand. He is not able to flex or extend his left thumb compared to the right. It is painful in the MP joint. Left ring finger has been triggering. He has it taped currently to keep it stable. Vitals:    09/10/20 1358   BP: 110/70   Pulse: 90   Temp: 98.7 °F (37.1 °C)   SpO2: 100%   Weight: 269 lb 3.2 oz (122.1 kg)       REVIEW OF SYSTEMS    Review of Systems   Constitutional: Negative for fatigue, fever and unexpected weight change. HENT: Negative. Eyes: Negative. Respiratory: Negative for cough and shortness of breath. Cardiovascular: Negative for chest pain and leg swelling. Endocrine: Negative. Musculoskeletal: Positive for arthralgias (see hpi). Skin: Negative. Allergic/Immunologic: Negative. Neurological: Negative for dizziness and headaches. Hematological: Negative.         PAST MEDICAL HISTORY    Past Medical History:   Diagnosis Date    Anxiety     no treatment    Arthritis     left shoulder    Cardiomegaly     CHF (congestive heart failure) (HCC) EF 20%    Chronic back pain     Diabetes mellitus (HCC)     GERD (gastroesophageal reflux disease)     History of lung thrombosis     Hx of blood clots     Hyperkalemia     Hyperlipidemia     Hypertension     Irregular heart beat     Neuropathy     feet    Obesity     WILLA (obstructive sleep apnea)     not using the machine    Pacemaker     AICD    PE (pulmonary embolism) 2001    \"about 5 yrs ago\"    Prostate cancer ([facility] 2016    Spinal stenosis at L4-L5 level 2019    Stage 3 chronic kidney disease ([facility] 12/10/2018    Traumatic closed fx of eight or more ribs with minimal displacement 2017    pneumothorax. Pt fell 50 feet from a balcony. Pt denies history of a head injury.  Type 2 diabetes mellitus without complication (HCC)        FAMILY HISTORY    Family History   Problem Relation Age of Onset    Diabetes Father     Hypertension Father     Heart Disease Father     Hypertension Mother     Prostate Cancer Brother     Diabetes Brother     Diabetes Brother     Diabetes Brother        SOCIAL HISTORY    Social History     Socioeconomic History    Marital status:      Spouse name: None    Number of children: None    Years of education: None    Highest education level: None   Occupational History    Occupation: disability   Social Needs    Financial resource strain: None    Food insecurity     Worry: None     Inability: None    Transportation needs     Medical: None     Non-medical: None   Tobacco Use    Smoking status: Former Smoker     Packs/day: 0.50     Years: 20.00     Pack years: 10.00     Last attempt to quit: 2017     Years since quitting: 3.2    Smokeless tobacco: Never Used   Substance and Sexual Activity    Alcohol use: No    Drug use: No    Sexual activity: Yes     Partners: Female   Lifestyle    Physical activity     Days per week: None     Minutes per session: None    Stress: None   Relationships    Social connections     Talks on phone: None     Gets together: None     Attends [employer] service: None     Active member of club or organization: None     Attends meetings of clubs or organizations: None     Relationship status: None    Intimate partner violence     Fear of current or ex partner: None     Emotionally abused: None     Physically abused: None     Forced sexual activity: None   Other Topics Concern    None   Social History Narrative    , grandchildren from  daughter live with him and his wife.         SURGICAL cervical adenopathy. Skin:     General: Skin is warm and dry. Neurological:      Mental Status: He is alert and oriented to person, place, and time. Psychiatric:         Behavior: Behavior normal.         Assessment/PLan  1. Thumb pain, left  Referral to hand specialist  - Melody Massey MD, Orthopedic Surgery, [place]    2. Trigger ring finger of left hand  Referral.  Continue taping if it adds to comfort. Annabella Pearce MD, Orthopedic Surgery, [place]    3. Controlled type 2 diabetes mellitus with diabetic nephropathy, with long-term current use of insulin ([facility])  Chronic stable condition. Needed refill of pen needles  - Insulin Pen Needle 31G X 8 MM MISC; 1 each by Does not apply route 2 times daily  Dispense: 100 each; Refill: 0    4. Diabetic polyneuropathy associated with diabetes mellitus due to underlying condition ([facility])  Chronic stable condition refill done  - DULoxetine (CYMBALTA) 60 MG extended release capsule; Take 1 capsule by mouth daily  Dispense: 30 capsule; Refill: 5      Xenia Epps received counseling on the following healthy behaviors: nutrition, exercise and medication adherence  Reviewed prior labs and health maintenance. Continue current medications, diet and exercise. Discussed use, benefit, and side effects of prescribed medications. Barriers to medication compliance addressed. Patient given educational materials - see patient instructions. All patient questions answered. Patient voiced understanding. Return if symptoms worsen or fail to improve.         Electronically signed by Torey Felix MD on 9/10/20 at 2:16 PM EDT

## 2020-09-30 RX ORDER — GABAPENTIN 800 MG/1
TABLET ORAL
Qty: 60 TABLET | Refills: 0 | Status: SHIPPED | OUTPATIENT
Start: 2020-09-30 | End: 2020-11-02

## 2020-09-30 NOTE — TELEPHONE ENCOUNTER
Last visit: 9/10/2020  Last Med refill: 8/28/2020  Does patient have enough medication for 72 hours: Yes    Next Visit Date:  Future Appointments   Date Time Provider Elida Rice   11/19/2020 10:00 AM MD noemi [address] Maintenance   Topic Date Due    Hepatitis B vaccine (1 of 3 - Risk 3-dose series) 07/22/1983    Shingles Vaccine (1 of 2) 07/22/2014    PSA counseling  09/19/2017    Diabetic retinal exam  06/01/2018    Annual Wellness Visit (AWV)  05/29/2019    Lipid screen  12/05/2019    Diabetic foot exam  03/07/2020    Potassium monitoring  01/30/2021    Creatinine monitoring  01/30/2021    A1C test (Diabetic or Prediabetic)  07/14/2021    Colon cancer screen colonoscopy  09/09/2024    DTaP/Tdap/Td vaccine (2 - Td) 01/01/2025    Flu vaccine  Completed    Pneumococcal 0-64 years Vaccine  Completed    Hepatitis C screen  Completed    HIV screen  Completed    Hepatitis A vaccine  Aged Out    Hib vaccine  Aged Out    Meningococcal (ACWY) vaccine  Aged Out       Hemoglobin A1C (%)   Date Value   07/14/2020 8.6   01/29/2020 8.1 (H)   01/17/2020 8.4             ( goal A1C is < 7)   No results found for: LABMICR  LDL Cholesterol (mg/dL)   Date Value   08/18/2017 81   09/19/2016 65     LDL Calculated (mg/dL)   Date Value   12/05/2018 61       (goal LDL is <100)   AST (U/L)   Date Value   12/05/2018 13     ALT (U/L)   Date Value   12/05/2018 10     BUN (mg/dL)   Date Value   01/30/2020 15     BP Readings from Last 3 Encounters:   09/10/20 110/70   08/06/20 128/82   07/14/20 120/80          (goal 120/80)    All Future Testing planned in [facility]  Lab Frequency Next Occurrence               Patient Active Problem List:     Prostate cancer ([facility]     Controlled type 2 diabetes mellitus with diabetic nephropathy, with long-term current use of insulin (HCC)     Mixed hyperlipidemia     Obesity (BMI 30.0-34. 9)     Coronary artery disease involving native coronary artery

## 2020-10-08 RX ORDER — SITAGLIPTIN 100 MG/1
TABLET, FILM COATED ORAL
Qty: 30 TABLET | Refills: 5 | Status: SHIPPED | OUTPATIENT
Start: 2020-10-08 | End: 2021-05-05

## 2020-10-08 NOTE — TELEPHONE ENCOUNTER
Last visit: 9/10/2020  Last Med refill: 5/26/2020  Does patient have enough medication for 72 hours: No:     Next Visit Date:  Future Appointments   Date Time Provider Elida Rice   11/19/2020 10:00 AM MD noemi [address] Maintenance   Topic Date Due    Hepatitis B vaccine (1 of 3 - Risk 3-dose series) 07/22/1983    Shingles Vaccine (1 of 2) 07/22/2014    PSA counseling  09/19/2017    Diabetic retinal exam  06/01/2018    Annual Wellness Visit (AWV)  05/29/2019    Lipid screen  12/05/2019    Diabetic foot exam  03/07/2020    Potassium monitoring  01/30/2021    Creatinine monitoring  01/30/2021    A1C test (Diabetic or Prediabetic)  07/14/2021    Colon cancer screen colonoscopy  09/09/2024    DTaP/Tdap/Td vaccine (2 - Td) 01/01/2025    Flu vaccine  Completed    Pneumococcal 0-64 years Vaccine  Completed    Hepatitis C screen  Completed    HIV screen  Completed    Hepatitis A vaccine  Aged Out    Hib vaccine  Aged Out    Meningococcal (ACWY) vaccine  Aged Out       Hemoglobin A1C (%)   Date Value   07/14/2020 8.6   01/29/2020 8.1 (H)   01/17/2020 8.4             ( goal A1C is < 7)   No results found for: LABMICR  LDL Cholesterol (mg/dL)   Date Value   08/18/2017 81   09/19/2016 65     LDL Calculated (mg/dL)   Date Value   12/05/2018 61       (goal LDL is <100)   AST (U/L)   Date Value   12/05/2018 13     ALT (U/L)   Date Value   12/05/2018 10     BUN (mg/dL)   Date Value   01/30/2020 15     BP Readings from Last 3 Encounters:   09/10/20 110/70   08/06/20 128/82   07/14/20 120/80          (goal 120/80)    All Future Testing planned in [facility]  Lab Frequency Next Occurrence               Patient Active Problem List:     Prostate cancer ([address]     Controlled type 2 diabetes mellitus with diabetic nephropathy, with long-term current use of insulin (HCC)     Mixed hyperlipidemia     Obesity (BMI 30.0-34. 9)     Coronary artery disease involving native coronary artery

## 2020-10-11 RX ORDER — ISOSORBIDE MONONITRATE 30 MG/1
TABLET, EXTENDED RELEASE ORAL
Qty: 120 TABLET | Refills: 0 | Status: SHIPPED | OUTPATIENT
Start: 2020-10-11 | End: 2021-02-09

## 2020-10-19 ENCOUNTER — TELEPHONE (OUTPATIENT)
Dept: FAMILY MEDICINE CLINIC | Age: 55
End: 2020-10-19

## 2020-11-02 RX ORDER — GABAPENTIN 800 MG/1
TABLET ORAL
Qty: 60 TABLET | Refills: 0 | Status: SHIPPED | OUTPATIENT
Start: 2020-11-02 | End: 2020-11-10

## 2020-11-10 RX ORDER — GABAPENTIN 800 MG/1
TABLET ORAL
Qty: 60 TABLET | Refills: 0 | Status: SHIPPED | OUTPATIENT
Start: 2020-11-10 | End: 2020-12-28 | Stop reason: SDUPTHER

## 2020-11-19 ENCOUNTER — OFFICE VISIT (OUTPATIENT)
Dept: FAMILY MEDICINE CLINIC | Age: 55
End: 2020-11-19
Payer: COMMERCIAL

## 2020-11-19 ENCOUNTER — HOSPITAL ENCOUNTER (OUTPATIENT)
Age: 55
Setting detail: SPECIMEN
Discharge: HOME | End: 2020-11-19
Payer: COMMERCIAL

## 2020-11-19 VITALS
SYSTOLIC BLOOD PRESSURE: 110 MMHG | WEIGHT: 272.8 LBS | HEART RATE: 101 BPM | BODY MASS INDEX: 36.15 KG/M2 | DIASTOLIC BLOOD PRESSURE: 78 MMHG | OXYGEN SATURATION: 98 % | HEIGHT: 73 IN | TEMPERATURE: 97.3 F

## 2020-11-19 PROBLEM — E66.01 MORBIDLY OBESE (HCC): Status: ACTIVE | Noted: 2020-11-19

## 2020-11-19 LAB — HBA1C MFR BLD: 9.1 %

## 2020-11-19 PROCEDURE — 99214 OFFICE O/P EST MOD 30 MIN: CPT | Performed by: FAMILY MEDICINE

## 2020-11-19 PROCEDURE — 83036 HEMOGLOBIN GLYCOSYLATED A1C: CPT | Performed by: FAMILY MEDICINE

## 2020-11-19 RX ORDER — ASPIRIN 325 MG
325 TABLET, DELAYED RELEASE (ENTERIC COATED) ORAL EVERY 6 HOURS PRN
Qty: 30 TABLET | Refills: 3 | Status: ON HOLD
Start: 2020-11-19 | End: 2021-08-26 | Stop reason: HOSPADM

## 2020-11-19 ASSESSMENT — ENCOUNTER SYMPTOMS
DIARRHEA: 0
BLOOD IN STOOL: 0
ABDOMINAL PAIN: 0
COUGH: 0
CONSTIPATION: 0
EYES NEGATIVE: 1
ALLERGIC/IMMUNOLOGIC NEGATIVE: 1
SHORTNESS OF BREATH: 0

## 2020-11-19 NOTE — PROGRESS NOTES
[facility]  [facility]  [address]  [facility] [address]  Dept: [phone]    11/19/2020    CHIEF COMPLAINT    Chief Complaint   Patient presents with    Follow-up     dm       HPI    Suleman Jordan is a 63 y.o. male who presents   Chief Complaint   Patient presents with    Follow-up     dm   . Recheck chronic conditions. Taking medications as prescribed for diabetes, hypertension, heart failure, edema, peripheral neuropathy, hyperlipidemia, GERD, chronic back pain and prostate enlargement with no side effects and good effectiveness. Says he is walking his dog daily. Admits to stress at home with having grandchildren living with him that are doing schooling at home. Admits that he is eating blueberry muffins from [employer] that his daughter brings him. Diabetes   He presents for his follow-up diabetic visit. He has type 2 diabetes mellitus. His disease course has been fluctuating. There are no hypoglycemic associated symptoms. Pertinent negatives for hypoglycemia include no dizziness, headaches or nervousness/anxiousness. Pertinent negatives for diabetes include no chest pain and no fatigue. There are no hypoglycemic complications. Diabetic complications include heart disease, nephropathy and peripheral neuropathy. Risk factors for coronary artery disease include hypertension, dyslipidemia, diabetes mellitus, male sex, obesity and sedentary lifestyle. Current diabetic treatment includes insulin injections. He is compliant with treatment some of the time. His weight is stable. He is following a generally unhealthy diet. He has not had a previous visit with a dietitian. He rarely participates in exercise. His home blood glucose trend is fluctuating minimally. An ACE inhibitor/angiotensin II receptor blocker is being taken. Hypertension   This is a chronic problem. The current episode started more than 1 year ago.  The problem is History:   Diagnosis Date    Anxiety     no treatment    Arthritis     left shoulder    Cardiomegaly     CHF (congestive heart failure) (HCC) EF 20%    Chronic back pain     Diabetes mellitus (HCC)     GERD (gastroesophageal reflux disease)     History of lung thrombosis     Hx of blood clots     Hyperkalemia     Hyperlipidemia     Hypertension     Irregular heart beat     Neuropathy     feet    Obesity     WILLA (obstructive sleep apnea)     not using the machine    Pacemaker     AICD    PE (pulmonary embolism) 2001    \"about 5 yrs ago\"    Prostate cancer ([facility] [address] 2016    Spinal stenosis at L4-L5 level 2019    Stage 3 chronic kidney disease 12/10/2018    Traumatic closed fx of eight or more ribs with minimal displacement 06/2017    pneumothorax. Pt fell 50 feet from a balcony. Pt denies history of a head injury.     Type 2 diabetes mellitus without complication ([facility] [address]        FAMILY HISTORY    Family History   Problem Relation Age of Onset    Diabetes Father     Hypertension Father     Heart Disease Father     Hypertension Mother     Prostate Cancer Brother     Diabetes Brother     Diabetes Brother     Diabetes Brother        SOCIAL HISTORY    Social History     Socioeconomic History    Marital status:      Spouse name: None    Number of children: 2    Years of education: None    Highest education level: None   Occupational History    Occupation: disability   Social Needs    Financial resource strain: None    Food insecurity     Worry: None     Inability: None    Transportation needs     Medical: None     Non-medical: None   Tobacco Use    Smoking status: Former Smoker     Packs/day: 0.50     Years: 20.00     Pack years: 10.00     Last attempt to quit: 6/12/2017     Years since quitting: 3.4    Smokeless tobacco: Never Used   Substance and Sexual Activity    Alcohol use: No    Drug use: No    Sexual activity: Yes     Partners: Female   Lifestyle    Physical activity Days per week: None     Minutes per session: None    Stress: None   Relationships    Social connections     Talks on phone: None     Gets together: None     Attends [employer] service: None     Active member of club or organization: None     Attends meetings of clubs or organizations: None     Relationship status: None    Intimate partner violence     Fear of current or ex partner: None     Emotionally abused: None     Physically abused: None     Forced sexual activity: None   Other Topics Concern    None   Social History Narrative    , grandchildren from  daughter live with him and his wife. SURGICAL HISTORY    Past Surgical History:   Procedure Laterality Date    CHEST TUBE INSERTION  2017    COLONOSCOPY      EYE SURGERY Right     injury    FINGER TRIGGER RELEASE      left ring and thumb    HERNIA REPAIR      LUMBAR FUSION  2020    L5-S1    LUMBAR SPINE SURGERY Right 2020    RIGHT L5 -S1 TLIF MINIMALLY INVASIVE- 2CARMS,  NUVASIVE, NO CELLSAVER performed by Nelida Avitia MD at [address] [place] [address]      ICD;  battery change ;  Dr. Parrish Natarajan.  DEFIB NOT SAFE MRI    PROSTATE BIOPSY      PROSTATECTOMY  2016    carmen lymph node dissection    VENA CAVA FILTER PLACEMENT         CURRENT MEDICATIONS    Current Outpatient Medications   Medication Sig Dispense Refill    aspirin 325 MG EC tablet Take 1 tablet by mouth every 6 hours as needed for Pain 30 tablet 3    gabapentin (NEURONTIN) 800 MG tablet take 1 tablet by mouth twice a day 60 tablet 0    isosorbide mononitrate (IMDUR) 30 MG extended release tablet take 1 tablet by mouth once daily 120 tablet 0    JANUVIA 100 MG tablet take 1 tablet by mouth once daily 30 tablet 5    Insulin Pen Needle 31G X 8 MM MISC 1 each by Does not apply route 2 times daily 100 each 0    DULoxetine (CYMBALTA) 60 MG extended release capsule Take 1 capsule by mouth daily 30 capsule 5    ibuprofen (ADVIL;MOTRIN) 800 MG tablet Take 1 tablet by mouth every 8 hours as needed for Pain 20 tablet 0    insulin aspart protamine-insulin aspart (NOVOLOG MIX 70/30) (70-30) 100 UNIT/ML injection Inject 60 Units into the skin 2 times daily (with meals) 30 mL 3    ciclopirox (LOPROX) 0.77 % cream Apply topically 2 times daily To feet 90 g 2    ONETOUCH ULTRA strip TEST three times a day if needed 100 strip 2    blood glucose test strips (ASCENSIA AUTODISC VI;ONE TOUCH ULTRA TEST VI) strip 1 each by In Vitro route 3 times daily As needed. 100 each 0    carvedilol (COREG) 6.25 MG tablet take 1 tablet by mouth twice a day 60 tablet 3    furosemide (LASIX) 20 MG tablet take 1 tablet by mouth once daily 30 tablet 3    Insulin Syringe-Needle U-100 (B-D INS SYR ULTRAFINE 1CC/31G) 31G X 5/16\" 1 ML MISC use twice a day 100 each 3    pravastatin (PRAVACHOL) 40 MG tablet Take 1 tablet by mouth nightly 90 tablet 0    nicotine (NICODERM CQ) 14 MG/24HR Place 1 patch onto the skin every 24 hours for 14 days 14 patch 3    omeprazole (PRILOSEC) 20 MG delayed release capsule take 1 capsule by mouth once daily 30 MINUTES before meals 120 capsule 2    docusate sodium (COLACE, DULCOLAX) 100 MG CAPS Take 100 mg by mouth 2 times daily 60 capsule 0    tiZANidine (ZANAFLEX) 4 MG tablet Take 1 tablet by mouth every 8 hours as needed (muscle spasm) 50 tablet 0    tamsulosin (FLOMAX) 0.4 MG capsule Take 0.4 mg by mouth daily      lisinopril (PRINIVIL;ZESTRIL) 2.5 MG tablet Take 2.5 mg by mouth daily       No current facility-administered medications for this visit. ALLERGIES    Allergies   Allergen Reactions    Adhesive Tape Other (See Comments)     Blister,skin tears with silk tape       PHYSICAL EXAM   Physical Exam  Vitals signs reviewed. Constitutional:       Appearance: He is well-developed. He is obese. HENT:      Head: Normocephalic.    Eyes:      Conjunctiva/sclera: Conjunctivae normal.      Pupils: Pupils are equal, round, and reactive to light. Neck:      Musculoskeletal: Normal range of motion and neck supple. Thyroid: No thyromegaly. Cardiovascular:      Rate and Rhythm: Normal rate and regular rhythm. Heart sounds: Normal heart sounds. No murmur. Pulmonary:      Effort: Pulmonary effort is normal.      Breath sounds: Normal breath sounds. No wheezing or rales. Abdominal:      Palpations: Abdomen is soft. Tenderness: There is no abdominal tenderness. There is no guarding or rebound. Musculoskeletal: Normal range of motion. General: No tenderness or deformity. Lymphadenopathy:      Cervical: No cervical adenopathy. Skin:     General: Skin is warm and dry. Neurological:      Mental Status: He is alert and oriented to person, place, and time. Psychiatric:         Behavior: Behavior normal.         ASSESSMENT/PLAN  1. Controlled type 2 diabetes mellitus with diabetic nephropathy, with long-term current use of insulin (HCC)  Chronic condition. Not currently well controlled. He is going to try and modify his diet and reduce carbohydrates and blueberry muffins.  - Comprehensive Metabolic Panel; Future  - POCT glycosylated hemoglobin (Hb A1C)  Results for orders placed or performed in visit on 11/19/20   POCT glycosylated hemoglobin (Hb A1C)   Result Value Ref Range    Hemoglobin A1C 9.1 %       2. Essential hypertension  Chronic, stable condition. Follow with cardiologist as planned  - Comprehensive Metabolic Panel; Future    3. Chronic systolic heart failure (HCC)  Chronic. No current symptoms. Following with cardiologist    4. Coronary artery disease involving native coronary artery of native heart without angina pectoris  As above    5. Stage 3a chronic kidney disease  Chronic. Last labs that were reviewed in his record showed normal creatinine and GFR. We will recheck current levels and continue seeing nephrologist if needed.   - Comprehensive Metabolic Panel; Future    6. Chronic right-sided low back pain with right-sided sciatica  Continue activity and muscle relaxer. 7. Mixed hyperlipidemia  Chronic, stable condition. Continue statin  - Lipid Panel; Future    8. Morbidly obese ([address]  Continue efforts to improve diet and exercise    9. History of lung thrombosis  Continue on daily aspirin and following with cardiologist.  - aspirin 325 MG EC tablet; Take 1 tablet by mouth every 6 hours as needed for Pain  Dispense: 30 tablet; Refill: 3      Ellie Matute received counseling on the following healthy behaviors: nutrition, exercise, medication adherence and tobacco cessation  Reviewed prior labs and health maintenance. Continue current medications, diet and exercise. Discussed use, benefit, and side effects of prescribed medications. Barriers to medication compliance addressed. Patient given educational materials - see patient instructions. All patient questions answered. Patient voiced understanding. Return in about 4 months (around 3/19/2021) for diabetes, htn.         Electronically signed by Sunitha Kimbrough MD on 11/19/20 at 10:21 AM EST

## 2020-11-20 LAB
ALBUMIN SERPL-MCNC: 3.8 G/DL (ref 3.5–5.2)
ALBUMIN/GLOBULIN RATIO: 1.3 (ref 1–2.5)
ALP BLD-CCNC: 74 U/L (ref 40–129)
ALT SERPL-CCNC: 23 U/L (ref 5–41)
ANION GAP SERPL CALCULATED.3IONS-SCNC: 13 MMOL/L (ref 9–17)
AST SERPL-CCNC: 27 U/L
BILIRUB SERPL-MCNC: 0.34 MG/DL (ref 0.3–1.2)
BUN BLDV-MCNC: 10 MG/DL (ref 6–20)
BUN/CREAT BLD: ABNORMAL (ref 9–20)
CALCIUM SERPL-MCNC: 9.2 MG/DL (ref 8.6–10.4)
CHLORIDE BLD-SCNC: 105 MMOL/L (ref 98–107)
CHOLESTEROL/HDL RATIO: 4.7
CHOLESTEROL: 168 MG/DL
CO2: 21 MMOL/L (ref 20–31)
CREAT SERPL-MCNC: 0.98 MG/DL (ref 0.7–1.2)
GFR AFRICAN AMERICAN: >60 ML/MIN
GFR NON-AFRICAN AMERICAN: >60 ML/MIN
GFR SERPL CREATININE-BSD FRML MDRD: ABNORMAL ML/MIN/{1.73_M2}
GFR SERPL CREATININE-BSD FRML MDRD: ABNORMAL ML/MIN/{1.73_M2}
GLUCOSE BLD-MCNC: 158 MG/DL (ref 70–99)
HDLC SERPL-MCNC: 36 MG/DL
LDL CHOLESTEROL: 104 MG/DL (ref 0–130)
POTASSIUM SERPL-SCNC: 4.5 MMOL/L (ref 3.7–5.3)
SODIUM BLD-SCNC: 139 MMOL/L (ref 135–144)
TOTAL PROTEIN: 6.8 G/DL (ref 6.4–8.3)
TRIGL SERPL-MCNC: 140 MG/DL
VLDLC SERPL CALC-MCNC: ABNORMAL MG/DL (ref 1–30)

## 2020-12-07 ENCOUNTER — TELEPHONE (OUTPATIENT)
Dept: FAMILY MEDICINE CLINIC | Age: 55
End: 2020-12-07

## 2020-12-07 RX ORDER — COLESEVELAM 180 1/1
3 TABLET ORAL 2 TIMES DAILY
COMMUNITY
Start: 2020-11-18 | End: 2021-06-24 | Stop reason: SDUPTHER

## 2020-12-07 RX ORDER — IBUPROFEN 600 MG/1
1 TABLET ORAL 2 TIMES DAILY
COMMUNITY
Start: 2020-10-20 | End: 2021-03-19

## 2020-12-07 RX ORDER — GLUCOSAMINE/CHONDR SU A SOD 750-600 MG
1 TABLET ORAL DAILY
COMMUNITY
Start: 2020-11-14 | End: 2021-06-24 | Stop reason: SDUPTHER

## 2020-12-07 RX ORDER — LISINOPRIL 5 MG/1
1 TABLET ORAL DAILY
COMMUNITY
Start: 2020-10-19 | End: 2021-03-19

## 2020-12-07 RX ORDER — WARFARIN SODIUM 7.5 MG/1
7.5 TABLET ORAL DAILY
COMMUNITY

## 2020-12-07 NOTE — TELEPHONE ENCOUNTER
Patient is now taking Warfarin 7.5 mg[de-identified] Take 1 tablet once a day. He is waiting  For a call back to see Vascular.

## 2020-12-28 RX ORDER — GABAPENTIN 800 MG/1
TABLET ORAL
Qty: 60 TABLET | Refills: 0 | Status: SHIPPED | OUTPATIENT
Start: 2020-12-28 | End: 2021-01-28

## 2021-01-21 DIAGNOSIS — E11.21 CONTROLLED TYPE 2 DIABETES MELLITUS WITH DIABETIC NEPHROPATHY, WITH LONG-TERM CURRENT USE OF INSULIN (HCC): ICD-10-CM

## 2021-01-21 DIAGNOSIS — Z79.4 CONTROLLED TYPE 2 DIABETES MELLITUS WITH DIABETIC NEPHROPATHY, WITH LONG-TERM CURRENT USE OF INSULIN (HCC): ICD-10-CM

## 2021-01-21 RX ORDER — INSULIN ASPART 100 [IU]/ML
60 INJECTION, SUSPENSION SUBCUTANEOUS 2 TIMES DAILY WITH MEALS
Qty: 30 ML | Refills: 3 | Status: SHIPPED | OUTPATIENT
Start: 2021-01-21 | End: 2021-05-05

## 2021-01-21 NOTE — TELEPHONE ENCOUNTER
Refill:    insulin aspart protamine-insulin aspart (NOVOLOG MIX 70/30) (70-30) 100 UNIT/ML injection    [facility] [place] [address], [facility] [address] Yuri Patel [facility] - F [phone]

## 2021-01-22 ENCOUNTER — TELEPHONE (OUTPATIENT)
Dept: FAMILY MEDICINE CLINIC | Age: 56
End: 2021-01-22

## 2021-01-22 NOTE — TELEPHONE ENCOUNTER
Attempted to contact patient to schedule Medicare Annual Wellness visit, Patient not available. Name on voicemail not patient's name. No message left.

## 2021-01-28 DIAGNOSIS — E08.42 DIABETIC POLYNEUROPATHY ASSOCIATED WITH DIABETES MELLITUS DUE TO UNDERLYING CONDITION (HCC): ICD-10-CM

## 2021-01-28 RX ORDER — GABAPENTIN 800 MG/1
TABLET ORAL
Qty: 60 TABLET | Refills: 0 | Status: SHIPPED | OUTPATIENT
Start: 2021-01-28 | End: 2021-02-25

## 2021-02-09 RX ORDER — ISOSORBIDE MONONITRATE 30 MG/1
TABLET, EXTENDED RELEASE ORAL
Qty: 120 TABLET | Refills: 3 | Status: SHIPPED | OUTPATIENT
Start: 2021-02-09

## 2021-02-25 DIAGNOSIS — E08.42 DIABETIC POLYNEUROPATHY ASSOCIATED WITH DIABETES MELLITUS DUE TO UNDERLYING CONDITION (HCC): ICD-10-CM

## 2021-02-25 RX ORDER — GABAPENTIN 800 MG/1
TABLET ORAL
Qty: 60 TABLET | Refills: 0 | Status: SHIPPED | OUTPATIENT
Start: 2021-02-25 | End: 2021-03-29

## 2021-03-19 ENCOUNTER — OFFICE VISIT (OUTPATIENT)
Dept: FAMILY MEDICINE CLINIC | Age: 56
End: 2021-03-19
Payer: COMMERCIAL

## 2021-03-19 VITALS
DIASTOLIC BLOOD PRESSURE: 70 MMHG | WEIGHT: 281 LBS | HEIGHT: 73 IN | SYSTOLIC BLOOD PRESSURE: 118 MMHG | OXYGEN SATURATION: 98 % | BODY MASS INDEX: 37.24 KG/M2 | HEART RATE: 105 BPM

## 2021-03-19 DIAGNOSIS — C61 PROSTATE CANCER (HCC): ICD-10-CM

## 2021-03-19 DIAGNOSIS — E11.21 CONTROLLED TYPE 2 DIABETES MELLITUS WITH DIABETIC NEPHROPATHY, WITH LONG-TERM CURRENT USE OF INSULIN (HCC): Primary | ICD-10-CM

## 2021-03-19 DIAGNOSIS — G89.29 CHRONIC MIDLINE LOW BACK PAIN WITH RIGHT-SIDED SCIATICA: ICD-10-CM

## 2021-03-19 DIAGNOSIS — I10 ESSENTIAL HYPERTENSION: ICD-10-CM

## 2021-03-19 DIAGNOSIS — E78.2 MIXED HYPERLIPIDEMIA: ICD-10-CM

## 2021-03-19 DIAGNOSIS — Z86.711: ICD-10-CM

## 2021-03-19 DIAGNOSIS — I50.22 CHRONIC SYSTOLIC HEART FAILURE (HCC): ICD-10-CM

## 2021-03-19 DIAGNOSIS — Z79.4 CONTROLLED TYPE 2 DIABETES MELLITUS WITH DIABETIC NEPHROPATHY, WITH LONG-TERM CURRENT USE OF INSULIN (HCC): Primary | ICD-10-CM

## 2021-03-19 DIAGNOSIS — I25.10 CORONARY ARTERY DISEASE INVOLVING NATIVE CORONARY ARTERY OF NATIVE HEART WITHOUT ANGINA PECTORIS: ICD-10-CM

## 2021-03-19 DIAGNOSIS — M54.41 CHRONIC MIDLINE LOW BACK PAIN WITH RIGHT-SIDED SCIATICA: ICD-10-CM

## 2021-03-19 LAB — HBA1C MFR BLD: 8.7 %

## 2021-03-19 PROCEDURE — 3052F HG A1C>EQUAL 8.0%<EQUAL 9.0%: CPT | Performed by: FAMILY MEDICINE

## 2021-03-19 PROCEDURE — 99214 OFFICE O/P EST MOD 30 MIN: CPT | Performed by: FAMILY MEDICINE

## 2021-03-19 PROCEDURE — 83036 HEMOGLOBIN GLYCOSYLATED A1C: CPT | Performed by: FAMILY MEDICINE

## 2021-03-19 SDOH — ECONOMIC STABILITY: FOOD INSECURITY: WITHIN THE PAST 12 MONTHS, YOU WORRIED THAT YOUR FOOD WOULD RUN OUT BEFORE YOU GOT MONEY TO BUY MORE.: NEVER TRUE

## 2021-03-19 SDOH — ECONOMIC STABILITY: TRANSPORTATION INSECURITY
IN THE PAST 12 MONTHS, HAS LACK OF TRANSPORTATION KEPT YOU FROM MEETINGS, WORK, OR FROM GETTING THINGS NEEDED FOR DAILY LIVING?: NO

## 2021-03-19 SDOH — ECONOMIC STABILITY: TRANSPORTATION INSECURITY
IN THE PAST 12 MONTHS, HAS THE LACK OF TRANSPORTATION KEPT YOU FROM MEDICAL APPOINTMENTS OR FROM GETTING MEDICATIONS?: NO

## 2021-03-19 ASSESSMENT — ENCOUNTER SYMPTOMS
SHORTNESS OF BREATH: 0
EYES NEGATIVE: 1
ALLERGIC/IMMUNOLOGIC NEGATIVE: 1
CONSTIPATION: 0
ABDOMINAL PAIN: 0
COUGH: 0
BLOOD IN STOOL: 0
BACK PAIN: 1
DIARRHEA: 0

## 2021-03-19 ASSESSMENT — PATIENT HEALTH QUESTIONNAIRE - PHQ9
SUM OF ALL RESPONSES TO PHQ9 QUESTIONS 1 & 2: 0
SUM OF ALL RESPONSES TO PHQ QUESTIONS 1-9: 0

## 2021-03-19 NOTE — PROGRESS NOTES
PAST MEDICAL HISTORY    Past Medical History:   Diagnosis Date    Anxiety     no treatment    Arthritis     left shoulder    Cardiomegaly     CH[facility]congest[facility] failure[facility] ([facility]) EF 20%    Chronic back pain     GERD (gastroesophageal reflux disease)     History of lung thrombosis     Hx of blood clots     Hyperkalemia     Hyperlipidemia     Hypertension     Irregular heart beat     Neuropathy     feet    Obesity     WILLA (obstructive sleep apnea)     not using the machine    Pacemaker     AICD    PE (pulmonary embolism) 2001    \"about 5 yrs ago\"    Prostate cancer ([facility] [address] 2016    Spinal stenosis at L4-L5 level 2019    Traumatic closed fx of eight or more ribs with minimal displacement 06/2017    pneumothorax. Pt fell 50 feet from a balcony. Pt denies history of a head injury.     Type 2 diabetes mellitus without complication ([facility] [address]        FAMILY HISTORY    Family History   Problem Relation Age of Onset    Diabetes Father     Hypertension Father     Heart Disease Father     Hypertension Mother     Prostate Cancer Brother     Diabetes Brother     Diabetes Brother     Diabetes Brother        SOCIAL HISTORY    Social History     Socioeconomic History    Marital status:      Spouse name: None    Number of children: 2    Years of education: None    Highest education level: None   Occupational History    Occupation: disability   Social Needs    Financial resource strain: Not hard at all   Mikayla-Aparna insecurity     Worry: Never true     Inability: Never true    Transportation needs     Medical: No     Non-medical: No   Tobacco Use    Smoking status: Former Smoker     Packs/day: 0.50     Years: 20.00     Pack years: 10.00     Quit date: 6/12/2017     Years since quitting: 3.7    Smokeless tobacco: Never Used   Substance and Sexual Activity    Alcohol use: No    Drug use: No    Sexual activity: Yes     Partners: Female   Lifestyle    Physical activity     Days per week: None Minutes per session: None    Stress: None   Relationships    Social connections     Talks on phone: None     Gets together: None     Attends Orthodoxy service: None     Active member of club or organization: None     Attends meetings of clubs or organizations: None     Relationship status: None    Intimate partner violence     Fear of current or ex partner: None     Emotionally abused: None     Physically abused: None     Forced sexual activity: None   Other Topics Concern    None   Social History Narrative    , grandchildren from  daughter live with him and his wife. SURGICAL HISTORY    Past Surgical History:   Procedure Laterality Date    CHEST TUBE INSERTION  2017    COLONOSCOPY      EYE SURGERY Right     injury    FINGER TRIGGER RELEASE      left ring and thumb    HERNIA REPAIR      LUMBAR FUSION  2020    L5-S1    LUMBAR SPINE SURGERY Right 2020    RIGHT L5 -S1 TLIF MINIMALLY INVASIVE- 2CARMS,  NUVASIVE, NO CELLSAVER performed by Pamela Aguilar MD at [address] [place] [address]      ICD;  battery change ;  Dr. Nell Ley  DEFIB NOT SAFE MRI    PROSTATE BIOPSY      PROSTATECTOMY  2016    carmen lymph node dissection    VENA CAVA FILTER PLACEMENT         CURRENT MEDICATIONS    Current Outpatient Medications   Medication Sig Dispense Refill    gabapentin (NEURONTIN) 800 MG tablet take 1 tablet by mouth twice a day 60 tablet 0    isosorbide mononitrate (IMDUR) 30 MG extended release tablet take 1 tablet by mouth once daily 120 tablet 3    insulin aspart protamine-insulin aspart (NOVOLOG MIX 70/30) (70-30) 100 UNIT/ML injection Inject 60 Units into the skin 2 times daily (with meals) 30 mL 3    RA VITAMIN D-3 50 MCG ( UT) CAPS Take 1 capsule by mouth daily      warfarin (COUMADIN) 7.5 MG tablet Take 7.5 mg by mouth daily      colesevelam (WELCHOL) 625 MG tablet Take 3 tablets by mouth 2 times daily      aspirin 325 MG EC tablet Take 1 tablet by mouth every 6 hours as needed for Pain 30 tablet 3    JANUVIA 100 MG tablet take 1 tablet by mouth once daily 30 tablet 5    Insulin Pen Needle 31G X 8 MM MISC 1 each by Does not apply route 2 times daily 100 each 0    DULoxetine (CYMBALTA) 60 MG extended release capsule Take 1 capsule by mouth daily 30 capsule 5    ibuprofen (ADVIL;MOTRIN) 800 MG tablet Take 1 tablet by mouth every 8 hours as needed for Pain 20 tablet 0    ciclopirox (LOPROX) 0.77 % cream Apply topically 2 times daily To feet 90 g 2    ONETOUCH ULTRA strip TEST three times a day if needed 100 strip 2    blood glucose test strips (ASCENSIA AUTODISC VI;ONE TOUCH ULTRA TEST VI) strip 1 each by In Vitro route 3 times daily As needed. 100 each 0    carvedilol (COREG) 6.25 MG tablet take 1 tablet by mouth twice a day 60 tablet 3    furosemide (LASIX) 20 MG tablet take 1 tablet by mouth once daily 30 tablet 3    Insulin Syringe-Needle U-100 (B-D INS SYR ULTRAFINE 1CC/31G) 31G X 5/16\" 1 ML MISC use twice a day 100 each 3    pravastatin (PRAVACHOL) 40 MG tablet Take 1 tablet by mouth nightly 90 tablet 0    omeprazole (PRILOSEC) 20 MG delayed release capsule take 1 capsule by mouth once daily 30 MINUTES before meals 120 capsule 2    docusate sodium (COLACE, DULCOLAX) 100 MG CAPS Take 100 mg by mouth 2 times daily 60 capsule 0    tiZANidine (ZANAFLEX) 4 MG tablet Take 1 tablet by mouth every 8 hours as needed (muscle spasm) 50 tablet 0    tamsulosin (FLOMAX) 0.4 MG capsule Take 0.4 mg by mouth daily      lisinopril (PRINIVIL;ZESTRIL) 2.5 MG tablet Take 2.5 mg by mouth daily      nicotine (NICODERM CQ) 14 MG/24HR Place 1 patch onto the skin every 24 hours for 14 days 14 patch 3     No current facility-administered medications for this visit.         ALLERGIES    Allergies   Allergen Reactions    Adhesive Tape Other (See Comments)     Blister,skin tears with silk tape PHYSICAL EXAM   Physical Exam  Vitals signs reviewed. Constitutional:       Appearance: He is well-developed. He is obese. HENT:      Head: Normocephalic. Eyes:      Conjunctiva/sclera: Conjunctivae normal.      Pupils: Pupils are equal, round, and reactive to light. Neck:      Musculoskeletal: Normal range of motion and neck supple. Thyroid: No thyromegaly. Cardiovascular:      Rate and Rhythm: Normal rate and regular rhythm. Heart sounds: Normal heart sounds. No murmur. Comments: Venous stasis discoloration lower legs  Pulmonary:      Effort: Pulmonary effort is normal.      Breath sounds: Normal breath sounds. No wheezing or rales. Abdominal:      Palpations: Abdomen is soft. Tenderness: There is no abdominal tenderness. There is no guarding or rebound. Musculoskeletal: Normal range of motion. General: No tenderness or deformity. Right lower le+ Pitting Edema present. Left lower le+ Pitting Edema present. Lymphadenopathy:      Cervical: No cervical adenopathy. Skin:     General: Skin is warm and dry. Neurological:      Mental Status: He is alert and oriented to person, place, and time. Psychiatric:         Mood and Affect: Mood normal.         Behavior: Behavior normal.         Thought Content: Thought content normal.         Judgment: Judgment normal.         ASSESSMENT/PLAN  1. Controlled type 2 diabetes mellitus with diabetic nephropathy, with long-term current use of insulin (HCC)  Chronic, improved somewhat. Refer to endocrinologist for further treatment options. - POCT glycosylated hemoglobin (Hb A1C)  - [facility] - Alberto Lance MD, Ophthalmology, Ty Rangel MD, Endocrinology, [place], [place], [address] [place] [address]  Results for orders placed or performed in visit on 21   POCT glycosylated hemoglobin (Hb A1C)   Result Value Ref Range    Hemoglobin A1C 8.7 %       2. Essential hypertension  Chronic, stable. 3. Mixed hyperlipidemia  Chronic, stable, continue current medication and/or plan  Labs up to date    4. Coronary artery disease involving native coronary artery of native heart without angina pectoris  Stable, follow with cardiologist    5. History of lung thrombosis  Cont coumadin, see vascular    6. Prostate cancer [facility])  Cont seeing urologist.     7. Chronic midline low back pain with right-sided sciatica  Cont to stay active, prn tylenol. 8. Chronic systolic heart failure (HCC)  Stable, cont meds and cardio         Most Recent Value  3/21/2019 - 3/20/2021   Hemoglobin A1C 9.1  11/19/2020       Jennifer Ham received counseling on the following healthy behaviors: nutrition, exercise and medication adherence  Reviewed prior labs and health maintenance. Continue current medications, diet and exercise. Discussed use, benefit, and side effects of prescribed medications. Barriers to medication compliance addressed. Patient given educational materials - see patient instructions. All patient questions answered. Patient voiced understanding. Return in about 6 months (around 9/19/2021) for diabetes, htn.         Electronically signed by Basilio Garcia MD on 3/19/21 at 10:52 AM EDT

## 2021-03-29 DIAGNOSIS — E08.42 DIABETIC POLYNEUROPATHY ASSOCIATED WITH DIABETES MELLITUS DUE TO UNDERLYING CONDITION (HCC): ICD-10-CM

## 2021-03-29 RX ORDER — GABAPENTIN 800 MG/1
TABLET ORAL
Qty: 60 TABLET | Refills: 0 | Status: SHIPPED | OUTPATIENT
Start: 2021-03-29 | End: 2021-05-10 | Stop reason: SDUPTHER

## 2021-03-29 NOTE — TELEPHONE ENCOUNTER
Pharm requested refill    Health Maintenance   Topic Date Due    COVID-19 Vaccine (1) Never done    Hepatitis B vaccine (1 of 3 - Risk 3-dose series) Never done    Shingles Vaccine (1 of 2) Never done    PSA counseling  09/19/2017    Diabetic retinal exam  06/01/2018    Annual Wellness Visit (AWV)  Never done    Diabetic foot exam  03/07/2020    Lipid screen  11/19/2021    Potassium monitoring  11/19/2021    Creatinine monitoring  11/19/2021    A1C test (Diabetic or Prediabetic)  03/19/2022    Colon cancer screen colonoscopy  09/09/2024    DTaP/Tdap/Td vaccine (2 - Td) 01/01/2025    Flu vaccine  Completed    Pneumococcal 0-64 years Vaccine  Completed    Hepatitis C screen  Completed    HIV screen  Completed    Hepatitis A vaccine  Aged Out    Hib vaccine  Aged Out    Meningococcal (ACWY) vaccine  Aged Out             (applicable per patient's age: Cancer Screenings, Depression Screening, Fall Risk Screening, Immunizations)    Hemoglobin A1C (%)   Date Value   03/19/2021 8.7   11/19/2020 9.1   07/14/2020 8.6     LDL Cholesterol (mg/dL)   Date Value   11/19/2020 104     LDL Calculated (mg/dL)   Date Value   12/05/2018 61     AST (U/L)   Date Value   11/19/2020 27     ALT (U/L)   Date Value   11/19/2020 23     BUN (mg/dL)   Date Value   11/19/2020 10      (goal A1C is < 7)   (goal LDL is <100) need 30-50% reduction from baseline     BP Readings from Last 3 Encounters:   03/19/21 118/70   11/19/20 110/78   09/10/20 110/70    (goal /80)      All Future Testing planned in [facility]:  Lab Frequency Next Occurrence       Next Visit Date:  Future Appointments   Date Time Provider Elida Rice   6/24/2021 10:30 AM MD noemi [facility]   9/20/2021 11:30 AM MD noemi Almonte [facility]            Patient Active Problem List:     Prostate cancer ([facility]     Controlled type 2 diabetes mellitus with diabetic nephropathy, with long-term current use of insulin ([facility] [facility]     Mixed hyperlipidemia     Obesity (BMI 30.0-34. 9)     Coronary artery disease involving native coronary artery of native heart without angina pectoris     Pacemaker     Systolic heart failure (HCC)     Chronic back pain     History of lung thrombosis     Stage 3 chronic kidney disease     Spinal stenosis at L4-L5 level     Chronic right-sided low back pain with right-sided sciatica     Erectile dysfunction following radical prostatectomy     Type 2 diabetes mellitus with diabetic polyneuropathy (HCC)     HTN (hypertension)     Spondylolisthesis, acquired     Foraminal stenosis of lumbar region     Degeneration of intervertebral disc     Nocturia     Tinea pedis     Morbidly obese ([address]

## 2021-04-14 RX ORDER — OMEPRAZOLE 20 MG/1
CAPSULE, DELAYED RELEASE ORAL
Qty: 120 CAPSULE | Refills: 2 | Status: SHIPPED | OUTPATIENT
Start: 2021-04-14 | End: 2022-04-04

## 2021-05-05 DIAGNOSIS — Z79.4 CONTROLLED TYPE 2 DIABETES MELLITUS WITH DIABETIC NEPHROPATHY, WITH LONG-TERM CURRENT USE OF INSULIN (HCC): ICD-10-CM

## 2021-05-05 DIAGNOSIS — E11.21 CONTROLLED TYPE 2 DIABETES MELLITUS WITH DIABETIC NEPHROPATHY, WITH LONG-TERM CURRENT USE OF INSULIN (HCC): ICD-10-CM

## 2021-05-05 RX ORDER — SITAGLIPTIN 100 MG/1
TABLET, FILM COATED ORAL
Qty: 90 TABLET | Refills: 3 | Status: SHIPPED | OUTPATIENT
Start: 2021-05-05 | End: 2021-05-14

## 2021-05-05 RX ORDER — INSULIN ASPART 100 [IU]/ML
INJECTION, SUSPENSION SUBCUTANEOUS
Qty: 30 ML | Refills: 3 | Status: SHIPPED | OUTPATIENT
Start: 2021-05-05 | End: 2021-08-18

## 2021-05-10 DIAGNOSIS — E08.42 DIABETIC POLYNEUROPATHY ASSOCIATED WITH DIABETES MELLITUS DUE TO UNDERLYING CONDITION (HCC): ICD-10-CM

## 2021-05-10 RX ORDER — GABAPENTIN 800 MG/1
TABLET ORAL
Qty: 60 TABLET | Refills: 0 | Status: SHIPPED | OUTPATIENT
Start: 2021-05-10 | End: 2021-06-07

## 2021-05-14 RX ORDER — SITAGLIPTIN 100 MG/1
TABLET, FILM COATED ORAL
Qty: 90 TABLET | Refills: 3 | Status: SHIPPED | OUTPATIENT
Start: 2021-05-14 | End: 2022-05-02

## 2021-06-07 DIAGNOSIS — E08.42 DIABETIC POLYNEUROPATHY ASSOCIATED WITH DIABETES MELLITUS DUE TO UNDERLYING CONDITION (HCC): ICD-10-CM

## 2021-06-07 RX ORDER — GABAPENTIN 800 MG/1
TABLET ORAL
Qty: 60 TABLET | Refills: 0 | Status: SHIPPED | OUTPATIENT
Start: 2021-06-07 | End: 2021-06-16

## 2021-06-16 DIAGNOSIS — E08.42 DIABETIC POLYNEUROPATHY ASSOCIATED WITH DIABETES MELLITUS DUE TO UNDERLYING CONDITION (HCC): ICD-10-CM

## 2021-06-17 RX ORDER — GABAPENTIN 800 MG/1
TABLET ORAL
Qty: 60 TABLET | Refills: 0 | Status: SHIPPED | OUTPATIENT
Start: 2021-06-17 | End: 2021-08-10

## 2021-06-23 PROBLEM — K40.91 HERNIA, INGUINAL, RECURRENT: Status: ACTIVE | Noted: 2020-10-18

## 2021-06-23 PROBLEM — Z79.01 LONG TERM (CURRENT) USE OF ANTICOAGULANTS: Status: ACTIVE | Noted: 2020-12-07

## 2021-06-23 PROBLEM — I26.94 MULTIPLE SUBSEGMENTAL PULMONARY EMBOLI WITHOUT ACUTE COR PULMONALE (HCC): Status: ACTIVE | Noted: 2020-12-03

## 2021-06-24 ENCOUNTER — OFFICE VISIT (OUTPATIENT)
Dept: FAMILY MEDICINE CLINIC | Age: 56
End: 2021-06-24
Payer: COMMERCIAL

## 2021-06-24 VITALS
OXYGEN SATURATION: 95 % | HEART RATE: 105 BPM | SYSTOLIC BLOOD PRESSURE: 120 MMHG | DIASTOLIC BLOOD PRESSURE: 76 MMHG | TEMPERATURE: 97.2 F | RESPIRATION RATE: 16 BRPM | HEIGHT: 73 IN | WEIGHT: 276 LBS | BODY MASS INDEX: 36.58 KG/M2

## 2021-06-24 DIAGNOSIS — E55.9 VITAMIN D DEFICIENCY: ICD-10-CM

## 2021-06-24 DIAGNOSIS — E66.01 SEVERE OBESITY (BMI 35.0-35.9 WITH COMORBIDITY) (HCC): ICD-10-CM

## 2021-06-24 DIAGNOSIS — E78.2 MIXED HYPERLIPIDEMIA: ICD-10-CM

## 2021-06-24 DIAGNOSIS — Z79.4 CONTROLLED TYPE 2 DIABETES MELLITUS WITH DIABETIC NEPHROPATHY, WITH LONG-TERM CURRENT USE OF INSULIN (HCC): ICD-10-CM

## 2021-06-24 DIAGNOSIS — I26.94 MULTIPLE SUBSEGMENTAL PULMONARY EMBOLI WITHOUT ACUTE COR PULMONALE (HCC): ICD-10-CM

## 2021-06-24 DIAGNOSIS — I50.22 CHRONIC SYSTOLIC HEART FAILURE (HCC): ICD-10-CM

## 2021-06-24 DIAGNOSIS — E11.21 CONTROLLED TYPE 2 DIABETES MELLITUS WITH DIABETIC NEPHROPATHY, WITH LONG-TERM CURRENT USE OF INSULIN (HCC): ICD-10-CM

## 2021-06-24 DIAGNOSIS — L30.9 DERMATITIS: ICD-10-CM

## 2021-06-24 DIAGNOSIS — Z00.00 ROUTINE GENERAL MEDICAL EXAMINATION AT A HEALTH CARE FACILITY: Primary | ICD-10-CM

## 2021-06-24 DIAGNOSIS — I10 ESSENTIAL HYPERTENSION: ICD-10-CM

## 2021-06-24 DIAGNOSIS — F17.210 CIGARETTE NICOTINE DEPENDENCE WITHOUT COMPLICATION: ICD-10-CM

## 2021-06-24 PROCEDURE — G0438 PPPS, INITIAL VISIT: HCPCS | Performed by: FAMILY MEDICINE

## 2021-06-24 PROCEDURE — 3052F HG A1C>EQUAL 8.0%<EQUAL 9.0%: CPT | Performed by: FAMILY MEDICINE

## 2021-06-24 RX ORDER — LISINOPRIL 5 MG/1
TABLET ORAL
COMMUNITY
Start: 2021-04-15

## 2021-06-24 RX ORDER — CARVEDILOL 12.5 MG/1
TABLET ORAL
COMMUNITY
Start: 2021-05-13

## 2021-06-24 RX ORDER — NICOTINE 21 MG/24HR
1 PATCH, TRANSDERMAL 24 HOURS TRANSDERMAL EVERY 24 HOURS
Qty: 14 PATCH | Refills: 0 | Status: ON HOLD | OUTPATIENT
Start: 2021-06-24 | End: 2021-08-26 | Stop reason: HOSPADM

## 2021-06-24 RX ORDER — COLESEVELAM 180 1/1
1875 TABLET ORAL 2 TIMES DAILY
Qty: 180 TABLET | Refills: 5 | Status: SHIPPED | OUTPATIENT
Start: 2021-06-24

## 2021-06-24 RX ORDER — NICOTINE 21 MG/24HR
1 PATCH, TRANSDERMAL 24 HOURS TRANSDERMAL EVERY 24 HOURS
Qty: 14 PATCH | Refills: 3 | Status: ON HOLD | OUTPATIENT
Start: 2021-06-24 | End: 2021-08-26 | Stop reason: HOSPADM

## 2021-06-24 RX ORDER — GLUCOSAMINE/CHONDR SU A SOD 750-600 MG
1 TABLET ORAL DAILY
Qty: 30 CAPSULE | Refills: 5 | Status: SHIPPED | OUTPATIENT
Start: 2021-06-24 | End: 2022-01-12

## 2021-06-24 ASSESSMENT — PATIENT HEALTH QUESTIONNAIRE - PHQ9
1. LITTLE INTEREST OR PLEASURE IN DOING THINGS: 0
SUM OF ALL RESPONSES TO PHQ QUESTIONS 1-9: 0
SUM OF ALL RESPONSES TO PHQ QUESTIONS 1-9: 0
2. FEELING DOWN, DEPRESSED OR HOPELESS: 0
SUM OF ALL RESPONSES TO PHQ9 QUESTIONS 1 & 2: 0
SUM OF ALL RESPONSES TO PHQ QUESTIONS 1-9: 0

## 2021-06-24 ASSESSMENT — LIFESTYLE VARIABLES: HOW OFTEN DO YOU HAVE A DRINK CONTAINING ALCOHOL: 0

## 2021-06-24 NOTE — PATIENT INSTRUCTIONS
Personalized Preventive Plan for Johnnie Cardona - 6/24/2021  Medicare offers a range of preventive health benefits. Some of the tests and screenings are paid in full while other may be subject to a deductible, co-insurance, and/or copay. Some of these benefits include a comprehensive review of your medical history including lifestyle, illnesses that may run in your family, and various assessments and screenings as appropriate. After reviewing your medical record and screening and assessments performed today your provider may have ordered immunizations, labs, imaging, and/or referrals for you. A list of these orders (if applicable) as well as your Preventive Care list are included within your After Visit Summary for your review. Other Preventive Recommendations:    · A preventive eye exam performed by an eye specialist is recommended every 1-2 years to screen for glaucoma; cataracts, macular degeneration, and other eye disorders. · A preventive dental visit is recommended every 6 months. · Try to get at least 150 minutes of exercise per week or 10,000 steps per day on a pedometer . · Order or download the FREE \"Exercise & Physical Activity: Your Everyday Guide\" from [employer]. Call 3-351.747.1511 or search [employer] online. · You need 4823-9946 mg of calcium and 5869-5205 IU of vitamin D per day. It is possible to meet your calcium requirement with diet alone, but a vitamin D supplement is usually necessary to meet this goal.  · When exposed to the sun, use a sunscreen that protects against both UVA and UVB radiation with an SPF of 30 or greater. Reapply every 2 to 3 hours or after sweating, drying off with a towel, or swimming. · Always wear a seat belt when traveling in a car. Always wear a helmet when riding a bicycle or motorcycle.

## 2021-06-24 NOTE — PROGRESS NOTES
Medicare Annual Wellness Visit  Name: Luis Fountain Date: 2021   MRN: [id number] Sex: Male   Age: 63 y.o. Ethnicity: Non-/Non    : 1965 Race: Obinna Aguilera is here for Medicare AWV    Screenings for behavioral, psychosocial and functional/safety risks, and cognitive dysfunction are all negative except as indicated below. These results, as well as other patient data from the [address] form, are documented in Flowsheets linked to this Encounter. Allergies   Allergen Reactions    Adhesive Tape Other (See Comments)     Blister,skin tears with silk tape         Prior to Visit Medications    Medication Sig Taking?  Authorizing Provider   carvedilol (COREG) 12.5 MG tablet take 1 tablet by mouth twice a day Yes Historical Provider, MD   lisinopril (PRINIVIL;ZESTRIL) 5 MG tablet take 1 tablet by mouth once daily Yes Historical Provider, MD   ciclopirox (LOPROX) 0.77 % cream Apply topically 2 times daily To feet Yes Hilaria Gupta MD   colesevelam (WELCHOL) 625 MG tablet Take 3 tablets by mouth 2 times daily Yes Hilaria Gupta MD   RA VITAMIN D-3 50 MCG ( UT) CAPS Take 1 capsule by mouth daily Yes Hilaria Gupta MD   nicotine (NICODERM CQ) 14 MG/24HR Place 1 patch onto the skin every 24 hours for 14 days Yes Hilaria Gupta MD   nicotine (NICODERM CQ) 21 MG/24HR Place 1 patch onto the skin every 24 hours for 14 days Yes Hilaria Gupta MD   nicotine (NICODERM CQ) 7 MG/24HR Place 1 patch onto the skin every 24 hours Yes Hilaria Gupta MD   gabapentin (NEURONTIN) 800 MG tablet take 1 tablet by mouth twice a day Yes Hilaria Gupta MD   JANUVIA 100 MG tablet take 1 tablet by mouth once daily Yes Hilaria Gupta MD   NOVOLOG MIX 70/30 (70-30) 100 UNIT/ML injection inject 60 units subcutaneously twice a day with meals Yes Hilaria Gupta MD   omeprazole (PRILOSEC) 20 MG delayed release capsule take 1 capsule by mouth once daily 30 MINUTES PRIOR TO FOOD Yes Tonny Frankel, MD   isosorbide mononitrate (IMDUR) 30 MG extended release tablet take 1 tablet by mouth once daily Yes Tonny Frankel, MD   warfarin (COUMADIN) 7.5 MG tablet Take 7.5 mg by mouth daily Yes Historical Provider, MD   aspirin 325 MG EC tablet Take 1 tablet by mouth every 6 hours as needed for Pain Yes Tonny Frankel, MD   Insulin Pen Needle 31G X 8 MM MISC 1 each by Does not apply route 2 times daily Yes Tonny Frankel, MD   DULoxetine (CYMBALTA) 60 MG extended release capsule Take 1 capsule by mouth daily Yes Tonny Frankel, MD   furosemide (LASIX) 20 MG tablet take 1 tablet by mouth once daily Yes Tonny Frankel, MD   Insulin Syringe-Needle U-100 (B-D INS SYR ULTRAFINE 1CC/31G) 31G X 5/16\" 1 ML MISC use twice a day Yes Tonny Frankel, MD   pravastatin (PRAVACHOL) 40 MG tablet Take 1 tablet by mouth nightly Yes Tonny Frankel, MD   docusate sodium (COLACE, DULCOLAX) 100 MG CAPS Take 100 mg by mouth 2 times daily Yes Edgardo Sheets MD   tamsulosin (FLOMAX) 0.4 MG capsule Take 0.4 mg by mouth daily Yes Historical Provider, MD   [facility] ULTRA strip TEST three times a day if needed  Patient not taking: Reported on 6/24/2021  Tonny Frankel, MD   blood glucose test strips (ASCENSIA AUTODISC VI;ONE TOUCH ULTRA TEST VI) strip 1 each by In Vitro route 3 times daily As needed.   Patient not taking: Reported on 6/24/2021  Tonny Frankel, MD         Past Medical History:   Diagnosis Date    Anxiety     no treatment    Arthritis     left shoulder    Cardiomegaly     CHF (congestive heart failure) (HCC) EF 20%    Chronic back pain     GERD (gastroesophageal reflux disease)     History of lung thrombosis     Hx of blood clots     Hyperkalemia     Hyperlipidemia     Hypertension     Irregular heart beat     Neuropathy     feet    Obesity     WILLA (obstructive sleep apnea)     not using the machine    Pacemaker     AICD    PE (pulmonary embolism) 2001    \"about 5 yrs ago\"    Prostate cancer ([facility] [address] emotional support that you need?: Yes  Do you have a Living Will?: (!) No  Advance Directives     Power of [facility] Will ACP-Advance Directive ACP-Power of     Not on File Not on File Not on File Not on File      General Health Risk Interventions:  · No Living Will: Advance Care Planning addressed with patient today    Health Habits/Nutrition:  Health Habits/Nutrition  Do you exercise for at least 20 minutes 2-3 times per week?: Yes  Have you lost any weight without trying in the past 3 months?: No  Do you eat only one meal per day?: No  Have you seen the dentist within the past year?: (!) No  Body mass index: (!) 36.41  Health Habits/Nutrition Interventions:  · Inadequate physical activity:  patient agrees to exercise for at least 150 minutes/week  · Dental exam overdue:  patient encouraged to make appointment with his/her dentist    Hearing/Vision:  No exam data present  Hearing/Vision  Do you or your family notice any trouble with your hearing that hasn't been managed with hearing aids?: No  Do you have difficulty driving, watching TV, or doing any of your daily activities because of your eyesight?: (!) Yes  Have you had an eye exam within the past year?: (!) No  Hearing/Vision Interventions:  · Vision concerns:  patient encouraged to make appointment with his/her eye specialist    Safety:  Safety  Do you have working smoke detectors?: Yes  Have all throw rugs been removed or fastened?: Yes  Do you have non-slip mats or surfaces in all bathtubs/showers?: (!) No  Do all of your stairways have a railing or banister?: Yes  Are your doorways, halls and stairs free of clutter?: Yes  Do you always fasten your seatbelt when you are in a car?: Yes  Safety Interventions:  · Home safety tips provided     Personalized Preventive Plan   Current Health Maintenance Status  Immunization History   Administered Date(s) Administered    Influenza Virus Vaccine 11/01/2017    Influenza, Quadv, IM, PF (6 mo and older Fluzone, Flulaval, Fluarix, and 3 yrs and older Afluria) 09/25/2019, 09/10/2020    Pneumococcal Conjugate 13-valent ([id number]) 03/28/2017    Pneumococcal Polysaccharide ([id number]) 10/05/2012, 10/18/2018    Tdap (Boostrix, Adacel) 01/01/2015        Health Maintenance   Topic Date Due    Hepatitis B vaccine (1 of 3 - Risk 3-dose series) Never done    Shingles Vaccine (1 of 2) Never done    PSA counseling  09/19/2017    Diabetic retinal exam  06/01/2018    Annual Wellness Visit (AWV)  Never done    Diabetic foot exam  03/07/2020    Lipid screen  11/19/2021    Potassium monitoring  11/19/2021    Creatinine monitoring  11/19/2021    A1C test (Diabetic or Prediabetic)  03/19/2022    Colon cancer screen colonoscopy  09/09/2024    DTaP/Tdap/Td vaccine (2 - Td or Tdap) 01/01/2025    Pneumococcal 0-64 years Vaccine (2 of 2) 07/22/2029    Flu vaccine  Completed    COVID-19 Vaccine  Completed    Hepatitis C screen  Completed    HIV screen  Completed    Hepatitis A vaccine  Aged Out    Hib vaccine  Aged Out    Meningococcal (ACWY) vaccine  Aged Out     Recommendations for [employer] Due: see orders and patient instructions/AVS.  . Recommended screening schedule for the next 5-10 years is provided to the patient in written form: see Patient Instructions/AVS.    Pricilla Vang was seen today for medicare awv. Diagnoses and all orders for this visit:    Routine general medical examination at a health care facility    Dermatitis  -     ciclopirox (LOPROX) 0.77 % cream; Apply topically 2 times daily To feet    Severe obesity (BMI 35.0-35.9 with comorbidity) ([facility])    Vitamin D deficiency  -     RA VITAMIN D-3 50 MCG (2000 UT) CAPS; Take 1 capsule by mouth daily    Mixed hyperlipidemia  -     colesevelam (WELCHOL) 625 MG tablet;  Take 3 tablets by mouth 2 times daily    Essential hypertension    Controlled type 2 diabetes mellitus with diabetic nephropathy, with long-term current use of insulin [facility])    Multiple subsegmental pulmonary emboli without acute cor pulmonale (HCC)    Cigarette nicotine dependence without complication  -     nicotine (NICODERM CQ) 14 MG/24HR; Place 1 patch onto the skin every 24 hours for 14 days  -     nicotine (NICODERM CQ) 21 MG/24HR; Place 1 patch onto the skin every 24 hours for 14 days  -     nicotine (NICODERM CQ) 7 MG/24HR; Place 1 patch onto the skin every 24 hours    Chronic systolic heart failure (HCC)             Cont meds, get back on nicoderm patches.  Return in 3 months for diabetic check

## 2021-06-24 NOTE — PROGRESS NOTES
Depression screening done  Chief Complaint   Patient presents with   [facility]

## 2021-06-30 ENCOUNTER — HOSPITAL ENCOUNTER (OUTPATIENT)
Dept: PREADMISSION TESTING | Age: 56
Discharge: HOME | End: 2021-07-04
Payer: COMMERCIAL

## 2021-06-30 ENCOUNTER — HOSPITAL ENCOUNTER (OUTPATIENT)
Age: 56
Discharge: HOME | End: 2021-06-30
Payer: COMMERCIAL

## 2021-06-30 VITALS
DIASTOLIC BLOOD PRESSURE: 82 MMHG | HEART RATE: 107 BPM | WEIGHT: 277 LBS | RESPIRATION RATE: 16 BRPM | OXYGEN SATURATION: 98 % | SYSTOLIC BLOOD PRESSURE: 134 MMHG | TEMPERATURE: 96.9 F | HEIGHT: 73 IN | BODY MASS INDEX: 36.71 KG/M2

## 2021-06-30 LAB
ANION GAP SERPL CALCULATED.3IONS-SCNC: 11 MMOL/L (ref 9–17)
BUN BLDV-MCNC: 17 MG/DL (ref 6–20)
BUN/CREAT BLD: 14 (ref 9–20)
CALCIUM SERPL-MCNC: 9.5 MG/DL (ref 8.6–10.4)
CHLORIDE BLD-SCNC: 100 MMOL/L (ref 98–107)
CO2: 25 MMOL/L (ref 20–31)
CREAT SERPL-MCNC: 1.22 MG/DL (ref 0.7–1.2)
GFR AFRICAN AMERICAN: >60 ML/MIN
GFR NON-AFRICAN AMERICAN: >60 ML/MIN
GFR SERPL CREATININE-BSD FRML MDRD: ABNORMAL ML/MIN/{1.73_M2}
GFR SERPL CREATININE-BSD FRML MDRD: ABNORMAL ML/MIN/{1.73_M2}
GLUCOSE BLD-MCNC: 172 MG/DL (ref 70–99)
HCT VFR BLD CALC: 44.7 % (ref 40.7–50.3)
HEMOGLOBIN: 14.1 G/DL (ref 13–17)
MCH RBC QN AUTO: 25.6 PG (ref 25.2–33.5)
MCHC RBC AUTO-ENTMCNC: 31.5 G/DL (ref 28.4–34.8)
MCV RBC AUTO: 81.1 FL (ref 82.6–102.9)
NRBC AUTOMATED: 0 PER 100 WBC
PDW BLD-RTO: 16.2 % (ref 11.8–14.4)
PLATELET # BLD: 209 K/UL (ref 138–453)
PMV BLD AUTO: 9.3 FL (ref 8.1–13.5)
POTASSIUM SERPL-SCNC: 4.7 MMOL/L (ref 3.7–5.3)
RBC # BLD: 5.51 M/UL (ref 4.21–5.77)
SODIUM BLD-SCNC: 136 MMOL/L (ref 135–144)
WBC # BLD: 8.7 K/UL (ref 3.5–11.3)

## 2021-06-30 PROCEDURE — 83036 HEMOGLOBIN GLYCOSYLATED A1C: CPT

## 2021-06-30 PROCEDURE — 80048 BASIC METABOLIC PNL TOTAL CA: CPT

## 2021-06-30 PROCEDURE — 93005 ELECTROCARDIOGRAM TRACING: CPT | Performed by: ANESTHESIOLOGY

## 2021-06-30 PROCEDURE — 85027 COMPLETE CBC AUTOMATED: CPT

## 2021-06-30 PROCEDURE — 36415 COLL VENOUS BLD VENIPUNCTURE: CPT

## 2021-06-30 NOTE — PRE-PROCEDURE INSTRUCTIONS
[facility] ON Wednesday, 7/14/2021 at 0700 AM    Once you enter the hospital lobby, take the elevators to the second floor. Check-In is at the surgery registration desk. Continue to take your home medications as you normally do up to and including the night before surgery with the exception of any blood thinning medications. Please stop any blood thinning medications as directed by your surgeon or prescribing physician. Failure to stop certain medications may interfere with your scheduled surgery. These may include:  Aspirin, Warfarin (Coumadin), Clopidogrel (Plavix), Ibuprofen (Motrin, Advil), Naproxen (Aleve), Meloxicam (Mobic), Celecoxib (Celebrex), Eliquis, Pradaxa, Xarelto, Effient, Fish Oil, Herbal supplements. Please ask Dr. Kyara Bower as to when to stop taking warfarin and aspirin    If you are diabetic, do not take any of your diabetic medications by mouth the morning of surgery. If you are taking insulin contact the doctor that manages your diabetes for instructions about any changes to your insulin dosages the day before surgery. Do not inject insulin or other injectable diabetic medications the morning of surgery unless otherwise instructed by the doctor who manages your diabetes. Please take the following medication(s) the day of surgery with a small sip of water:  Isosorbide mononitrate, Gabapentin, Duloxetine, Carvedilol, Omeprazole. Please use your inhalers at home the day of surgery. PREPARING FOR YOUR SURGERY:     Before surgery, you can play an important role in your own health. Because skin is not sterile, we need to be sure that your skin is as free of germs as possible before surgery by carefully washing before surgery. Preparing or prepping skin before surgery can reduce the risk of a surgical site infection.   Do not shave the area of your body where your surgery will be performed unless you received specific permission from your physician. You will need to shower at home the night before surgery and the morning of surgery with a special soap called chlorhexidine gluconate (CHG*). *Not to be used by people allergic to Chlorhexidine Gluconate (CHG). Following these instructions will help you be sure that your skin is clean before surgery. Instructions on cleaning your skin before surgery: The night before your surgery:      You will need to shower with warm water (not hot) and the CHG soap.  Use a clean wash cloth and a clean towel. Have clean clothes available to put on after the shower.    First wash your hair with regular shampoo. Rinse your hair and body thoroughly to remove the shampoo.  Wash your face and genital area (private parts) with your regular soap or water only. Thoroughly rinse your body with warm water from the neck down.  Turn water off to prevent rinsing the soap off too soon.  With a clean wet washcloth and half of the CHG soap in the bottle, lather your entire body from the neck down. Do not use CHG soap near your eyes or ears to avoid injury to those areas.  Wash thoroughly, paying special attention to the area where your surgery will be performed.  Wash your body gently for five (5) minutes. Avoid scrubbing your skin too hard.  Turn the water back on and rinse your body thoroughly.  Pat yourself dry with a clean, soft towel. Do not apply lotion, cream or powder.  Dress with clean freshly washed clothes. The morning of surgery:     Repeat shower following steps above - using remaining half of CHG soap in bottle. Patient Instructions:    [facility] If you are having any type of anesthesia you are to have nothing to eat or drink after midnight the night before your surgery.   This includes gum, hard candy, mints, water or smoking or chewing tobacco.  The only exception to this is a small sip of water to take with any morning dose of heart, blood pressure, or seizure medications. No alcoholic beverages for 24 hours prior to surgery.  Brush your teeth but do not swallow water.  Bring your eyeglasses and case with you. No contacts are to be worn the day of surgery. You also may bring your hearing aids. Most surgical procedures involving anesthesia will require that you remove your dentures prior to surgery.  If you are on C-PAP or Bi-PAP at home and plan on staying in the hospital overnight for your surgery please bring the machine with you. · Do not wear any jewelry or body piercings day of surgery. Also, NO lotion, perfume or deodorant to be used the day of surgery. No nail polish on the operative extremity (arm/leg surgeries)    · Do not bring any valuables such as jewelry, cash, or credit cards. If you are staying overnight with us, please bring a small bag of personal items.  Please wear loose, comfortable clothing. If you are potentially going to have a cast or brace bring clothing that will fit over them.  In case of illness - If you have cold or flu like symptoms (high fever, runny nose, sore throat, cough, etc.) rash, nausea, vomiting, loose stools, and/or recent contact with someone who has a contagious disease (chicken pox, measles, etc.) Please call your doctor before coming to the hospital.     If your child is having surgery please make arrangements for any other children to be cared for at home on the day of surgery. Other children are not permitted in recovery room and we want you to be able to spend time with the patient. If other arrangements are not available then we suggest that you have a second adult to stay in the waiting room. Day of Surgery/Procedure:    As a patient at [facility] you can expect quality medical and nursing care that is centered on your individual needs.   Our goal is to make your surgical experience as comfortable as possible    . Transportation After Your Surgery/Procedure: You will need a friend or family member to drive you home after your procedure. Your  must be 25years of age or older and able to sign off on your discharge instructions. A taxi cab or any other form of public transportation is not acceptable. Your friend or family member must stay at the hospital throughout your procedure. Someone must remain with you for the first 24 hours after your surgery if you receive anesthesia or medication. If you do not have someone to stay with you, your procedure may be cancelled.       If you have any other questions regarding your procedure or the day of surgery, please call [phone]      _________________________  ____________________________  Signature (Patient)              Signature (Provider) & date

## 2021-06-30 NOTE — H&P
\"about 5 yrs ago\"    Prostate cancer ([facility] [address] 2016    Spinal stenosis at L4-L5 level 2019    Traumatic closed fx of eight or more ribs with minimal displacement 06/2017    pneumothorax. Pt fell 50 feet from a balcony. Pt denies history of a head injury.  Type 2 diabetes mellitus without complication [facility])         Past Surgical History:     Past Surgical History:   Procedure Laterality Date    CHEST TUBE INSERTION  06/2017    COLONOSCOPY  2016    EYE SURGERY Right     injury    FINGER TRIGGER RELEASE  2020    left ring and thumb    HERNIA REPAIR  2020    LUMBAR FUSION  01/29/2020    L5-S1    LUMBAR SPINE SURGERY Right 1/29/2020    RIGHT L5 -S1 TLIF MINIMALLY INVASIVE- 2CARMS,  NUVASIVE, NO CELLSAVER performed by Oswaldo Juan MD at [facility]  2008    ICD;  battery change 2016;  Dr. Carina Persaud. DEFIB NOT SAFE MRI    PROSTATE BIOPSY      PROSTATECTOMY  08/31/2016    carmen lymph node dissection    VENA CAVA FILTER PLACEMENT  2000        Medications Prior to Admission:     Prior to Admission medications    Medication Sig Start Date End Date Taking?  Authorizing Provider   carvedilol (COREG) 12.5 MG tablet take 1 tablet by mouth twice a day 5/13/21   Historical Provider, MD   lisinopril (PRINIVIL;ZESTRIL) 5 MG tablet take 1 tablet by mouth once daily 4/15/21   Historical Provider, MD   ciclopirox (LOPROX) 0.77 % cream Apply topically 2 times daily To feet 6/24/21   Marta Patel MD   [facility]) 625 MG tablet Take 3 tablets by mouth 2 times daily 6/24/21   Marta Patel MD   RA VITAMIN D-3 50 MCG (2000 UT) CAPS Take 1 capsule by mouth daily 6/24/21   Marta Patel MD   nicotine (NICODERM CQ) 14 MG/24HR Place 1 patch onto the skin every 24 hours for 14 days 6/24/21 7/8/21  Marta Patel MD   nicotine (NICODERM CQ) 21 MG/24HR Place 1 patch onto the skin every 24 hours for 14 days 6/24/21 7/8/21  Marta Patel MD   nicotine (NICODERM CQ) 7 MG/24HR Place 1 patch onto the skin every 24 hours 6/24/21   Melisa Krueger MD   gabapentin (NEURONTIN) 800 MG tablet take 1 tablet by mouth twice a day 6/17/21 7/16/21  Melisa Krueger MD   JANUVIA 100 MG tablet take 1 tablet by mouth once daily 5/14/21   Melisa Krueger MD   NOVOLOG MIX 70/30 (70-30) 100 UNIT/ML injection inject 60 units subcutaneously twice a day with meals 5/5/21   Melisa Krueger MD   omeprazole (PRILOSEC) 20 MG delayed release capsule take 1 capsule by mouth once daily 30 MINUTES PRIOR TO FOOD 4/14/21   Melisa Krueger MD   isosorbide mononitrate (IMDUR) 30 MG extended release tablet take 1 tablet by mouth once daily 2/9/21   Melisa Krueger MD   warfarin (COUMADIN) 7.5 MG tablet Take 7.5 mg by mouth daily    Historical Provider, MD   aspirin 325 MG EC tablet Take 1 tablet by mouth every 6 hours as needed for Pain 11/19/20   Melisa Krueger MD   Insulin Pen Needle 31G X 8 MM MISC 1 each by Does not apply route 2 times daily 9/10/20   Melisa Krueger MD   DULoxetine (CYMBALTA) 60 MG extended release capsule Take 1 capsule by mouth daily 9/10/20   Melisa Krueger MD   [facility] ULTRA strip TEST three times a day if needed  Patient not taking: Reported on 6/24/2021 7/2/20   Melisa Krueger MD   blood glucose test strips (ASCENSIA AUTODISC VI;ONE TOUCH ULTRA TEST VI) strip 1 each by In Vitro route 3 times daily As needed.   Patient not taking: Reported on 6/24/2021 7/2/20   Melisa Krueger MD   furosemide (LASIX) 20 MG tablet take 1 tablet by mouth once daily 6/15/20   Melisa Krueger MD   Insulin Syringe-Needle U-100 (B-D INS SYR ULTRAFINE 1CC/31G) 31G X 5/16\" 1 ML MISC use twice a day 5/26/20   Melisa Krueger MD   pravastatin (PRAVACHOL) 40 MG tablet Take 1 tablet by mouth nightly 4/21/20   Melisa Krueger MD   tamsulosin (FLOMAX) 0.4 MG capsule Take 0.4 mg by mouth daily    Historical Provider, MD        Allergies:     Adhesive tape    Social History:     Tobacco:    reports POCGLU in the last 72 hours. General Appearance:  Alert, well appearing, and in no acute distress. Mental status: Oriented to person, place, and time. Head: Normocephalic and atraumatic. Eye: No icterus, redness, pupils equal and reactive, extraocular eye movements intact, and conjunctiva clear. Ear: Hearing grossly intact. Nose: No drainage noted. Mouth: Mucous membranes moist.  Neck: Supple and no carotid bruits noted. Lungs: Bilateral equal air entry, clear to auscultation, no wheezing, rales or rhonchi, and normal effort. Cardiovascular: Normal rate, regular rhythm, no murmur, gallop, or rub. Abdomen: Soft, nontender, nondistended, and active bowel sounds. Neurologic: Normal speech and cranial nerves II through XII grossly intact. Strength 5/5 bilaterally. Skin: No gross lesions, rashes, bruising, or bleeding on exposed skin area. Extremities: Posterior tibial pulses 2+ bilaterally. No pedal edema. No calf tenderness with palpation. Psych: Normal affect.      Investigations:      Laboratory Testing:  Recent Results (from the past 24 hour(s))   CBC    Collection Time: 06/30/21  1:51 PM   Result Value Ref Range    WBC 8.7 3.5 - 11.3 k/uL    RBC 5.51 4.21 - 5.77 m/uL    Hemoglobin 14.1 13.0 - 17.0 g/dL    Hematocrit 44.7 40.7 - 50.3 %    MCV 81.1 (L) 82.6 - 102.9 fL    MCH 25.6 25.2 - 33.5 pg    MCHC 31.5 28.4 - 34.8 g/dL    RDW 16.2 (H) 11.8 - 14.4 %    Platelets 007 801 - 483 k/uL    MPV 9.3 8.1 - 13.5 fL    NRBC Automated 0.0 0.0 per 100 WBC   Basic Metabolic Prof    Collection Time: 06/30/21  1:51 PM   Result Value Ref Range    Glucose 172 (H) 70 - 99 mg/dL    BUN 17 6 - 20 mg/dL    CREATININE 1.22 (H) 0.70 - 1.20 mg/dL    Bun/Cre Ratio 14 9 - 20    Calcium 9.5 8.6 - 10.4 mg/dL    Sodium 136 135 - 144 mmol/L    Potassium 4.7 3.7 - 5.3 mmol/L    Chloride 100 98 - 107 mmol/L    CO2 25 20 - 31 mmol/L    Anion Gap 11 9 - 17 mmol/L    GFR Non-African American >60 >60 mL/min    GFR African American >60 >60 mL/min    GFR Comment          GFR Staging NOT REPORTED        Recent Labs     06/30/21  1351   HGB 14.1   HCT 44.7   WBC 8.7   MCV 81.1*      K 4.7      CO2 25   BUN 17   CREATININE 1.22*   GLUCOSE 172*       No results for input(s): COVID19 in the last 720 hours. Imaging/Diagnostics:    12/5/2020: Echo complete W/ contrast     Ref Range & Units 6 mo ago   LA volume  cm3 46.30        LA volume  cm3 49.70        LA dimension  cm 4.20        LA Volume Index  ml/m2 20.3        AV peak john  cm/s 133.00        AV peak gradient  mmHg 7.00        Aortic root  cm 3.00        IVS 0.6 - 1.1 cm 1.10        LVIDd 10.48 - 14.56 cm 6.60        LVIDs 5.98 - 9.06 cm 5.00        LVOT diameter  cm 2.10        LVOT peak john  cm/s 106.00        LVOT peak gradient  mmHg 4.00        PW 0.6 - 1.1 cm 1.10        FS 28 - 44 % 24        FS 28 - 44 % 24        Interventricular Septum/Left Ventricular PWD by 2D   1.00        E wave deceleration time  ms 173.00        MV pressure 1/2 time  ms 51.00        E/A ratio   0.70        PV max velocity  cm/s 93.70        PV peak gradient  mmHg 4.00        Left Atrium to Aortic Root Ratio   1.40        MV E/E' Tissue Velocity Medial   11.00        E/E' ratio   9.50        LVOT mean gradient  mmHg 2.00        LVOT peak VTI  cm 17.80        LVOT stroke volume  mL 62.00        MV Peak A John  cm/s 83.40        MV Peak E John  cm/s 61.60        MV decel slope  m/s2 3.57        MV valve area p 1/2 method  cm2 4.31        AV mean gradient  mmHg 4.00        AV VTI  cm 25.60        AV valve area  cm2 2.41        Energy loss index   1.49        MV TDI E' (lateral)  cm/s 6.5        MV TDI E' (medial)  cm/s 5.6        LA size  cm 4.2        RA area  cm2 15.4        Narrative      Left Ventricle: There is mild concentric increased wall   thickness/hypertrophy.   Left Ventricle: Systolic function is moderately to severely decreased   with an ejection fraction of 30-35%.   Other Result Text    This result has an attachment that is not available. Date: 20   Received From: [facility]             EK2021: Sinus tachycardia. Possible left atrial enlargement. Borderline ECG. See Epic. Diagnosis:      1. Prostate cancer    Plans:     1.  Penis 3 piece prosthesis insertion       Beth May, APRN - CNP  2021  2:14 PM

## 2021-07-01 LAB
EKG ATRIAL RATE: 102 BPM
EKG P AXIS: 67 DEGREES
EKG P-R INTERVAL: 152 MS
EKG Q-T INTERVAL: 372 MS
EKG QRS DURATION: 82 MS
EKG QTC CALCULATION (BAZETT): 484 MS
EKG R AXIS: 35 DEGREES
EKG T AXIS: 41 DEGREES
EKG VENTRICULAR RATE: 102 BPM
ESTIMATED AVERAGE GLUCOSE: 235 MG/DL
HBA1C MFR BLD: 9.8 % (ref 4–6)

## 2021-07-27 DIAGNOSIS — Z79.4 CONTROLLED TYPE 2 DIABETES MELLITUS WITH DIABETIC NEPHROPATHY, WITH LONG-TERM CURRENT USE OF INSULIN (HCC): ICD-10-CM

## 2021-07-27 DIAGNOSIS — E11.21 CONTROLLED TYPE 2 DIABETES MELLITUS WITH DIABETIC NEPHROPATHY, WITH LONG-TERM CURRENT USE OF INSULIN (HCC): ICD-10-CM

## 2021-07-27 RX ORDER — BLOOD SUGAR DIAGNOSTIC
STRIP MISCELLANEOUS
Qty: 100 EACH | Refills: 3 | Status: SHIPPED | OUTPATIENT
Start: 2021-07-27 | End: 2021-08-04

## 2021-07-27 NOTE — TELEPHONE ENCOUNTER
Pharm requested    Health Maintenance   Topic Date Due    Hepatitis B vaccine (1 of 3 - Risk 3-dose series) Never done    Shingles Vaccine (1 of 2) Never done    PSA counseling  09/19/2017    Diabetic retinal exam  06/01/2018    Diabetic foot exam  03/07/2020    Flu vaccine (1) 09/01/2021    A1C test (Diabetic or Prediabetic)  09/30/2021    Lipid screen  11/19/2021    Annual Wellness Visit (AWV)  06/25/2022    Potassium monitoring  06/30/2022    Creatinine monitoring  06/30/2022    Colon cancer screen colonoscopy  09/09/2024    DTaP/Tdap/Td vaccine (2 - Td or Tdap) 01/01/2025    Pneumococcal 0-64 years Vaccine (2 of 2 - PPSV23) 07/22/2029    COVID-19 Vaccine  Completed    Hepatitis C screen  Completed    HIV screen  Completed    Hepatitis A vaccine  Aged Out    Hib vaccine  Aged Out    Meningococcal (ACWY) vaccine  Aged Out             (applicable per patient's age: Cancer Screenings, Depression Screening, Fall Risk Screening, Immunizations)    Hemoglobin A1C (%)   Date Value   06/30/2021 9.8 (H)   03/19/2021 8.7   11/19/2020 9.1     LDL Cholesterol (mg/dL)   Date Value   11/19/2020 104     LDL Calculated (mg/dL)   Date Value   12/05/2018 61     AST (U/L)   Date Value   11/19/2020 27     ALT (U/L)   Date Value   11/19/2020 23     BUN (mg/dL)   Date Value   06/30/2021 17      (goal A1C is < 7)   (goal LDL is <100) need 30-50% reduction from baseline     BP Readings from Last 3 Encounters:   06/30/21 134/82   06/24/21 120/76   03/19/21 118/70    (goal /80)      All Future Testing planned in [facility]:  Lab Frequency Next Occurrence   COVID-19 Once 06/30/2021       Next Visit Date:  Future Appointments   Date Time Provider Elida Rice   9/20/2021 11:30 AM MD noemi Iz[address] fp [facility]   6/27/2022 10:30 AM MD noemi [address] fp [facility]            Patient Active Problem List:     Prostate cancer ([address]     Controlled type 2 diabetes mellitus with diabetic nephropathy, with long-term current use of insulin (HCC)     Mixed hyperlipidemia     Obesity (BMI 30.0-34. 9)     Coronary artery disease involving native coronary artery of native heart without angina pectoris     Pacemaker     Systolic heart failure (HCC)     Chronic back pain     History of lung thrombosis     Stage 3 chronic kidney disease ([address]     Spinal stenosis at L4-L5 level     Chronic right-sided low back pain with right-sided sciatica     Erectile dysfunction following radical prostatectomy     Type 2 diabetes mellitus with diabetic polyneuropathy (HCC)     HTN (hypertension)     Spondylolisthesis, acquired     Foraminal stenosis of lumbar region     Degeneration of intervertebral disc     Nocturia     Tinea pedis     Morbidly obese ([address]     Long term (current) use of anticoagulants     Hernia, inguinal, recurrent     Multiple subsegmental pulmonary emboli without acute cor pulmonale ([address]

## 2021-08-04 DIAGNOSIS — E11.21 CONTROLLED TYPE 2 DIABETES MELLITUS WITH DIABETIC NEPHROPATHY, WITH LONG-TERM CURRENT USE OF INSULIN (HCC): ICD-10-CM

## 2021-08-04 DIAGNOSIS — Z79.4 CONTROLLED TYPE 2 DIABETES MELLITUS WITH DIABETIC NEPHROPATHY, WITH LONG-TERM CURRENT USE OF INSULIN (HCC): ICD-10-CM

## 2021-08-04 RX ORDER — BLOOD SUGAR DIAGNOSTIC
STRIP MISCELLANEOUS
Qty: 100 EACH | Refills: 3 | Status: SHIPPED | OUTPATIENT
Start: 2021-08-04

## 2021-08-10 DIAGNOSIS — E08.42 DIABETIC POLYNEUROPATHY ASSOCIATED WITH DIABETES MELLITUS DUE TO UNDERLYING CONDITION (HCC): ICD-10-CM

## 2021-08-10 RX ORDER — GABAPENTIN 800 MG/1
TABLET ORAL
Qty: 60 TABLET | Refills: 0 | Status: SHIPPED | OUTPATIENT
Start: 2021-08-10 | End: 2021-09-06

## 2021-08-10 NOTE — TELEPHONE ENCOUNTER
Pharm requested    Health Maintenance   Topic Date Due    Hepatitis B vaccine (1 of 3 - Risk 3-dose series) Never done    Shingles Vaccine (1 of 2) Never done    PSA counseling  09/19/2017    Diabetic retinal exam  06/01/2018    Diabetic foot exam  03/07/2020    Flu vaccine (1) 09/01/2021    A1C test (Diabetic or Prediabetic)  09/30/2021    Lipid screen  11/19/2021    Annual Wellness Visit (AWV)  06/25/2022    Potassium monitoring  06/30/2022    Creatinine monitoring  06/30/2022    Colon cancer screen colonoscopy  09/09/2024    DTaP/Tdap/Td vaccine (2 - Td or Tdap) 01/01/2025    Pneumococcal 0-64 years Vaccine (2 of 2 - PPSV23) 07/22/2029    COVID-19 Vaccine  Completed    Hepatitis C screen  Completed    HIV screen  Completed    Hepatitis A vaccine  Aged Out    Hib vaccine  Aged Out    Meningococcal (ACWY) vaccine  Aged Out             (applicable per patient's age: Cancer Screenings, Depression Screening, Fall Risk Screening, Immunizations)    Hemoglobin A1C (%)   Date Value   06/30/2021 9.8 (H)   03/19/2021 8.7   11/19/2020 9.1     LDL Cholesterol (mg/dL)   Date Value   11/19/2020 104     LDL Calculated (mg/dL)   Date Value   12/05/2018 61     AST (U/L)   Date Value   11/19/2020 27     ALT (U/L)   Date Value   11/19/2020 23     BUN (mg/dL)   Date Value   06/30/2021 17      (goal A1C is < 7)   (goal LDL is <100) need 30-50% reduction from baseline     BP Readings from Last 3 Encounters:   06/30/21 134/82   06/24/21 120/76   03/19/21 118/70    (goal /80)      All Future Testing planned in [facility]:  Lab Frequency Next Occurrence   COVID-19 Once 06/30/2021       Next Visit Date:  Future Appointments   Date Time Provider Elida Rice   9/20/2021 11:30 AM MD noemi Dodd fp [facility]   6/27/2022 10:30 AM MD noemi Dodd fp [facility]            Patient Active Problem List:     Prostate cancer ([facility] [address]     Controlled type 2 diabetes mellitus with diabetic nephropathy, with long-term current use of insulin (HCC)     Mixed hyperlipidemia     Obesity (BMI 30.0-34. 9)     Coronary artery disease involving native coronary artery of native heart without angina pectoris     Pacemaker     Systolic heart failure (HCC)     Chronic back pain     History of lung thrombosis     Stage 3 chronic kidney disease ([address]     Spinal stenosis at L4-L5 level     Chronic right-sided low back pain with right-sided sciatica     Erectile dysfunction following radical prostatectomy     Type 2 diabetes mellitus with diabetic polyneuropathy (HCC)     HTN (hypertension)     Spondylolisthesis, acquired     Foraminal stenosis of lumbar region     Degeneration of intervertebral disc     Nocturia     Tinea pedis     Morbidly obese ([address]     Long term (current) use of anticoagulants     Hernia, inguinal, recurrent     Multiple subsegmental pulmonary emboli without acute cor pulmonale ([address]

## 2021-08-18 DIAGNOSIS — E11.21 CONTROLLED TYPE 2 DIABETES MELLITUS WITH DIABETIC NEPHROPATHY, WITH LONG-TERM CURRENT USE OF INSULIN (HCC): ICD-10-CM

## 2021-08-18 DIAGNOSIS — Z79.4 CONTROLLED TYPE 2 DIABETES MELLITUS WITH DIABETIC NEPHROPATHY, WITH LONG-TERM CURRENT USE OF INSULIN (HCC): ICD-10-CM

## 2021-08-18 RX ORDER — INSULIN ASPART 100 [IU]/ML
INJECTION, SUSPENSION SUBCUTANEOUS
Qty: 30 ML | Refills: 3 | Status: SHIPPED | OUTPATIENT
Start: 2021-08-18 | End: 2021-08-23

## 2021-08-23 DIAGNOSIS — Z79.4 CONTROLLED TYPE 2 DIABETES MELLITUS WITH DIABETIC NEPHROPATHY, WITH LONG-TERM CURRENT USE OF INSULIN (HCC): ICD-10-CM

## 2021-08-23 DIAGNOSIS — E11.21 CONTROLLED TYPE 2 DIABETES MELLITUS WITH DIABETIC NEPHROPATHY, WITH LONG-TERM CURRENT USE OF INSULIN (HCC): ICD-10-CM

## 2021-08-23 RX ORDER — INSULIN ASPART 100 [IU]/ML
INJECTION, SUSPENSION SUBCUTANEOUS
Qty: 30 ML | Refills: 3 | Status: SHIPPED | OUTPATIENT
Start: 2021-08-23 | End: 2021-11-30 | Stop reason: SDUPTHER

## 2021-08-23 NOTE — TELEPHONE ENCOUNTER
Pharm requested    Health Maintenance   Topic Date Due    Hepatitis B vaccine (1 of 3 - Risk 3-dose series) Never done    Shingles Vaccine (1 of 2) Never done    PSA counseling  09/19/2017    Diabetic retinal exam  06/01/2018    Diabetic foot exam  03/07/2020    Flu vaccine (1) 09/01/2021    A1C test (Diabetic or Prediabetic)  09/30/2021    Lipid screen  11/19/2021    Annual Wellness Visit (AWV)  06/25/2022    Potassium monitoring  06/30/2022    Creatinine monitoring  06/30/2022    Colon cancer screen colonoscopy  09/09/2024    DTaP/Tdap/Td vaccine (2 - Td or Tdap) 01/01/2025    Pneumococcal 0-64 years Vaccine (2 of 2 - PPSV23) 07/22/2029    COVID-19 Vaccine  Completed    Hepatitis C screen  Completed    HIV screen  Completed    Hepatitis A vaccine  Aged Out    Hib vaccine  Aged Out    Meningococcal (ACWY) vaccine  Aged Out             (applicable per patient's age: Cancer Screenings, Depression Screening, Fall Risk Screening, Immunizations)    Hemoglobin A1C (%)   Date Value   06/30/2021 9.8 (H)   03/19/2021 8.7   11/19/2020 9.1     LDL Cholesterol (mg/dL)   Date Value   11/19/2020 104     LDL Calculated (mg/dL)   Date Value   12/05/2018 61     AST (U/L)   Date Value   11/19/2020 27     ALT (U/L)   Date Value   11/19/2020 23     BUN (mg/dL)   Date Value   06/30/2021 17      (goal A1C is < 7)   (goal LDL is <100) need 30-50% reduction from baseline     BP Readings from Last 3 Encounters:   06/30/21 134/82   06/24/21 120/76   03/19/21 118/70    (goal /80)      All Future Testing planned in [facility]:  Lab Frequency Next Occurrence   COVID-19 Once 06/30/2021       Next Visit Date:  Future Appointments   Date Time Provider Elida Rice   9/20/2021 11:30 AM MD noemi Silver [facility]   6/27/2022 10:30 AM MD noemi Silver [facility]            Patient Active Problem List:     Prostate cancer ([facility] [address]     Controlled type 2 diabetes mellitus with diabetic nephropathy, with long-term current use of insulin (HCC)     Mixed hyperlipidemia     Obesity (BMI 30.0-34. 9)     Coronary artery disease involving native coronary artery of native heart without angina pectoris     Pacemaker     Systolic heart failure (HCC)     Chronic back pain     History of lung thrombosis     Stage 3 chronic kidney disease ([address]     Spinal stenosis at L4-L5 level     Chronic right-sided low back pain with right-sided sciatica     Erectile dysfunction following radical prostatectomy     Type 2 diabetes mellitus with diabetic polyneuropathy (HCC)     HTN (hypertension)     Spondylolisthesis, acquired     Foraminal stenosis of lumbar region     Degeneration of intervertebral disc     Nocturia     Tinea pedis     Morbidly obese ([address]     Long term (current) use of anticoagulants     Hernia, inguinal, recurrent     Multiple subsegmental pulmonary emboli without acute cor pulmonale ([address]

## 2021-08-24 ENCOUNTER — ANESTHESIA EVENT (OUTPATIENT)
Dept: OPERATING ROOM | Age: 56
End: 2021-08-24
Payer: COMMERCIAL

## 2021-08-25 ENCOUNTER — HOSPITAL ENCOUNTER (OUTPATIENT)
Age: 56
Setting detail: OBSERVATION
Discharge: HOME | End: 2021-08-26
Attending: UROLOGY | Admitting: UROLOGY
Payer: COMMERCIAL

## 2021-08-25 ENCOUNTER — ANESTHESIA (OUTPATIENT)
Dept: OPERATING ROOM | Age: 56
End: 2021-08-25
Payer: COMMERCIAL

## 2021-08-25 VITALS — DIASTOLIC BLOOD PRESSURE: 77 MMHG | TEMPERATURE: 58.3 F | SYSTOLIC BLOOD PRESSURE: 159 MMHG | OXYGEN SATURATION: 90 %

## 2021-08-25 DIAGNOSIS — N52.31 ERECTILE DYSFUNCTION AFTER RADICAL PROSTATECTOMY: Primary | ICD-10-CM

## 2021-08-25 PROBLEM — N52.9 ED (ERECTILE DYSFUNCTION): Status: ACTIVE | Noted: 2021-08-25

## 2021-08-25 LAB
GLUCOSE BLD-MCNC: 235 MG/DL (ref 75–110)
GLUCOSE BLD-MCNC: 235 MG/DL (ref 75–110)
GLUCOSE BLD-MCNC: 249 MG/DL (ref 75–110)
GLUCOSE BLD-MCNC: 288 MG/DL (ref 75–110)
GLUCOSE BLD-MCNC: 330 MG/DL (ref 75–110)
GLUCOSE BLD-MCNC: 417 MG/DL (ref 75–110)
INR BLD: 1.1
PROTHROMBIN TIME: 14.4 SEC (ref 11.5–14.2)

## 2021-08-25 PROCEDURE — 3700000001 HC ADD 15 MINUTES (ANESTHESIA): Performed by: UROLOGY

## 2021-08-25 PROCEDURE — 6360000002 HC RX W HCPCS: Performed by: UROLOGY

## 2021-08-25 PROCEDURE — G0378 HOSPITAL OBSERVATION PER HR: HCPCS

## 2021-08-25 PROCEDURE — 2580000003 HC RX 258: Performed by: ANESTHESIOLOGY

## 2021-08-25 PROCEDURE — 2500000003 HC RX 250 WO HCPCS: Performed by: NURSE ANESTHETIST, CERTIFIED REGISTERED

## 2021-08-25 PROCEDURE — C1713 ANCHOR/SCREW BN/BN,TIS/BN: HCPCS | Performed by: UROLOGY

## 2021-08-25 PROCEDURE — 3600000012 HC SURGERY LEVEL 2 ADDTL 15MIN: Performed by: UROLOGY

## 2021-08-25 PROCEDURE — 82947 ASSAY GLUCOSE BLOOD QUANT: CPT

## 2021-08-25 PROCEDURE — 85610 PROTHROMBIN TIME: CPT

## 2021-08-25 PROCEDURE — 7100000001 HC PACU RECOVERY - ADDTL 15 MIN: Performed by: UROLOGY

## 2021-08-25 PROCEDURE — 2580000003 HC RX 258: Performed by: UROLOGY

## 2021-08-25 PROCEDURE — 36415 COLL VENOUS BLD VENIPUNCTURE: CPT

## 2021-08-25 PROCEDURE — 3700000000 HC ANESTHESIA ATTENDED CARE: Performed by: UROLOGY

## 2021-08-25 PROCEDURE — 6360000002 HC RX W HCPCS: Performed by: NURSE ANESTHETIST, CERTIFIED REGISTERED

## 2021-08-25 PROCEDURE — 6360000002 HC RX W HCPCS: Performed by: ANESTHESIOLOGY

## 2021-08-25 PROCEDURE — C1813 PROSTHESIS, PENILE, INFLATAB: HCPCS | Performed by: UROLOGY

## 2021-08-25 PROCEDURE — 6370000000 HC RX 637 (ALT 250 FOR IP)

## 2021-08-25 PROCEDURE — 2580000003 HC RX 258: Performed by: NURSE ANESTHETIST, CERTIFIED REGISTERED

## 2021-08-25 PROCEDURE — 7100000000 HC PACU RECOVERY - FIRST 15 MIN: Performed by: UROLOGY

## 2021-08-25 PROCEDURE — 3600000002 HC SURGERY LEVEL 2 BASE: Performed by: UROLOGY

## 2021-08-25 PROCEDURE — 6370000000 HC RX 637 (ALT 250 FOR IP): Performed by: UROLOGY

## 2021-08-25 PROCEDURE — 6370000000 HC RX 637 (ALT 250 FOR IP): Performed by: ANESTHESIOLOGY

## 2021-08-25 PROCEDURE — 2709999900 HC NON-CHARGEABLE SUPPLY: Performed by: UROLOGY

## 2021-08-25 DEVICE — IMPLANTABLE DEVICE: Type: IMPLANTABLE DEVICE | Status: FUNCTIONAL

## 2021-08-25 DEVICE — ASSEMBLY KIT
Type: IMPLANTABLE DEVICE | Status: FUNCTIONAL
Brand: TITAN

## 2021-08-25 DEVICE — TITAN® TOUCH SCROTAL ZERO DEGREE ANGLE CYLINDER SET WITH PUMP
Type: IMPLANTABLE DEVICE | Site: SCROTUM | Status: FUNCTIONAL
Brand: TITAN

## 2021-08-25 DEVICE — CL RESERVOIR
Type: IMPLANTABLE DEVICE | Status: FUNCTIONAL
Brand: TITAN

## 2021-08-25 RX ORDER — HYDRALAZINE HYDROCHLORIDE 25 MG/1
25 TABLET, FILM COATED ORAL DAILY
Status: DISCONTINUED | OUTPATIENT
Start: 2021-08-25 | End: 2021-08-26 | Stop reason: HOSPADM

## 2021-08-25 RX ORDER — IPRATROPIUM BROMIDE AND ALBUTEROL SULFATE 2.5; .5 MG/3ML; MG/3ML
1 SOLUTION RESPIRATORY (INHALATION) ONCE
Status: COMPLETED | OUTPATIENT
Start: 2021-08-25 | End: 2021-08-25

## 2021-08-25 RX ORDER — HYDRALAZINE HYDROCHLORIDE 20 MG/ML
5 INJECTION INTRAMUSCULAR; INTRAVENOUS ONCE
Status: COMPLETED | OUTPATIENT
Start: 2021-08-25 | End: 2021-08-25

## 2021-08-25 RX ORDER — HYDROMORPHONE HYDROCHLORIDE 1 MG/ML
0.25 INJECTION, SOLUTION INTRAMUSCULAR; INTRAVENOUS; SUBCUTANEOUS EVERY 5 MIN PRN
Status: DISCONTINUED | OUTPATIENT
Start: 2021-08-25 | End: 2021-08-25 | Stop reason: HOSPADM

## 2021-08-25 RX ORDER — SODIUM CHLORIDE 9 MG/ML
25 INJECTION, SOLUTION INTRAVENOUS PRN
Status: DISCONTINUED | OUTPATIENT
Start: 2021-08-25 | End: 2021-08-25 | Stop reason: HOSPADM

## 2021-08-25 RX ORDER — HYDRALAZINE HYDROCHLORIDE 25 MG/1
TABLET, FILM COATED ORAL
COMMUNITY
Start: 2021-08-17

## 2021-08-25 RX ORDER — FENTANYL CITRATE 50 UG/ML
INJECTION, SOLUTION INTRAMUSCULAR; INTRAVENOUS PRN
Status: DISCONTINUED | OUTPATIENT
Start: 2021-08-25 | End: 2021-08-25 | Stop reason: SDUPTHER

## 2021-08-25 RX ORDER — SODIUM CHLORIDE, SODIUM LACTATE, POTASSIUM CHLORIDE, CALCIUM CHLORIDE 600; 310; 30; 20 MG/100ML; MG/100ML; MG/100ML; MG/100ML
INJECTION, SOLUTION INTRAVENOUS CONTINUOUS PRN
Status: DISCONTINUED | OUTPATIENT
Start: 2021-08-25 | End: 2021-08-25 | Stop reason: SDUPTHER

## 2021-08-25 RX ORDER — INSULIN GLARGINE 100 [IU]/ML
67 INJECTION, SOLUTION SUBCUTANEOUS NIGHTLY
Status: DISCONTINUED | OUTPATIENT
Start: 2021-08-25 | End: 2021-08-26 | Stop reason: HOSPADM

## 2021-08-25 RX ORDER — LIDOCAINE HYDROCHLORIDE 10 MG/ML
1 INJECTION, SOLUTION EPIDURAL; INFILTRATION; INTRACAUDAL; PERINEURAL
Status: DISCONTINUED | OUTPATIENT
Start: 2021-08-26 | End: 2021-08-25 | Stop reason: HOSPADM

## 2021-08-25 RX ORDER — SODIUM CHLORIDE 0.9 % (FLUSH) 0.9 %
5-40 SYRINGE (ML) INJECTION PRN
Status: DISCONTINUED | OUTPATIENT
Start: 2021-08-25 | End: 2021-08-26 | Stop reason: HOSPADM

## 2021-08-25 RX ORDER — ONDANSETRON 4 MG/1
4 TABLET, ORALLY DISINTEGRATING ORAL EVERY 8 HOURS PRN
Status: DISCONTINUED | OUTPATIENT
Start: 2021-08-25 | End: 2021-08-26 | Stop reason: HOSPADM

## 2021-08-25 RX ORDER — POLYETHYLENE GLYCOL 3350 17 G/17G
17 POWDER, FOR SOLUTION ORAL DAILY PRN
Status: DISCONTINUED | OUTPATIENT
Start: 2021-08-25 | End: 2021-08-26 | Stop reason: HOSPADM

## 2021-08-25 RX ORDER — SODIUM CHLORIDE 9 MG/ML
25 INJECTION, SOLUTION INTRAVENOUS PRN
Status: DISCONTINUED | OUTPATIENT
Start: 2021-08-25 | End: 2021-08-26 | Stop reason: HOSPADM

## 2021-08-25 RX ORDER — LISINOPRIL 5 MG/1
5 TABLET ORAL DAILY
Status: DISCONTINUED | OUTPATIENT
Start: 2021-08-25 | End: 2021-08-26 | Stop reason: HOSPADM

## 2021-08-25 RX ORDER — ROCURONIUM BROMIDE 10 MG/ML
INJECTION, SOLUTION INTRAVENOUS PRN
Status: DISCONTINUED | OUTPATIENT
Start: 2021-08-25 | End: 2021-08-25 | Stop reason: SDUPTHER

## 2021-08-25 RX ORDER — DEXTROSE MONOHYDRATE 25 G/50ML
12.5 INJECTION, SOLUTION INTRAVENOUS PRN
Status: DISCONTINUED | OUTPATIENT
Start: 2021-08-25 | End: 2021-08-26 | Stop reason: HOSPADM

## 2021-08-25 RX ORDER — MORPHINE SULFATE 2 MG/ML
2 INJECTION, SOLUTION INTRAMUSCULAR; INTRAVENOUS
Status: DISCONTINUED | OUTPATIENT
Start: 2021-08-25 | End: 2021-08-26 | Stop reason: HOSPADM

## 2021-08-25 RX ORDER — NICOTINE POLACRILEX 4 MG
15 LOZENGE BUCCAL PRN
Status: DISCONTINUED | OUTPATIENT
Start: 2021-08-25 | End: 2021-08-26 | Stop reason: HOSPADM

## 2021-08-25 RX ORDER — COLESEVELAM 180 1/1
1875 TABLET ORAL 2 TIMES DAILY
Status: DISCONTINUED | OUTPATIENT
Start: 2021-08-25 | End: 2021-08-26 | Stop reason: HOSPADM

## 2021-08-25 RX ORDER — OXYCODONE HYDROCHLORIDE AND ACETAMINOPHEN 5; 325 MG/1; MG/1
1 TABLET ORAL EVERY 4 HOURS PRN
Status: DISCONTINUED | OUTPATIENT
Start: 2021-08-25 | End: 2021-08-26 | Stop reason: HOSPADM

## 2021-08-25 RX ORDER — FUROSEMIDE 20 MG/1
20 TABLET ORAL DAILY
Status: DISCONTINUED | OUTPATIENT
Start: 2021-08-25 | End: 2021-08-26 | Stop reason: HOSPADM

## 2021-08-25 RX ORDER — NICOTINE 21 MG/24HR
1 PATCH, TRANSDERMAL 24 HOURS TRANSDERMAL EVERY 24 HOURS
Status: DISCONTINUED | OUTPATIENT
Start: 2021-08-25 | End: 2021-08-25 | Stop reason: DRUGHIGH

## 2021-08-25 RX ORDER — INSULIN ASPART 100 [IU]/ML
60 INJECTION, SUSPENSION SUBCUTANEOUS 2 TIMES DAILY WITH MEALS
Status: DISCONTINUED | OUTPATIENT
Start: 2021-08-25 | End: 2021-08-25 | Stop reason: CLARIF

## 2021-08-25 RX ORDER — FUROSEMIDE 10 MG/ML
INJECTION INTRAMUSCULAR; INTRAVENOUS PRN
Status: DISCONTINUED | OUTPATIENT
Start: 2021-08-25 | End: 2021-08-25 | Stop reason: SDUPTHER

## 2021-08-25 RX ORDER — SODIUM CHLORIDE, SODIUM LACTATE, POTASSIUM CHLORIDE, CALCIUM CHLORIDE 600; 310; 30; 20 MG/100ML; MG/100ML; MG/100ML; MG/100ML
INJECTION, SOLUTION INTRAVENOUS CONTINUOUS
Status: DISCONTINUED | OUTPATIENT
Start: 2021-08-26 | End: 2021-08-25

## 2021-08-25 RX ORDER — DEXTROSE MONOHYDRATE 50 MG/ML
100 INJECTION, SOLUTION INTRAVENOUS PRN
Status: DISCONTINUED | OUTPATIENT
Start: 2021-08-25 | End: 2021-08-26 | Stop reason: HOSPADM

## 2021-08-25 RX ORDER — DEXAMETHASONE SODIUM PHOSPHATE 10 MG/ML
INJECTION INTRAMUSCULAR; INTRAVENOUS PRN
Status: DISCONTINUED | OUTPATIENT
Start: 2021-08-25 | End: 2021-08-25 | Stop reason: SDUPTHER

## 2021-08-25 RX ORDER — DULOXETIN HYDROCHLORIDE 60 MG/1
60 CAPSULE, DELAYED RELEASE ORAL DAILY
Status: DISCONTINUED | OUTPATIENT
Start: 2021-08-25 | End: 2021-08-26 | Stop reason: HOSPADM

## 2021-08-25 RX ORDER — ONDANSETRON 2 MG/ML
4 INJECTION INTRAMUSCULAR; INTRAVENOUS EVERY 6 HOURS PRN
Status: DISCONTINUED | OUTPATIENT
Start: 2021-08-25 | End: 2021-08-26 | Stop reason: HOSPADM

## 2021-08-25 RX ORDER — SODIUM CHLORIDE 0.9 % (FLUSH) 0.9 %
5-40 SYRINGE (ML) INJECTION EVERY 12 HOURS SCHEDULED
Status: DISCONTINUED | OUTPATIENT
Start: 2021-08-25 | End: 2021-08-26 | Stop reason: HOSPADM

## 2021-08-25 RX ORDER — PANTOPRAZOLE SODIUM 40 MG/1
40 TABLET, DELAYED RELEASE ORAL EVERY MORNING
Status: DISCONTINUED | OUTPATIENT
Start: 2021-08-26 | End: 2021-08-26 | Stop reason: HOSPADM

## 2021-08-25 RX ORDER — PROPOFOL 10 MG/ML
INJECTION, EMULSION INTRAVENOUS PRN
Status: DISCONTINUED | OUTPATIENT
Start: 2021-08-25 | End: 2021-08-25 | Stop reason: SDUPTHER

## 2021-08-25 RX ORDER — ONDANSETRON 2 MG/ML
4 INJECTION INTRAMUSCULAR; INTRAVENOUS
Status: DISCONTINUED | OUTPATIENT
Start: 2021-08-25 | End: 2021-08-25 | Stop reason: HOSPADM

## 2021-08-25 RX ORDER — ISOSORBIDE MONONITRATE 30 MG/1
30 TABLET, EXTENDED RELEASE ORAL DAILY
Status: DISCONTINUED | OUTPATIENT
Start: 2021-08-25 | End: 2021-08-26 | Stop reason: HOSPADM

## 2021-08-25 RX ORDER — CARVEDILOL 12.5 MG/1
12.5 TABLET ORAL 2 TIMES DAILY
Status: DISCONTINUED | OUTPATIENT
Start: 2021-08-25 | End: 2021-08-26 | Stop reason: HOSPADM

## 2021-08-25 RX ORDER — FENTANYL CITRATE 50 UG/ML
25 INJECTION, SOLUTION INTRAMUSCULAR; INTRAVENOUS EVERY 5 MIN PRN
Status: DISCONTINUED | OUTPATIENT
Start: 2021-08-25 | End: 2021-08-25 | Stop reason: HOSPADM

## 2021-08-25 RX ORDER — METOPROLOL TARTRATE 5 MG/5ML
INJECTION INTRAVENOUS PRN
Status: DISCONTINUED | OUTPATIENT
Start: 2021-08-25 | End: 2021-08-25 | Stop reason: SDUPTHER

## 2021-08-25 RX ORDER — SODIUM CHLORIDE 0.9 % (FLUSH) 0.9 %
10 SYRINGE (ML) INJECTION PRN
Status: DISCONTINUED | OUTPATIENT
Start: 2021-08-25 | End: 2021-08-25 | Stop reason: HOSPADM

## 2021-08-25 RX ORDER — ESMOLOL HYDROCHLORIDE 10 MG/ML
INJECTION INTRAVENOUS PRN
Status: DISCONTINUED | OUTPATIENT
Start: 2021-08-25 | End: 2021-08-25 | Stop reason: SDUPTHER

## 2021-08-25 RX ORDER — SODIUM CHLORIDE 9 MG/ML
INJECTION, SOLUTION INTRAVENOUS CONTINUOUS
Status: DISCONTINUED | OUTPATIENT
Start: 2021-08-26 | End: 2021-08-25

## 2021-08-25 RX ORDER — IPRATROPIUM BROMIDE AND ALBUTEROL SULFATE 2.5; .5 MG/3ML; MG/3ML
SOLUTION RESPIRATORY (INHALATION)
Status: COMPLETED
Start: 2021-08-25 | End: 2021-08-25

## 2021-08-25 RX ORDER — SODIUM CHLORIDE 0.9 % (FLUSH) 0.9 %
10 SYRINGE (ML) INJECTION EVERY 12 HOURS SCHEDULED
Status: DISCONTINUED | OUTPATIENT
Start: 2021-08-25 | End: 2021-08-25 | Stop reason: HOSPADM

## 2021-08-25 RX ORDER — MIDAZOLAM HYDROCHLORIDE 1 MG/ML
INJECTION INTRAMUSCULAR; INTRAVENOUS PRN
Status: DISCONTINUED | OUTPATIENT
Start: 2021-08-25 | End: 2021-08-25 | Stop reason: SDUPTHER

## 2021-08-25 RX ORDER — PRAVASTATIN SODIUM 40 MG
40 TABLET ORAL NIGHTLY
Status: DISCONTINUED | OUTPATIENT
Start: 2021-08-25 | End: 2021-08-26 | Stop reason: HOSPADM

## 2021-08-25 RX ORDER — GABAPENTIN 400 MG/1
800 CAPSULE ORAL DAILY
Status: DISCONTINUED | OUTPATIENT
Start: 2021-08-25 | End: 2021-08-26 | Stop reason: HOSPADM

## 2021-08-25 RX ORDER — LIDOCAINE HYDROCHLORIDE 20 MG/ML
INJECTION, SOLUTION EPIDURAL; INFILTRATION; INTRACAUDAL; PERINEURAL PRN
Status: DISCONTINUED | OUTPATIENT
Start: 2021-08-25 | End: 2021-08-25 | Stop reason: SDUPTHER

## 2021-08-25 RX ADMIN — MIDAZOLAM 2 MG: 1 INJECTION INTRAMUSCULAR; INTRAVENOUS at 10:02

## 2021-08-25 RX ADMIN — FUROSEMIDE 10 MG: 10 INJECTION, SOLUTION INTRAMUSCULAR; INTRAVENOUS at 12:55

## 2021-08-25 RX ADMIN — GENTAMICIN SULFATE 120 MG: 40 INJECTION, SOLUTION INTRAMUSCULAR; INTRAVENOUS at 10:03

## 2021-08-25 RX ADMIN — SODIUM CHLORIDE, POTASSIUM CHLORIDE, SODIUM LACTATE AND CALCIUM CHLORIDE: 600; 310; 30; 20 INJECTION, SOLUTION INTRAVENOUS at 08:03

## 2021-08-25 RX ADMIN — MORPHINE SULFATE 2 MG: 2 INJECTION, SOLUTION INTRAMUSCULAR; INTRAVENOUS at 19:23

## 2021-08-25 RX ADMIN — SODIUM CHLORIDE, POTASSIUM CHLORIDE, SODIUM LACTATE AND CALCIUM CHLORIDE: 600; 310; 30; 20 INJECTION, SOLUTION INTRAVENOUS at 10:02

## 2021-08-25 RX ADMIN — FENTANYL CITRATE 50 MCG: 50 INJECTION, SOLUTION INTRAMUSCULAR; INTRAVENOUS at 10:06

## 2021-08-25 RX ADMIN — INSULIN GLARGINE 67 UNITS: 100 INJECTION, SOLUTION SUBCUTANEOUS at 21:02

## 2021-08-25 RX ADMIN — ESMOLOL HYDROCHLORIDE 50 MG: 10 INJECTION, SOLUTION INTRAVENOUS at 10:14

## 2021-08-25 RX ADMIN — LIDOCAINE HYDROCHLORIDE 100 MG: 20 INJECTION, SOLUTION EPIDURAL; INFILTRATION; INTRACAUDAL; PERINEURAL at 10:06

## 2021-08-25 RX ADMIN — FUROSEMIDE 20 MG: 20 TABLET ORAL at 16:53

## 2021-08-25 RX ADMIN — PROPOFOL 100 MG: 10 INJECTION, EMULSION INTRAVENOUS at 10:06

## 2021-08-25 RX ADMIN — FENTANYL CITRATE 25 MCG: 50 INJECTION, SOLUTION INTRAMUSCULAR; INTRAVENOUS at 10:14

## 2021-08-25 RX ADMIN — FENTANYL CITRATE 25 MCG: 50 INJECTION, SOLUTION INTRAMUSCULAR; INTRAVENOUS at 14:17

## 2021-08-25 RX ADMIN — HYDRALAZINE HYDROCHLORIDE 25 MG: 25 TABLET, FILM COATED ORAL at 16:53

## 2021-08-25 RX ADMIN — CEFAZOLIN SODIUM 3000 MG: 10 INJECTION, POWDER, FOR SOLUTION INTRAVENOUS at 10:17

## 2021-08-25 RX ADMIN — METOPROLOL TARTRATE 2 MG: 1 INJECTION, SOLUTION INTRAVENOUS at 11:26

## 2021-08-25 RX ADMIN — ESMOLOL HYDROCHLORIDE 20 MG: 10 INJECTION, SOLUTION INTRAVENOUS at 10:54

## 2021-08-25 RX ADMIN — GENTAMICIN SULFATE 120 MG: 40 INJECTION, SOLUTION INTRAMUSCULAR; INTRAVENOUS at 19:35

## 2021-08-25 RX ADMIN — LISINOPRIL 5 MG: 5 TABLET ORAL at 16:53

## 2021-08-25 RX ADMIN — ESMOLOL HYDROCHLORIDE 30 MG: 10 INJECTION, SOLUTION INTRAVENOUS at 12:41

## 2021-08-25 RX ADMIN — PRAVASTATIN SODIUM 40 MG: 40 TABLET ORAL at 19:23

## 2021-08-25 RX ADMIN — OXYCODONE AND ACETAMINOPHEN 1 TABLET: 5; 325 TABLET ORAL at 21:02

## 2021-08-25 RX ADMIN — OXYCODONE AND ACETAMINOPHEN 1 TABLET: 5; 325 TABLET ORAL at 16:41

## 2021-08-25 RX ADMIN — DEXAMETHASONE SODIUM PHOSPHATE 10 MG: 10 INJECTION INTRAMUSCULAR; INTRAVENOUS at 10:17

## 2021-08-25 RX ADMIN — METOPROLOL TARTRATE 3 MG: 1 INJECTION, SOLUTION INTRAVENOUS at 11:19

## 2021-08-25 RX ADMIN — FENTANYL CITRATE 50 MCG: 50 INJECTION, SOLUTION INTRAMUSCULAR; INTRAVENOUS at 10:51

## 2021-08-25 RX ADMIN — PROPOFOL 30 MG: 10 INJECTION, EMULSION INTRAVENOUS at 10:52

## 2021-08-25 RX ADMIN — FENTANYL CITRATE 25 MCG: 50 INJECTION, SOLUTION INTRAMUSCULAR; INTRAVENOUS at 15:06

## 2021-08-25 RX ADMIN — CARVEDILOL 12.5 MG: 12.5 TABLET, FILM COATED ORAL at 19:23

## 2021-08-25 RX ADMIN — FENTANYL CITRATE 50 MCG: 50 INJECTION, SOLUTION INTRAMUSCULAR; INTRAVENOUS at 11:02

## 2021-08-25 RX ADMIN — HYDRALAZINE HYDROCHLORIDE 5 MG: 20 INJECTION INTRAMUSCULAR; INTRAVENOUS at 13:40

## 2021-08-25 RX ADMIN — HYDRALAZINE HYDROCHLORIDE 5 MG: 20 INJECTION INTRAMUSCULAR; INTRAVENOUS at 13:57

## 2021-08-25 RX ADMIN — LINAGLIPTIN 5 MG: 5 TABLET, FILM COATED ORAL at 16:53

## 2021-08-25 RX ADMIN — ROCURONIUM BROMIDE 50 MG: 10 INJECTION, SOLUTION INTRAVENOUS at 10:06

## 2021-08-25 RX ADMIN — FENTANYL CITRATE 50 MCG: 50 INJECTION, SOLUTION INTRAMUSCULAR; INTRAVENOUS at 12:15

## 2021-08-25 RX ADMIN — DULOXETINE HYDROCHLORIDE 60 MG: 60 CAPSULE, DELAYED RELEASE ORAL at 16:53

## 2021-08-25 RX ADMIN — FENTANYL CITRATE 25 MCG: 50 INJECTION, SOLUTION INTRAMUSCULAR; INTRAVENOUS at 10:32

## 2021-08-25 RX ADMIN — FENTANYL CITRATE 50 MCG: 50 INJECTION, SOLUTION INTRAMUSCULAR; INTRAVENOUS at 12:28

## 2021-08-25 RX ADMIN — IPRATROPIUM BROMIDE AND ALBUTEROL SULFATE 1 AMPULE: 2.5; .5 SOLUTION RESPIRATORY (INHALATION) at 13:22

## 2021-08-25 RX ADMIN — ISOSORBIDE MONONITRATE 30 MG: 30 TABLET, EXTENDED RELEASE ORAL at 16:53

## 2021-08-25 RX ADMIN — FENTANYL CITRATE 50 MCG: 50 INJECTION, SOLUTION INTRAMUSCULAR; INTRAVENOUS at 10:42

## 2021-08-25 RX ADMIN — IPRATROPIUM BROMIDE AND ALBUTEROL SULFATE 1 AMPULE: .5; 2.5 SOLUTION RESPIRATORY (INHALATION) at 13:22

## 2021-08-25 RX ADMIN — CEFAZOLIN SODIUM 3000 MG: 10 INJECTION, POWDER, FOR SOLUTION INTRAVENOUS at 16:54

## 2021-08-25 RX ADMIN — GABAPENTIN 800 MG: 400 CAPSULE ORAL at 16:53

## 2021-08-25 RX ADMIN — INSULIN HUMAN 13 UNITS: 100 INJECTION, SOLUTION PARENTERAL at 13:29

## 2021-08-25 RX ADMIN — SUGAMMADEX 200 MG: 100 INJECTION, SOLUTION INTRAVENOUS at 12:10

## 2021-08-25 RX ADMIN — INSULIN LISPRO 12 UNITS: 100 INJECTION, SOLUTION INTRAVENOUS; SUBCUTANEOUS at 16:50

## 2021-08-25 RX ADMIN — INSULIN HUMAN 10 UNITS: 100 INJECTION, SOLUTION PARENTERAL at 14:03

## 2021-08-25 ASSESSMENT — PULMONARY FUNCTION TESTS
PIF_VALUE: 27
PIF_VALUE: 27
PIF_VALUE: 28
PIF_VALUE: 22
PIF_VALUE: 26
PIF_VALUE: 27
PIF_VALUE: 2
PIF_VALUE: 5
PIF_VALUE: 27
PIF_VALUE: 15
PIF_VALUE: 12
PIF_VALUE: 26
PIF_VALUE: 28
PIF_VALUE: 26
PIF_VALUE: 27
PIF_VALUE: 26
PIF_VALUE: 27
PIF_VALUE: 28
PIF_VALUE: 28
PIF_VALUE: 26
PIF_VALUE: 2
PIF_VALUE: 28
PIF_VALUE: 25
PIF_VALUE: 26
PIF_VALUE: 27
PIF_VALUE: 2
PIF_VALUE: 30
PIF_VALUE: 27
PIF_VALUE: 19
PIF_VALUE: 27
PIF_VALUE: 27
PIF_VALUE: 25
PIF_VALUE: 28
PIF_VALUE: 3
PIF_VALUE: 28
PIF_VALUE: 26
PIF_VALUE: 28
PIF_VALUE: 2
PIF_VALUE: 1
PIF_VALUE: 31
PIF_VALUE: 27
PIF_VALUE: 2
PIF_VALUE: 12
PIF_VALUE: 29
PIF_VALUE: 27
PIF_VALUE: 22
PIF_VALUE: 26
PIF_VALUE: 28
PIF_VALUE: 23
PIF_VALUE: 24
PIF_VALUE: 25
PIF_VALUE: 27
PIF_VALUE: 26
PIF_VALUE: 27
PIF_VALUE: 2
PIF_VALUE: 26
PIF_VALUE: 27
PIF_VALUE: 31
PIF_VALUE: 2
PIF_VALUE: 28
PIF_VALUE: 25
PIF_VALUE: 27
PIF_VALUE: 26
PIF_VALUE: 28
PIF_VALUE: 28
PIF_VALUE: 22
PIF_VALUE: 26
PIF_VALUE: 15
PIF_VALUE: 27
PIF_VALUE: 27
PIF_VALUE: 28
PIF_VALUE: 27
PIF_VALUE: 25
PIF_VALUE: 4
PIF_VALUE: 27
PIF_VALUE: 27
PIF_VALUE: 2
PIF_VALUE: 16
PIF_VALUE: 26
PIF_VALUE: 27
PIF_VALUE: 27
PIF_VALUE: 28
PIF_VALUE: 2
PIF_VALUE: 29
PIF_VALUE: 26
PIF_VALUE: 28
PIF_VALUE: 2
PIF_VALUE: 29
PIF_VALUE: 27
PIF_VALUE: 28
PIF_VALUE: 3
PIF_VALUE: 2
PIF_VALUE: 26
PIF_VALUE: 7
PIF_VALUE: 28
PIF_VALUE: 27
PIF_VALUE: 27
PIF_VALUE: 0
PIF_VALUE: 18
PIF_VALUE: 26
PIF_VALUE: 22
PIF_VALUE: 27
PIF_VALUE: 28
PIF_VALUE: 1
PIF_VALUE: 28
PIF_VALUE: 26
PIF_VALUE: 26
PIF_VALUE: 2
PIF_VALUE: 29
PIF_VALUE: 27
PIF_VALUE: 29
PIF_VALUE: 22
PIF_VALUE: 25
PIF_VALUE: 27
PIF_VALUE: 29
PIF_VALUE: 15
PIF_VALUE: 27
PIF_VALUE: 26
PIF_VALUE: 28
PIF_VALUE: 3
PIF_VALUE: 28
PIF_VALUE: 27
PIF_VALUE: 28
PIF_VALUE: 28
PIF_VALUE: 2
PIF_VALUE: 20
PIF_VALUE: 28
PIF_VALUE: 26
PIF_VALUE: 19
PIF_VALUE: 29
PIF_VALUE: 28
PIF_VALUE: 26
PIF_VALUE: 28
PIF_VALUE: 29
PIF_VALUE: 15
PIF_VALUE: 28
PIF_VALUE: 4
PIF_VALUE: 27
PIF_VALUE: 27
PIF_VALUE: 22
PIF_VALUE: 26
PIF_VALUE: 28
PIF_VALUE: 15
PIF_VALUE: 6
PIF_VALUE: 28
PIF_VALUE: 27
PIF_VALUE: 28
PIF_VALUE: 25
PIF_VALUE: 28
PIF_VALUE: 2
PIF_VALUE: 25
PIF_VALUE: 27
PIF_VALUE: 24
PIF_VALUE: 28
PIF_VALUE: 25
PIF_VALUE: 39
PIF_VALUE: 27
PIF_VALUE: 28
PIF_VALUE: 28
PIF_VALUE: 27
PIF_VALUE: 21
PIF_VALUE: 28
PIF_VALUE: 26
PIF_VALUE: 29
PIF_VALUE: 27
PIF_VALUE: 27
PIF_VALUE: 26
PIF_VALUE: 25
PIF_VALUE: 26
PIF_VALUE: 3
PIF_VALUE: 27

## 2021-08-25 ASSESSMENT — PAIN SCALES - GENERAL
PAINLEVEL_OUTOF10: 8
PAINLEVEL_OUTOF10: 6
PAINLEVEL_OUTOF10: 6
PAINLEVEL_OUTOF10: 10
PAINLEVEL_OUTOF10: 6
PAINLEVEL_OUTOF10: 0
PAINLEVEL_OUTOF10: 7
PAINLEVEL_OUTOF10: 0
PAINLEVEL_OUTOF10: 6

## 2021-08-25 ASSESSMENT — PAIN DESCRIPTION - FREQUENCY
FREQUENCY: CONTINUOUS

## 2021-08-25 ASSESSMENT — PAIN - FUNCTIONAL ASSESSMENT
PAIN_FUNCTIONAL_ASSESSMENT: PREVENTS OR INTERFERES SOME ACTIVE ACTIVITIES AND ADLS
PAIN_FUNCTIONAL_ASSESSMENT: 0-10

## 2021-08-25 ASSESSMENT — PAIN DESCRIPTION - ONSET
ONSET: ON-GOING

## 2021-08-25 ASSESSMENT — PAIN DESCRIPTION - PAIN TYPE
TYPE: SURGICAL PAIN

## 2021-08-25 ASSESSMENT — PAIN DESCRIPTION - DESCRIPTORS
DESCRIPTORS: ACHING;DISCOMFORT
DESCRIPTORS: ACHING
DESCRIPTORS: ACHING
DESCRIPTORS: ACHING;DISCOMFORT
DESCRIPTORS: ACHING;DISCOMFORT

## 2021-08-25 ASSESSMENT — LIFESTYLE VARIABLES: SMOKING_STATUS: 1

## 2021-08-25 ASSESSMENT — PAIN DESCRIPTION - LOCATION
LOCATION: PENIS

## 2021-08-25 ASSESSMENT — PAIN DESCRIPTION - PROGRESSION: CLINICAL_PROGRESSION: NOT CHANGED

## 2021-08-25 NOTE — ANESTHESIA PRE PROCEDURE
Department of Anesthesiology  Preprocedure Note       Name:  Brenda Hardwick   Age:  61 y.o.  :  1965                                          MRN:  [id number]         Date:  2021      Surgeon: Brenda Cantu):  Dominick Garnett MD    Procedure: PENIS PROSTHESIS INSERTION  - COLOPLAST (N/A )    Medications prior to admission:   Prior to Admission medications    Medication Sig Start Date End Date Taking?  Authorizing Provider   NOVOLOG MIX 70/30 (70-30) 100 UNIT/ML injection inject 60 units subcutaneously twice a day with meals 21   Tristan Greentree, MD   gabapentin (NEURONTIN) 800 MG tablet take 1 tablet by mouth twice a day 8/10/21 9/10/21  Trisatn Greentree, MD   Insulin Syringe-Needle U-100 (BD INSULIN SYRINGE U/F) 31G X \" 1 ML MISC use 1 PEN NEEDLE to inject MEDICATION subcutaneously twice a day 21   Tristan Greentree, MD   carvedilol (COREG) 12.5 MG tablet take 1 tablet by mouth twice a day 21   Historical Provider, MD   lisinopril (PRINIVIL;ZESTRIL) 5 MG tablet take 1 tablet by mouth once daily 4/15/21   Historical Provider, MD   ciclopirox (LOPROX) 0.77 % cream Apply topically 2 times daily To feet 21   Tristan Greentree, MD   [facility]) 625 MG tablet Take 3 tablets by mouth 2 times daily 21   Tristan Greentree, MD MITCHELL VITAMIN D-3 50 MCG ( UT) CAPS Take 1 capsule by mouth daily 21   Tristan Greentree, MD   nicotine (NICODERM CQ) 14 MG/24HR Place 1 patch onto the skin every 24 hours for 14 days 21  Tristan Greentree, MD   nicotine (NICODERM CQ) 21 MG/24HR Place 1 patch onto the skin every 24 hours for 14 days 21  Tristan Greentree, MD   nicotine (NICODERM CQ) 7 MG/24HR Place 1 patch onto the skin every 24 hours 21   Tristan Greentree, MD   JANUVIA 100 MG tablet take 1 tablet by mouth once daily 21   Tristan Greentree, MD   omeprazole (PRILOSEC) 20 MG delayed release capsule take 1 capsule by mouth once daily 30 MINUTES PRIOR TO FOOD 21 Hussain Villar MD   isosorbide mononitrate (IMDUR) 30 MG extended release tablet take 1 tablet by mouth once daily 2/9/21   Hussain Villar MD   warfarin (COUMADIN) 7.5 MG tablet Take 7.5 mg by mouth daily    Historical Provider, MD   aspirin 325 MG EC tablet Take 1 tablet by mouth every 6 hours as needed for Pain 11/19/20   Hussain Villar MD   Insulin Pen Needle 31G X 8 MM MISC 1 each by Does not apply route 2 times daily 9/10/20   Hussain Villar MD   DULoxetine (CYMBALTA) 60 MG extended release capsule Take 1 capsule by mouth daily 9/10/20   Hussain Villar MD   [facility] ULTRA strip TEST three times a day if needed  Patient not taking: Reported on 6/24/2021 7/2/20   Hussain Villar MD   blood glucose test strips (ASCENSIA AUTODISC VI;ONE TOUCH ULTRA TEST VI) strip 1 each by In Vitro route 3 times daily As needed. Patient not taking: Reported on 6/24/2021 7/2/20   Hussain Villar MD   furosemide (LASIX) 20 MG tablet take 1 tablet by mouth once daily 6/15/20   Hussain Villar MD   pravastatin (PRAVACHOL) 40 MG tablet Take 1 tablet by mouth nightly 4/21/20   Hussain Villar MD   tamsulosin (FLOMAX) 0.4 MG capsule Take 0.4 mg by mouth daily    Historical Provider, MD       Current medications:    No current outpatient medications on file. No current facility-administered medications for this visit. Allergies: Allergies   Allergen Reactions    Adhesive Tape Other (See Comments)     Blister,skin tears with silk tape       Problem List:    Patient Active Problem List   Diagnosis Code    Prostate cancer ([facility] C61    Controlled type 2 diabetes mellitus with diabetic nephropathy, with long-term current use of insulin ([facility] E11.21, Z79.4    Mixed hyperlipidemia E78.2    Obesity (BMI 30.0-34. 9) E66.9    Coronary artery disease involving native coronary artery of native heart without angina pectoris I25.10    Pacemaker K57.6    Systolic heart failure (HCC) I50.20    Chronic back pain M54.9, G89.29    History of lung thrombosis Z86.711    Stage 3 chronic kidney disease (HCC) N18.30    Spinal stenosis at L4-L5 level M48.061    Chronic right-sided low back pain with right-sided sciatica M54.41, G89.29    Erectile dysfunction following radical prostatectomy N52.31    Type 2 diabetes mellitus with diabetic polyneuropathy ([facility]) E11.42    HTN (hypertension) I10    Spondylolisthesis, acquired M43.10    Foraminal stenosis of lumbar region M48.061    Degeneration of intervertebral disc SPL7312    Nocturia R35.1    Tinea pedis B35.3    Morbidly obese ([facility]) E66.01    Long term (current) use of anticoagulants Z79.01    Hernia, inguinal, recurrent K40.91    Multiple subsegmental pulmonary emboli without acute cor pulmonale ([facility]) I26.94       Past Medical History:        Diagnosis Date    Anxiety     no treatment    Arthritis     left shoulder    Cardiomegaly     CHF (congestive heart failure) ([facility]) EF 20%    Chronic back pain     GERD (gastroesophageal reflux disease)     History of lung thrombosis     Hx of blood clots     Hyperkalemia     Hyperlipidemia     Hypertension     Irregular heart beat     Neuropathy     feet    Obesity     WILLA (obstructive sleep apnea)     not using the machine    Pacemaker     AICD    PE (pulmonary embolism) 2001    \"about 5 yrs ago\"    Prostate cancer ([facility] 2016    Spinal stenosis at L4-L5 level 2019    Traumatic closed fx of eight or more ribs with minimal displacement 06/2017    pneumothorax. Pt fell 50 feet from a balcony. Pt denies history of a head injury.     Type 2 diabetes mellitus without complication [facility])        Past Surgical History:        Procedure Laterality Date    CHEST TUBE INSERTION  06/2017    COLONOSCOPY  2016    EYE SURGERY Right     injury    FINGER TRIGGER RELEASE  2020    left ring and thumb    HERNIA REPAIR  2020    LUMBAR FUSION  01/29/2020    L5-S1    LUMBAR SPINE SURGERY Right 1/29/2020    RIGHT L5 -S1 TLIF MINIMALLY PROTIME 10.0 01/15/2020    INR 1.0 01/15/2020    APTT 26.0 01/15/2020       HCG (If Applicable): No results found for: PREGTESTUR, PREGSERUM, HCG, HCGQUANT     ABGs: No results found for: PHART, PO2ART, COV2CEN, EDD1QHF, BEART, [id number]     Type & Screen (If Applicable):  No results found for: LABABO, LABRH    Anesthesia Evaluation   no history of anesthetic complications:   Airway: Mallampati: II  TM distance: >3 FB   Neck ROM: full  Mouth opening: > = 3 FB Dental:          Pulmonary: breath sounds clear to auscultation  (+) sleep apnea: on CPAP and noncompliant,  current smoker                           Cardiovascular:    (+) hypertension:, valvular problems/murmurs: MR, pacemaker: AICD, past MI:, CAD:, CHF:, hyperlipidemia    (-)  angina and  MURRAY    ECG reviewed  Rhythm: regular  Rate: normal  Echocardiogram reviewed    Cleared by cardiology           ROS comment: 5/20  Left Ventricle: There is mild concentric increased wall   thickness/hypertrophy.   Left Ventricle: Systolic function is moderately to severely decreased   with an ejection fraction of 30-35%. 7/22/21 ef 15%, mod MR     Neuro/Psych:   (+) neuromuscular disease (neuropathy):,             GI/Hepatic/Renal:   (+) GERD: well controlled, morbid obesity          Endo/Other:    (+) DiabetesType II DM, , malignancy/cancer (prostate). Abdominal:             Vascular:   + DVT, PE. Other Findings:             Anesthesia Plan      general     ASA 4     (Cardiac cleared)  Induction: intravenous. MIPS: Postoperative opioids intended and Prophylactic antiemetics administered. Anesthetic plan and risks discussed with patient. Use of blood products discussed with patient whom consented to blood products. Plan discussed with CRNA. Attending anesthesiologist reviewed and agrees with Preprocedure content    Place magnet on AICD.       Roshan Jean MD   8/25/2021

## 2021-08-25 NOTE — FLOWSHEET NOTE
08/25/21 1700   Vital Signs   Temp 98.2 °F (36.8 °C)   Temp Source Oral   Pulse 100   Heart Rate Source Monitor   Resp 16   /88   BP Location Right upper arm   Level of Consciousness Alert (0)   MEWS Score 1   Patient Currently in Pain Yes   Pt MEWS of 1  Continue VS as ordered

## 2021-08-25 NOTE — ANESTHESIA POSTPROCEDURE EVALUATION
Department of Anesthesiology  Postprocedure Note    Patient: Flavia Nj  MRN: [id number]  YOB: 1965  Date of evaluation: 8/25/2021  Time:  6:04 PM     Procedure Summary     Date: 08/25/21 Room / Location: [address] [place] [address]    Anesthesia Start: 3306 Anesthesia Stop: 7456    Procedure: Luis Aida Alec Paniagua 1640 (N/A ) Diagnosis: (DX PROSTATE CA)    Surgeons: Bari Leos MD Responsible Provider: Rolanda Melgoza MD    Anesthesia Type: general ASA Status: 4          Anesthesia Type: general    Patrick Phase I: Patrick Score: 9    Patrick Phase II:      Last vitals: Reviewed and per EMR flowsheets.        Anesthesia Post Evaluation    Patient location during evaluation: PACU  Patient participation: complete - patient participated  Level of consciousness: awake  Airway patency: patent  Nausea & Vomiting: no nausea  Complications: no  Cardiovascular status: blood pressure returned to baseline  Respiratory status: acceptable  Hydration status: euvolemic

## 2021-08-25 NOTE — CARE COORDINATION
Case Management Initial Discharge Plan  Sahil Zhao,             Met with:patient to discuss discharge plans. Information verified: address, contacts, phone number, , insurance Yes  PCP: Khloe Zazueta MD  Date of last visit: 2021    Insurance Provider: Rocio Escoto Dual     Discharge Planning    Living Arrangements:  Spouse/Significant Other, Family Members   Support Systems:  Spouse/Significant Other, Family Members    Home has 2 stories  7-8 stairs to climb to get into front door, 0 stairs to climb to reach second floor  Location of bedroom/bathroom in home  - stays on main floor    Patient able to perform ADL's:Independent    Current Services (outpatient & in home) none  DME equipment: walker, cane, glucometer  DME provider: N/A    Pharmacy: [facility]    Potential Assistance Needed:  N/A    Patient agreeable to home care: No  [place] of choice provided:  n/a    Prior SNF/Rehab Placement and Facility: No  Agreeable to SNF/Rehab: No  [place] of choice provided: n/a   Evaluation: n/a    Expected Discharge date:  09/15/21  Patient expects to be discharged to: Follow Up Appointment: Best Day/ Time: Tuesday PM    Transportation provider: wife  Transportation arrangements needed for discharge: No    Readmission Risk              Risk of Unplanned Readmission:  0             Does patient have a readmission risk score greater than 14?: No  If yes, follow-up appointment must be made within 7 days of discharge. Goal of Care:       Discharge Plan: Met with patient at bedside. Lives with wife and 4 children, independent and drives. S/P penile implant. Admitted for post op pain management. No HHC needs anticipated. Will follow with Dr. Lex Kemp post op.             Electronically signed by Laurie Nguyen RN on 21 at 4:43 PM EDT

## 2021-08-25 NOTE — H&P
History and Physical Service   [place] [address]    HISTORY AND PHYSICAL EXAMINATION            Date of Evaluation: 8/25/2021  Patient name:  Jett Rivera  MRN:   [id number]  YOB: 1965  PCP:    Yefri Guerrero MD    History Obtained From:     Patient, Medical records    History of Present Illness: This is Jett Rivera a 61 y.o. male who presents today for a penis prosthesis insertion by Dr. Elda Brunner due to history of prostate cancer. History of erectile dysfunction since prostatectomy in 2016. The pt has tried ED medication without improvement. History of urinary retention which is treated with Flomax. The pt has urinary frequency and nocturia. Pt denies urinary incontinence, urgency, dysuria, hematuria, fevers, chills, chest pain, dyspnea, rashes, open sores, and wounds. History of diabetes. POC blood glucose today is 235 mg/dL (Dr. Nely Martinez is aware). Vitamin D was last taken on 8/24/2021. History of cardiomegaly, CHF, hyperlipidemia, hypertension, irregular heart beat, pulmonary embolism (S/p vena cava filter placement in 2000), WILLA, and diabetes. S/p AICD placement in 2008. Pt states he had a cardiac catheterization with 1 stent placed 2 years ago. Pt follows-up with Dr. Parrish Natarajan from cardiology. The pt has cardiac clearance with moderate risk from Lily Dickson CNP. Cardiac clearance is in the pt's paper chart. Aspirin was last taken on 8/21/2021. Warfarin was last taken on 8/22/2021. Lovenox was last taken on 8/24/2021. [facility]. ECHO 7/22/2021 (Dr. Nely Martinez is aware of the ECHO results)  Summary:  1. Mildly dilated LV with mild LVH and severely reduced function, EF 15%  2. Mildly dilated LA  3. At least moderate MR  4. Mild TR  5. Grade 1 diastolic dysfunction    ECHO 12/05/2020  Narrative      Left Ventricle: There is mild concentric increased wall   thickness/hypertrophy.      Left Ventricle: Systolic function is moderately to severely decreased   with an ejection fraction of 30-35%. Past Medical History:     Past Medical History:   Diagnosis Date    Anxiety     no treatment    Arthritis     left shoulder    Cardiomegaly     CH[facility]congestive [facility] failure) [facility]) EF 20%    Chronic back pain     GERD (gastroesophageal reflux disease)     Heart attack ([facility]     History of lung thrombosis     Hx of blood clots     Hyperkalemia     Hyperlipidemia     Hypertension     Irregular heart beat     Neuropathy     feet    Obesity     WILLA (obstructive sleep apnea)     not using the machine    Pacemaker     AICD    PE (pulmonary embolism) 2001    \"about 5 yrs ago\"    Prostate cancer ([facility] 2016    Spinal stenosis at L4-L5 level 2019    Traumatic closed fx of eight or more ribs with minimal displacement 06/2017    pneumothorax. Pt fell 50 feet from a balcony. Pt denies history of a head injury.  Type 2 [facility] mellitus [facility] complication [facility]         Past Surgical History:     Past Surgical History:   Procedure Laterality Date    CHEST TUBE INSERTION  06/2017    COLONOSCOPY  2016    CORONARY ANGIOPLASTY WITH STENT PLACEMENT      EYE SURGERY Right     injury    FINGER TRIGGER RELEASE  2020    left ring and thumb    HERNIA REPAIR  2020    LUMBAR FUSION  01/29/2020    L5-S1    LUMBAR SPINE SURGERY Right 1/29/2020    RIGHT L5 -S1 TLIF MINIMALLY INVASIVE- 2CARMS,  NUVASIVE, NO CELLSAVER performed by Angelic Hunt MD at [facility]  2008    ICD;  battery change 2016;  Dr. Markie Garsia. DEFIB NOT SAFE MRI    PROSTATE BIOPSY      PROSTATECTOMY  08/31/2016    carmen lymph node dissection    VENA CAVA FILTER PLACEMENT  2000        Medications Prior to Admission:     Prior to Admission medications    Medication Sig Start Date End Date Taking?  Authorizing Provider   enoxaparin (LOVENOX) 120 MG/0.8ML injection Inject 120 mg into the skin every 12 hours 7/7/21  Yes Historical Provider, MD   hydrALAZINE (APRESOLINE) 25 MG tablet take 1 tablet by mouth twice a day 8/17/21  Yes Historical Provider, MD   NOVOLOG MIX 70/30 (70-30) 100 UNIT/ML injection inject 60 units subcutaneously twice a day with meals 8/23/21  Yes Stephanie Cary MD   gabapentin (NEURONTIN) 800 MG tablet take 1 tablet by mouth twice a day 8/10/21 9/10/21 Yes Stephanie Cary MD   carvedilol (COREG) 12.5 MG tablet take 1 tablet by mouth twice a day 5/13/21  Yes Historical Provider, MD   lisinopril (PRINIVIL;ZESTRIL) 5 MG tablet take 1 tablet by mouth once daily 4/15/21  Yes Historical Provider, MD   ciclopirox (LOPROX) 0.77 % cream Apply topically 2 times daily To feet 6/24/21  Yes Stephanie Cary MD   [facility]) 625 MG tablet Take 3 tablets by mouth 2 times daily 6/24/21  Yes Stephanie Cary MD   RA VITAMIN D-3 50 MCG (2000 UT) CAPS Take 1 capsule by mouth daily 6/24/21  Yes Stephanie Cary MD   nicotine (NICODERM CQ) 7 MG/24HR Place 1 patch onto the skin every 24 hours 6/24/21  Yes Stephanie Cary MD   JANUVIA 100 MG tablet take 1 tablet by mouth once daily 5/14/21  Yes Stephanie Cary MD   omeprazole (PRILOSEC) 20 MG delayed release capsule take 1 capsule by mouth once daily 30 MINUTES PRIOR TO FOOD 4/14/21  Yes Stephanie Cary MD   isosorbide mononitrate (IMDUR) 30 MG extended release tablet take 1 tablet by mouth once daily 2/9/21  Yes Stephanie Cary MD   DULoxetine (CYMBALTA) 60 MG extended release capsule Take 1 capsule by mouth daily 9/10/20  Yes Stephanie Cary MD   furosemide (LASIX) 20 MG tablet take 1 tablet by mouth once daily 6/15/20  Yes Stephanie Cary MD   pravastatin (PRAVACHOL) 40 MG tablet Take 1 tablet by mouth nightly 4/21/20  Yes Stephanie Cary MD   tamsulosin (FLOMAX) 0.4 MG capsule Take 0.4 mg by mouth daily   Yes Historical Provider, MD   Insulin Syringe-Needle U-100 (BD INSULIN SYRINGE U/F) 31G X 5/16\" 1 ML MISC use 1 PEN NEEDLE to inject MEDICATION subcutaneously twice a day 8/4/21   Yefri Guerrero MD   nicotine (NICODERM CQ) 14 MG/24HR Place 1 patch onto the skin every 24 hours for 14 days 6/24/21 7/8/21  Yefri Guerrero MD   nicotine (NICODERM CQ) 21 MG/24HR Place 1 patch onto the skin every 24 hours for 14 days 6/24/21 7/8/21  Yefri Guerrero MD   warfarin (COUMADIN) 7.5 MG tablet Take 7.5 mg by mouth daily    Historical Provider, MD   aspirin 325 MG EC tablet Take 1 tablet by mouth every 6 hours as needed for Pain 11/19/20   Yefri Guerrero MD   Insulin Pen Needle 31G X 8 MM MISC 1 each by Does not apply route 2 times daily 9/10/20   Yefri Guerrero MD   [facility] ULTRA strip TEST three times a day if needed  Patient not taking: Reported on 6/24/2021 7/2/20   Yefri Guerrero MD   blood glucose test strips (ASCENSIA AUTODISC VI;ONE TOUCH ULTRA TEST VI) strip 1 each by In Vitro route 3 times daily As needed. Patient not taking: Reported on 6/24/2021 7/2/20   Yefri Guerrero MD        Allergies:     Adhesive tape    Social History:     Tobacco:    reports that he quit smoking about 8 weeks ago. He has a 10.00 pack-year smoking history. He has never used smokeless tobacco.  Alcohol:      reports no history of alcohol use. Drug Use:  reports no history of drug use. Family History:     Family History   Problem Relation Age of Onset    Diabetes Father     Hypertension Father     Heart Disease Father     Hypertension Mother     Prostate Cancer Brother     Diabetes Brother     Diabetes Brother     Diabetes Brother        Review of Systems:     Positive and Negative as described in HPI. CONSTITUTIONAL: Negative for fevers, chills, sweats, fatigue, and weight loss. HEENT: Negative for glasses, hearing changes, rhinorrhea, and throat pain. RESPIRATORY: WILLA. Negative for shortness of breath, cough, congestion, and wheezing. CARDIOVASCULAR: History of a pulmonary embolism. Pt has a [place] filter. Irregular heart beat. Pt has an AICD. Negative for chest pain and palpitations. GASTROINTESTINAL: The pt takes Prilosec for acid reflux. Negative for nausea, vomiting, diarrhea, constipation, and abdominal pain. GENITOURINARY: See HPI. INTEGUMENT: Easy bruising. Negative for rash and skin lesions. HEMATOLOGIC/LYMPHATIC: Bilateral lower extremity edema. Pt takes Lasix. ALLERGIC/IMMUNOLOGIC: Negative for urticaria and itching. ENDOCRINE: Diabetes. Last A1C was 8.1% per pt. Negative for increase in drinking, increase in urination, and heat or cold intolerance. MUSCULOSKELETAL: Negative joint pains, muscle aches, and swelling of joints. NEUROLOGICAL: Negative for headaches, dizziness, lightheadedness, numbness, and tingling extremities. Pt denies history of seizures and strokes. BEHAVIOR/PSYCH: Negative for depression and anxiety. Physical Exam:   /89   Pulse 94   Temp 97.5 °F (36.4 °C) (Temporal)   Resp 16   Ht 6' 1\" (1.854 m)   Wt 270 lb (122.5 kg)   SpO2 98%   BMI 35.62 kg/m²     Recent Labs     08/25/21  0756   POCGLU 235*        General Appearance:  Alert, well appearing, and in no acute distress. Obese. Mental status: Oriented to person, place, and time. Head: Normocephalic and atraumatic. Eye: No icterus, redness, pupils equal and reactive, extraocular eye movements intact, and conjunctiva clear. Ear: Hearing grossly intact. Nose: No drainage noted. Mouth: Mucous membranes moist.  Neck: Distant bilateral carotid sounds. Supple and no carotid bruits noted. Lungs: Bilateral equal air entry, clear to auscultation, no wheezing, rales or rhonchi, and normal effort. Cardiovascular: Distant heart sounds. ICD in the left upper chest. Normal rate, regular rhythm, no murmur, gallop, and rub. Abdomen: Rotund. Soft, nontender, and active bowel sounds. Neurologic: Normal speech and cranial nerves II through XII grossly intact. Strength 5/5 bilaterally.   Skin: No gross lesions, rashes, bruising, or bleeding on exposed skin area. Extremities: Bilateral lower extremity 1+ pitting edema. Posterior tibial pulses are palpable bilaterally. No calf tenderness with palpation. Psych: Normal affect. Investigations:      Laboratory Testing:  Recent Results (from the past 24 hour(s))   Protime-INR    Collection Time: 08/25/21  7:55 AM   Result Value Ref Range    Protime 14.4 (H) 11.5 - 14.2 sec    INR 1.1    POC Glucose Fingerstick    Collection Time: 08/25/21  7:56 AM   Result Value Ref Range    POC Glucose 235 (H) 75 - 110 mg/dL       Recent Labs     08/25/21  0755   INR 1.1   PROTIME 14.4*       No results for input(s): COVID19 in the last 720 hours. Diagnosis:      1. History of prostate cancer    Plans:     1.  Penis prosthesis insertion      GWENDOLYN Bartholomew CNP  8/25/2021  8:37 AM

## 2021-08-25 NOTE — FLOWSHEET NOTE
Pt with medical staff x1 during both attempted visits today. Writer says a prayer at the door. Chaplains will remain available to offer spiritual and emotional support as needed.        08/25/21 1702   Encounter Summary   Services provided to: Patient not available   Referral/Consult From: Rivera   Continue Visiting   (8/25/21 not available)   Complexity of Encounter Low   Routine   Type Initial     Electronically signed by Sergio Sharma, on 8/25/2021 at 5:03 PM.  [place]  [phone]

## 2021-08-25 NOTE — PROGRESS NOTES
Pt admitted to room 2010 per bed in stable condition from PACU  Oriented to room and surroundings  Bed in lowest position, wheels locked, 2/4 side rails up  Call light in reach, room free of clutter, adequate lighting provided  Denies any further questions at this time  Instructed to call out with any questions/concerns/new onset of pain and/or n/v   White board updated  Continue to monitor with hourly rounding  STAY WITH ME protocol initiated   Bed alarm on/Fall Risk signs in place/Fall risk sticker to wrist band  Non-skid socks on/at bedside  MEDICATION EDUCATION SHEET in place for patient/family to view & ask questions.

## 2021-08-25 NOTE — PLAN OF CARE
Problem: Pain:  Goal: Control of acute pain  Description: Control of acute pain  Outcome: Ongoing  Note: Pain level assessed and rated on a 0-10 scale  Assess characteristics of pain  PRN pain medication given per pt request  Non-pharmacological interventions implemented  Report ineffective pain management to physician  Update pt and family of any changes  Pt instructed to call out with new onset of pain  Continue to monitor       Problem: Falls - Risk of:  Goal: Will remain free from falls  Description: Will remain free from falls  Outcome: Ongoing     Problem: Safety:  Goal: Free from accidental physical injury  Description: Free from accidental physical injury  Outcome: Ongoing  Note: Proper pt identification  Hourly rounding performed  Anticipatory needs met  Non-skid socks worn  Proper transferring technique  2/4 side rails up  Personal necessities within reach  Bed low and locked  Call light in reach  Proper lighting  Room free of clutter  Continue to monitor         Problem: Infection:  Goal: Will remain free from infection  Description: Will remain free from infection  Outcome: Ongoing     Problem: Daily Care:  Goal: Daily care needs are met  Description: Daily care needs are met  Outcome: Ongoing  Note: Assess patient self-care activities  Encourage patient to perform ADL's as tolerated  Include family when possible       Problem: Discharge Planning:  Goal: Patients continuum of care needs are met  Description: Patients continuum of care needs are met  Outcome: Ongoing  Note: Identify potential discharge needs/barriers on admission  Involve family/patient/significant other in discharge process  Collaborate with Case Management/ for discharge needs/concerns

## 2021-08-26 VITALS
OXYGEN SATURATION: 95 % | HEART RATE: 98 BPM | DIASTOLIC BLOOD PRESSURE: 80 MMHG | WEIGHT: 270.4 LBS | RESPIRATION RATE: 18 BRPM | BODY MASS INDEX: 35.84 KG/M2 | TEMPERATURE: 98.3 F | HEIGHT: 73 IN | SYSTOLIC BLOOD PRESSURE: 129 MMHG

## 2021-08-26 LAB
GLUCOSE BLD-MCNC: 250 MG/DL (ref 75–110)
GLUCOSE BLD-MCNC: 296 MG/DL (ref 75–110)
GLUCOSE BLD-MCNC: 313 MG/DL (ref 75–110)
GLUCOSE BLD-MCNC: 326 MG/DL (ref 75–110)
GLUCOSE BLD-MCNC: 337 MG/DL (ref 75–110)
GLUCOSE BLD-MCNC: 413 MG/DL (ref 75–110)
HCT VFR BLD CALC: 42.9 % (ref 40.7–50.3)
HEMOGLOBIN: 13.3 G/DL (ref 13–17)
LACTIC ACID, SEPSIS WHOLE BLOOD: NORMAL MMOL/L (ref 0.5–1.9)
LACTIC ACID, SEPSIS: 1.6 MMOL/L (ref 0.5–1.9)
MCH RBC QN AUTO: 25.7 PG (ref 25.2–33.5)
MCHC RBC AUTO-ENTMCNC: 31 G/DL (ref 28.4–34.8)
MCV RBC AUTO: 82.8 FL (ref 82.6–102.9)
NRBC AUTOMATED: 0 PER 100 WBC
PDW BLD-RTO: 16.4 % (ref 11.8–14.4)
PLATELET # BLD: 214 K/UL (ref 138–453)
PMV BLD AUTO: 9.8 FL (ref 8.1–13.5)
RBC # BLD: 5.18 M/UL (ref 4.21–5.77)
WBC # BLD: 12.5 K/UL (ref 3.5–11.3)

## 2021-08-26 PROCEDURE — 6370000000 HC RX 637 (ALT 250 FOR IP): Performed by: NURSE PRACTITIONER

## 2021-08-26 PROCEDURE — 36415 COLL VENOUS BLD VENIPUNCTURE: CPT

## 2021-08-26 PROCEDURE — G0378 HOSPITAL OBSERVATION PER HR: HCPCS

## 2021-08-26 PROCEDURE — 99203 OFFICE O/P NEW LOW 30 MIN: CPT | Performed by: FAMILY MEDICINE

## 2021-08-26 PROCEDURE — 96372 THER/PROPH/DIAG INJ SC/IM: CPT

## 2021-08-26 PROCEDURE — 83605 ASSAY OF LACTIC ACID: CPT

## 2021-08-26 PROCEDURE — 82947 ASSAY GLUCOSE BLOOD QUANT: CPT

## 2021-08-26 PROCEDURE — 85027 COMPLETE CBC AUTOMATED: CPT

## 2021-08-26 PROCEDURE — 6370000000 HC RX 637 (ALT 250 FOR IP): Performed by: UROLOGY

## 2021-08-26 PROCEDURE — 87040 BLOOD CULTURE FOR BACTERIA: CPT

## 2021-08-26 PROCEDURE — 2580000003 HC RX 258: Performed by: UROLOGY

## 2021-08-26 PROCEDURE — 6360000002 HC RX W HCPCS: Performed by: UROLOGY

## 2021-08-26 RX ORDER — OXYCODONE HYDROCHLORIDE AND ACETAMINOPHEN 5; 325 MG/1; MG/1
1 TABLET ORAL EVERY 6 HOURS PRN
Qty: 10 TABLET | Refills: 0 | Status: SHIPPED | OUTPATIENT
Start: 2021-08-26 | End: 2021-08-29

## 2021-08-26 RX ORDER — DEXTROSE MONOHYDRATE 25 G/50ML
12.5 INJECTION, SOLUTION INTRAVENOUS PRN
Status: DISCONTINUED | OUTPATIENT
Start: 2021-08-26 | End: 2021-08-26 | Stop reason: SDUPTHER

## 2021-08-26 RX ORDER — NICOTINE POLACRILEX 4 MG
15 LOZENGE BUCCAL PRN
Status: DISCONTINUED | OUTPATIENT
Start: 2021-08-26 | End: 2021-08-26 | Stop reason: SDUPTHER

## 2021-08-26 RX ORDER — CEPHALEXIN 500 MG/1
500 CAPSULE ORAL 4 TIMES DAILY
Qty: 42 CAPSULE | Refills: 0 | Status: SHIPPED | OUTPATIENT
Start: 2021-08-26 | End: 2021-09-09

## 2021-08-26 RX ORDER — DEXTROSE MONOHYDRATE 50 MG/ML
100 INJECTION, SOLUTION INTRAVENOUS PRN
Status: DISCONTINUED | OUTPATIENT
Start: 2021-08-26 | End: 2021-08-26 | Stop reason: SDUPTHER

## 2021-08-26 RX ADMIN — CEFAZOLIN SODIUM 3000 MG: 10 INJECTION, POWDER, FOR SOLUTION INTRAVENOUS at 00:55

## 2021-08-26 RX ADMIN — DULOXETINE HYDROCHLORIDE 60 MG: 60 CAPSULE, DELAYED RELEASE ORAL at 08:59

## 2021-08-26 RX ADMIN — PANTOPRAZOLE SODIUM 40 MG: 40 TABLET, DELAYED RELEASE ORAL at 08:59

## 2021-08-26 RX ADMIN — INSULIN LISPRO 12 UNITS: 100 INJECTION, SOLUTION INTRAVENOUS; SUBCUTANEOUS at 01:54

## 2021-08-26 RX ADMIN — INSULIN LISPRO 8 UNITS: 100 INJECTION, SOLUTION INTRAVENOUS; SUBCUTANEOUS at 05:49

## 2021-08-26 RX ADMIN — LINAGLIPTIN 5 MG: 5 TABLET, FILM COATED ORAL at 08:59

## 2021-08-26 RX ADMIN — ENOXAPARIN SODIUM 120 MG: 150 INJECTION SUBCUTANEOUS at 09:01

## 2021-08-26 RX ADMIN — FUROSEMIDE 20 MG: 20 TABLET ORAL at 08:59

## 2021-08-26 RX ADMIN — OXYCODONE AND ACETAMINOPHEN 1 TABLET: 5; 325 TABLET ORAL at 00:55

## 2021-08-26 RX ADMIN — INSULIN LISPRO 8 UNITS: 100 INJECTION, SOLUTION INTRAVENOUS; SUBCUTANEOUS at 10:08

## 2021-08-26 RX ADMIN — CARVEDILOL 12.5 MG: 12.5 TABLET, FILM COATED ORAL at 08:59

## 2021-08-26 RX ADMIN — HYDRALAZINE HYDROCHLORIDE 25 MG: 25 TABLET, FILM COATED ORAL at 08:59

## 2021-08-26 RX ADMIN — LISINOPRIL 5 MG: 5 TABLET ORAL at 08:59

## 2021-08-26 RX ADMIN — GABAPENTIN 800 MG: 400 CAPSULE ORAL at 08:59

## 2021-08-26 RX ADMIN — ISOSORBIDE MONONITRATE 30 MG: 30 TABLET, EXTENDED RELEASE ORAL at 08:59

## 2021-08-26 RX ADMIN — GENTAMICIN SULFATE 120 MG: 40 INJECTION, SOLUTION INTRAMUSCULAR; INTRAVENOUS at 04:20

## 2021-08-26 RX ADMIN — CEFAZOLIN SODIUM 3000 MG: 10 INJECTION, POWDER, FOR SOLUTION INTRAVENOUS at 08:59

## 2021-08-26 ASSESSMENT — PAIN DESCRIPTION - LOCATION: LOCATION: PENIS

## 2021-08-26 ASSESSMENT — ENCOUNTER SYMPTOMS
BACK PAIN: 0
COUGH: 0
SHORTNESS OF BREATH: 0
VOICE CHANGE: 0
NAUSEA: 0
VOMITING: 0
DIARRHEA: 0
ABDOMINAL PAIN: 0
CHEST TIGHTNESS: 0
SINUS PRESSURE: 0
RHINORRHEA: 0
CONSTIPATION: 0
WHEEZING: 0
CHOKING: 0

## 2021-08-26 ASSESSMENT — PAIN SCALES - GENERAL
PAINLEVEL_OUTOF10: 5
PAINLEVEL_OUTOF10: 8

## 2021-08-26 ASSESSMENT — PAIN DESCRIPTION - PROGRESSION: CLINICAL_PROGRESSION: NOT CHANGED

## 2021-08-26 ASSESSMENT — PAIN DESCRIPTION - ONSET: ONSET: ON-GOING

## 2021-08-26 ASSESSMENT — PAIN DESCRIPTION - DESCRIPTORS: DESCRIPTORS: ACHING;BURNING;DISCOMFORT

## 2021-08-26 ASSESSMENT — PAIN DESCRIPTION - PAIN TYPE: TYPE: SURGICAL PAIN

## 2021-08-26 ASSESSMENT — PAIN DESCRIPTION - FREQUENCY: FREQUENCY: CONTINUOUS

## 2021-08-26 NOTE — PROGRESS NOTES
CLINICAL PHARMACY NOTE: MEDS TO BEDS    Total # of Prescriptions Filled: 2   The following medications were delivered to the patient:  · PERCOCET 5-325  · CEPHALEXIN 500MG    Additional Documentation:

## 2021-08-26 NOTE — CONSULTS
[facility]  Office: [address], DO, Dinorah Lundberg, DO, Oswald Patee, DO, Tash Tarik Blood, DO, Jameson Ramos MD, Nicci Cordon MD, Harman Gilman MD, Roro Holder MD, Stu Castro MD, Vanna Hobbs MD, Manny Rose MD, Ok Jennings, DO, Aura Romo MD, Evelyne Tillman, DO, Khanh Dodd MD,  Yas Rota, DO, Jennifer Fortune MD, Ajay Lr MD, Mauro Aldana MD, Buster Vallejo MD, Yusuf Valadez MD, Sam Reynolds MD, Anneliese Gomez MD, Kendall León, [facility], [facility], CNP, Gopi Fontana, CNP, Kelley Greco, CNS, Salome Hamilton, CNP, Linda Santiago, CNP, Shelton Lawrence, CNP, Heddy Sacks, CNP, Aura Briceño, CNP, Nemo Soto PA-C, Mert Jones, [place], Jose Logan, CNP, Marta Cast, CNP, Michael Quinones, CNP, Mily Small, CNP, Franklin Garcia, CNP, Darrell Brenner, CNP, Chalino Michel, CNP, Yolanda Pena, [facility]      Consultation Note       Admit Date: 8/25/2021  Bed/Room No.  2010/2010-02  Admitting Physician : No att. providers found  Code Status :Prior  Hospital Day:  LOS: 0 days   Patient is currently admitted for  treatment of  ED (erectile dysfunction)  Reason for Consult:  Medical Management   Requesting Physician: No att. providers found  Penny Fajardo MD    HISTORY OF PRESENT ILLNESS:   The patient is a 61 y.o. male who is admitted for for surgical treatment of erectile dysfunction. Patient underwent penile prosthesis insertion on 8/25/2021 with Dr. Jing Manjarrez. He has underlying history of congestive heart failure, sleep apnea, spinal stenosis, type 2 diabetes mellitus. Patient had failed on medical therapy before. Following for med management. Patient has been having hypoglycemia post surgery. He is currently on 2 L of oxygen by nasal cannula. He is very uncontrolled diabetes mellitus with last A1c 9.8. Patient is on insulin NovoLog Mix 70/30 and takes 60 units twice daily with meals along with Januvia.      Past Medical History:        Diagnosis Date    Anxiety     no treatment    Arthritis     left shoulder    Cardiomegaly     CHF [facility]congestive heart failure) [facility]) EF 20%    Chronic back pain     GERD (gastroesophageal reflux disease)     Heart attack ([facility]     History of lung thrombosis     Hx of blood clots     Hyperkalemia     Hyperlipidemia     Hypertension     Irregular heart beat     Neuropathy     feet    Obesity     WILLA (obstructive sleep apnea)     not using the machine    Pacemaker     AICD    PE (pulmonary embolism) 2001    \"about 5 yrs ago\"    Prostate cancer ([facility] 2016    Spinal stenosis at L4-L5 level 2019    Traumatic closed fx of eight or more ribs with minimal displacement 06/2017    pneumothorax. Pt fell 50 feet from a balcony. Pt denies history of a head injury.  Type 2 diabetes mellitus without complication [facility])        Past Surgical History:        Procedure Laterality Date    CHEST TUBE INSERTION  06/2017    COLONOSCOPY  2016    CORONARY ANGIOPLASTY WITH STENT PLACEMENT      EYE SURGERY Right     injury    FINGER TRIGGER RELEASE  2020    left ring and thumb    HERNIA REPAIR  2020    LUMBAR FUSION  01/29/2020    L5-S1    LUMBAR SPINE SURGERY Right 1/29/2020    RIGHT L5 -S1 TLIF MINIMALLY INVASIVE- 2CARMS,  NUVASIVE, NO CELLSAVER performed by Sofi Bustos MD at [address] [place] [address]  2008    ICD;  battery change 2016;  Dr. Chela Britton. DEFIB NOT SAFE MRI    PENILE PROSTHESIS PLACEMENT N/A 8/25/2021    PENIS PROSTHESIS INSERTION  - COLOPLAST performed by Christianne Irving MD at [address] [place] [address]  08/31/2016    carmen lymph node dissection    VENA CAVA FILTER PLACEMENT  2000       Medications Prior to Admission:    Prior to Admission medications    Medication Sig Start Date End Date Taking?  Authorizing Provider   oxyCODONE-acetaminophen (PERCOCET) 5-325 MG per tablet Take 1 tablet by mouth every 6 hours as needed for Pain for up to 3 days.  8/26/21 8/29/21 Yes Kassy Pittman MD   cephALEXin (KEFLEX) 500 MG capsule Take 1 capsule by mouth 4 times daily for 14 days 8/26/21 9/9/21 Yes Kassy Pittman MD   hydrALAZINE (APRESOLINE) 25 MG tablet take 1 tablet by mouth twice a day 8/17/21  Yes Historical Provider, MD   NOVOLOG MIX 70/30 (70-30) 100 UNIT/ML injection inject 60 units subcutaneously twice a day with meals 8/23/21  Yes Rafael Smith MD   gabapentin (NEURONTIN) 800 MG tablet take 1 tablet by mouth twice a day 8/10/21 9/10/21 Yes Rafael Smith MD   carvedilol (COREG) 12.5 MG tablet take 1 tablet by mouth twice a day 5/13/21  Yes Historical Provider, MD   lisinopril (PRINIVIL;ZESTRIL) 5 MG tablet take 1 tablet by mouth once daily 4/15/21  Yes Historical Provider, MD   ciclopirox (LOPROX) 0.77 % cream Apply topically 2 times daily To feet 6/24/21  Yes Rafael Smith MD   [facility]) 625 MG tablet Take 3 tablets by mouth 2 times daily 6/24/21  Yes Rafael Smith MD   RA VITAMIN D-3 50 MCG (2000 UT) CAPS Take 1 capsule by mouth daily 6/24/21  Yes Rafael Smith MD   JANUVIA 100 MG tablet take 1 tablet by mouth once daily 5/14/21  Yes Rafael Smith MD   omeprazole (PRILOSEC) 20 MG delayed release capsule take 1 capsule by mouth once daily 30 MINUTES PRIOR TO FOOD 4/14/21  Yes Rafael Smith MD   isosorbide mononitrate (IMDUR) 30 MG extended release tablet take 1 tablet by mouth once daily 2/9/21  Yes Rafael Smith MD   DULoxetine (CYMBALTA) 60 MG extended release capsule Take 1 capsule by mouth daily 9/10/20  Yes Rafael Smith MD   furosemide (LASIX) 20 MG tablet take 1 tablet by mouth once daily 6/15/20  Yes Rafael Smith MD   pravastatin (PRAVACHOL) 40 MG tablet Take 1 tablet by mouth nightly 4/21/20  Yes Rafael Smith MD   Insulin Syringe-Needle U-100 (BD INSULIN SYRINGE U/F) 31G X 5/16\" 1 ML MISC use 1 PEN NEEDLE to inject MEDICATION subcutaneously twice a day 8/4/21   Gasper Vences MD   warfarin (COUMADIN) 7.5 MG tablet Take 7.5 mg by mouth daily    Historical Provider, MD   Insulin Pen Needle 31G X 8 MM MISC 1 each by Does not apply route 2 times daily 9/10/20   Gasper Vences MD   [facility] ULTRA strip TEST three times a day if needed  Patient not taking: Reported on 6/24/2021 7/2/20   Gasper Vences MD   blood glucose test strips (ASCENSIA AUTODISC VI;ONE TOUCH ULTRA TEST VI) strip 1 each by In Vitro route 3 times daily As needed. Patient not taking: Reported on 6/24/2021 7/2/20   Gasper Vences MD       Allergies:  Adhesive tape    Social History:   TOBACCO:   reports that he quit smoking about 8 weeks ago. He has a 10.00 pack-year smoking history. He has never used smokeless tobacco.  ETOH:   reports no history of alcohol use. OCCUPATION:      Family History:       Problem Relation Age of Onset    Diabetes Father     Hypertension Father     Heart Disease Father     Hypertension Mother     Prostate Cancer Brother     Diabetes Brother     Diabetes Brother     Diabetes Brother        Review of Systems   Constitutional: Negative for activity change, appetite change, fatigue, fever and unexpected weight change. HENT: Negative for congestion, nosebleeds, rhinorrhea, sinus pressure, sneezing and voice change. Respiratory: Negative for cough, choking, chest tightness, shortness of breath and wheezing. Cardiovascular: Negative for chest pain, palpitations and leg swelling. Gastrointestinal: Negative for abdominal pain, constipation, diarrhea, nausea and vomiting. Genitourinary: Negative for difficulty urinating, discharge, dysuria, frequency and testicular pain. Musculoskeletal: Negative for back pain. Skin: Negative for rash. Neurological: Negative for dizziness, weakness, light-headedness and headaches. Hematological: Does not bruise/bleed easily.    Psychiatric/Behavioral: Negative for agitation, behavioral problems, confusion, self-injury, sleep disturbance and suicidal ideas. Objective:   /80   Pulse 98   Temp 98.3 °F (36.8 °C) (Oral)   Resp 18   Ht 6' 1\" (1.854 m)   Wt 270 lb 6.4 oz (122.7 kg)   SpO2 95%   BMI 35.67 kg/m²       Intake/Output Summary (Last 24 hours) at 8/26/2021 1521  Last data filed at 8/26/2021 2785  Gross per 24 hour   Intake 2707 ml   Output 2800 ml   Net -93 ml       Physical Exam  Vitals and nursing note reviewed. Constitutional:       General: He is not in acute distress. Appearance: He is obese. He is not diaphoretic. HENT:      Head: Normocephalic and atraumatic. Nose:      Right Sinus: No maxillary sinus tenderness or frontal sinus tenderness. Left Sinus: No maxillary sinus tenderness or frontal sinus tenderness. Mouth/Throat:      Pharynx: No oropharyngeal exudate. Eyes:      General: No scleral icterus. Conjunctiva/sclera: Conjunctivae normal.      Pupils: Pupils are equal, round, and reactive to light. Neck:      Thyroid: No thyromegaly. Vascular: No JVD. Cardiovascular:      Rate and Rhythm: Normal rate and regular rhythm. Pulses:           Dorsalis pedis pulses are 2+ on the right side and 2+ on the left side. Heart sounds: Normal heart sounds. No murmur heard. Pulmonary:      Effort: Pulmonary effort is normal.      Breath sounds: Normal breath sounds. No wheezing or rales. Abdominal:      Palpations: Abdomen is soft. There is no mass. Tenderness: There is no abdominal tenderness. Musculoskeletal:      Cervical back: Full passive range of motion without pain and neck supple. Lymphadenopathy:      Head:      Right side of head: No submandibular adenopathy. Left side of head: No submandibular adenopathy. Cervical: No cervical adenopathy. Skin:     General: Skin is warm. Neurological:      Mental Status: He is alert and oriented to person, place, and time. Motor: No tremor.    Psychiatric:         Behavior: Behavior is cooperative. Laboratory findings:    Hematology:  Recent Labs     08/26/21 0217   WBC 12.5*   HGB 13.3   HCT 42.9   MCV 82.8          Chemistry:  Lab Results   Component Value Date     06/30/2021    K 4.7 06/30/2021     06/30/2021    CO2 25 06/30/2021    BUN 17 06/30/2021    CREATININE 1.22 (H) 06/30/2021    GLUCOSE 172 (H) 06/30/2021    CALCIUM 9.5 06/30/2021    PROT 6.8 11/19/2020    LABALBU 3.8 11/19/2020    BILITOT 0.34 11/19/2020    ALKPHOS 74 11/19/2020    AST 27 11/19/2020    ALT 23 11/19/2020    LABGLOM >60 06/30/2021    GFRAA >60 06/30/2021    GLOB NOT REPORTED 11/09/2016     Lab Results   Component Value Date    LABALBU 3.8 11/19/2020     Lab Results   Component Value Date    LABA1C 9.8 (H) 06/30/2021     Lab Results   Component Value Date     06/30/2021     No results found for: TSHFT4, TSH    Magnesium: No results found for: MG  Phosphorus: No results found for: PHOS  Ionized Calcium: No results found for: CAION   Last 3 Blood Glucose:   No results for input(s): GLUCOSE in the last 72 hours. Urinalysis:   PT/INR:    Lab Results   Component Value Date    PROTIME 14.4 08/25/2021    INR 1.1 08/25/2021     PTT:    Lab Results   Component Value Date    APTT 26.0 01/15/2020     Microbiology:    Imaging / Clinical Data:   No results found. Assessment and Plan   Principal Problem:    ED (erectile dysfunction)  Active Problems:    Prostate cancer ([address]    Type 2 diabetes mellitus with hyperglycemia, with long-term current use of insulin (HCC)    Mixed hyperlipidemia  Resolved Problems:    * No resolved hospital problems. *         1. Erectile dysfunction s/p coloplast inflatable penile prosthesis insertion - post op management per Urology. 2. Uncontrolled type 2 diabetes mellitus - Patient is on insulin and still has hyperglycemia. He has Free style Continuous blood sugar monitor system in place.    I recommend to follow diabetic diet and  discuss GLP1 agonist  Or referral to Endo outpatient. 3. Obesity - life style changes. 4. Sleep apnea  5. H/o pulmonary embolism  6. H/o prostate cancer 2016  7. Lumbar spinal Canal stenosis    Not opposed to discharge     Thank you for allowing me in care of this patient and consult. call with any questions. Will follow with you. Patients questions answered . Updates discussed with patient and Staff  RN.      Darryle So, MD, MD  8/26/2021  Copy of note to No att. providers found and  Nevin Elmore MD    (Please note that portions of this note were completed with a voice recognition program. Efforts were made to edit the dictations but occasionally words are mis-transcribed.)

## 2021-08-26 NOTE — FLOWSHEET NOTE
08/25/21 1915   Vital Signs   Temp 98.6 °F (37 °C)   Temp Source Oral   Pulse 125   Heart Rate Source Monitor   Resp 17   /75   BP Location Right Arm   MAP (mmHg) 86   Level of Consciousness Alert (0)   MEWS Score 3     MEWS score of 3;will repeat & monitor per protocol

## 2021-08-26 NOTE — PROGRESS NOTES
Exam: Dressing clean dry and intact. Interval Imaging Findings:    Impression: ED    Patient Active Problem List   Diagnosis    Prostate cancer ([address]    Controlled type 2 diabetes mellitus with diabetic nephropathy, with long-term current use of insulin (HCC)    Mixed hyperlipidemia    Obesity (BMI 30.0-34. 9)    Coronary artery disease involving native coronary artery of native heart without angina pectoris    Pacemaker    Systolic heart failure (HCC)    Chronic back pain    History of lung thrombosis    Stage 3 chronic kidney disease ([address]    Spinal stenosis at L4-L5 level    Chronic right-sided low back pain with right-sided sciatica    Erectile dysfunction following radical prostatectomy    Type 2 diabetes mellitus with diabetic polyneuropathy ([address]    HTN (hypertension)    Spondylolisthesis, acquired    Foraminal stenosis of lumbar region    Degeneration of intervertebral disc    Nocturia    Tinea pedis    Morbidly obese ([address]    Long term (current) use of anticoagulants    Hernia, inguinal, recurrent    Multiple subsegmental pulmonary emboli without acute cor pulmonale ([address]    ED (erectile dysfunction)       Plan: Discharge. Follow up 2 weeks.     Dat Arroyo MD  7:20 AM 8/26/2021

## 2021-08-26 NOTE — PROGRESS NOTES
Pt discharged to home in stable condition with belongings  Discharge instructions given  \"Meds To Beds\" medication at bedside  Pt denies having any further questions at this time  Patient/family state they have everything they were admitted with.

## 2021-08-26 NOTE — OP NOTE
[id number] [facility] [address]                [address] [place] [address]                                OPERATIVE REPORT    PATIENT NAME: Erin Martines                  :        1965  MED REC NO:   [id number]                             ROOM:         ACCOUNT NO:   [de-identified]                           ADMIT DATE: 2021  PROVIDER:     Kathy Yan    DATE OF PROCEDURE:  2021    PREOPERATIVE DIAGNOSIS:  Erectile dysfunction. POSTOPERATIVE DIAGNOSIS:  Erectile dysfunction. NATURE OF OPERATION:  Placement of inflatable three-piece penile  prosthesis. SURGEON:  Live Vaughan MD    ANESTHESIA:  General.    ESTIMATED BLOOD LOSS:  50 mL. DRAINS:  16-Mauritanian Moreira catheter. INDICATIONS:  The patient is a 50-year-old male. He developed erectile  dysfunction after robotic prostatectomy on 2016. He has used  sildenafil as well as Artas triple agent without success. I have  offered him penile implant to treat his erectile dysfunction. I  discussed risks of bleeding, infection, mechanical failure of the  device, urinary tract injury, vascular injury, bowel injury, shortening  of penile length, penile curvature, SST deformity, lack of glans penis  sensation, crossover of the corporal bodies. He understands and agrees  to proceed. PROCEDURE:  He was taken to the operating room. He was given a general  anesthetic. He was put in the supine position. His genitals were first  prepped with Hibiclens. He was then prepped with ChloraPrep. After 3  minutes, he was prepped and draped in usual sterile fashion. A  16-Mauritanian coude tip Moreira was placed in the bladder. The penis was  retracted superiorly by placing a hook in the meatus. Marques retractor  was placed. Ioban drape was placed over the surgical field. A vertical  penile scrotal incision was made with a 15-blade scalpel.   The Bovie was  used to dissect through the dartos layers. Blunt and sharp dissection  was performed. Our retracting hooks were placed. Both corporal bodies  were identified. Metzenbaum scissors were used to dissect them. We  then placed the sutures of 3-0 PDS in both corporal bodies. Corporotomies were made with the cutting current of our Bovie. Corporal  bodies were dilated with Metzenbaum scissors. I then dilated to  13-Filipino proximally and distally with Ryann dilators. Our measuring  device was inserted. I measured a 20-cm implant. That was then  prepared on the field. I then placed our 75 mL reservoir in the left  retropubic space. Metzenbaum scissors were used to zeng the  transversalis fascia over the pubic bone. Once our space was developed,  the reservoir was inserted. 75 mL of saline were placed in the  reservoir. There was no evidence of retrograde flow. Once the implant  was prepared, the sutures to the distal end of the implant were placed  through the Flo Common needle. The Flo Common needle was then placed in the  measuring device. I then placed that in the distal aspect of the  corporal bodies bilaterally. The implants were then pulled distally. I  then placed the proximal aspect of the implant in the corporal body. I  then did a test insufflation. The implant buckled. I was unable to  close the corporotomy as the implant was too long. At that point, we  prepared an 18-cm implant. Once that implant was prepared and irrigated  with gentamicin and rifampin solution, I repeated the same technique  using the Flo Common needles and the measuring device and our implant seated  proximally and distally. Test insufflation revealed a rigid straight  erect penis. The device was deflated. Our corporotomies were closed  with the existing stay sutures. Our dartos pouch was created in the  scrotum using Metzenbaum scissors. The scrotal pump was inserted, and  the neck of the dartos pouch was closed with some interrupted 3-0 PDS  sutures.   We then made our connection from the scrotal pump to the  reservoir using the Quick connectors and the wrench. Once the  connection was made, the implant was tested and a rigid straight  correction was noted. The wound was irrigated copiously with antibiotic  solution. Our dartos layer was closed with a running 3-0 PDS suture. The skin was closed with interrupted 4-0 Monocryl. Dermabond, Telfa,  Tegaderm were applied. Implant was partially inflated. He tolerated it  well. He will be kept overnight for observation.         Dom Massey    D: 08/25/2021 14:17:58       T: 08/25/2021 14:25:33     [email or web address]  Job#: [id number]     Doc#: [id number]    CC:

## 2021-08-26 NOTE — PLAN OF CARE
Problem: Pain:  Goal: Pain level will decrease  Description: Pain level will decrease  8/25/2021 2218 by Salas Ross RN  Outcome: Ongoing  Goal: Control of acute pain  Description: Control of acute pain  8/25/2021 2218 by Salas Ross RN  Outcome: Ongoing  Note: Pain level assessed and rated on a 0-10 scale  Assess characteristics of pain  PRN pain medication given per pt request  Non-pharmacological interventions implemented  Report ineffective pain management to physician  Update pt and family of any changes  Pt instructed to call out with new onset of pain  Continue to monitor    Goal: Control of chronic pain  Description: Control of chronic pain  8/25/2021 2218 by Salas Ross RN  Outcome: Ongoing  Goal: Patient's pain/discomfort is manageable  Description: Patient's pain/discomfort is manageable  8/25/2021 2218 by Salas Ross RN  Outcome: Ongoing     Problem: Falls - Risk of:  Goal: Will remain free from falls  Description: Will remain free from falls  8/25/2021 2218 by Salas Ross RN  Outcome: Ongoing  Goal: Absence of physical injury  Description: Absence of physical injury  8/25/2021 2218 by Salas Ross RN  Outcome: Ongoing     Problem: ABCDS Injury Assessment  Goal: Absence of physical injury  8/25/2021 2218 by Salas Ross RN  Outcome: Ongoing     Problem: Infection:  Goal: Will remain free from infection  Description: Will remain free from infection  8/25/2021 2218 by Salas Ross RN  Outcome: Ongoing     Problem: Safety:  Goal: Free from accidental physical injury  Description: Free from accidental physical injury  8/25/2021 2218 by Salas Ross RN  Outcome: Ongoing    Note: Proper pt identification  Hourly rounding performed  Anticipatory needs met  Non-skid socks worn  Proper transferring technique  2/4 side rails up  Personal necessities within reach  Bed low and locked  Call light in reach  Proper lighting  Room free of clutter  Continue to monitor      Goal: Free from intentional harm  Description: Free from intentional harm  8/25/2021 2218 by Shari Galeazzi, RN  Outcome: Ongoing     Problem: Daily Care:  Goal: Daily care needs are met  Description: Daily care needs are met  8/25/2021 2218 by Shari Galeazzi, RN  Outcome: Ongoing  Note: Assess patient self-care activities  Encourage patient to perform ADL's as tolerated  Include family when possible       Problem: Skin Integrity:  Goal: Skin integrity will stabilize  Description: Skin integrity will stabilize  8/25/2021 2218 by Shari Galeazzi, RN  Outcome: Ongoing    Problem: Discharge Planning:  Goal: Patients continuum of care needs are met  Description: Patients continuum of care needs are met  8/25/2021 2218 by Shari Galeazzi, RN  Outcome: Ongoing  Note: Identify potential discharge needs/barriers on admission  Involve family/patient/significant other in discharge process  Collaborate with Case Management/ for discharge needs/concerns

## 2021-08-27 ENCOUNTER — CARE COORDINATION (OUTPATIENT)
Dept: CASE MANAGEMENT | Age: 56
End: 2021-08-27

## 2021-08-27 DIAGNOSIS — E08.42 DIABETIC POLYNEUROPATHY ASSOCIATED WITH DIABETES MELLITUS DUE TO UNDERLYING CONDITION (HCC): ICD-10-CM

## 2021-08-27 DIAGNOSIS — N52.1 ERECTILE DYSFUNCTION DUE TO DISEASES CLASSIFIED ELSEWHERE: Primary | ICD-10-CM

## 2021-08-27 PROCEDURE — 1111F DSCHRG MED/CURRENT MED MERGE: CPT | Performed by: FAMILY MEDICINE

## 2021-08-27 RX ORDER — DULOXETIN HYDROCHLORIDE 60 MG/1
CAPSULE, DELAYED RELEASE ORAL
Qty: 90 CAPSULE | Refills: 2 | Status: SHIPPED | OUTPATIENT
Start: 2021-08-27 | End: 2022-05-23

## 2021-08-27 NOTE — CARE COORDINATION
Ari[facility] 45 Transitions Initial Follow Up Call    Call within 2 business days of discharge: Yes    Patient: Jett Rivera Patient : 1965   MRN: [id number]  Reason for Admission: Erectile dysfunction after radical prostatectomy S/P Penis prosthesis insertion  Discharge Date: 21 RARS: No data recorded    Last Discharge [facility]       Complaint Diagnosis Description Type Department Provider    21  Erectile dysfunction after radical prostatectomy Admission (Discharged) Nelida Sorenson MD           Spoke with: Rashawn Packjanette Tangbrant Hernandez: Edna Schulte    Was able to contact Mandi Hsieh for initial transitional outreach. He stated that he was doing\"ok\". He stated that he is having pain, especially while sitting. He said that the swelling continues and is urinating without difficulty. Reviewed ice application, elevation of scrotum, supportive underwear and penis positioning   Reviewed medications and he stated that he had all his medications. Reviewed discontinued medications with Mandi Hsieh. 1111F order completed. He has not made his follow up appointments yet, and said that his wife will be calling. He declined assistance. Reviewed CTN role and he was receptive to further outreach. Non-face-to-face services provided:  Scheduled appointment with PCP-wife to call  Scheduled appointment with Specialist-wife to call urologist  Obtained and reviewed discharge summary and/or continuity of care documents  Assessment and support for treatment adherence and medication management-reviewed     Transitions of Care Initial Call    Was this an external facility discharge? No Discharge Facility: [facility]    Challenges to be reviewed by the provider   Additional needs identified to be addressed with provider: Yes  none             Method of communication with provider : none      Advance Care Planning:   Does patient have an Advance Directive: not on file; education provided.      Was this a readmission? No  Patient stated reason for admission: planned surgery  Patients top risk factors for readmission: lack of knowledge about disease and medical condition-post op    Care Transition Nurse (CTN) contacted the patient by telephone to perform post hospital discharge assessment. Verified name and  with patient as identifiers. Provided introduction to self, and explanation of the CTN role. CTN reviewed discharge instructions, medical action plan and red flags with patient who verbalized understanding. Patient given an opportunity to ask questions and does not have any further questions or concerns at this time. Were discharge instructions available to patient? Yes. Reviewed appropriate site of care based on symptoms and resources available to patient including: PCP, Specialist and When to call 911. The patient agrees to contact the PCP office for questions related to their healthcare. Medication reconciliation was performed with patient, who verbalizes understanding of administration of home medications. Covid Risk Education     Educated patient about risk for severe COVID-19 due to risk factors according to CDC guidelines. CTN reviewed discharge instructions, medical action plan and red flag symptoms with the patient who verbalized understanding. Discussed COVID vaccination status: Yes and pt has been vaccinated. Education provided on COVID-19 vaccination as appropriate. Discussed exposure protocols and quarantine with CDC Guidelines. Patient was given an opportunity to verbalize any questions and concerns and agrees to contact CTN or health care provider for questions related to their healthcare. Reviewed and educated patient on any new and changed medications related to discharge diagnosis. Was patient discharged with a pulse oximeter? No Discussed and confirmed pulse oximeter discharge instructions and when to notify provider or seek emergency care.        CTN provided contact information. Plan for follow-up call in 5-7 days based on severity of symptoms and risk factors.   Plan for next call: routine follow up/appointments        Care Transitions 24 Hour Call    Do you have any ongoing symptoms?: Yes  Patient-reported symptoms: Pain  Do you have a copy of your discharge instructions?: Yes  Do you have all of your prescriptions and are they filled?: Yes  Have you been contacted by a [address]?: No  Have you scheduled your follow up appointment?: No  Were you discharged with any Home Care or Post Acute Services: No  Patient DME: Joe cane  Do you have support at home?: Partner/Spouse/SO  Do you feel like you have everything you need to keep you well at home?: No  Are you an active caregiver in your home?: No  Care Transitions Interventions         Follow Up  Future Appointments   Date Time Provider Elida Rice   9/20/2021 11:30 AM MD noemi Aguilar [facility]   6/27/2022 10:30 AM MD noemi Aguilar RN

## 2021-09-01 LAB
CULTURE: NORMAL
Lab: NORMAL
SPECIMEN DESCRIPTION: NORMAL

## 2021-09-03 ENCOUNTER — CARE COORDINATION (OUTPATIENT)
Dept: CASE MANAGEMENT | Age: 56
End: 2021-09-03

## 2021-09-06 DIAGNOSIS — E08.42 DIABETIC POLYNEUROPATHY ASSOCIATED WITH DIABETES MELLITUS DUE TO UNDERLYING CONDITION (HCC): ICD-10-CM

## 2021-09-06 RX ORDER — GABAPENTIN 800 MG/1
TABLET ORAL
Qty: 60 TABLET | Refills: 0 | Status: SHIPPED | OUTPATIENT
Start: 2021-09-06 | End: 2021-10-05

## 2021-09-10 ENCOUNTER — CARE COORDINATION (OUTPATIENT)
Dept: CASE MANAGEMENT | Age: 56
End: 2021-09-10

## 2021-09-10 NOTE — CARE COORDINATION
Te[facility] 45 Transitions Follow Up Call    9/10/2021    Patient: Hailey Mata  Patient : 1965   MRN: [id number]  Reason for Admission: Erectile dysfunction after radical prostatectomy S/P Penis prosthesis insertion  Discharge Date: 21 RARS: No data recorded       Spoke with: Hailey Mata    Was able to contact Archana Poole for transitional outreach. He stated that he was doing \"all right\". He said that the pain was better, swelling was doing down, and the incision continues to heal.  He said that he is urinating and having bowel movement without any difficulty. He said that he has his follow up appointment with urology on 9/15. He had no questions or concerns. Care Transitions Follow Up Call    Needs to be reviewed by the provider   Additional needs identified to be addressed with provider: No  none             Method of communication with provider : none      Care Transition Nurse (CTN) contacted the patient by telephone to follow up after admission on 21. Verified name and  with patient as identifiers. Addressed changes since last contact: none  Discussed follow-up appointments. CTN reviewed discharge instructions, medical action plan and red flags with patient and discussed any barriers to care and/or understanding of plan of care after discharge. Discussed appropriate site of care based on symptoms and resources available to patient including: PCP, Specialist and When to call 911. The patient agrees to contact the PCP office for questions related to their healthcare. Patients top risk factors for readmission: lack of knowledge about disease  Interventions to address risk factors: Obtained and reviewed discharge summary and/or continuity of care documents      Non[facility] follow up appointment(s): 9/15 urology    CTN provided contact information for future needs. Plan for follow-up call in 7-10 days based on severity of symptoms and risk factors.   Plan for next call: follow up after urology visit            Care Transitions Subsequent and Final Call    Subsequent and Final Calls  Do you have any ongoing symptoms?: No  Have your medications changed?: No  Do you have any questions related to your medications?: No  Do you currently have any active services?: No  Do you have any needs or concerns that I can assist you with?: No  Care Transitions Interventions  Other Interventions:            Follow Up  Future Appointments   Date Time Provider Elida Rice   9/20/2021 11:30 AM [facility]   6/27/2022 10:30 AM MD noemi Sotomayor Luis Rosaura Ma RN

## 2021-09-17 ENCOUNTER — CARE COORDINATION (OUTPATIENT)
Dept: CASE MANAGEMENT | Age: 56
End: 2021-09-17

## 2021-09-17 NOTE — CARE COORDINATION
Te[facility] 45 Transitions Follow Up Call    2021    Patient: Carline Fair  Patient : 1965   MRN: [id number]  Reason for Admission: Erectile dysfunction after radical prostatectomy S/P Penis prosthesis insertion  Discharge Date: 21 RARS: No data recorded       Spoke Di Gonzalez    Was able to contact Liberty Shirley for transitional outreach. He stated that he was doing \"good\". He said that he was still sore, but the swelling has resolved. He said the surgeon thought everything was going well. He has a follow up in one month. Liberty Watson denied any fever/chills and is voiding without any problems. .Care Transitions Follow Up Call          Needs to be reviewed by the provider    Additional needs identified to be addressed with provider: No  none                 Method of communication with provider : none        Care Transition Nurse (CTN) contacted the patient by telephone to follow up after admission on 21. Verified name and  with patient as identifiers.     Addressed changes since last contact: none  Discussed follow-up appointments.      CTN reviewed discharge instructions, medical action plan and red flags with patient and discussed any barriers to care and/or understanding of plan of care after discharge. Discussed appropriate site of care based on symptoms and resources available to patient including: PCP, Specialist and When to call 911. The patient agrees to contact the PCP office for questions related to their healthcare.      Patients top risk factors for readmission: lack of knowledge about disease  Interventions to address risk factors: Obtained and reviewed discharge summary and/or continuity of care documents        Non-[facility] follow up appointment(s): 9/15 urology     CTN provided contact information for future needs. Plan for follow-up call in 7-10 days based on severity of symptoms and risk factors.   Plan for next call: follow up             Care Transitions Subsequent and Final Call    Subsequent and Final Calls  Do you have any ongoing symptoms?: Yes  Onset of Patient-reported symptoms: In the past 7 days  Patient-reported symptoms: Pain  Have your medications changed?: No  Do you have any questions related to your medications?: No  Do you currently have any active services?: No  Do you have any needs or concerns that I can assist you with?: No  Care Transitions Interventions  Other Interventions:            Follow Up  Future Appointments   Date Time Provider Elida Rice   9/20/2021 11:30 AM MD noemi Hicks [facility]   6/27/2022 10:30 AM Sharrell Honey, MD [address] [place] [facility] M Jari Simmonds, RN

## 2021-09-24 ENCOUNTER — CARE COORDINATION (OUTPATIENT)
Dept: CASE MANAGEMENT | Age: 56
End: 2021-09-24

## 2021-09-24 NOTE — CARE COORDINATION
[facility] 45 Transitions Follow Up Call    2021    Patient: Sommer Cabral  Patient : 1965   MRN: [id number]  Reason for Admission: Erectile dysfunction after radical prostatectomy S/P Penis prosthesis insertion  Discharge Date: 21 RARS: No data recorded       Spoke with: Sommer Cabral    Was able to contact Alexx Akhtar for final outreach. He stated that he was doing \"good\". He stated that he still is sore, but is having no further issues. He had not questions or concerns. Care Transitions Follow Up Call    Needs to be reviewed by the provider   Additional needs identified to be addressed with provider: Yes  none             Method of communication with provider : none      Care Transition Nurse (CTN) contacted the patient by telephone to follow up after admission on 21. Verified name and  with patient as identifiers. Addressed changes since last contact: none  Discussed follow-up appointments. CTN reviewed discharge instructions, medical action plan and red flags with patient and discussed any barriers to care and/or understanding of plan of care after discharge. Discussed appropriate site of care based on symptoms and resources available to patient including: PCP, Specialist and When to call 911. The patient agrees to contact the PCP office for questions related to their healthcare. Patients top risk factors for readmission: lack of knowledge about disease  Interventions to address risk factors: Obtained and reviewed discharge summary and/or continuity of care documents      Non-[facility] follow up appointment(s):     CTN provided contact information for future needs. No further follow-up call indicated based on severity of symptoms and risk factors. Plan for next call: final outreach episode ended. Care Transitions Subsequent and Final Call    Subsequent and Final Calls  Care Transitions Interventions  Other Interventions:            Follow Up  Future Appointments   Date Time Provider Elida Krystal   12/2/2021  3:00 PM MD noemi Trevino [facility]   6/27/2022 10:30 AM MD noemi Trevino, RN

## 2021-10-05 DIAGNOSIS — E08.42 DIABETIC POLYNEUROPATHY ASSOCIATED WITH DIABETES MELLITUS DUE TO UNDERLYING CONDITION (HCC): ICD-10-CM

## 2021-10-05 RX ORDER — GABAPENTIN 800 MG/1
TABLET ORAL
Qty: 60 TABLET | Refills: 0 | Status: SHIPPED | OUTPATIENT
Start: 2021-10-05 | End: 2021-11-01

## 2021-10-05 NOTE — TELEPHONE ENCOUNTER
Pharm requested    Health Maintenance   Topic Date Due    Hepatitis B vaccine (1 of 3 - Risk 3-dose series) Never done    Shingles Vaccine (1 of 2) Never done    PSA counseling  09/19/2017    Diabetic retinal exam  06/01/2018    Diabetic foot exam  03/07/2020    Flu vaccine (1) 09/01/2021    A1C test (Diabetic or Prediabetic)  09/30/2021    Lipid screen  11/19/2021    Annual Wellness Visit (AWV)  06/25/2022    Potassium monitoring  06/30/2022    Creatinine monitoring  06/30/2022    Colon cancer screen colonoscopy  09/09/2024    DTaP/Tdap/Td vaccine (2 - Td or Tdap) 01/01/2025    Pneumococcal 0-64 years Vaccine (2 of 2 - PPSV23) 07/22/2029    COVID-19 Vaccine  Completed    Hepatitis C screen  Completed    HIV screen  Completed    Hepatitis A vaccine  Aged Out    Hib vaccine  Aged Out    Meningococcal (ACWY) vaccine  Aged Out             (applicable per patient's age: Cancer Screenings, Depression Screening, Fall Risk Screening, Immunizations)    Hemoglobin A1C (%)   Date Value   06/30/2021 9.8 (H)   03/19/2021 8.7   11/19/2020 9.1     LDL Cholesterol (mg/dL)   Date Value   11/19/2020 104     LDL Calculated (mg/dL)   Date Value   12/05/2018 61     AST (U/L)   Date Value   11/19/2020 27     ALT (U/L)   Date Value   11/19/2020 23     BUN (mg/dL)   Date Value   06/30/2021 17      (goal A1C is < 7)   (goal LDL is <100) need 30-50% reduction from baseline     BP Readings from Last 3 Encounters:   08/26/21 129/80   08/25/21 (!) 159/77   06/30/21 134/82    (goal /80)      All Future Testing planned in [facility]:  Lab Frequency Next Occurrence   COVID-19 Once 06/30/2021       Next Visit Date:  Future Appointments   Date Time Provider Elida Rice   12/2/2021  3:00 PM MD noemi Rice [facility]   6/27/2022 10:30 AM MD noemi Rice [facility]            Patient Active Problem List:     Prostate cancer ([address]     Type 2 diabetes mellitus with hyperglycemia, with long-term current use of insulin (HCC)     Mixed hyperlipidemia     Obesity (BMI 30.0-34. 9)     Coronary artery disease involving native coronary artery of native heart without angina pectoris     Pacemaker     Systolic heart failure (HCC)     Chronic back pain     History of lung thrombosis     Stage 3 chronic kidney disease ([address]     Spinal stenosis at L4-L5 level     Chronic right-sided low back pain with right-sided sciatica     Erectile dysfunction following radical prostatectomy     Type 2 diabetes mellitus with diabetic polyneuropathy (HCC)     HTN (hypertension)     Spondylolisthesis, acquired     Foraminal stenosis of lumbar region     Degeneration of intervertebral disc     Nocturia     Tinea pedis     Morbidly obese ([facility] [address]     Long term (current) use of anticoagulants     Hernia, inguinal, recurrent     Multiple subsegmental pulmonary emboli without acute cor pulmonale (HCC)     ED (erectile dysfunction)

## 2021-11-01 DIAGNOSIS — E08.42 DIABETIC POLYNEUROPATHY ASSOCIATED WITH DIABETES MELLITUS DUE TO UNDERLYING CONDITION (HCC): ICD-10-CM

## 2021-11-01 RX ORDER — GABAPENTIN 800 MG/1
TABLET ORAL
Qty: 60 TABLET | Refills: 0 | Status: SHIPPED | OUTPATIENT
Start: 2021-11-01 | End: 2021-12-02

## 2021-11-01 NOTE — TELEPHONE ENCOUNTER
Pharm requested    Health Maintenance   Topic Date Due    Hepatitis B vaccine (1 of 3 - Risk 3-dose series) Never done    Shingles Vaccine (1 of 2) Never done    PSA counseling  09/19/2017    Diabetic retinal exam  06/01/2018    Diabetic foot exam  03/07/2020    Flu vaccine (1) 09/01/2021    A1C test (Diabetic or Prediabetic)  09/30/2021    Lipid screen  11/19/2021    Annual Wellness Visit (AWV)  06/25/2022    Potassium monitoring  06/30/2022    Creatinine monitoring  06/30/2022    Colon cancer screen colonoscopy  09/09/2024    DTaP/Tdap/Td vaccine (2 - Td or Tdap) 01/01/2025    Pneumococcal 0-64 years Vaccine (2 of 2 - PPSV23) 07/22/2029    COVID-19 Vaccine  Completed    Hepatitis C screen  Completed    HIV screen  Completed    Hepatitis A vaccine  Aged Out    Hib vaccine  Aged Out    Meningococcal (ACWY) vaccine  Aged Out             (applicable per patient's age: Cancer Screenings, Depression Screening, Fall Risk Screening, Immunizations)    Hemoglobin A1C (%)   Date Value   06/30/2021 9.8 (H)   03/19/2021 8.7   11/19/2020 9.1     LDL Cholesterol (mg/dL)   Date Value   11/19/2020 104     LDL Calculated (mg/dL)   Date Value   12/05/2018 61     AST (U/L)   Date Value   11/19/2020 27     ALT (U/L)   Date Value   11/19/2020 23     BUN (mg/dL)   Date Value   06/30/2021 17      (goal A1C is < 7)   (goal LDL is <100) need 30-50% reduction from baseline     BP Readings from Last 3 Encounters:   08/26/21 129/80   08/25/21 (!) 159/77   06/30/21 134/82    (goal /80)      All Future Testing planned in [facility]:  Lab Frequency Next Occurrence   COVID-19 Once 06/30/2021       Next Visit Date:  Future Appointments   Date Time Provider Elida Rice   12/2/2021  3:00 PM MD noemi Baldwin [facility]   6/27/2022 10:30 AM MD noemi Baldwin [facility]            Patient Active Problem List:     Prostate cancer ([facility] [address]     Type 2 diabetes mellitus with hyperglycemia, with long-term

## 2021-11-30 DIAGNOSIS — E11.21 CONTROLLED TYPE 2 DIABETES MELLITUS WITH DIABETIC NEPHROPATHY, WITH LONG-TERM CURRENT USE OF INSULIN (HCC): ICD-10-CM

## 2021-11-30 DIAGNOSIS — Z79.4 CONTROLLED TYPE 2 DIABETES MELLITUS WITH DIABETIC NEPHROPATHY, WITH LONG-TERM CURRENT USE OF INSULIN (HCC): ICD-10-CM

## 2021-11-30 RX ORDER — INSULIN ASPART 100 [IU]/ML
INJECTION, SUSPENSION SUBCUTANEOUS
Qty: 30 ML | Refills: 3 | Status: SHIPPED | OUTPATIENT
Start: 2021-11-30 | End: 2022-01-18 | Stop reason: ALTCHOICE

## 2021-11-30 NOTE — TELEPHONE ENCOUNTER
Pt requested    Health Maintenance   Topic Date Due    Hepatitis B vaccine (1 of 3 - Risk 3-dose series) Never done    Shingles Vaccine (1 of 2) Never done    PSA counseling  09/19/2017    Diabetic retinal exam  06/01/2018    Diabetic foot exam  03/07/2020    Flu vaccine (1) 09/01/2021    A1C test (Diabetic or Prediabetic)  09/30/2021    COVID-19 Vaccine (3 - Booster for Moderna series) 11/13/2021    Lipid screen  11/19/2021    Annual Wellness Visit (AWV)  06/25/2022    Potassium monitoring  06/30/2022    Creatinine monitoring  06/30/2022    Colon cancer screen colonoscopy  09/09/2024    DTaP/Tdap/Td vaccine (2 - Td or Tdap) 01/01/2025    Pneumococcal 0-64 years Vaccine (2 of 2 - PPSV23) 07/22/2029    Hepatitis C screen  Completed    HIV screen  Completed    Hepatitis A vaccine  Aged Out    Hib vaccine  Aged Out    Meningococcal (ACWY) vaccine  Aged Out             (applicable per patient's age: Cancer Screenings, Depression Screening, Fall Risk Screening, Immunizations)    Hemoglobin A1C (%)   Date Value   06/30/2021 9.8 (H)   03/19/2021 8.7   11/19/2020 9.1     LDL Cholesterol (mg/dL)   Date Value   11/19/2020 104     LDL Calculated (mg/dL)   Date Value   12/05/2018 61     AST (U/L)   Date Value   11/19/2020 27     ALT (U/L)   Date Value   11/19/2020 23     BUN (mg/dL)   Date Value   06/30/2021 17      (goal A1C is < 7)   (goal LDL is <100) need 30-50% reduction from baseline     BP Readings from Last 3 Encounters:   08/26/21 129/80   08/25/21 (!) 159/77   06/30/21 134/82    (goal /80)      All Future Testing planned in [facility]:  Lab Frequency Next Occurrence   COVID-19 Once 06/30/2021       Next Visit Date:  Future Appointments   Date Time Provider Elida Rice   12/8/2021 10:00 AM [facility]   6/27/2022 10:30 AM [facility]            Patient Active Problem List:     Prostate cancer [facility])     Type 2 diabetes mellitus with hyperglycemia, with long-term current use of insulin (HCC)     Mixed hyperlipidemia     Obesity (BMI 30.0-34. 9)     Coronary artery disease involving native coronary artery of native heart without angina pectoris     Pacemaker     Systolic heart failure (HCC)     Chronic back pain     History of lung thrombosis     Stage 3 chronic kidney disease ([address]     Spinal stenosis at L4-L5 level     Chronic right-sided low back pain with right-sided sciatica     Erectile dysfunction following radical prostatectomy     Type 2 diabetes mellitus with diabetic polyneuropathy (HCC)     HTN (hypertension)     Spondylolisthesis, acquired     Foraminal stenosis of lumbar region     Degeneration of intervertebral disc     Nocturia     Tinea pedis     Morbidly obese ([address]     Long term (current) use of anticoagulants     Hernia, inguinal, recurrent     Multiple subsegmental pulmonary emboli without acute cor pulmonale (HCC)     ED (erectile dysfunction)

## 2021-11-30 NOTE — TELEPHONE ENCOUNTER
----- Message from [facility] sent at 11/30/2021  9:51 AM EST -----  Subject: Refill Request    QUESTIONS  Name of Medication? NOVOLOG MIX 70/30 (70-30) 100 UNIT/ML injection  Patient-reported dosage and instructions? 60 units in the am and 60 units   in the evening  How many days do you have left? 0  Preferred Pharmacy? [facility] phone number (if available)? [phone]  Additional Information for Provider? client is all most out of medication  ---------------------------------------------------------------------------  --------------  [address]  What is the best way for the office to contact you? OK to leave message on   voicemail  Preferred Call Back Phone Number?  [phone]

## 2021-12-01 DIAGNOSIS — E08.42 DIABETIC POLYNEUROPATHY ASSOCIATED WITH DIABETES MELLITUS DUE TO UNDERLYING CONDITION (HCC): ICD-10-CM

## 2021-12-01 NOTE — TELEPHONE ENCOUNTER
Clint Borrego is calling to request a refill on the following medication(s):    Last Visit Date (If Applicable):  2/22/9353    Next Visit Date:    12/8/2021    Medication Request:  Requested Prescriptions     Pending Prescriptions Disp Refills    gabapentin (NEURONTIN) 800 MG tablet [Pharmacy Med Name: GABAPENTIN 800 MG TABLET] 60 tablet 0     Sig: take 1 tablet by mouth twice a day

## 2021-12-02 RX ORDER — GABAPENTIN 800 MG/1
TABLET ORAL
Qty: 60 TABLET | Refills: 0 | Status: SHIPPED | OUTPATIENT
Start: 2021-12-02 | End: 2021-12-27

## 2021-12-08 ENCOUNTER — TELEMEDICINE (OUTPATIENT)
Dept: FAMILY MEDICINE CLINIC | Age: 56
End: 2021-12-08
Payer: COMMERCIAL

## 2021-12-08 DIAGNOSIS — E11.42 TYPE 2 DIABETES MELLITUS WITH DIABETIC POLYNEUROPATHY, WITH LONG-TERM CURRENT USE OF INSULIN (HCC): ICD-10-CM

## 2021-12-08 DIAGNOSIS — Z79.4 TYPE 2 DIABETES MELLITUS WITH DIABETIC POLYNEUROPATHY, WITH LONG-TERM CURRENT USE OF INSULIN (HCC): ICD-10-CM

## 2021-12-08 DIAGNOSIS — N49.2 SCROTAL INFECTION: Primary | ICD-10-CM

## 2021-12-08 DIAGNOSIS — N18.31 STAGE 3A CHRONIC KIDNEY DISEASE (HCC): ICD-10-CM

## 2021-12-08 PROCEDURE — 99214 OFFICE O/P EST MOD 30 MIN: CPT | Performed by: FAMILY MEDICINE

## 2021-12-08 RX ORDER — CEPHALEXIN 500 MG/1
CAPSULE ORAL
Qty: 10 CAPSULE | Refills: 0
Start: 2021-12-08 | End: 2022-01-18 | Stop reason: ALTCHOICE

## 2021-12-08 RX ORDER — CEPHALEXIN 500 MG/1
CAPSULE ORAL
COMMUNITY
Start: 2021-12-02 | End: 2021-12-08 | Stop reason: SDUPTHER

## 2021-12-08 NOTE — PROGRESS NOTES
[facility]  [facility]  [address]  [facility] [address]  Dept: [phone]    2021    TELEHEALTH EVALUATION -- Audio/Visual (During [employer]XOOK-37 public health emergency)  Flower Foster (:  1965) has requested an audio/video evaluation for the following concern(s):    HPI:  Video visit to discuss recent penile surgery. Had a penile implant that got infected. Implant was removed and then reinserted. Got a second infection and entire implant could not be removed. Still having pain, discharge from scrotum. Taking abx, cephalexin. Having swelling in penis after taking abx about an hour after. Urologist is Dr. Odilia Rivers at [facility]. Taking percocet 5mg as needed for pain. He is concerned with ongoing infection and swelling. Hx of diabetes, has not been seen since , A1C was 9.8 at that time. He thinks his bs is improving. There were no vitals filed for this visit. REVIEW OF SYSTEMS:   Review of Systems   Constitutional: Negative for fever. Genitourinary:        See hpi       PAST MEDICAL HISTORY:    Past Medical History:   Diagnosis Date    Anxiety     no treatment    Arthritis     left shoulder    Cardiomegaly     CHF (congestive heart failure) ([facility]) EF 20%    Chronic back pain     GERD (gastroesophageal reflux disease)     Heart attack ([address]     History of lung thrombosis     Hx of blood clots     Hyperkalemia     Hyperlipidemia     Hypertension     Irregular heart beat     Neuropathy     feet    Obesity     WILLA (obstructive sleep apnea)     not using the machine    Pacemaker     AICD    PE (pulmonary embolism)     \"about 5 yrs ago\"    Prostate cancer ([address] 2016    Spinal stenosis at L4-L5 level     Traumatic closed fx of eight or more ribs with minimal displacement 2017    pneumothorax. Pt fell 50 feet from a balcony. Pt denies history of a head injury.     Type 2 diabetes mellitus without complication ([facility]        FAMILY HISTORY:    Family History   Problem Relation Age of Onset    Diabetes Father     Hypertension Father     Heart Disease Father     Hypertension Mother     Prostate Cancer Brother     Diabetes Brother     Diabetes Brother     Diabetes Brother        SOCIAL HISTORY:    Social History     Socioeconomic History    Marital status:      Spouse name: Not on file    Number of children: 2    Years of education: Not on file    Highest education level: Not on file   Occupational History    Occupation: disability   Tobacco Use    Smoking status: Former Smoker     Packs/day: 0.50     Years: 20.00     Pack years: 10.00     Quit date: 2021     Years since quittin.4    Smokeless tobacco: Never Used   Vaping Use    Vaping Use: Never used   Substance and Sexual Activity    Alcohol use: No    Drug use: No    Sexual activity: Yes     Partners: Female   Other Topics Concern    Not on file   Social History Narrative    , grandchildren from  daughter live with him and his wife. Social Determinants of Health     Financial Resource Strain: Low Risk     Difficulty of Paying Living Expenses: Not hard at all   Food Insecurity: No Food Insecurity    Worried About Running Out of Food in the Last Year: Never true    Rashmi of Food in the Last Year: Never true   Transportation Needs: No Transportation Needs    Lack of Transportation (Medical): No    Lack of Transportation (Non-Medical):  No   Physical Activity:     Days of Exercise per Week: Not on file    Minutes of Exercise per Session: Not on file   Stress:     Feeling of Stress : Not on file   Social Connections:     Frequency of Communication with Friends and Family: Not on file    Frequency of Social Gatherings with Friends and Family: Not on file    Attends Sabianism Services: Not on file    Active Member of Clubs or Organizations: Not on file    Attends Club or Organization Meetings: Not on file    Marital Status: Not on file   Intimate Partner Violence:     Fear of Current or Ex-Partner: Not on file    Emotionally Abused: Not on file    Physically Abused: Not on file    Sexually Abused: Not on file   Housing Stability:     Unable to Pay for Housing in the Last Year: Not on file    Number of [place] in the Last Year: Not on file    Unstable Housing in the Last Year: Not on file       SURGICAL HISTORY:    Past Surgical History:   Procedure Laterality Date    CHEST TUBE INSERTION  06/2017    COLONOSCOPY  2016    CORONARY ANGIOPLASTY WITH STENT PLACEMENT      EYE SURGERY Right     injury   [place]  2020    left ring and thumb    HERNIA REPAIR  2020    LUMBAR FUSION  01/29/2020    L5-S1    LUMBAR SPINE SURGERY Right 1/29/2020    RIGHT L5 -S1 TLIF MINIMALLY INVASIVE- 2CARMS,  [address], NO CELLSAVER performed by Amanda Waldron MD at [address] [place] [address]  2008    ICD;  battery change 2016;  Dr. Dennis Lora.  DEFIB NOT SAFE MRI    PENILE PROSTHESIS PLACEMENT N/A 8/25/2021    PENIS PROSTHESIS INSERTION  - COLOPLAST performed by Anahi Walker MD at [address] [place] [address]  08/31/2016    carmen lymph node dissection    VENA CAVA FILTER PLACEMENT  2000       CURRENT MEDICATIONS:    Current Outpatient Medications   Medication Sig Dispense Refill    cephALEXin (KEFLEX) 500 MG capsule take 1 capsule by mouth four times a day 10 capsule 0    gabapentin (NEURONTIN) 800 MG tablet take 1 tablet by mouth twice a day 60 tablet 0    insulin aspart protamine-insulin aspart (NOVOLOG MIX 70/30) (70-30) 100 UNIT/ML injection inject 60 units subcutaneously twice a day with meals 30 mL 3    DULoxetine (CYMBALTA) 60 MG extended release capsule take 1 capsule by mouth once daily 90 capsule 2    hydrALAZINE (APRESOLINE) 25 MG tablet take 1 tablet by mouth twice a day      Insulin Syringe-Needle U-100 (BD INSULIN SYRINGE U/F) 31G X 5/16\" 1 ML MISC use 1 PEN NEEDLE to inject MEDICATION subcutaneously twice a day 100 each 3    carvedilol (COREG) 12.5 MG tablet take 1 tablet by mouth twice a day      lisinopril (PRINIVIL;ZESTRIL) 5 MG tablet take 1 tablet by mouth once daily      ciclopirox (LOPROX) 0.77 % cream Apply topically 2 times daily To feet 90 g 2    colesevelam (WELCHOL) 625 MG tablet Take 3 tablets by mouth 2 times daily 180 tablet 5    RA VITAMIN D-3 50 MCG (2000 UT) CAPS Take 1 capsule by mouth daily 30 capsule 5    JANUVIA 100 MG tablet take 1 tablet by mouth once daily 90 tablet 3    omeprazole (PRILOSEC) 20 MG delayed release capsule take 1 capsule by mouth once daily 30 MINUTES PRIOR TO FOOD 120 capsule 2    isosorbide mononitrate (IMDUR) 30 MG extended release tablet take 1 tablet by mouth once daily 120 tablet 3    warfarin (COUMADIN) 7.5 MG tablet Take 7.5 mg by mouth daily      Insulin Pen Needle 31G X 8 MM MISC 1 each by Does not apply route 2 times daily 100 each 0    ONETOUCH ULTRA strip TEST three times a day if needed 100 strip 2    blood glucose test strips (ASCENSIA AUTODISC VI;ONE TOUCH ULTRA TEST VI) strip 1 each by In Vitro route 3 times daily As needed. 100 each 0    furosemide (LASIX) 20 MG tablet take 1 tablet by mouth once daily 30 tablet 3    pravastatin (PRAVACHOL) 40 MG tablet Take 1 tablet by mouth nightly 90 tablet 0     No current facility-administered medications for this visit. ALLERGIES:   Allergies   Allergen Reactions    Adhesive Tape Other (See Comments)     Blister,skin tears with silk tape       PHYSICAL EXAM:   Physical Exam  Constitutional:       Appearance: Normal appearance. HENT:      Head: Normocephalic. Eyes:      Conjunctiva/sclera: Conjunctivae normal.   Pulmonary:      Effort: Pulmonary effort is normal.   Musculoskeletal:         General: Normal range of motion. Cervical back: Normal range of motion.    Neurological:      General: No focal deficit present. Mental Status: He is alert and oriented to person, place, and time. Mental status is at baseline. Psychiatric:         Mood and Affect: Mood normal.         Behavior: Behavior normal.         Thought Content: Thought content normal.         Judgment: Judgment normal.          ASSESSMENT/PLAN:  1. Scrotal infection  Patient encouraged to call surgeon to discuss his symptoms. If he is unable to be seen by surgeon I would encourage him to go to the ED for evaluation    2. Stage 3a chronic kidney disease (HCC)  Stable, and needs to be rechecked in office. 3. Type 2 diabetes mellitus with diabetic polyneuropathy, with long-term current use of insulin ([facility]  Continue current medications and make appointment in office to have A1c done and medication review. Return in about 4 weeks (around 1/5/2022). Kiersten Zuñiga is a 61 y.o. male being evaluated by a Virtual Visit (video visit) encounter to address concerns as mentioned above. A caregiver was present when appropriate. Due to this being a TeleHealth encounter (During [facility] public health emergency), evaluation of the following organ systems was limited: Vitals/Constitutional/EENT/Resp/CV/GI//MS/Neuro/Skin/Heme-Lymph-Imm. Pursuant to the emergency declaration under the [facility] [address] authority and the [employer] Act, this Virtual Visit was conducted with patient's (and/or legal guardian's) consent, to reduce the patient's risk of exposure to COVID-19 and provide necessary medical care. The patient (and/or legal guardian) has also been advised to contact this office for worsening conditions or problems, and seek emergency medical treatment and/or call 911 if deemed necessary. Services were provided through a video synchronous discussion virtually to substitute for in-person clinic visit.  Patient and provider were located at their individual homes. --Christina Almanzar MD on 12/8/2021 at 10:14 AM    An electronic signature was used to authenticate this note.

## 2021-12-09 NOTE — TELEPHONE ENCOUNTER
Pharm requested refill    Health Maintenance   Topic Date Due    Hepatitis B vaccine (1 of 3 - Risk 3-dose series) 07/22/1983    Shingles Vaccine (1 of 2) 07/22/2014    PSA counseling  09/19/2017    Diabetic retinal exam  06/01/2018    Annual Wellness Visit (AWV)  05/29/2019    Lipid screen  12/05/2019    Diabetic foot exam  03/07/2020    Potassium monitoring  01/30/2021    Creatinine monitoring  01/30/2021    A1C test (Diabetic or Prediabetic)  07/14/2021    Colon cancer screen colonoscopy  09/09/2024    DTaP/Tdap/Td vaccine (2 - Td) 01/01/2025    Flu vaccine  Completed    Pneumococcal 0-64 years Vaccine  Completed    Hepatitis C screen  Completed    HIV screen  Completed    Hepatitis A vaccine  Aged Out    Hib vaccine  Aged Out    Meningococcal (ACWY) vaccine  Aged Out             (applicable per patient's age: Cancer Screenings, Depression Screening, Fall Risk Screening, Immunizations)    Hemoglobin A1C (%)   Date Value   07/14/2020 8.6   01/29/2020 8.1 (H)   01/17/2020 8.4     LDL Cholesterol (mg/dL)   Date Value   08/18/2017 81     LDL Calculated (mg/dL)   Date Value   12/05/2018 61     AST (U/L)   Date Value   12/05/2018 13     ALT (U/L)   Date Value   12/05/2018 10     BUN (mg/dL)   Date Value   01/30/2020 15      (goal A1C is < 7)   (goal LDL is <100) need 30-50% reduction from baseline     BP Readings from Last 3 Encounters:   09/10/20 110/70   08/06/20 128/82   07/14/20 120/80    (goal /80)      All Future Testing planned in [facility]:  Lab Frequency Next Occurrence       Next Visit Date:  Future Appointments   Date Time Provider Elida Rice   11/19/2020 10:00 AM MD noemi Felipe [facility]            Patient Active Problem List:     Prostate cancer [facility])     Controlled type 2 diabetes mellitus with diabetic nephropathy, with long-term current use of insulin ([facility]     Mixed hyperlipidemia     Obesity (BMI 30.0-34. 9)     Coronary artery disease involving native coronary artery of native heart without angina pectoris     Pacemaker     Systolic heart failure (HCC)     Chronic back pain     History of lung thrombosis     Stage 3 chronic kidney disease     Spinal stenosis at L4-L5 level     Chronic right-sided low back pain with right-sided sciatica     Erectile dysfunction following radical prostatectomy     Type 2 diabetes mellitus with diabetic polyneuropathy (HCC)     HTN (hypertension)     Spondylolisthesis, acquired     Foraminal stenosis of lumbar region     Degeneration of intervertebral disc     Nocturia     Tinea pedis normal...

## 2021-12-27 DIAGNOSIS — E08.42 DIABETIC POLYNEUROPATHY ASSOCIATED WITH DIABETES MELLITUS DUE TO UNDERLYING CONDITION (HCC): ICD-10-CM

## 2021-12-27 RX ORDER — GABAPENTIN 800 MG/1
TABLET ORAL
Qty: 60 TABLET | Refills: 0 | Status: SHIPPED | OUTPATIENT
Start: 2021-12-27 | End: 2022-02-14

## 2022-01-12 DIAGNOSIS — E55.9 VITAMIN D DEFICIENCY: ICD-10-CM

## 2022-01-12 RX ORDER — GLUCOSAMINE/CHONDR SU A SOD 750-600 MG
TABLET ORAL
Qty: 30 CAPSULE | Refills: 5 | Status: SHIPPED | OUTPATIENT
Start: 2022-01-12 | End: 2022-07-07

## 2022-01-18 ENCOUNTER — OFFICE VISIT (OUTPATIENT)
Dept: FAMILY MEDICINE CLINIC | Age: 57
End: 2022-01-18
Payer: COMMERCIAL

## 2022-01-18 VITALS
DIASTOLIC BLOOD PRESSURE: 60 MMHG | HEIGHT: 73 IN | WEIGHT: 266.6 LBS | OXYGEN SATURATION: 99 % | HEART RATE: 93 BPM | BODY MASS INDEX: 35.33 KG/M2 | SYSTOLIC BLOOD PRESSURE: 128 MMHG

## 2022-01-18 DIAGNOSIS — N18.31 STAGE 3A CHRONIC KIDNEY DISEASE (HCC): ICD-10-CM

## 2022-01-18 DIAGNOSIS — I50.22 CHRONIC SYSTOLIC HEART FAILURE (HCC): ICD-10-CM

## 2022-01-18 DIAGNOSIS — I25.10 CORONARY ARTERY DISEASE INVOLVING NATIVE CORONARY ARTERY OF NATIVE HEART WITHOUT ANGINA PECTORIS: ICD-10-CM

## 2022-01-18 DIAGNOSIS — G89.29 CHRONIC LEFT SHOULDER PAIN: ICD-10-CM

## 2022-01-18 DIAGNOSIS — E11.42 TYPE 2 DIABETES MELLITUS WITH DIABETIC POLYNEUROPATHY, WITH LONG-TERM CURRENT USE OF INSULIN (HCC): Primary | ICD-10-CM

## 2022-01-18 DIAGNOSIS — I26.94 MULTIPLE SUBSEGMENTAL PULMONARY EMBOLI WITHOUT ACUTE COR PULMONALE (HCC): ICD-10-CM

## 2022-01-18 DIAGNOSIS — I10 PRIMARY HYPERTENSION: ICD-10-CM

## 2022-01-18 DIAGNOSIS — Z79.4 TYPE 2 DIABETES MELLITUS WITH DIABETIC POLYNEUROPATHY, WITH LONG-TERM CURRENT USE OF INSULIN (HCC): Primary | ICD-10-CM

## 2022-01-18 DIAGNOSIS — E78.2 MIXED HYPERLIPIDEMIA: ICD-10-CM

## 2022-01-18 DIAGNOSIS — C61 PROSTATE CANCER (HCC): ICD-10-CM

## 2022-01-18 DIAGNOSIS — E66.01 SEVERE OBESITY (BMI 35.0-35.9 WITH COMORBIDITY) (HCC): ICD-10-CM

## 2022-01-18 DIAGNOSIS — M25.512 CHRONIC LEFT SHOULDER PAIN: ICD-10-CM

## 2022-01-18 LAB — HBA1C MFR BLD: 8.5 %

## 2022-01-18 PROCEDURE — 83036 HEMOGLOBIN GLYCOSYLATED A1C: CPT | Performed by: FAMILY MEDICINE

## 2022-01-18 PROCEDURE — 3052F HG A1C>EQUAL 8.0%<EQUAL 9.0%: CPT | Performed by: FAMILY MEDICINE

## 2022-01-18 PROCEDURE — 99214 OFFICE O/P EST MOD 30 MIN: CPT | Performed by: FAMILY MEDICINE

## 2022-01-18 RX ORDER — PEN NEEDLE, DIABETIC 31 GX5/16"
1 NEEDLE, DISPOSABLE MISCELLANEOUS DAILY
Qty: 100 EACH | Refills: 3 | Status: SHIPPED | OUTPATIENT
Start: 2022-01-18 | End: 2022-03-15 | Stop reason: SDUPTHER

## 2022-01-18 ASSESSMENT — ENCOUNTER SYMPTOMS
EYES NEGATIVE: 1
CONSTIPATION: 0
COUGH: 1
BLOOD IN STOOL: 0
DIARRHEA: 0
SHORTNESS OF BREATH: 1
ALLERGIC/IMMUNOLOGIC NEGATIVE: 1
ABDOMINAL PAIN: 0

## 2022-01-18 NOTE — PROGRESS NOTES
[facility] [address] [facility] [address]  Dept: [phone]    1/18/2022    CHIEF COMPLAINT    Chief Complaint   Patient presents with    Diabetes       HPI    Moises Chandler is a 61 y.o. male who presents   Chief Complaint   Patient presents with    Diabetes   . Recheck chronic conditions including diabetes, htn, heart failure, GERD, chronic back pain, left shoulder pain. Pain for past 3-4 moths. No injury. Seeing cardiologist for heart failure which is stable. Seeing urologist for hx of prostate cancer. Had a penile implant which got infected and had to have second procedure to insert a different type. Vitals:    01/18/22 1026   BP: 128/60   Pulse: 93   SpO2: 99%   Weight: 266 lb 9.6 oz (120.9 kg)   Height: 6' 1\" (1.854 m)       REVIEW OF SYSTEMS    Review of Systems   Constitutional: Negative for fatigue, fever and unexpected weight change. HENT: Negative. Eyes: Negative. Respiratory: Positive for cough and shortness of breath. Cardiovascular: Negative for chest pain and leg swelling. Gastrointestinal: Negative for abdominal pain, blood in stool, constipation and diarrhea. Endocrine: Negative. Genitourinary: Negative for frequency and urgency. Musculoskeletal: Positive for arthralgias (left shoulder pain, difficulty raising arm). Skin: Negative. Allergic/Immunologic: Negative. Neurological: Negative for dizziness and headaches. Hematological: Negative. Psychiatric/Behavioral: Negative for sleep disturbance. The patient is not nervous/anxious.         PAST MEDICAL HISTORY    Past Medical History:   Diagnosis Date    Anxiety     no treatment    Arthritis     left shoulder    Cardiomegaly     [facility]congest[facility] failure[facility] EF 20%    Chronic back pain     GERD (gastroesophageal reflux disease)     Heart [facility] ([facility]     History of lung thrombosis     Hx of blood clots     Hyperkalemia     Hyperlipidemia     Hypertension     Irregular heart beat     Neuropathy     feet    Obesity     WILLA (obstructive sleep apnea)     not using the machine    Pacemaker     AICD    PE (pulmonary embolism)     \"about 5 yrs ago\"    Prostate cancer ([facility] 2016    Spinal stenosis at L4-L5 level     Traumatic closed fx of eight or more ribs with minimal displacement 2017    pneumothorax. Pt fell 50 feet from a balcony. Pt denies history of a head injury.  Type 2 diabetes mellitus without complication ([facility]        FAMILY HISTORY    Family History   Problem Relation Age of Onset    Diabetes Father     Hypertension Father     Heart Disease Father     Hypertension Mother     Prostate Cancer Brother     Diabetes Brother     Diabetes Brother     Diabetes Brother        SOCIAL HISTORY    Social History     Socioeconomic History    Marital status:      Spouse name: None    Number of children: 2    Years of education: None    Highest education level: None   Occupational History    Occupation: disability   Tobacco Use    Smoking status: Former Smoker     Packs/day: 0.50     Years: 20.00     Pack years: 10.00     Quit date: 2021     Years since quittin.5    Smokeless tobacco: Never Used   Vaping Use    Vaping Use: Never used   Substance and Sexual Activity    Alcohol use: No    Drug use: No    Sexual activity: Yes     Partners: Female   Other Topics Concern    None   Social History Narrative    , grandchildren from  daughter live with him and his wife. Social Determinants of Health     Financial Resource Strain: Low Risk     Difficulty of Paying Living Expenses: Not hard at all   Food Insecurity: No Food Insecurity    Worried About Running Out of Food in the Last Year: Never true    Rashmi of Food in the Last Year: Never true   Transportation Needs: No Transportation Needs    Lack of Transportation (Medical):  No    Lack of Transportation (Non-Medical): No   Physical Activity:     Days of Exercise per Week: Not on file    Minutes of Exercise per Session: Not on file   Stress:     Feeling of Stress : Not on file   Social Connections:     Frequency of Communication with Friends and Family: Not on file    Frequency of Social Gatherings with Friends and Family: Not on file    Attends Catholic Services: Not on file    Active Member of Clubs or Organizations: Not on file    Attends Club or Organization Meetings: Not on file    Marital Status: Not on file   Intimate Partner Violence:     Fear of Current or Ex-Partner: Not on file    Emotionally Abused: Not on file    Physically Abused: Not on file    Sexually Abused: Not on file   Housing Stability:     Unable to Pay for Housing in the Last Year: Not on file    Number of [place] in the Last Year: Not on file    Unstable Housing in the Last Year: Not on file       SURGICAL HISTORY    Past Surgical History:   Procedure Laterality Date    CHEST TUBE INSERTION  06/2017    COLONOSCOPY  2016   [address] Right     injury   [address]    left ring and thumb    HERNIA REPAIR  2020   [facility]  01/29/2020    L5-S1    LUMBAR SPINE SURGERY Right 1/29/2020    RIGHT L5 -S1 TLIF MINIMALLY INVASIVE- 2CARMS,  NUVASIVE, NO CELLSAVER performed by Delbert Wall MD at [address] [place] [address]  2008    ICD;  battery change 2016;  Dr. Danay Potter.  DEFIB NOT SAFE MRI    PENILE PROSTHESIS PLACEMENT N/A 8/25/2021    PENIS PROSTHESIS INSERTION  - COLOPLAST performed by Nerissa Chase MD at [address] [place] [address]  08/31/2016    carmen lymph node dissection    VENA CAVA FILTER PLACEMENT  2000       CURRENT MEDICATIONS    Current Outpatient Medications   Medication Sig Dispense Refill    insulin aspart protamine-insulin aspart (Hermon Andrey 70/30) (70-30) 100 UNIT/ML injection Inject 60 Units into the skin 2 times daily (with meals) 15 pen 3    Insulin Pen Needle (KROGER PEN NEEDLES 31G) 31G X 8 MM MISC 1 each by Does not apply route daily 100 each 3    RA VITAMIN D-3 50 MCG (2000 UT) CAPS take 1 capsule by mouth once daily 30 capsule 5    gabapentin (NEURONTIN) 800 MG tablet take 1 tablet by mouth twice a day 60 tablet 0    DULoxetine (CYMBALTA) 60 MG extended release capsule take 1 capsule by mouth once daily 90 capsule 2    hydrALAZINE (APRESOLINE) 25 MG tablet take 1 tablet by mouth twice a day      Insulin Syringe-Needle U-100 (BD INSULIN SYRINGE U/F) 31G X 5/16\" 1 ML MISC use 1 PEN NEEDLE to inject MEDICATION subcutaneously twice a day 100 each 3    carvedilol (COREG) 12.5 MG tablet take 1 tablet by mouth twice a day      lisinopril (PRINIVIL;ZESTRIL) 5 MG tablet take 1 tablet by mouth once daily      ciclopirox (LOPROX) 0.77 % cream Apply topically 2 times daily To feet 90 g 2    colesevelam (WELCHOL) 625 MG tablet Take 3 tablets by mouth 2 times daily 180 tablet 5    JANUVIA 100 MG tablet take 1 tablet by mouth once daily 90 tablet 3    omeprazole (PRILOSEC) 20 MG delayed release capsule take 1 capsule by mouth once daily 30 MINUTES PRIOR TO FOOD 120 capsule 2    isosorbide mononitrate (IMDUR) 30 MG extended release tablet take 1 tablet by mouth once daily 120 tablet 3    warfarin (COUMADIN) 7.5 MG tablet Take 7.5 mg by mouth daily      Insulin Pen Needle 31G X 8 MM MISC 1 each by Does not apply route 2 times daily 100 each 0    ONETOUCH ULTRA strip TEST three times a day if needed 100 strip 2    blood glucose test strips (ASCENSIA AUTODISC VI;ONE TOUCH ULTRA TEST VI) strip 1 each by In Vitro route 3 times daily As needed.  100 each 0    furosemide (LASIX) 20 MG tablet take 1 tablet by mouth once daily 30 tablet 3    pravastatin (PRAVACHOL) 40 MG tablet Take 1 tablet by mouth nightly 90 tablet 0     No current facility-administered medications for this visit. ALLERGIES    Allergies   Allergen Reactions    Adhesive Tape Other (See Comments)     Blister,skin tears with silk tape       PHYSICAL EXAM   Physical Exam  Vitals reviewed. Constitutional:       Appearance: He is well-developed. HENT:      Head: Normocephalic. Eyes:      Conjunctiva/sclera: Conjunctivae normal.      Pupils: Pupils are equal, round, and reactive to light. Neck:      Thyroid: No thyromegaly. Cardiovascular:      Rate and Rhythm: Normal rate and regular rhythm. Heart sounds: Normal heart sounds. No murmur heard. Pulmonary:      Effort: Pulmonary effort is normal.      Breath sounds: Normal breath sounds. No wheezing or rales. Abdominal:      Palpations: Abdomen is soft. Tenderness: There is no abdominal tenderness. There is no guarding or rebound. Musculoskeletal:         General: Tenderness (left distal shoulder) present. No deformity. Normal range of motion. Cervical back: Normal range of motion and neck supple. Lymphadenopathy:      Cervical: No cervical adenopathy. Skin:     General: Skin is warm and dry. Neurological:      Mental Status: He is alert and oriented to person, place, and time. Psychiatric:         Mood and Affect: Mood normal.         Behavior: Behavior normal.         Thought Content: Thought content normal.         Judgment: Judgment normal.         ASSESSMENT/PLAN  1. Type 2 diabetes mellitus with diabetic polyneuropathy, with long-term current use of insulin (HCC)  Chronic, stable. Discussed need for good control of diabetes and hypertension to prevent kidney disease.  - insulin aspart protamine-insulin aspart (NOVOLOG 70/30) (70-30) 100 UNIT/ML injection; Inject 60 Units into the skin 2 times daily (with meals)  Dispense: 15 pen; Refill: 3  - Insulin Pen Needle (KROGER PEN NEEDLES 31G) 31G X 8 MM MISC; 1 each by Does not apply route daily  Dispense: 100 each;  Refill: 3  Results for

## 2022-01-28 DIAGNOSIS — L30.9 DERMATITIS: ICD-10-CM

## 2022-02-02 DIAGNOSIS — L30.9 DERMATITIS: ICD-10-CM

## 2022-02-13 DIAGNOSIS — E08.42 DIABETIC POLYNEUROPATHY ASSOCIATED WITH DIABETES MELLITUS DUE TO UNDERLYING CONDITION (HCC): ICD-10-CM

## 2022-02-14 RX ORDER — GABAPENTIN 800 MG/1
TABLET ORAL
Qty: 60 TABLET | Refills: 0 | Status: SHIPPED | OUTPATIENT
Start: 2022-02-14 | End: 2022-03-12

## 2022-03-12 DIAGNOSIS — E08.42 DIABETIC POLYNEUROPATHY ASSOCIATED WITH DIABETES MELLITUS DUE TO UNDERLYING CONDITION (HCC): ICD-10-CM

## 2022-03-12 RX ORDER — GABAPENTIN 800 MG/1
TABLET ORAL
Qty: 60 TABLET | Refills: 0 | Status: SHIPPED | OUTPATIENT
Start: 2022-03-12 | End: 2022-04-08

## 2022-03-14 ENCOUNTER — TELEPHONE (OUTPATIENT)
Dept: FAMILY MEDICINE CLINIC | Age: 57
End: 2022-03-14

## 2022-03-15 DIAGNOSIS — E11.42 TYPE 2 DIABETES MELLITUS WITH DIABETIC POLYNEUROPATHY, WITH LONG-TERM CURRENT USE OF INSULIN (HCC): ICD-10-CM

## 2022-03-15 DIAGNOSIS — Z79.4 TYPE 2 DIABETES MELLITUS WITH DIABETIC POLYNEUROPATHY, WITH LONG-TERM CURRENT USE OF INSULIN (HCC): ICD-10-CM

## 2022-03-15 RX ORDER — PEN NEEDLE, DIABETIC 31 GX5/16"
1 NEEDLE, DISPOSABLE MISCELLANEOUS 2 TIMES DAILY
Qty: 100 EACH | Refills: 3 | Status: SHIPPED | OUTPATIENT
Start: 2022-03-15 | End: 2022-09-29

## 2022-03-15 NOTE — TELEPHONE ENCOUNTER
----- Message from Nuha Burkda sent at 3/15/2022  1:25 PM EDT -----  Subject: Refill Request    QUESTIONS  Name of Medication? Insulin Pen Needle (KROGER PEN NEEDLES 31G) 31G X 8 MM   MISC  Patient-reported dosage and instructions? Twice a day for injection  How many days do you have left? 0  Preferred Pharmacy? [facility] phone number (if available)? [phone]  Additional Information for Provider? Pt states that his prescription has   that he uses the needles once a day but pt states that he uses them twice   a day. Pt ran out of his script due to him using them twice a day. If you   can not reach pt he states you can call his wife Deanna Paz cell   [phone]  ---------------------------------------------------------------------------  --------------  Byron LIN  What is the best way for the office to contact you? OK to leave message on   voicemail  Preferred Call Back Phone Number?  [phone]

## 2022-03-17 DIAGNOSIS — Z79.4 TYPE 2 DIABETES MELLITUS WITH DIABETIC POLYNEUROPATHY, WITH LONG-TERM CURRENT USE OF INSULIN (HCC): ICD-10-CM

## 2022-03-17 DIAGNOSIS — E11.42 TYPE 2 DIABETES MELLITUS WITH DIABETIC POLYNEUROPATHY, WITH LONG-TERM CURRENT USE OF INSULIN (HCC): ICD-10-CM

## 2022-03-17 PROCEDURE — 3052F HG A1C>EQUAL 8.0%<EQUAL 9.0%: CPT | Performed by: FAMILY MEDICINE

## 2022-03-17 RX ORDER — INSULIN ASPART 100 [IU]/ML
INJECTION, SUSPENSION SUBCUTANEOUS
Qty: 15 ML | Refills: 5 | Status: SHIPPED | OUTPATIENT
Start: 2022-03-17 | End: 2022-05-19 | Stop reason: SDUPTHER

## 2022-03-31 ENCOUNTER — ANESTHESIA (OUTPATIENT)
Dept: OPERATING ROOM | Age: 57
End: 2022-03-31
Payer: MEDICAID

## 2022-03-31 ENCOUNTER — ANESTHESIA EVENT (OUTPATIENT)
Dept: OPERATING ROOM | Age: 57
End: 2022-03-31
Payer: MEDICAID

## 2022-03-31 ENCOUNTER — HOSPITAL ENCOUNTER (OUTPATIENT)
Age: 57
Setting detail: OUTPATIENT SURGERY
Discharge: HOME | End: 2022-03-31
Attending: UROLOGY | Admitting: UROLOGY
Payer: MEDICAID

## 2022-03-31 VITALS
RESPIRATION RATE: 25 BRPM | SYSTOLIC BLOOD PRESSURE: 126 MMHG | HEART RATE: 105 BPM | HEIGHT: 73 IN | WEIGHT: 260 LBS | OXYGEN SATURATION: 95 % | TEMPERATURE: 98 F | DIASTOLIC BLOOD PRESSURE: 92 MMHG | BODY MASS INDEX: 34.46 KG/M2

## 2022-03-31 VITALS — DIASTOLIC BLOOD PRESSURE: 55 MMHG | SYSTOLIC BLOOD PRESSURE: 97 MMHG | OXYGEN SATURATION: 97 %

## 2022-03-31 DIAGNOSIS — N52.1 ERECTILE DYSFUNCTION DUE TO DISEASES CLASSIFIED ELSEWHERE: Primary | ICD-10-CM

## 2022-03-31 LAB
GLUCOSE BLD-MCNC: 160 MG/DL (ref 75–110)
GLUCOSE BLD-MCNC: 195 MG/DL (ref 75–110)
INR BLD: 1.2
PROTHROMBIN TIME: 14.8 SEC (ref 11.5–14.2)

## 2022-03-31 PROCEDURE — 87205 SMEAR GRAM STAIN: CPT

## 2022-03-31 PROCEDURE — 2580000003 HC RX 258: Performed by: ANESTHESIOLOGY

## 2022-03-31 PROCEDURE — 2580000003 HC RX 258: Performed by: UROLOGY

## 2022-03-31 PROCEDURE — 87070 CULTURE OTHR SPECIMN AEROBIC: CPT

## 2022-03-31 PROCEDURE — 87077 CULTURE AEROBIC IDENTIFY: CPT

## 2022-03-31 PROCEDURE — 2500000003 HC RX 250 WO HCPCS: Performed by: NURSE ANESTHETIST, CERTIFIED REGISTERED

## 2022-03-31 PROCEDURE — 7100000000 HC PACU RECOVERY - FIRST 15 MIN: Performed by: UROLOGY

## 2022-03-31 PROCEDURE — 6360000002 HC RX W HCPCS: Performed by: UROLOGY

## 2022-03-31 PROCEDURE — 3600000012 HC SURGERY LEVEL 2 ADDTL 15MIN: Performed by: UROLOGY

## 2022-03-31 PROCEDURE — 7100000010 HC PHASE II RECOVERY - FIRST 15 MIN: Performed by: UROLOGY

## 2022-03-31 PROCEDURE — 87075 CULTR BACTERIA EXCEPT BLOOD: CPT

## 2022-03-31 PROCEDURE — 7100000001 HC PACU RECOVERY - ADDTL 15 MIN: Performed by: UROLOGY

## 2022-03-31 PROCEDURE — 2709999900 HC NON-CHARGEABLE SUPPLY: Performed by: UROLOGY

## 2022-03-31 PROCEDURE — 85610 PROTHROMBIN TIME: CPT

## 2022-03-31 PROCEDURE — 3700000001 HC ADD 15 MINUTES (ANESTHESIA): Performed by: UROLOGY

## 2022-03-31 PROCEDURE — 82947 ASSAY GLUCOSE BLOOD QUANT: CPT

## 2022-03-31 PROCEDURE — 2500000003 HC RX 250 WO HCPCS: Performed by: UROLOGY

## 2022-03-31 PROCEDURE — 3600000002 HC SURGERY LEVEL 2 BASE: Performed by: UROLOGY

## 2022-03-31 PROCEDURE — 6360000002 HC RX W HCPCS: Performed by: NURSE ANESTHETIST, CERTIFIED REGISTERED

## 2022-03-31 PROCEDURE — 3700000000 HC ANESTHESIA ATTENDED CARE: Performed by: UROLOGY

## 2022-03-31 PROCEDURE — 7100000011 HC PHASE II RECOVERY - ADDTL 15 MIN: Performed by: UROLOGY

## 2022-03-31 PROCEDURE — 88300 SURGICAL PATH GROSS: CPT

## 2022-03-31 PROCEDURE — 87186 SC STD MICRODIL/AGAR DIL: CPT

## 2022-03-31 RX ORDER — MIDAZOLAM HYDROCHLORIDE 1 MG/ML
INJECTION INTRAMUSCULAR; INTRAVENOUS PRN
Status: DISCONTINUED | OUTPATIENT
Start: 2022-03-31 | End: 2022-03-31 | Stop reason: SDUPTHER

## 2022-03-31 RX ORDER — FENTANYL CITRATE 50 UG/ML
INJECTION, SOLUTION INTRAMUSCULAR; INTRAVENOUS PRN
Status: DISCONTINUED | OUTPATIENT
Start: 2022-03-31 | End: 2022-03-31 | Stop reason: SDUPTHER

## 2022-03-31 RX ORDER — SODIUM CHLORIDE 9 MG/ML
INJECTION, SOLUTION INTRAVENOUS CONTINUOUS
Status: DISCONTINUED | OUTPATIENT
Start: 2022-03-31 | End: 2022-03-31 | Stop reason: HOSPADM

## 2022-03-31 RX ORDER — DEXAMETHASONE SODIUM PHOSPHATE 4 MG/ML
INJECTION, SOLUTION INTRA-ARTICULAR; INTRALESIONAL; INTRAMUSCULAR; INTRAVENOUS; SOFT TISSUE PRN
Status: DISCONTINUED | OUTPATIENT
Start: 2022-03-31 | End: 2022-03-31 | Stop reason: SDUPTHER

## 2022-03-31 RX ORDER — FENTANYL CITRATE 50 UG/ML
25 INJECTION, SOLUTION INTRAMUSCULAR; INTRAVENOUS EVERY 5 MIN PRN
Status: DISCONTINUED | OUTPATIENT
Start: 2022-03-31 | End: 2022-03-31 | Stop reason: HOSPADM

## 2022-03-31 RX ORDER — PHENYLEPHRINE HCL IN 0.9% NACL 1 MG/10 ML
SYRINGE (ML) INTRAVENOUS PRN
Status: DISCONTINUED | OUTPATIENT
Start: 2022-03-31 | End: 2022-03-31 | Stop reason: SDUPTHER

## 2022-03-31 RX ORDER — BUPIVACAINE HYDROCHLORIDE 2.5 MG/ML
INJECTION, SOLUTION INFILTRATION; PERINEURAL PRN
Status: DISCONTINUED | OUTPATIENT
Start: 2022-03-31 | End: 2022-03-31 | Stop reason: ALTCHOICE

## 2022-03-31 RX ORDER — SODIUM CHLORIDE 0.9 % (FLUSH) 0.9 %
10 SYRINGE (ML) INJECTION PRN
Status: DISCONTINUED | OUTPATIENT
Start: 2022-03-31 | End: 2022-03-31 | Stop reason: HOSPADM

## 2022-03-31 RX ORDER — LIDOCAINE HYDROCHLORIDE 10 MG/ML
1 INJECTION, SOLUTION EPIDURAL; INFILTRATION; INTRACAUDAL; PERINEURAL
Status: DISCONTINUED | OUTPATIENT
Start: 2022-03-31 | End: 2022-03-31 | Stop reason: HOSPADM

## 2022-03-31 RX ORDER — SODIUM CHLORIDE 9 MG/ML
25 INJECTION, SOLUTION INTRAVENOUS PRN
Status: DISCONTINUED | OUTPATIENT
Start: 2022-03-31 | End: 2022-03-31 | Stop reason: HOSPADM

## 2022-03-31 RX ORDER — SODIUM CHLORIDE 0.9 % (FLUSH) 0.9 %
10 SYRINGE (ML) INJECTION EVERY 12 HOURS SCHEDULED
Status: DISCONTINUED | OUTPATIENT
Start: 2022-03-31 | End: 2022-03-31 | Stop reason: HOSPADM

## 2022-03-31 RX ORDER — ONDANSETRON 2 MG/ML
INJECTION INTRAMUSCULAR; INTRAVENOUS PRN
Status: DISCONTINUED | OUTPATIENT
Start: 2022-03-31 | End: 2022-03-31 | Stop reason: SDUPTHER

## 2022-03-31 RX ORDER — SODIUM CHLORIDE, SODIUM LACTATE, POTASSIUM CHLORIDE, CALCIUM CHLORIDE 600; 310; 30; 20 MG/100ML; MG/100ML; MG/100ML; MG/100ML
INJECTION, SOLUTION INTRAVENOUS CONTINUOUS
Status: DISCONTINUED | OUTPATIENT
Start: 2022-03-31 | End: 2022-03-31 | Stop reason: HOSPADM

## 2022-03-31 RX ORDER — LIDOCAINE HYDROCHLORIDE 20 MG/ML
INJECTION, SOLUTION INFILTRATION; PERINEURAL PRN
Status: DISCONTINUED | OUTPATIENT
Start: 2022-03-31 | End: 2022-03-31 | Stop reason: SDUPTHER

## 2022-03-31 RX ORDER — ONDANSETRON 2 MG/ML
4 INJECTION INTRAMUSCULAR; INTRAVENOUS
Status: DISCONTINUED | OUTPATIENT
Start: 2022-03-31 | End: 2022-03-31 | Stop reason: HOSPADM

## 2022-03-31 RX ORDER — PROPOFOL 10 MG/ML
INJECTION, EMULSION INTRAVENOUS PRN
Status: DISCONTINUED | OUTPATIENT
Start: 2022-03-31 | End: 2022-03-31 | Stop reason: SDUPTHER

## 2022-03-31 RX ORDER — FENTANYL CITRATE 50 UG/ML
50 INJECTION, SOLUTION INTRAMUSCULAR; INTRAVENOUS EVERY 5 MIN PRN
Status: DISCONTINUED | OUTPATIENT
Start: 2022-03-31 | End: 2022-03-31 | Stop reason: HOSPADM

## 2022-03-31 RX ORDER — OXYCODONE HYDROCHLORIDE AND ACETAMINOPHEN 5; 325 MG/1; MG/1
1 TABLET ORAL EVERY 6 HOURS PRN
Qty: 20 TABLET | Refills: 0 | Status: SHIPPED | OUTPATIENT
Start: 2022-03-31 | End: 2022-04-05

## 2022-03-31 RX ORDER — CEPHALEXIN 500 MG/1
500 CAPSULE ORAL 3 TIMES DAILY
Qty: 15 CAPSULE | Refills: 0 | Status: SHIPPED | OUTPATIENT
Start: 2022-03-31 | End: 2022-04-05

## 2022-03-31 RX ADMIN — PROPOFOL 30 MG: 10 INJECTION, EMULSION INTRAVENOUS at 15:38

## 2022-03-31 RX ADMIN — MIDAZOLAM 2 MG: 1 INJECTION INTRAMUSCULAR; INTRAVENOUS at 15:23

## 2022-03-31 RX ADMIN — DEXAMETHASONE SODIUM PHOSPHATE 4 MG: 4 INJECTION, SOLUTION INTRAMUSCULAR; INTRAVENOUS at 15:31

## 2022-03-31 RX ADMIN — Medication 50 MCG: at 15:23

## 2022-03-31 RX ADMIN — Medication 50 MCG: at 15:45

## 2022-03-31 RX ADMIN — Medication 50 MCG: at 15:38

## 2022-03-31 RX ADMIN — PROPOFOL 140 MG: 10 INJECTION, EMULSION INTRAVENOUS at 15:23

## 2022-03-31 RX ADMIN — ONDANSETRON 4 MG: 2 INJECTION, SOLUTION INTRAMUSCULAR; INTRAVENOUS at 15:55

## 2022-03-31 RX ADMIN — Medication 50 MCG: at 15:31

## 2022-03-31 RX ADMIN — Medication 100 MCG: at 15:34

## 2022-03-31 RX ADMIN — LIDOCAINE HYDROCHLORIDE 80 MG: 20 INJECTION, SOLUTION INFILTRATION; PERINEURAL at 15:23

## 2022-03-31 RX ADMIN — MEROPENEM 1000 MG: 1 INJECTION, POWDER, FOR SOLUTION INTRAVENOUS at 15:31

## 2022-03-31 RX ADMIN — SODIUM CHLORIDE, POTASSIUM CHLORIDE, SODIUM LACTATE AND CALCIUM CHLORIDE: 600; 310; 30; 20 INJECTION, SOLUTION INTRAVENOUS at 13:34

## 2022-03-31 ASSESSMENT — PULMONARY FUNCTION TESTS
PIF_VALUE: 20
PIF_VALUE: 21
PIF_VALUE: 16
PIF_VALUE: 4
PIF_VALUE: 19
PIF_VALUE: 21
PIF_VALUE: 3
PIF_VALUE: 22
PIF_VALUE: 20
PIF_VALUE: 16
PIF_VALUE: 21
PIF_VALUE: 21
PIF_VALUE: 23
PIF_VALUE: 20
PIF_VALUE: 14
PIF_VALUE: 15
PIF_VALUE: 21
PIF_VALUE: 1
PIF_VALUE: 16
PIF_VALUE: 22
PIF_VALUE: 19
PIF_VALUE: 2
PIF_VALUE: 22
PIF_VALUE: 16
PIF_VALUE: 16
PIF_VALUE: 20
PIF_VALUE: 1
PIF_VALUE: 16
PIF_VALUE: 0
PIF_VALUE: 3
PIF_VALUE: 0
PIF_VALUE: 19
PIF_VALUE: 17
PIF_VALUE: 0
PIF_VALUE: 22
PIF_VALUE: 4
PIF_VALUE: 3
PIF_VALUE: 1
PIF_VALUE: 22
PIF_VALUE: 17
PIF_VALUE: 15
PIF_VALUE: 23
PIF_VALUE: 1
PIF_VALUE: 20
PIF_VALUE: 21
PIF_VALUE: 21
PIF_VALUE: 0
PIF_VALUE: 16
PIF_VALUE: 22
PIF_VALUE: 2

## 2022-03-31 ASSESSMENT — PAIN - FUNCTIONAL ASSESSMENT: PAIN_FUNCTIONAL_ASSESSMENT: 0-10

## 2022-03-31 NOTE — ANESTHESIA PRE PROCEDURE
Department of Anesthesiology  Preprocedure Note       Name:  Marya Waterman   Age:  61 y.o.  :  1965                                          MRN:  [id number]         Date:  3/31/2022      Surgeon: Joseluis Charles):  Conner Riley MD    Procedure: Procedure(s):  PENIS IMPLANT REMOVAL    Medications prior to admission:   Prior to Admission medications    Medication Sig Start Date End Date Taking?  Authorizing Provider   NOVOLOG MIX 70/30 FLEXPEN (70-30) 100 UNIT/ML injection inject 60 units subcutaneously twice a day with meals 3/17/22   Storm Dontrell, MD   Insulin Pen Needle ([employer] PEN NEEDLES 31G) 31G X 8 MM MISC 1 each by Does not apply route 2 times daily 3/15/22   Storm Dontrell, MD   gabapentin (NEURONTIN) 800 MG tablet take 1 tablet by mouth twice a day 3/12/22 4/12/22  Storm Both, MD   ciclopirox (LOPROX) 0.77 % cream apply topically twice a day to feet 2/3/22   [facility], MD   Handicap Placard MISC by Does not apply route Expires five years form original written date 22   Juan Miguel Jon, MD MITCHELL VITAMIN D-3 50 MCG (2000) CAPS take 1 capsule by mouth once daily 22   Storm Both, MD   DULoxetine (CYMBALTA) 60 MG extended release capsule take 1 capsule by mouth once daily 21   [facility], MD   hydrALAZINE (APRESOLINE) 25 MG tablet take 1 tablet by mouth twice a day 21   Historical Provider, MD   Insulin Syringe-Needle U-100 (BD INSULIN SYRINGE U/F) 31G X \" 1 ML MISC use 1 PEN NEEDLE to inject MEDICATION subcutaneously twice a day 21   Storm Both, MD   carvedilol (COREG) 12.5 MG tablet take 1 tablet by mouth twice a day 21   Historical Provider, MD   lisinopril (PRINIVIL;ZESTRIL) 5 MG tablet take 1 tablet by mouth once daily 4/15/21   Historical Provider, MD [facility]) 625 MG tablet Take 3 tablets by mouth 2 times daily 21   Juan Miguel Jon, MD   JANUVIA 100 MG tablet take 1 tablet by mouth once daily 21   Juan Miguel Jon, MD omeprazole (PRILOSEC) 20 MG delayed release capsule take 1 capsule by mouth once daily 30 MINUTES PRIOR TO FOOD 4/14/21   Glenda Bradshaw MD   isosorbide mononitrate (IMDUR) 30 MG extended release tablet take 1 tablet by mouth once daily 2/9/21   Glenda Bradshaw MD   warfarin (COUMADIN) 7.5 MG tablet Take 7.5 mg by mouth daily    Historical Provider, MD   Insulin Pen Needle 31G X 8 MM MISC 1 each by Does not apply route 2 times daily 9/10/20   Glenda Bradshaw MD   [facility] ULTRA strip TEST three times a day if needed 7/2/20   Glenda Bradshaw MD   blood glucose test strips (ASCENSIA AUTODISC VI;ONE TOUCH ULTRA TEST VI) strip 1 each by In Vitro route 3 times daily As needed. 7/2/20   Glenda Bradshaw MD   furosemide (LASIX) 20 MG tablet take 1 tablet by mouth once daily 6/15/20   Glenda Bradshaw MD   pravastatin (PRAVACHOL) 40 MG tablet Take 1 tablet by mouth nightly 4/21/20   Glenda Bradshaw MD       Current medications:    Current Facility-Administered Medications   Medication Dose Route Frequency Provider Last Rate Last Admin    lidocaine PF 1 % injection 1 mL  1 mL IntraDERmal Once PRN Jennifer Blankenship MD        0.9 % sodium chloride infusion   IntraVENous Continuous Ndal Nicky Doyle MD        lactated ringers infusion   IntraVENous Continuous Jennifer Blankenship  mL/hr at 03/31/22 1334 New Bag at 03/31/22 1334    sodium chloride flush 0.9 % injection 10 mL  10 mL IntraVENous 2 times per day Jennifer Blankenship MD        sodium chloride flush 0.9 % injection 10 mL  10 mL IntraVENous PRN Jennifer Blankenship MD        0.9 % sodium chloride infusion  25 mL IntraVENous PRN Jennifer Blankenship MD        gentamicin (GARAMYCIN) 120 mg, vancomycin (VANCOCIN) 1,000 mg in sodium chloride 0.9 % 500 mL irrigation   Irrigation Once Dina Horne MD        meropenem (MERREM) 1,000 mg in sodium chloride 0.9 % 100 mL IVPB (mini-bag)  1,000 mg IntraVENous Once Dina Horne MD           Allergies:     Allergies   Allergen Reactions  Adhesive Tape Other (See Comments)     Blister,skin tears with silk tape       Problem List:    Patient Active Problem List   Diagnosis Code    Prostate cancer ([email or web address] C61    Type 2 diabetes mellitus with hyperglycemia, with long-term current use of insulin ([email or web address] E11.65, Z79.4    Mixed hyperlipidemia E78.2    Obesity (BMI 30.0-34. 9) E66.9    Coronary artery disease involving native coronary artery of native heart without angina pectoris I25.10    Pacemaker U62.1    Systolic heart failure (HCC) I50.20    Chronic back pain M54.9, G89.29    History of lung thrombosis Z86.711    Stage 3 chronic kidney disease (HCC) N18.30    Spinal stenosis at L4-L5 level M48.061    Chronic right-sided low back pain with right-sided sciatica M54.41, G89.29    Erectile dysfunction following radical prostatectomy N52.31    Type 2 diabetes mellitus with diabetic polyneuropathy ([facility]) E11.42    HTN (hypertension) I10    Spondylolisthesis, acquired M43.10    Foraminal stenosis of lumbar region M48.061    Degeneration of intervertebral disc [id number]    Nocturia R35.1    Tinea pedis B35.3    Morbidly obese (HCC) E66.01    Long term (current) use of anticoagulants Z79.01    Hernia, inguinal, recurrent K40.91    Multiple subsegmental pulmonary emboli without acute cor pulmonale ([facility]) I26.94    ED (erectile dysfunction) N52.9       Past Medical History:        Diagnosis Date    Anxiety     no treatment    Arthritis     left shoulder    Cardiomegaly     CHF (congestive heart failure) ([facility]) EF 20%    Chronic back pain     GERD (gastroesophageal reflux disease)     Heart attack ([facility]     History of lung thrombosis     Hx of blood clots     left leg to lungs     Hyperkalemia     Hyperlipidemia     Hypertension     Irregular heart beat     Neuropathy     feet    Obesity     WILLA (obstructive sleep apnea)     not using the machine    Pacemaker     AICD    PE (pulmonary embolism) 2001    \"about 5 yrs ago\"    Prostate cancer ([facility] 2016    Spinal stenosis at L4-L5 level     Traumatic closed fx of eight or more ribs with minimal displacement 2017    pneumothorax. Pt fell 50 feet from a balcony. Pt denies history of a head injury.  Type 2 diabetes mellitus without complication [facility])        Past Surgical History:        Procedure Laterality Date    CHEST TUBE INSERTION  2017    COLONOSCOPY      CORONARY ANGIOPLASTY WITH STENT PLACEMENT      EYE SURGERY Right     injury    FINGER TRIGGER RELEASE      left ring and thumb    HERNIA REPAIR      LUMBAR FUSION  2020    L5-S1    LUMBAR SPINE SURGERY Right 2020    RIGHT L5 -S1 TLIF MINIMALLY INVASIVE- 2CARMS,  NUVASIVE, NO CELLSAVER performed by Phoebe Okeefe MD at [facility]      ICD;  battery change ;  Dr. Stefan Aguilar. DEFIB NOT SAFE MRI    PENILE PROSTHESIS PLACEMENT N/A 2021    PENIS PROSTHESIS INSERTION  - COLOPLAST performed by Melody Berumen MD at [address] [place] [address]  2016    carmen lymph node dissection    VENA CAVA FILTER PLACEMENT         Social History:    Social History     Tobacco Use    Smoking status: Former Smoker     Packs/day: 0.50     Years: 20.00     Pack years: 10.00     Quit date: 2021     Years since quittin.7    Smokeless tobacco: Never Used   Substance Use Topics    Alcohol use:  No                                Counseling given: Not Answered      Vital Signs (Current):   Vitals:    22 1311 22 1314   BP:  136/74   Pulse:  104   Resp:  16   Temp:  97.7 °F (36.5 °C)   TempSrc:  Temporal   SpO2:  96%   Weight: 260 lb (117.9 kg)    Height: 6' 1\" (1.854 m)                                               BP Readings from Last 3 Encounters:   22 136/74   22 128/60   21 129/80       NPO Status: Time of last liquid consumption: 2330                        Time of last solid consumption: 2300                        Date of last liquid consumption: 03/30/22                        Date of last solid food consumption: 03/30/22    BMI:   Wt Readings from Last 3 Encounters:   03/31/22 260 lb (117.9 kg)   01/18/22 266 lb 9.6 oz (120.9 kg)   08/26/21 270 lb 6.4 oz (122.7 kg)     Body mass index is 34.3 kg/m².     CBC:   Lab Results   Component Value Date    WBC 12.5 08/26/2021    RBC 5.18 08/26/2021    HGB 13.3 08/26/2021    HCT 42.9 08/26/2021    MCV 82.8 08/26/2021    RDW 16.4 08/26/2021     08/26/2021       CMP:   Lab Results   Component Value Date     06/30/2021    K 4.7 06/30/2021     06/30/2021    CO2 25 06/30/2021    BUN 17 06/30/2021    CREATININE 1.22 06/30/2021    GFRAA >60 06/30/2021    LABGLOM >60 06/30/2021    GLUCOSE 172 06/30/2021    PROT 6.8 11/19/2020    CALCIUM 9.5 06/30/2021    BILITOT 0.34 11/19/2020    ALKPHOS 74 11/19/2020    AST 27 11/19/2020    ALT 23 11/19/2020       POC Tests:   Recent Labs     03/31/22  1325   POCGLU 195*       Coags:   Lab Results   Component Value Date    PROTIME 14.8 03/31/2022    INR 1.2 03/31/2022    APTT 26.0 01/15/2020       HCG (If Applicable): No results found for: PREGTESTUR, PREGSERUM, HCG, HCGQUANT     ABGs: No results found for: PHART, PO2ART, RZS9DCI, BVO8XSG, BEART, U1YLWTHM     Type & Screen (If Applicable):  No results found for: LABABO, LABRH    Drug/Infectious Status (If Applicable):  Lab Results   Component Value Date    HEPCAB NONREACTIVE 03/28/2017       COVID-19 Screening (If Applicable): No results found for: COVID19        Anesthesia Evaluation    Airway: Mallampati: I  TM distance: >3 FB   Neck ROM: full  Mouth opening: > = 3 FB Dental:          Pulmonary:   (+) sleep apnea:                             Cardiovascular:    (+) hypertension:, pacemaker:, CAD:,     (-)  angina          Echocardiogram reviewed                  Neuro/Psych:               GI/Hepatic/Renal:             Endo/Other: (+) Diabetes, . Abdominal:             Vascular:   + PE.        Other Findings:             Anesthesia Plan      general     ASA 4                                 Janet Arenas MD   3/31/2022

## 2022-03-31 NOTE — OP NOTE
[address] [facility] [address]                [address] [place] [address]                                OPERATIVE REPORT    PATIENT NAME: Shikha Vanegas                  :        1965  MED REC NO:   [id number]                             ROOM:  ACCOUNT NO:   [de-identified]                           ADMIT DATE: 2022  PROVIDER:     Heatl Yan    DATE OF PROCEDURE:  2022    PREOPERATIVE DIAGNOSIS:  Infected right malleable penile prosthesis. POSTOPERATIVE DIAGNOSIS:  Infected right malleable penile prosthesis. PROCEDURE:  Removal, right malleable penile prosthesis. SURGEON:  Marilyn Monk MD    ANESTHESIA:  General.    ESTIMATED BLOOD LOSS:  Minimal.    INDICATIONS:  The patient is a 60-year-old male. He previously had a  three-piece penile implant placed. It got infected. It was removed and  a malleable prosthesis was placed. He did well initially, but then he  developed an erosion of the left malleable implant through the glans  penis which was removed. He again did well initially, but he has been  having a lot of penile pain on the right side from the malleable  implant. The patient wants the implant removed. Clinically, I suspect  it is infected as well. PROCEDURE:  He was taken to operating room. He was given a general  anesthetic. He was put in the supine position. His genitals were  prepped in the usual sterile fashion. A 16-Singaporean Moreira catheter was  placed. A penile scrotal incision was made with the 15-blade scalpel. The Bovie was used to dissect through the subcutaneous tissue. The  right corporal body was identified. The cutting current of the Bovie  was then used to open the right corporal body. Immediately, some  purulent material was noted. This was cultured for aerobic and  anaerobic.   I took a right angle clamp and was able to get underneath  the implant and removed the malleable implant in its entirety. I then  irrigated the wound copiously with 500 mL of 120 mg of gentamicin and 1  gm of vancomycin. Hemostasis was obtained with the Bovie. I then close  the skin with some interrupted 0 Prolenes in a vertical mattress  fashion. Telfa, Tegaderm were applied. The Moreira catheter was removed. He tolerated it well. He will be discharged. He will follow up next  Thursday for wound assessment. He will be discharged home on Percocet  and cephalexin.         Rufina Nina    D: 03/31/2022 16:16:56       T: 03/31/2022 16:21:15     [email or web address]  Job#: [id number]     Doc#: [id number]    CC:

## 2022-03-31 NOTE — H&P
Interval H&P Note    Pt Name: Sandrea Schaumann  MRN: [id number]  YOB: 1965  Date of evaluation: 3/31/2022      [x] I have reviewed the hard copy urology progress note by Dr. Fidel Hartman dated 3/22/2022 labeled in short chart which meets the criteria for an Interval History and Physical note. [x] I have examined  Sandrea Schaumann  There are no changes to the patient who is scheduled for a penis implant removal by Dr. Fidel Hartman for erosion penile prosthesis. The patient denies new health changes, fever, chills, wheezing, cough, open sores or wounds. History of sleep apnea, COPD, hyperlipidemia, hypertension, diabetes, DVT and PE, atrial fibrillation, congestive heart failure. Patient follows with cardiologist Dr. Cayden Schmitt. Denies shortness of breath, chest pain, palpitations, dizziness, syncope. POC . Last Warfarin 3/24/2022 and Vitamin D 3/31/2022. Vital signs: /74   Pulse 104   Temp 97.7 °F (36.5 °C) (Temporal)   Resp 16   Ht 6' 1\" (1.854 m)   Wt 260 lb (117.9 kg)   SpO2 96%   BMI 34.30 kg/m²     Allergies:  Adhesive tape    Medications:    Prior to Admission medications    Medication Sig Start Date End Date Taking?  Authorizing Provider   NOVOLOG MIX 70/30 FLEXPEN (70-30) 100 UNIT/ML injection inject 60 units subcutaneously twice a day with meals 3/17/22   Mahi uHmmel MD   Insulin Pen Needle ([employer] PEN NEEDLES 31G) 31G X 8 MM MISC 1 each by Does not apply route 2 times daily 3/15/22   Mahi Hummel MD   gabapentin (NEURONTIN) 800 MG tablet take 1 tablet by mouth twice a day 3/12/22 4/12/22  Mahi Hummel MD   ciclopirox (LOPROX) 0.77 % cream apply topically twice a day to feet 2/3/22   Mahi Hummel MD   Handicap Placard MISC by Does not apply route Expires five years form original written date 1/18/22   Mahi Hummel MD   RA VITAMIN D-3 50 MCG (2000 UT) CAPS take 1 capsule by mouth once daily 1/12/22   Mahi Hummel MD   DULoxetine (CYMBALTA) 60 MG extended release capsule take 1 capsule by mouth once daily 8/27/21   Marta Patel MD   hydrALAZINE (APRESOLINE) 25 MG tablet take 1 tablet by mouth twice a day 8/17/21   Historical Provider, MD   Insulin Syringe-Needle U-100 (BD INSULIN SYRINGE U/F) 31G X 5/16\" 1 ML MISC use 1 PEN NEEDLE to inject MEDICATION subcutaneously twice a day 8/4/21   Marta Patel MD   carvedilol (COREG) 12.5 MG tablet take 1 tablet by mouth twice a day 5/13/21   Historical Provider, MD   lisinopril (PRINIVIL;ZESTRIL) 5 MG tablet take 1 tablet by mouth once daily 4/15/21   Historical Provider, MD [facility] 625 MG tablet Take 3 tablets by mouth 2 times daily 6/24/21   Marta Patel MD   JANUVIA 100 MG tablet take 1 tablet by mouth once daily 5/14/21   Marta Patel MD   omeprazole (PRILOSEC) 20 MG delayed release capsule take 1 capsule by mouth once daily 30 MINUTES PRIOR TO FOOD 4/14/21   Marta Patel MD   isosorbide mononitrate (IMDUR) 30 MG extended release tablet take 1 tablet by mouth once daily 2/9/21   Marta Patel MD   warfarin (COUMADIN) 7.5 MG tablet Take 7.5 mg by mouth daily    Historical Provider, MD   Insulin Pen Needle 31G X 8 MM MISC 1 each by Does not apply route 2 times daily 9/10/20   Marta Patel MD   [facility] ULTRA strip TEST three times a day if needed 7/2/20   Marta Patel MD   blood glucose test strips (ASCENSIA AUTODISC VI;ONE TOUCH ULTRA TEST VI) strip 1 each by In Vitro route 3 times daily As needed.  7/2/20   Marta Patel MD   furosemide (LASIX) 20 MG tablet take 1 tablet by mouth once daily 6/15/20   Marta Patel MD   pravastatin (PRAVACHOL) 40 MG tablet Take 1 tablet by mouth nightly 4/21/20   Marta Patel MD         Past Medical History:     Past Medical History:   Diagnosis Date    Anxiety     no treatment    Arthritis     left shoulder    Cardiomegaly     CHF (congestive heart failure) (HCC) EF 20%    Chronic back pain     GERD (gastroesophageal reflux disease)     Heart attack ([facility]     History of lung thrombosis     Hx of blood clots     Hyperkalemia     Hyperlipidemia     Hypertension     Irregular heart beat     Neuropathy     feet    Obesity     WILLA (obstructive sleep apnea)     not using the machine    Pacemaker     AICD    PE (pulmonary embolism) 2001    \"about 5 yrs ago\"    Prostate cancer ([facility] 2016    Spinal stenosis at L4-L5 level 2019    Traumatic closed fx of eight or more ribs with minimal displacement 06/2017    pneumothorax. Pt fell 50 feet from a balcony. Pt denies history of a head injury.  Type 2 diabetes mellitus without complication ([facility]         Past Surgical History:     Past Surgical History:   Procedure Laterality Date    CHEST TUBE INSERTION  06/2017    COLONOSCOPY  2016    CORONARY ANGIOPLASTY WITH STENT PLACEMENT      EYE SURGERY Right     injury    FINGER TRIGGER RELEASE  2020    left ring and thumb    HERNIA REPAIR  2020    LUMBAR FUSION  01/29/2020    L5-S1    LUMBAR SPINE SURGERY Right 1/29/2020    RIGHT L5 -S1 TLIF MINIMALLY INVASIVE- 2CARMS,  NUVASIVE, NO CELLSAVER performed by Es Caballero MD at [address] [place] [address]  2008    ICD;  battery change 2016;  Dr. Shabana Dudley.  DEFIB NOT SAFE MRI    PENILE PROSTHESIS PLACEMENT N/A 8/25/2021    PENIS PROSTHESIS INSERTION  - COLOPLAST performed by Jer Webre MD at [address] [place] [address]  08/31/2016    carmen lymph node dissection    VENA CAVA FILTER PLACEMENT  2000       Social History:     Social History     Socioeconomic History    Marital status:      Spouse name: Not on file    Number of children: 2    Years of education: Not on file    Highest education level: Not on file   Occupational History    Occupation: disability   Tobacco Use    Smoking status: Former Smoker     Packs/day: 0.50     Years: 20.00     Pack years: 10.00     Quit date: 6/29/2021     Years since quittin.7    Smokeless tobacco: Never Used   Vaping Use    Vaping Use: Never used   Substance and Sexual Activity    Alcohol use: No    Drug use: No    Sexual activity: Yes     Partners: Female   Other Topics Concern    Not on file   Social History Narrative    , grandchildren from  daughter live with him and his wife. Social Determinants of Health     Financial Resource Strain:     Difficulty of Paying Living Expenses: Not on file   Food Insecurity:     Worried About Running Out of Food in the Last Year: Not on file    Rashmi of Food in the Last Year: Not on file   Transportation Needs:     Lack of Transportation (Medical): Not on file    Lack of Transportation (Non-Medical):  Not on file   Physical Activity:     Days of Exercise per Week: Not on file    Minutes of Exercise per Session: Not on file   Stress:     Feeling of Stress : Not on file   Social Connections:     Frequency of Communication with Friends and Family: Not on file    Frequency of Social Gatherings with Friends and Family: Not on file    Attends Holiness Services: Not on file    Active Member of Clubs or Organizations: Not on file    Attends Club or Organization Meetings: Not on file    Marital Status: Not on file   Intimate Partner Violence:     Fear of Current or Ex-Partner: Not on file    Emotionally Abused: Not on file    Physically Abused: Not on file    Sexually Abused: Not on file   Housing Stability:     Unable to Pay for Housing in the Last Year: Not on file    Number of [place] in the Last Year: Not on file    Unstable Housing in the Last Year: Not on file       Family History:     Family History   Problem Relation Age of Onset    Diabetes Father     Hypertension Father     Heart Disease Father     Hypertension Mother     Prostate Cancer Brother     Diabetes Brother     Diabetes Brother     Diabetes Brother      This is a 61 y.o. male who is pleasant, cooperative, alert and oriented x3, in no acute distress. Heart: Heart sounds are normal.   asymptomatic tachycardic rate and regular rhythm without murmur, gallop or rub. Lungs: Normal respiratory effort with equal expansion, good air exchange, unlabored and inspiratory and expiratory wheezing to auscultation without rales bilaterally   Abdomen: soft, nontender, nondistended with bowel sounds active. Labs:  No results for input(s): HGB, HCT, WBC, MCV, PLT, NA, K, CL, CO2, BUN, CREATININE, GLUCOSE, INR, PROTIME, APTT, AST, ALT, LABALBU, HCG in the last 720 hours. No results for input(s): COVID19 in the last 720 hours.     Gay Litten, APRN - CNP  Electronically signed 3/31/2022 at 1:29 PM

## 2022-03-31 NOTE — ANESTHESIA POSTPROCEDURE EVALUATION
Department of Anesthesiology  Postprocedure Note    Patient: Jamaica Irwin  MRN: [id number]  YOB: 1965  Date of evaluation: 3/31/2022  Time:  6:24 PM     Procedure Summary     Date: 03/31/22 Room / Location: [address] [place] [address] - INPATIENT    Anesthesia Start: 8995 Anesthesia Stop: 5026    Procedure: PENIS IMPLANT REMOVAL (N/A Penis) Diagnosis: (DX EROSION PENILE PROSTHESIS)    Surgeons: Heraclio Verma MD Responsible Provider: Jason Oviedo MD    Anesthesia Type: general ASA Status: 4          Anesthesia Type: general    Patrick Phase I: Patrick Score: 8    Patrick Phase II: Patrick Score: 10    Last vitals: Reviewed and per EMR flowsheets.        Anesthesia Post Evaluation    Complications: no

## 2022-04-02 LAB
CULTURE: ABNORMAL
DIRECT EXAM: ABNORMAL
DIRECT EXAM: ABNORMAL
SPECIMEN DESCRIPTION: ABNORMAL

## 2022-04-04 LAB — SURGICAL PATHOLOGY REPORT: NORMAL

## 2022-04-04 RX ORDER — OMEPRAZOLE 20 MG/1
CAPSULE, DELAYED RELEASE ORAL
Qty: 120 CAPSULE | Refills: 2 | Status: SHIPPED | OUTPATIENT
Start: 2022-04-04

## 2022-04-04 NOTE — TELEPHONE ENCOUNTER
Robbie Bhardwaj is calling to request a refill on the following medication(s):    Last Visit Date (If Applicable):  0/10/0368    Next Visit Date:    5/19/2022    Medication Request:  Requested Prescriptions     Pending Prescriptions Disp Refills    omeprazole (PRILOSEC) 20 MG delayed release capsule [Pharmacy Med Name: OMEPRAZOLE DR 20 MG CAPSULE] 120 capsule 2     Sig: take 1 capsule by mouth once daily 30 MINUTES PRIOR TO FOOD

## 2022-04-08 DIAGNOSIS — E08.42 DIABETIC POLYNEUROPATHY ASSOCIATED WITH DIABETES MELLITUS DUE TO UNDERLYING CONDITION (HCC): ICD-10-CM

## 2022-04-08 RX ORDER — GABAPENTIN 800 MG/1
TABLET ORAL
Qty: 60 TABLET | Refills: 0 | Status: SHIPPED | OUTPATIENT
Start: 2022-04-08 | End: 2022-05-06

## 2022-05-02 RX ORDER — SITAGLIPTIN 100 MG/1
TABLET, FILM COATED ORAL
Qty: 90 TABLET | Refills: 3 | Status: SHIPPED | OUTPATIENT
Start: 2022-05-02

## 2022-05-06 DIAGNOSIS — E08.42 DIABETIC POLYNEUROPATHY ASSOCIATED WITH DIABETES MELLITUS DUE TO UNDERLYING CONDITION (HCC): ICD-10-CM

## 2022-05-06 RX ORDER — GABAPENTIN 800 MG/1
TABLET ORAL
Qty: 60 TABLET | Refills: 0 | Status: SHIPPED | OUTPATIENT
Start: 2022-05-06 | End: 2022-05-19 | Stop reason: SDUPTHER

## 2022-05-06 NOTE — TELEPHONE ENCOUNTER
Donna Montes is calling to request a refill on the following medication(s):    Last Visit Date (If Applicable):  3/89/5570    Next Visit Date:    5/19/2022    Medication Request:  Requested Prescriptions     Pending Prescriptions Disp Refills    gabapentin (NEURONTIN) 800 MG tablet [Pharmacy Med Name: GABAPENTIN 800 MG TABLET] 60 tablet 0     Sig: take 1 tablet by mouth twice a day

## 2022-05-19 ENCOUNTER — OFFICE VISIT (OUTPATIENT)
Dept: FAMILY MEDICINE CLINIC | Age: 57
End: 2022-05-19
Payer: COMMERCIAL

## 2022-05-19 VITALS
DIASTOLIC BLOOD PRESSURE: 60 MMHG | WEIGHT: 261.6 LBS | SYSTOLIC BLOOD PRESSURE: 120 MMHG | BODY MASS INDEX: 34.51 KG/M2 | OXYGEN SATURATION: 98 % | HEART RATE: 81 BPM

## 2022-05-19 DIAGNOSIS — I25.10 CORONARY ARTERY DISEASE INVOLVING NATIVE CORONARY ARTERY OF NATIVE HEART WITHOUT ANGINA PECTORIS: ICD-10-CM

## 2022-05-19 DIAGNOSIS — N52.1 ERECTILE DYSFUNCTION DUE TO DISEASES CLASSIFIED ELSEWHERE: ICD-10-CM

## 2022-05-19 DIAGNOSIS — C61 PROSTATE CANCER (HCC): ICD-10-CM

## 2022-05-19 DIAGNOSIS — E08.42 DIABETIC POLYNEUROPATHY ASSOCIATED WITH DIABETES MELLITUS DUE TO UNDERLYING CONDITION (HCC): ICD-10-CM

## 2022-05-19 DIAGNOSIS — I50.22 CHRONIC SYSTOLIC HEART FAILURE (HCC): ICD-10-CM

## 2022-05-19 DIAGNOSIS — E66.01 MORBIDLY OBESE (HCC): ICD-10-CM

## 2022-05-19 DIAGNOSIS — Z79.4 TYPE 2 DIABETES MELLITUS WITH DIABETIC POLYNEUROPATHY, WITH LONG-TERM CURRENT USE OF INSULIN (HCC): Primary | ICD-10-CM

## 2022-05-19 DIAGNOSIS — E11.42 TYPE 2 DIABETES MELLITUS WITH DIABETIC POLYNEUROPATHY, WITH LONG-TERM CURRENT USE OF INSULIN (HCC): Primary | ICD-10-CM

## 2022-05-19 DIAGNOSIS — I10 PRIMARY HYPERTENSION: ICD-10-CM

## 2022-05-19 PROBLEM — N18.30 STAGE 3 CHRONIC KIDNEY DISEASE (HCC): Status: RESOLVED | Noted: 2018-12-10 | Resolved: 2022-05-19

## 2022-05-19 PROBLEM — R35.1 NOCTURIA: Status: RESOLVED | Noted: 2020-07-14 | Resolved: 2022-05-19

## 2022-05-19 LAB — HBA1C MFR BLD: 7.5 %

## 2022-05-19 PROCEDURE — 3051F HG A1C>EQUAL 7.0%<8.0%: CPT | Performed by: FAMILY MEDICINE

## 2022-05-19 PROCEDURE — 83036 HEMOGLOBIN GLYCOSYLATED A1C: CPT | Performed by: FAMILY MEDICINE

## 2022-05-19 PROCEDURE — 99214 OFFICE O/P EST MOD 30 MIN: CPT | Performed by: FAMILY MEDICINE

## 2022-05-19 RX ORDER — INSULIN ASPART 100 [IU]/ML
60 INJECTION, SUSPENSION SUBCUTANEOUS 2 TIMES DAILY WITH MEALS
Qty: 15 ML | Refills: 5
Start: 2022-05-19 | End: 2022-06-13

## 2022-05-19 RX ORDER — GABAPENTIN 800 MG/1
800 TABLET ORAL 3 TIMES DAILY
Qty: 90 TABLET | Refills: 0
Start: 2022-05-19 | End: 2022-06-06

## 2022-05-19 SDOH — ECONOMIC STABILITY: FOOD INSECURITY: WITHIN THE PAST 12 MONTHS, THE FOOD YOU BOUGHT JUST DIDN'T LAST AND YOU DIDN'T HAVE MONEY TO GET MORE.: NEVER TRUE

## 2022-05-19 SDOH — ECONOMIC STABILITY: FOOD INSECURITY: WITHIN THE PAST 12 MONTHS, YOU WORRIED THAT YOUR FOOD WOULD RUN OUT BEFORE YOU GOT MONEY TO BUY MORE.: NEVER TRUE

## 2022-05-19 ASSESSMENT — ENCOUNTER SYMPTOMS
BLOOD IN STOOL: 0
COUGH: 0
ABDOMINAL PAIN: 0
ALLERGIC/IMMUNOLOGIC NEGATIVE: 1
EYES NEGATIVE: 1
BACK PAIN: 1
SHORTNESS OF BREATH: 0
CONSTIPATION: 0
DIARRHEA: 0

## 2022-05-19 ASSESSMENT — SOCIAL DETERMINANTS OF HEALTH (SDOH): HOW HARD IS IT FOR YOU TO PAY FOR THE VERY BASICS LIKE FOOD, HOUSING, MEDICAL CARE, AND HEATING?: NOT HARD AT ALL

## 2022-05-19 NOTE — PROGRESS NOTES
[facility]  [address] [place] [address] [address] [facility] [id number]  Dept: [phone]    5/19/2022    CHIEF COMPLAINT    Chief Complaint   Patient presents with    Diabetes    Hypertension       HPI    Elizabeth Mckeon is a 61 y.o. male who presents   Chief Complaint   Patient presents with    Diabetes    Hypertension   . Recheck chronic conditions including diabetes, gerd, OA, neuropathy, htn, hx of PE, heart failure, high cholesterol. Following with cardiologist.  Has a history of pacemaker. 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 He is still suffering from ED and wants to know if there is anything else he can do. Unable to take Viagra due to chronic use of nitroglycerin. Vitals:    05/19/22 0958   BP: 120/60   Pulse: 81   SpO2: 98%   Weight: 261 lb 9.6 oz (118.7 kg)       REVIEW OF SYSTEMS    Review of Systems   Constitutional: Negative for fatigue, fever and unexpected weight change. HENT: Negative. Eyes: Negative. Respiratory: Negative for cough and shortness of breath. Cardiovascular: Negative for chest pain and leg swelling. Gastrointestinal: Negative for abdominal pain, blood in stool, constipation and diarrhea. Endocrine: Negative. Genitourinary: Negative for frequency and urgency. ED, failed penile implant   Musculoskeletal: Positive for arthralgias and back pain. Skin: Negative. Allergic/Immunologic: Negative. Neurological: Negative for dizziness and headaches. Hematological: Negative. Psychiatric/Behavioral: Negative for sleep disturbance. The patient is not nervous/anxious.         PAST MEDICAL HISTORY    Past Medical History:   Diagnosis Date    Anxiety     no treatment    Arthritis     left shoulder    Cardiomegaly     CHF (congestive heart failure) ([facility] EF 20%    Chronic back pain     GERD (gastroesophageal reflux disease)     Heart attack ([facility]     History of lung thrombosis     Hx of blood clots     left leg to lungs     Hyperkalemia     Hyperlipidemia     Hypertension     Irregular heart beat     Neuropathy     feet    Obesity     WILLA (obstructive sleep apnea)     not using the machine    Pacemaker     AICD    PE (pulmonary embolism)     \"about 5 yrs ago\"    Prostate cancer ([facility] 2016    Spinal stenosis at L4-L5 level     Traumatic closed fx of eight or more ribs with minimal displacement 2017    pneumothorax. Pt fell 50 feet from a balcony. Pt denies history of a head injury.  Type 2 diabetes mellitus without complication ([facility]        FAMILY HISTORY    Family History   Problem Relation Age of Onset    Diabetes Father     Hypertension Father     Heart Disease Father     Hypertension Mother     Prostate Cancer Brother     Diabetes Brother     Diabetes Brother     Diabetes Brother        SOCIAL HISTORY    Social History     Socioeconomic History    Marital status:      Spouse name: None    Number of children: 2    Years of education: None    Highest education level: None   Occupational History    Occupation: disability   Tobacco Use    Smoking status: Former Smoker     Packs/day: 0.50     Years: 20.00     Pack years: 10.00     Quit date: 2021     Years since quittin.8    Smokeless tobacco: Never Used   Vaping Use    Vaping Use: Never used   Substance and Sexual Activity    Alcohol use: No    Drug use: No    Sexual activity: Yes     Partners: Female   Other Topics Concern    None   Social History Narrative    , grandchildren from  daughter live with him and his wife.       Social Determinants of Health     Financial Resource Strain: Low Risk     Difficulty of Paying Living Expenses: Not hard at all   Food Insecurity: No Food Insecurity    Worried About Running Out of Food in the Last Year: Never true    Rashmi of Food in the Last Year: Never true Transportation Needs:     Lack of Transportation (Medical): Not on file    Lack of Transportation (Non-Medical): Not on file   Physical Activity:     Days of Exercise per Week: Not on file    Minutes of Exercise per Session: Not on file   Stress:     Feeling of Stress : Not on file   Social Connections:     Frequency of Communication with Friends and Family: Not on file    Frequency of Social Gatherings with Friends and Family: Not on file    Attends [employer] Services: Not on file    Active Member of Clubs or Organizations: Not on file    Attends Club or Organization Meetings: Not on file    Marital Status: Not on file   Intimate Partner Violence:     Fear of Current or Ex-Partner: Not on file    Emotionally Abused: Not on file    Physically Abused: Not on file    Sexually Abused: Not on file   Housing Stability:     Unable to Pay for Housing in the Last Year: Not on file    Number of [place] in the Last Year: Not on file    Unstable Housing in the Last Year: Not on file       SURGICAL HISTORY    Past Surgical History:   Procedure Laterality Date    CHEST TUBE INSERTION  06/2017    COLONOSCOPY  2016   [address] Right     injury   [address]    left ring and thumb    HERNIA REPAIR  2020   [facility]  01/29/2020    L5-S1    LUMBAR SPINE SURGERY Right 1/29/2020    RIGHT L5 -S1 TLIF MINIMALLY INVASIVE- 2CARMS,  NUVASIVE, NO CELLSAVER performed by Edwin Hernandez MD at [facility]  2008    ICD;  battery change 2016;  Dr. Lei Headley.  DEFIB NOT SAFE MRI    PENILE PROSTHESIS PLACEMENT N/A 8/25/2021    PENIS PROSTHESIS INSERTION  - COLOPLAST performed by Dat Arroyo MD at [address] [employer] N/A 3/31/2022    PENIS IMPLANT REMOVAL performed by Dat Arroyo MD at [address] [place] [address]  08/31/2016 carmen lymph node dissection    VENA CAVA FILTER PLACEMENT  2000       CURRENT MEDICATIONS    Current Outpatient Medications   Medication Sig Dispense Refill    gabapentin (NEURONTIN) 800 MG tablet Take 1 tablet by mouth 3 times daily for 30 days.  90 tablet 0    insulin aspart protamine-insulin aspart (NOVOLOG MIX 70/30 FLEXPEN) (70-30) 100 UNIT/ML injection Inject 60 Units into the skin 2 times daily (with meals) 15 mL 5    JANUVIA 100 MG tablet take 1 tablet by mouth once daily 90 tablet 3    omeprazole (PRILOSEC) 20 MG delayed release capsule take 1 capsule by mouth once daily 30 MINUTES PRIOR TO FOOD 120 capsule 2    Insulin Pen Needle ([employer] PEN NEEDLES 31G) 31G X 8 MM MISC 1 each by Does not apply route 2 times daily 100 each 3    ciclopirox (LOPROX) 0.77 % cream apply topically twice a day to feet 90 g 2    Handicap Placard MISC by Does not apply route Expires five years form original written date 1 each 0    RA VITAMIN D-3 50 MCG (2000 UT) CAPS take 1 capsule by mouth once daily 30 capsule 5    DULoxetine (CYMBALTA) 60 MG extended release capsule take 1 capsule by mouth once daily 90 capsule 2    hydrALAZINE (APRESOLINE) 25 MG tablet take 1 tablet by mouth twice a day      Insulin Syringe-Needle U-100 (BD INSULIN SYRINGE U/F) 31G X 5/16\" 1 ML MISC use 1 PEN NEEDLE to inject MEDICATION subcutaneously twice a day 100 each 3    carvedilol (COREG) 12.5 MG tablet take 1 tablet by mouth twice a day      lisinopril (PRINIVIL;ZESTRIL) 5 MG tablet take 1 tablet by mouth once daily      colesevelam (WELCHOL) 625 MG tablet Take 3 tablets by mouth 2 times daily 180 tablet 5    isosorbide mononitrate (IMDUR) 30 MG extended release tablet take 1 tablet by mouth once daily 120 tablet 3    warfarin (COUMADIN) 7.5 MG tablet Take 7.5 mg by mouth daily      Insulin Pen Needle 31G X 8 MM MISC 1 each by Does not apply route 2 times daily 100 each 0    ONETOUCH ULTRA strip TEST three times a day if needed 100 strip 2    blood glucose test strips (ASCENSIA AUTODISC VI;ONE TOUCH ULTRA TEST VI) strip 1 each by In Vitro route 3 times daily As needed. 100 each 0    furosemide (LASIX) 20 MG tablet take 1 tablet by mouth once daily 30 tablet 3    pravastatin (PRAVACHOL) 40 MG tablet Take 1 tablet by mouth nightly 90 tablet 0     No current facility-administered medications for this visit. ALLERGIES    Allergies   Allergen Reactions    Adhesive Tape Other (See Comments)     Blister,skin tears with silk tape       PHYSICAL EXAM   Physical Exam  Vitals reviewed. Constitutional:       Appearance: He is well-developed. He is obese. HENT:      Head: Normocephalic. Eyes:      Conjunctiva/sclera: Conjunctivae normal.      Pupils: Pupils are equal, round, and reactive to light. Neck:      Thyroid: No thyromegaly. Cardiovascular:      Rate and Rhythm: Normal rate and regular rhythm. Heart sounds: Normal heart sounds. No murmur heard. Comments: Venous stasis discoloration, worse on left. No open sores  Pulmonary:      Effort: Pulmonary effort is normal.      Breath sounds: Normal breath sounds. No wheezing or rales. Abdominal:      Palpations: Abdomen is soft. Tenderness: There is no abdominal tenderness. There is no guarding or rebound. Musculoskeletal:         General: No tenderness or deformity. Normal range of motion. Cervical back: Normal range of motion and neck supple. Right lower le+ Edema present. Left lower le+ Edema present. Lymphadenopathy:      Cervical: No cervical adenopathy. Skin:     General: Skin is warm and dry. Neurological:      Mental Status: He is alert and oriented to person, place, and time. Psychiatric:         Mood and Affect: Mood normal.         Behavior: Behavior normal.         Thought Content: Thought content normal.         Judgment: Judgment normal.         ASSESSMENT/PLAN  1.  Type 2 diabetes mellitus with diabetic polyneuropathy, on 5/19/22 at 10:03 AM EDT

## 2022-05-23 DIAGNOSIS — E08.42 DIABETIC POLYNEUROPATHY ASSOCIATED WITH DIABETES MELLITUS DUE TO UNDERLYING CONDITION (HCC): ICD-10-CM

## 2022-05-23 RX ORDER — DULOXETIN HYDROCHLORIDE 60 MG/1
CAPSULE, DELAYED RELEASE ORAL
Qty: 90 CAPSULE | Refills: 2 | Status: SHIPPED | OUTPATIENT
Start: 2022-05-23

## 2022-06-05 DIAGNOSIS — E08.42 DIABETIC POLYNEUROPATHY ASSOCIATED WITH DIABETES MELLITUS DUE TO UNDERLYING CONDITION (HCC): ICD-10-CM

## 2022-06-06 RX ORDER — GABAPENTIN 800 MG/1
TABLET ORAL
Qty: 60 TABLET | Refills: 0 | Status: SHIPPED | OUTPATIENT
Start: 2022-06-06 | End: 2022-07-06

## 2022-06-13 DIAGNOSIS — E11.42 TYPE 2 DIABETES MELLITUS WITH DIABETIC POLYNEUROPATHY, WITH LONG-TERM CURRENT USE OF INSULIN (HCC): ICD-10-CM

## 2022-06-13 DIAGNOSIS — Z79.4 TYPE 2 DIABETES MELLITUS WITH DIABETIC POLYNEUROPATHY, WITH LONG-TERM CURRENT USE OF INSULIN (HCC): ICD-10-CM

## 2022-06-13 RX ORDER — INSULIN ASPART 100 [IU]/ML
INJECTION, SUSPENSION SUBCUTANEOUS
Qty: 15 ML | Refills: 5 | Status: SHIPPED | OUTPATIENT
Start: 2022-06-13 | End: 2022-06-15 | Stop reason: SDUPTHER

## 2022-06-15 DIAGNOSIS — Z79.4 TYPE 2 DIABETES MELLITUS WITH DIABETIC POLYNEUROPATHY, WITH LONG-TERM CURRENT USE OF INSULIN (HCC): ICD-10-CM

## 2022-06-15 DIAGNOSIS — E11.42 TYPE 2 DIABETES MELLITUS WITH DIABETIC POLYNEUROPATHY, WITH LONG-TERM CURRENT USE OF INSULIN (HCC): ICD-10-CM

## 2022-06-15 RX ORDER — INSULIN ASPART 100 [IU]/ML
INJECTION, SUSPENSION SUBCUTANEOUS
Qty: 15 ML | Refills: 5 | Status: SHIPPED | OUTPATIENT
Start: 2022-06-15 | End: 2022-06-16 | Stop reason: SDUPTHER

## 2022-06-15 NOTE — TELEPHONE ENCOUNTER
Eron Pugh is calling to request a refill on the following medication(s):    Last Visit Date (If Applicable):  2/65/7619    Next Visit Date:    6/27/2022    Medication Request:  Requested Prescriptions     Pending Prescriptions Disp Refills    insulin aspart protamine-insulin aspart (NOVOLOG MIX 70/30 FLEXPEN) (70-30) 100 UNIT/ML injection 15 mL 5

## 2022-06-16 DIAGNOSIS — E11.42 TYPE 2 DIABETES MELLITUS WITH DIABETIC POLYNEUROPATHY, WITH LONG-TERM CURRENT USE OF INSULIN (HCC): ICD-10-CM

## 2022-06-16 DIAGNOSIS — Z79.4 TYPE 2 DIABETES MELLITUS WITH DIABETIC POLYNEUROPATHY, WITH LONG-TERM CURRENT USE OF INSULIN (HCC): ICD-10-CM

## 2022-06-16 RX ORDER — INSULIN ASPART 100 [IU]/ML
INJECTION, SUSPENSION SUBCUTANEOUS
Qty: 15 ML | Refills: 5 | Status: SHIPPED | OUTPATIENT
Start: 2022-06-16 | End: 2022-06-16

## 2022-06-16 RX ORDER — INSULIN ASPART 100 [IU]/ML
INJECTION, SUSPENSION SUBCUTANEOUS
Qty: 15 ML | Refills: 5 | Status: SHIPPED | OUTPATIENT
Start: 2022-06-16 | End: 2022-09-01

## 2022-06-16 NOTE — TELEPHONE ENCOUNTER
Patient state [facility] advised him that they need this prescription handwritten printed and faxed over manually     Please advise

## 2022-06-16 NOTE — TELEPHONE ENCOUNTER
Leslee Holder is calling to request a refill on the following medication(s):    Last Visit Date (If Applicable):  0/93/9884    Next Visit Date:    6/27/2022    Medication Request:  Requested Prescriptions     Pending Prescriptions Disp Refills    NOVOLOG MIX 70/30 FLEXPEN (70-30) 100 UNIT/ML injection [Pharmacy Med Name: NOVOLOG MIX 70/30 FLEXPEN 5S] 15 mL 5     Sig: inject 60 units subcutaneously twice a day with meals

## 2022-07-06 DIAGNOSIS — E08.42 DIABETIC POLYNEUROPATHY ASSOCIATED WITH DIABETES MELLITUS DUE TO UNDERLYING CONDITION (HCC): ICD-10-CM

## 2022-07-06 RX ORDER — GABAPENTIN 800 MG/1
TABLET ORAL
Qty: 60 TABLET | Refills: 0 | Status: SHIPPED | OUTPATIENT
Start: 2022-07-06 | End: 2022-08-05

## 2022-07-07 DIAGNOSIS — E55.9 VITAMIN D DEFICIENCY: ICD-10-CM

## 2022-07-07 RX ORDER — GLUCOSAMINE/CHONDR SU A SOD 750-600 MG
TABLET ORAL
Qty: 30 CAPSULE | Refills: 5 | Status: SHIPPED | OUTPATIENT
Start: 2022-07-07

## 2022-07-07 NOTE — TELEPHONE ENCOUNTER
Bennett Shukla is calling to request a refill on the following medication(s):    Last Visit Date (If Applicable):  7/08/5571    Next Visit Date:    7/8/2022    Medication Request:  Requested Prescriptions     Pending Prescriptions Disp Refills    RA VITAMIN D-3 50 MCG (2000 UT) CAPS [Pharmacy Med Name: RA VITAMIN D3 2,000 UNIT SFTGL] 30 capsule 5     Sig: take 1 capsule by mouth once daily

## 2022-07-08 ENCOUNTER — OFFICE VISIT (OUTPATIENT)
Dept: FAMILY MEDICINE CLINIC | Age: 57
End: 2022-07-08
Payer: COMMERCIAL

## 2022-07-08 VITALS
DIASTOLIC BLOOD PRESSURE: 60 MMHG | SYSTOLIC BLOOD PRESSURE: 110 MMHG | WEIGHT: 262 LBS | HEART RATE: 82 BPM | BODY MASS INDEX: 34.57 KG/M2 | OXYGEN SATURATION: 98 %

## 2022-07-08 DIAGNOSIS — Z79.4 TYPE 2 DIABETES MELLITUS WITH DIABETIC POLYNEUROPATHY, WITH LONG-TERM CURRENT USE OF INSULIN (HCC): ICD-10-CM

## 2022-07-08 DIAGNOSIS — E11.42 TYPE 2 DIABETES MELLITUS WITH DIABETIC POLYNEUROPATHY, WITH LONG-TERM CURRENT USE OF INSULIN (HCC): ICD-10-CM

## 2022-07-08 DIAGNOSIS — I10 PRIMARY HYPERTENSION: ICD-10-CM

## 2022-07-08 DIAGNOSIS — Z00.00 MEDICARE ANNUAL WELLNESS VISIT, SUBSEQUENT: Primary | ICD-10-CM

## 2022-07-08 PROCEDURE — G0439 PPPS, SUBSEQ VISIT: HCPCS | Performed by: FAMILY MEDICINE

## 2022-07-08 PROCEDURE — 3051F HG A1C>EQUAL 7.0%<8.0%: CPT | Performed by: FAMILY MEDICINE

## 2022-07-08 ASSESSMENT — ENCOUNTER SYMPTOMS
EYES NEGATIVE: 1
ALLERGIC/IMMUNOLOGIC NEGATIVE: 1
COUGH: 0
ABDOMINAL PAIN: 0
CONSTIPATION: 0
BACK PAIN: 1
DIARRHEA: 0
SHORTNESS OF BREATH: 0
BLOOD IN STOOL: 0

## 2022-07-08 ASSESSMENT — PATIENT HEALTH QUESTIONNAIRE - PHQ9
SUM OF ALL RESPONSES TO PHQ9 QUESTIONS 1 & 2: 0
SUM OF ALL RESPONSES TO PHQ QUESTIONS 1-9: 0
1. LITTLE INTEREST OR PLEASURE IN DOING THINGS: 0
2. FEELING DOWN, DEPRESSED OR HOPELESS: 0
SUM OF ALL RESPONSES TO PHQ QUESTIONS 1-9: 0

## 2022-07-08 ASSESSMENT — LIFESTYLE VARIABLES: HOW OFTEN DO YOU HAVE A DRINK CONTAINING ALCOHOL: NEVER

## 2022-07-08 NOTE — PROGRESS NOTES
Medicare Annual Wellness Visit    Coco Lee is here for Medicare AWV  Medicare wellness and recheck conditions. Review of Systems   Constitutional: Negative for fatigue, fever and unexpected weight change. HENT: Negative. Eyes: Negative. Respiratory: Negative for cough and shortness of breath. Cardiovascular: Positive for leg swelling. Negative for chest pain. Gastrointestinal: Negative for abdominal pain, blood in stool, constipation and diarrhea. Endocrine: Negative. Genitourinary: Negative for frequency and urgency. Musculoskeletal: Positive for arthralgias and back pain. Skin: Negative. Allergic/Immunologic: Negative. Neurological: Negative for dizziness and headaches. Hematological: Negative. Psychiatric/Behavioral: Negative for sleep disturbance. The patient is not nervous/anxious. Assessment & Plan   Medicare annual wellness visit, subsequent  Type 2 diabetes mellitus with diabetic polyneuropathy, with long-term current use of insulin ([facility]  -     External Referral To Ophthalmology  Primary hypertension  -     External Referral To Ophthalmology      Recommendations for Preventive Services Due: see orders and patient instructions/AVS.  Recommended screening schedule for the next 5-10 years is provided to the patient in written form: see Patient Instructions/AVS.     Return for Medicare Annual Wellness Visit in 1 year. Subjective   The following acute and/or chronic problems were also addressed today:  See above    Patient's complete Health Risk Assessment and screening values have been reviewed and are found in Flowsheets. The following problems were reviewed today and where indicated follow up appointments were made and/or referrals ordered.     Positive Risk Factor Screenings with Interventions:               General Health and ACP:  General  In general, how would you say your health is?: Good  In the past 7 days, have you experienced any of the following: New or Increased Pain, New or Increased Fatigue, Loneliness, Social Isolation, Stress or Anger?: (!) Yes  Select all that apply: (!) Stress  Do you get the social and emotional support that you need?: Yes  Do you have a Living Will?: (!) No    Advance Directives     Power of [address] Will ACP-Advance Directive ACP-Power of     Not on File Not on File Not on File Not on File      General Health Risk Interventions:  · Stress: stressed with family/children  · No Living Will: Advance Care Planning addressed with patient today    Health Habits/Nutrition:     Physical Activity: Insufficiently Active    Days of Exercise per Week: 3 days    Minutes of Exercise per Session: 20 min     Have you lost any weight without trying in the past 3 months?: No     Have you seen the dentist within the past year?: Yes    Health Habits/Nutrition Interventions:  · Inadequate physical activity:  increase walking, activity    Hearing/Vision:  Do you or your family notice any trouble with your hearing that hasn't been managed with hearing aids?: No  Do you have difficulty driving, watching TV, or doing any of your daily activities because of your eyesight?: (!) Yes  Have you had an eye exam within the past year?: (!) No  No exam data present    Hearing/Vision Interventions:  · Vision concerns:  patient encouraged to make appointment with his/her eye specialist            Objective   Vitals:    07/08/22 1119   BP: 110/60   Pulse: 82   SpO2: 98%   Weight: 262 lb (118.8 kg)      Body mass index is 34.57 kg/m². Physical Exam  Vitals reviewed. Constitutional:       Appearance: He is well-developed. HENT:      Head: Normocephalic. Eyes:      Conjunctiva/sclera: Conjunctivae normal.      Pupils: Pupils are equal, round, and reactive to light. Neck:      Thyroid: No thyromegaly. Cardiovascular:      Rate and Rhythm: Normal rate and regular rhythm. Heart sounds: Normal heart sounds.  No murmur heard.      Pulmonary:      Effort: Pulmonary effort is normal.      Breath sounds: Normal breath sounds. No wheezing or rales. Abdominal:      Palpations: Abdomen is soft. Tenderness: There is no abdominal tenderness. There is no guarding or rebound. Musculoskeletal:         General: No tenderness or deformity. Normal range of motion. Cervical back: Normal range of motion and neck supple. Lymphadenopathy:      Cervical: No cervical adenopathy. Skin:     General: Skin is warm and dry. Neurological:      Mental Status: He is alert and oriented to person, place, and time. Psychiatric:         Mood and Affect: Mood normal.         Behavior: Behavior normal.         Thought Content: Thought content normal.         Judgment: Judgment normal.             Allergies   Allergen Reactions    Adhesive Tape Other (See Comments)     Blister,skin tears with silk tape     Prior to Visit Medications    Medication Sig Taking?  Authorizing Provider   RA VITAMIN D-3 50 MCG (2000 UT) CAPS take 1 capsule by mouth once daily Yes Kenneth Herman MD   gabapentin (NEURONTIN) 800 MG tablet take 1 tablet by mouth twice a day Yes Kenneth Herman MD   NOVOLOG MIX 70/30 FLEXPEN (70-30) 100 UNIT/ML injection inject 60 units subcutaneously twice a day with meals Yes Kenneth Herman MD   DULoxetine (CYMBALTA) 60 MG extended release capsule take 1 capsule by mouth once daily Yes Kenneth Herman MD   JANUVIA 100 MG tablet take 1 tablet by mouth once daily Yes Kenneth Herman MD   omeprazole (PRILOSEC) 20 MG delayed release capsule take 1 capsule by mouth once daily 30 MINUTES PRIOR TO FOOD Yes Kenneth Herman MD   Insulin Pen Needle ([employer] PEN NEEDLES 31G) 31G X 8 MM MISC 1 each by Does not apply route 2 times daily Yes Kenneth Herman MD   ciclopirox (LOPROX) 0.77 % cream apply topically twice a day to feet Yes Kenneth Herman MD   Handicap Placard MISC by Does not apply route Expires five years form original written date Yes Nguyen Mahmood MD   hydrALAZINE (APRESOLINE) 25 MG tablet take 1 tablet by mouth twice a day Yes Historical Provider, MD   Insulin Syringe-Needle U-100 (BD INSULIN SYRINGE U/F) 31G X 5/16\" 1 ML MISC use 1 PEN NEEDLE to inject MEDICATION subcutaneously twice a day Yes Nguyen Mahmood MD   carvedilol (COREG) 12.5 MG tablet take 1 tablet by mouth twice a day Yes Historical Provider, MD   lisinopril (PRINIVIL;ZESTRIL) 5 MG tablet take 1 tablet by mouth once daily Yes Historical Provider, MD   colesevelam (WELCHOL) 625 MG tablet Take 3 tablets by mouth 2 times daily Yes Nguyen Mahmood MD   isosorbide mononitrate (IMDUR) 30 MG extended release tablet take 1 tablet by mouth once daily Yes Nguyen Mahmood MD   warfarin (COUMADIN) 7.5 MG tablet Take 7.5 mg by mouth daily Yes Historical Provider, MD   Insulin Pen Needle 31G X 8 MM MISC 1 each by Does not apply route 2 times daily Yes Nguyen Mahmood MD   [facility] ULTRA strip TEST three times a day if needed Yes Nguyen Mahmood MD   blood glucose test strips (ASCENSIA AUTODISC VI;ONE TOUCH ULTRA TEST VI) strip 1 each by In Vitro route 3 times daily As needed.  Yes Nguyen Mahmood MD   furosemide (LASIX) 20 MG tablet take 1 tablet by mouth once daily Yes Nguyen Mahmood MD   pravastatin (PRAVACHOL) 40 MG tablet Take 1 tablet by mouth nightly Yes Nguyen Mahmood MD       [facility] (Including outside providers/suppliers regularly involved in providing care):   Patient Care Team:  Nguyen Mahmood MD as PCP - Haroldo Yoon MD as PCP - [facility] Provider  Margaret Randall MD as Consulting Physician (Cardiology)  Silver Urrutia DPM as Consulting Physician ([place])  Susan Gomez MD as Surgeon (Nephrology)     Reviewed and updated this visit:  Tobacco  Allergies  Meds  Med Hx  Surg Hx  Soc Hx  Fam Hx

## 2022-07-08 NOTE — PATIENT INSTRUCTIONS
Personalized Preventive Plan for Jasmyne Thayer - 7/8/2022  Medicare offers a range of preventive health benefits. Some of the tests and screenings are paid in full while other may be subject to a deductible, co-insurance, and/or copay. Some of these benefits include a comprehensive review of your medical history including lifestyle, illnesses that may run in your family, and various assessments and screenings as appropriate. After reviewing your medical record and screening and assessments performed today your provider may have ordered immunizations, labs, imaging, and/or referrals for you. A list of these orders (if applicable) as well as your Preventive Care list are included within your After Visit Summary for your review. Other Preventive Recommendations:    · A preventive eye exam performed by an eye specialist is recommended every 1-2 years to screen for glaucoma; cataracts, macular degeneration, and other eye disorders. · A preventive dental visit is recommended every 6 months. · Try to get at least 150 minutes of exercise per week or 10,000 steps per day on a pedometer . · Order or download the FREE \"Exercise & Physical Activity: Your Everyday Guide\" from [employer]. Call 7-918.310.6534 or search [employer] online. · You need 2441-2194 mg of calcium and 0000-2635 IU of vitamin D per day. It is possible to meet your calcium requirement with diet alone, but a vitamin D supplement is usually necessary to meet this goal.  · When exposed to the sun, use a sunscreen that protects against both UVA and UVB radiation with an SPF of 30 or greater. Reapply every 2 to 3 hours or after sweating, drying off with a towel, or swimming. · Always wear a seat belt when traveling in a car. Always wear a helmet when riding a bicycle or motorcycle.

## 2022-08-05 DIAGNOSIS — E08.42 DIABETIC POLYNEUROPATHY ASSOCIATED WITH DIABETES MELLITUS DUE TO UNDERLYING CONDITION (HCC): ICD-10-CM

## 2022-08-05 RX ORDER — GABAPENTIN 800 MG/1
TABLET ORAL
Qty: 60 TABLET | Refills: 0 | Status: SHIPPED | OUTPATIENT
Start: 2022-08-05 | End: 2022-09-05

## 2022-08-12 ENCOUNTER — TELEPHONE (OUTPATIENT)
Dept: FAMILY MEDICINE CLINIC | Age: 57
End: 2022-08-12

## 2022-08-12 NOTE — TELEPHONE ENCOUNTER
Sandra from [facility] called in wanting to know if you can fill out question 3 on the [physicians order] with your initials as well and fax it back as soon as possible .     Please advise

## 2022-08-25 NOTE — TELEPHONE ENCOUNTER
Nupur from [facility] called in wanting to know if you can fill out question 3 on the [physicians order] with your initials as well and fax it back as soon as possible .   She called in stating that question 3 has not been answered and she needs it done and faxed back soon if possible    Please advise

## 2022-09-01 DIAGNOSIS — E11.42 TYPE 2 DIABETES MELLITUS WITH DIABETIC POLYNEUROPATHY, WITH LONG-TERM CURRENT USE OF INSULIN (HCC): ICD-10-CM

## 2022-09-01 DIAGNOSIS — Z79.4 TYPE 2 DIABETES MELLITUS WITH DIABETIC POLYNEUROPATHY, WITH LONG-TERM CURRENT USE OF INSULIN (HCC): ICD-10-CM

## 2022-09-01 RX ORDER — INSULIN ASPART 100 [IU]/ML
INJECTION, SUSPENSION SUBCUTANEOUS
Qty: 15 ML | Refills: 5 | Status: SHIPPED | OUTPATIENT
Start: 2022-09-01

## 2022-09-01 NOTE — TELEPHONE ENCOUNTER
Coco Lee is calling to request a refill on the following medication(s):    Last Visit Date (If Applicable):  6/6/1912    Next Visit Date:    11/21/2022    Medication Request:  Requested Prescriptions     Pending Prescriptions Disp Refills    NOVOLOG MIX 70/30 FLEXPEN (70-30) 100 UNIT/ML injection [Pharmacy Med Name: NOVOLOG MIX 70/30 FLEXPEN 5S] 15 mL 5     Sig: inject 60 units subcutaneously twice a day with meals

## 2022-09-02 DIAGNOSIS — E08.42 DIABETIC POLYNEUROPATHY ASSOCIATED WITH DIABETES MELLITUS DUE TO UNDERLYING CONDITION (HCC): ICD-10-CM

## 2022-09-05 RX ORDER — GABAPENTIN 800 MG/1
TABLET ORAL
Qty: 60 TABLET | Refills: 0 | Status: SHIPPED | OUTPATIENT
Start: 2022-09-05 | End: 2022-10-03

## 2022-09-14 ENCOUNTER — TELEPHONE (OUTPATIENT)
Dept: FAMILY MEDICINE CLINIC | Age: 57
End: 2022-09-14

## 2022-09-14 NOTE — TELEPHONE ENCOUNTER
Patient called in stating that [facility] will be faxing over some paperwork for you to fill out in regards to his free style melsisa.     Please advise

## 2022-09-28 DIAGNOSIS — E11.42 TYPE 2 DIABETES MELLITUS WITH DIABETIC POLYNEUROPATHY, WITH LONG-TERM CURRENT USE OF INSULIN (HCC): ICD-10-CM

## 2022-09-28 DIAGNOSIS — Z79.4 TYPE 2 DIABETES MELLITUS WITH DIABETIC POLYNEUROPATHY, WITH LONG-TERM CURRENT USE OF INSULIN (HCC): ICD-10-CM

## 2022-09-29 RX ORDER — PEN NEEDLE, DIABETIC 31 GX5/16"
NEEDLE, DISPOSABLE MISCELLANEOUS
Qty: 100 EACH | Refills: 2 | Status: SHIPPED | OUTPATIENT
Start: 2022-09-29

## 2022-10-03 DIAGNOSIS — E08.42 DIABETIC POLYNEUROPATHY ASSOCIATED WITH DIABETES MELLITUS DUE TO UNDERLYING CONDITION (HCC): ICD-10-CM

## 2022-10-03 RX ORDER — GABAPENTIN 800 MG/1
TABLET ORAL
Qty: 60 TABLET | Refills: 0 | Status: SHIPPED | OUTPATIENT
Start: 2022-10-03 | End: 2022-10-10

## 2022-10-08 DIAGNOSIS — E08.42 DIABETIC POLYNEUROPATHY ASSOCIATED WITH DIABETES MELLITUS DUE TO UNDERLYING CONDITION (HCC): ICD-10-CM

## 2022-10-10 RX ORDER — GABAPENTIN 800 MG/1
TABLET ORAL
Qty: 60 TABLET | Refills: 0 | Status: SHIPPED | OUTPATIENT
Start: 2022-10-10 | End: 2022-11-09

## 2022-10-10 NOTE — TELEPHONE ENCOUNTER
Johanna Segovia is calling to request a refill on the following medication(s):    Medication Request:  Requested Prescriptions     Pending Prescriptions Disp Refills    gabapentin (NEURONTIN) 800 MG tablet [Pharmacy Med Name: GABAPENTIN 800 MG TABLET] 60 tablet 0     Sig: take 1 tablet by mouth twice a day       Last Visit Date (If Applicable):  5/5/2796    Next Visit Date:    11/21/2022

## 2022-11-17 DIAGNOSIS — E11.42 TYPE 2 DIABETES MELLITUS WITH DIABETIC POLYNEUROPATHY, WITH LONG-TERM CURRENT USE OF INSULIN (HCC): ICD-10-CM

## 2022-11-17 DIAGNOSIS — Z79.4 TYPE 2 DIABETES MELLITUS WITH DIABETIC POLYNEUROPATHY, WITH LONG-TERM CURRENT USE OF INSULIN (HCC): ICD-10-CM

## 2022-11-17 RX ORDER — INSULIN ASPART 100 [IU]/ML
INJECTION, SUSPENSION SUBCUTANEOUS
Qty: 15 ML | Refills: 5 | Status: SHIPPED | OUTPATIENT
Start: 2022-11-17

## 2022-11-17 NOTE — TELEPHONE ENCOUNTER
Cassandra Toro is calling to request a refill on the following medication(s):    Last Visit Date (If Applicable):  2/5/1087    Next Visit Date:    11/21/2022    Medication Request:  Requested Prescriptions     Pending Prescriptions Disp Refills    NOVOLOG MIX 70/30 FLEXPEN (70-30) 100 UNIT/ML injection [Pharmacy Med Name: NOVOLOG MIX 70/30 FLEXPEN 5S] 15 mL 5     Sig: inject 60 units subcutaneously twice a day with meals

## 2022-11-21 ENCOUNTER — OFFICE VISIT (OUTPATIENT)
Dept: FAMILY MEDICINE CLINIC | Age: 57
End: 2022-11-21
Payer: COMMERCIAL

## 2022-11-21 VITALS
HEART RATE: 80 BPM | SYSTOLIC BLOOD PRESSURE: 114 MMHG | BODY MASS INDEX: 36.84 KG/M2 | DIASTOLIC BLOOD PRESSURE: 74 MMHG | WEIGHT: 279.2 LBS

## 2022-11-21 DIAGNOSIS — Z79.4 TYPE 2 DIABETES MELLITUS WITH DIABETIC POLYNEUROPATHY, WITH LONG-TERM CURRENT USE OF INSULIN (HCC): Primary | ICD-10-CM

## 2022-11-21 DIAGNOSIS — I50.22 CHRONIC SYSTOLIC HEART FAILURE (HCC): ICD-10-CM

## 2022-11-21 DIAGNOSIS — I10 PRIMARY HYPERTENSION: ICD-10-CM

## 2022-11-21 DIAGNOSIS — E78.2 MIXED HYPERLIPIDEMIA: ICD-10-CM

## 2022-11-21 DIAGNOSIS — M48.061 SPINAL STENOSIS AT L4-L5 LEVEL: ICD-10-CM

## 2022-11-21 DIAGNOSIS — E11.42 TYPE 2 DIABETES MELLITUS WITH DIABETIC POLYNEUROPATHY, WITH LONG-TERM CURRENT USE OF INSULIN (HCC): Primary | ICD-10-CM

## 2022-11-21 LAB — HBA1C MFR BLD: 8.5 %

## 2022-11-21 PROCEDURE — 3074F SYST BP LT 130 MM HG: CPT | Performed by: FAMILY MEDICINE

## 2022-11-21 PROCEDURE — 3052F HG A1C>EQUAL 8.0%<EQUAL 9.0%: CPT | Performed by: FAMILY MEDICINE

## 2022-11-21 PROCEDURE — 3078F DIAST BP <80 MM HG: CPT | Performed by: FAMILY MEDICINE

## 2022-11-21 PROCEDURE — 83036 HEMOGLOBIN GLYCOSYLATED A1C: CPT | Performed by: FAMILY MEDICINE

## 2022-11-21 PROCEDURE — 99214 OFFICE O/P EST MOD 30 MIN: CPT | Performed by: FAMILY MEDICINE

## 2022-11-21 ASSESSMENT — ENCOUNTER SYMPTOMS
COUGH: 0
BACK PAIN: 1
EYES NEGATIVE: 1
BLOOD IN STOOL: 0
ALLERGIC/IMMUNOLOGIC NEGATIVE: 1
DIARRHEA: 0
ABDOMINAL PAIN: 0
SHORTNESS OF BREATH: 0
CONSTIPATION: 0

## 2022-11-21 NOTE — PROGRESS NOTES
[facility]  [address]  [place] [address] [facility] [address]  Dept: [phone]    11/21/2022    CHIEF COMPLAINT    Chief Complaint   Patient presents with    Hypertension    Diabetes       HPI    Oma Sanchez is a 61 y.o. male who presents   Chief Complaint   Patient presents with    Hypertension    Diabetes   . Recheck chronic conditions including diabetes, hypertension, GERD, coronary artery disease and hyperlipidemia. History of leg edema. Fairly well controlled with support socks. History of low back pain improved following lumbar fusion. No longer taking pain medication. Vitals:    11/21/22 1001   BP: 114/74   Pulse: 80   Weight: 279 lb 3.2 oz (126.6 kg)       REVIEW OF SYSTEMS    Review of Systems   Constitutional:  Negative for fatigue, fever and unexpected weight change. HENT: Negative. Eyes: Negative. Respiratory:  Negative for cough and shortness of breath. Cardiovascular:  Positive for leg swelling. Negative for chest pain. Gastrointestinal:  Negative for abdominal pain, blood in stool, constipation and diarrhea. Endocrine: Negative. Genitourinary:  Negative for frequency and urgency. Musculoskeletal:  Positive for back pain. Skin: Negative. Allergic/Immunologic: Negative. Neurological:  Negative for dizziness and headaches. Hematological: Negative. Psychiatric/Behavioral:  Negative for sleep disturbance. The patient is not nervous/anxious.       PAST MEDICAL HISTORY    Past Medical History:   Diagnosis Date    Anxiety     no treatment    Arthritis     left shoulder    Cardiomegaly     CHF (congestive heart failure) ([facility]) EF 20%    Chronic back pain     GERD (gastroesophageal reflux disease)     Heart attack ([facility]     History of lung thrombosis     Hx of blood clots     left leg to lungs     Hyperkalemia     Hyperlipidemia     Hypertension     Irregular heart beat     Neuropathy     feet ANGIOPLASTY WITH STENT PLACEMENT      EYE SURGERY Right     injury    FINGER TRIGGER RELEASE  2020    left ring and thumb    HERNIA REPAIR  2020    LUMBAR FUSION  01/29/2020    L5-S1    LUMBAR SPINE SURGERY Right 1/29/2020    RIGHT L5 -S1 TLIF MINIMALLY INVASIVE- 2CARMS,  NUVASIVE, NO CELLSAVER performed by Jonatan Lee MD at [address]  2008    ICD;  battery change 2016;  Dr. Mack Fountain.  DEFIB NOT SAFE MRI    PENILE PROSTHESIS PLACEMENT N/A 8/25/2021    PENIS PROSTHESIS INSERTION  - COLOPLAST performed by Regis Lott MD at [address] 3/31/2022    PENIS IMPLANT REMOVAL performed by Regis Lott MD at [address]  08/31/2016    carmen lymph node dissection    VENA CAVA FILTER PLACEMENT  2000       CURRENT MEDICATIONS    Current Outpatient Medications   Medication Sig Dispense Refill    NOVOLOG MIX 70/30 FLEXPEN (70-30) 100 UNIT/ML injection inject 60 units subcutaneously twice a day with meals 15 mL 5    gabapentin (NEURONTIN) 800 MG tablet take 1 tablet by mouth twice a day 60 tablet 0    DROPLET PEN NEEDLES 31G X 8 MM MISC use 1 PEN NEEDLE to inject MEDICATION subcutaneously twice a day 100 each 2    RA VITAMIN D-3 50 MCG (2000 UT) CAPS take 1 capsule by mouth once daily 30 capsule 5    DULoxetine (CYMBALTA) 60 MG extended release capsule take 1 capsule by mouth once daily 90 capsule 2    JANUVIA 100 MG tablet take 1 tablet by mouth once daily 90 tablet 3    omeprazole (PRILOSEC) 20 MG delayed release capsule take 1 capsule by mouth once daily 30 MINUTES PRIOR TO FOOD 120 capsule 2    ciclopirox (LOPROX) 0.77 % cream apply topically twice a day to feet 90 g 2    Handicap Placard MISC by Does not apply route Expires five years form original written date 1 each 0    hydrALAZINE (APRESOLINE) 25 MG tablet take 1 tablet by mouth twice a day      Insulin Syringe-Needle U-100 (BD INSULIN SYRINGE U/F) 31G X 5/16\" 1 ML MISC use 1 PEN NEEDLE to inject MEDICATION subcutaneously twice a day 100 each 3    carvedilol (COREG) 12.5 MG tablet take 1 tablet by mouth twice a day      lisinopril (PRINIVIL;ZESTRIL) 5 MG tablet take 1 tablet by mouth once daily      colesevelam (WELCHOL) 625 MG tablet Take 3 tablets by mouth 2 times daily 180 tablet 5    isosorbide mononitrate (IMDUR) 30 MG extended release tablet take 1 tablet by mouth once daily 120 tablet 3    warfarin (COUMADIN) 7.5 MG tablet Take 7.5 mg by mouth daily      Insulin Pen Needle 31G X 8 MM MISC 1 each by Does not apply route 2 times daily 100 each 0    ONETOUCH ULTRA strip TEST three times a day if needed 100 strip 2    blood glucose test strips (ASCENSIA AUTODISC VI;ONE TOUCH ULTRA TEST VI) strip 1 each by In Vitro route 3 times daily As needed. 100 each 0    furosemide (LASIX) 20 MG tablet take 1 tablet by mouth once daily 30 tablet 3    pravastatin (PRAVACHOL) 40 MG tablet Take 1 tablet by mouth nightly 90 tablet 0     No current facility-administered medications for this visit. ALLERGIES    Allergies   Allergen Reactions    Adhesive Tape Other (See Comments)     Blister,skin tears with silk tape       PHYSICAL EXAM   Physical Exam  Vitals reviewed. Constitutional:       Appearance: He is well-developed. He is obese. HENT:      Head: Normocephalic. Eyes:      Pupils: Pupils are equal, round, and reactive to light. Neck:      Thyroid: No thyromegaly. Cardiovascular:      Rate and Rhythm: Normal rate and regular rhythm. Heart sounds: Normal heart sounds. No murmur heard. Pulmonary:      Effort: Pulmonary effort is normal.      Breath sounds: Normal breath sounds. No wheezing or rales. Abdominal:      Palpations: Abdomen is soft. Tenderness: There is no abdominal tenderness. There is no guarding or rebound. Musculoskeletal:         General: No tenderness or deformity. Normal range of motion.       Cervical back: Normal range of motion and neck supple. Right lower le+ Edema present. Left lower le+ Edema present. Lymphadenopathy:      Cervical: No cervical adenopathy. Skin:     General: Skin is warm and dry. Neurological:      Mental Status: He is alert and oriented to person, place, and time. Psychiatric:         Mood and Affect: Mood normal.         Behavior: Behavior normal.         Thought Content: Thought content normal.         Judgment: Judgment normal.       ASSESSMENT/PLAN  1. Type 2 diabetes mellitus with diabetic polyneuropathy, with long-term current use of insulin ([facility] [address]  Results for orders placed or performed in visit on 22   POCT glycosylated hemoglobin (Hb A1C)   Result Value Ref Range    Hemoglobin A1C 8.5 %     A1c is going up. He will try to work on improving his diet and activity. - POCT glycosylated hemoglobin (Hb A1C)    2. Chronic systolic heart failure ([facility]  Well-controlled with current treatment plan. Follow with cardiologist    3. Primary hypertension  Controlled on current medication    4. Mixed hyperlipidemia  Continue statin. Reviewed labs which are up-to-date    5. Spinal stenosis at L4-L5 level  Continue activity within limitations    Carloz Lopez received counseling on the following healthy behaviors: nutrition, exercise, and medication adherence  Reviewed prior labs and health maintenance. Continue current medications, diet and exercise. Discussed use, benefit, and side effects of prescribed medications. Barriers to medication compliance addressed. Patient given educational materials - see patient instructions. All patient questions answered. Patient voiced understanding. Return in about 6 months (around 2023) for diabetes, htn.         Electronically signed by Eduardo Rizzo MD on 22 at 10:08 AM EST

## 2022-12-09 DIAGNOSIS — E08.42 DIABETIC POLYNEUROPATHY ASSOCIATED WITH DIABETES MELLITUS DUE TO UNDERLYING CONDITION (HCC): ICD-10-CM

## 2022-12-09 RX ORDER — GABAPENTIN 800 MG/1
TABLET ORAL
Qty: 60 TABLET | Refills: 0 | Status: SHIPPED | OUTPATIENT
Start: 2022-12-09 | End: 2023-01-08

## 2022-12-09 NOTE — TELEPHONE ENCOUNTER
Mela Dee is calling to request a refill on the following medication(s):    Last Visit Date (If Applicable):  36/11/2008    Next Visit Date:    5/22/2023    Medication Request:  Requested Prescriptions     Pending Prescriptions Disp Refills    gabapentin (NEURONTIN) 800 MG tablet [Pharmacy Med Name: GABAPENTIN 800 MG TABLET] 60 tablet 0     Sig: take 1 tablet by mouth twice a day

## 2023-01-07 DIAGNOSIS — E08.42 DIABETIC POLYNEUROPATHY ASSOCIATED WITH DIABETES MELLITUS DUE TO UNDERLYING CONDITION (HCC): ICD-10-CM

## 2023-01-09 RX ORDER — GABAPENTIN 800 MG/1
TABLET ORAL
Qty: 60 TABLET | Refills: 0 | Status: SHIPPED | OUTPATIENT
Start: 2023-01-09 | End: 2023-01-12

## 2023-01-09 NOTE — TELEPHONE ENCOUNTER
Yanni Pichardo is calling to request a refill on the following medication(s):    Last Visit Date (If Applicable):  21/77/0007    Next Visit Date:    5/22/2023    Medication Request:  Requested Prescriptions     Pending Prescriptions Disp Refills    gabapentin (NEURONTIN) 800 MG tablet [Pharmacy Med Name: GABAPENTIN 800 MG TABLET] 60 tablet 0     Sig: take 1 tablet by mouth twice a day

## 2023-01-12 DIAGNOSIS — E55.9 VITAMIN D DEFICIENCY: ICD-10-CM

## 2023-01-12 DIAGNOSIS — E08.42 DIABETIC POLYNEUROPATHY ASSOCIATED WITH DIABETES MELLITUS DUE TO UNDERLYING CONDITION (HCC): ICD-10-CM

## 2023-01-12 RX ORDER — GABAPENTIN 800 MG/1
TABLET ORAL
Qty: 60 TABLET | Refills: 0 | Status: SHIPPED | OUTPATIENT
Start: 2023-01-12 | End: 2023-02-11

## 2023-01-12 RX ORDER — GLUCOSAMINE/CHONDR SU A SOD 750-600 MG
TABLET ORAL
Qty: 30 CAPSULE | Refills: 5 | Status: SHIPPED | OUTPATIENT
Start: 2023-01-12

## 2023-01-12 NOTE — TELEPHONE ENCOUNTER
Diego Ma is calling to request a refill on the following medication(s):    Last Visit Date (If Applicable):  24/60/3479    Next Visit Date:    5/22/2023    Medication Request:  Requested Prescriptions     Pending Prescriptions Disp Refills    RA VITAMIN D-3 50 MCG (2000 UT) CAPS [Pharmacy Med Name: RA VITAMIN D3 2,000 UNIT SFTGL] 30 capsule 5     Sig: take 1 capsule by mouth once daily    gabapentin (NEURONTIN) 800 MG tablet [Pharmacy Med Name: GABAPENTIN 800 MG TABLET] 60 tablet 0     Sig: take 1 tablet by mouth twice a day

## 2023-01-31 RX ORDER — INSULIN ASPART 100 [IU]/ML
INJECTION, SOLUTION INTRAVENOUS; SUBCUTANEOUS
Qty: 15 ML | Refills: 0 | Status: SHIPPED | OUTPATIENT
Start: 2023-01-31

## 2023-01-31 NOTE — TELEPHONE ENCOUNTER
Sushil Navarrete is calling to request a refill on the following medication(s):    Last Visit Date (If Applicable):  38/75/3047    Next Visit Date:    5/22/2023    Medication Request:  Requested Prescriptions     Pending Prescriptions Disp Refills    NOVOLOG FLEXPEN 100 UNIT/ML injection pen [Pharmacy Med Name: NOVOLOG 100 UNIT/ML FLEXPEN] 15 mL 0     Sig: inject 60 units subcutaneously twice a day with meals

## 2023-02-19 DIAGNOSIS — E08.42 DIABETIC POLYNEUROPATHY ASSOCIATED WITH DIABETES MELLITUS DUE TO UNDERLYING CONDITION (HCC): ICD-10-CM

## 2023-02-20 RX ORDER — DULOXETIN HYDROCHLORIDE 60 MG/1
CAPSULE, DELAYED RELEASE ORAL
Qty: 90 CAPSULE | Refills: 2 | Status: SHIPPED | OUTPATIENT
Start: 2023-02-20

## 2023-02-23 DIAGNOSIS — E08.42 DIABETIC POLYNEUROPATHY ASSOCIATED WITH DIABETES MELLITUS DUE TO UNDERLYING CONDITION (HCC): ICD-10-CM

## 2023-02-23 DIAGNOSIS — Z79.4 TYPE 2 DIABETES MELLITUS WITH DIABETIC POLYNEUROPATHY, WITH LONG-TERM CURRENT USE OF INSULIN (HCC): ICD-10-CM

## 2023-02-23 DIAGNOSIS — E11.42 TYPE 2 DIABETES MELLITUS WITH DIABETIC POLYNEUROPATHY, WITH LONG-TERM CURRENT USE OF INSULIN (HCC): ICD-10-CM

## 2023-02-23 RX ORDER — PEN NEEDLE, DIABETIC 31 GX5/16"
1 NEEDLE, DISPOSABLE MISCELLANEOUS 2 TIMES DAILY
Qty: 1 EACH | Refills: 0 | Status: SHIPPED | OUTPATIENT
Start: 2023-02-23

## 2023-02-23 RX ORDER — GABAPENTIN 800 MG/1
TABLET ORAL
Qty: 60 TABLET | Refills: 0 | Status: SHIPPED | OUTPATIENT
Start: 2023-02-23 | End: 2023-03-25

## 2023-02-23 NOTE — TELEPHONE ENCOUNTER
Diego Ma is calling to request a refill on the following medication(s):    Last Visit Date (If Applicable):  01/55/7532    Next Visit Date:    5/22/2023    Medication Request:  Requested Prescriptions     Pending Prescriptions Disp Refills    Insulin Pen Needle (DROPLET PEN NEEDLES) 31G X 8 MM MISC [Pharmacy Med Name: DROPLET PEN NEEDLE 31GX5/16\"] 1 each 0     Sig: Inject 1 each as directed 2 times daily    gabapentin (NEURONTIN) 800 MG tablet [Pharmacy Med Name: GABAPENTIN 800 MG TABLET] 60 tablet 0     Sig: take 1 tablet by mouth twice a day

## 2023-03-20 RX ORDER — OMEPRAZOLE 20 MG/1
CAPSULE, DELAYED RELEASE ORAL
Qty: 120 CAPSULE | Refills: 2 | Status: SHIPPED | OUTPATIENT
Start: 2023-03-20

## 2023-03-20 NOTE — TELEPHONE ENCOUNTER
[facility] is calling to request a refill on the following medication(s):    Last Visit Date (If Applicable):  42/98/8561    Next Visit Date:    5/22/2023    Medication Request:  Requested Prescriptions     Pending Prescriptions Disp Refills    omeprazole (PRILOSEC) 20 MG delayed release capsule [Pharmacy Med Name: OMEPRAZOLE DR 20 MG CAPSULE] 120 capsule 2     Sig: take 1 capsule by mouth once daily 30 MINUTES PRIOR TO FOOD

## 2023-03-22 ENCOUNTER — TELEPHONE (OUTPATIENT)
Dept: FAMILY MEDICINE CLINIC | Age: 58
End: 2023-03-22

## 2023-03-22 DIAGNOSIS — N28.9 KIDNEY DISORDER: Primary | ICD-10-CM

## 2023-03-22 NOTE — TELEPHONE ENCOUNTER
Patient didn't want to see another provider made appt for April. Patient needs referral to nephrology.  Pended

## 2023-03-22 NOTE — TELEPHONE ENCOUNTER
Pt was discharged from the hospital and is looking to schedule a hospital f/u. No availability within the 2 week time frame given. Pt can do any day except March 28 and March 31.      Please advise

## 2023-04-25 ENCOUNTER — OFFICE VISIT (OUTPATIENT)
Dept: FAMILY MEDICINE CLINIC | Age: 58
End: 2023-04-25
Payer: COMMERCIAL

## 2023-04-25 VITALS
DIASTOLIC BLOOD PRESSURE: 80 MMHG | HEART RATE: 84 BPM | SYSTOLIC BLOOD PRESSURE: 122 MMHG | HEIGHT: 73 IN | BODY MASS INDEX: 35.78 KG/M2 | WEIGHT: 270 LBS

## 2023-04-25 DIAGNOSIS — E11.42 TYPE 2 DIABETES MELLITUS WITH DIABETIC POLYNEUROPATHY, WITH LONG-TERM CURRENT USE OF INSULIN (HCC): Primary | ICD-10-CM

## 2023-04-25 DIAGNOSIS — E78.2 MIXED HYPERLIPIDEMIA: ICD-10-CM

## 2023-04-25 DIAGNOSIS — C61 PROSTATE CANCER (HCC): ICD-10-CM

## 2023-04-25 DIAGNOSIS — I26.94 MULTIPLE SUBSEGMENTAL PULMONARY EMBOLI WITHOUT ACUTE COR PULMONALE (HCC): ICD-10-CM

## 2023-04-25 DIAGNOSIS — I10 PRIMARY HYPERTENSION: ICD-10-CM

## 2023-04-25 DIAGNOSIS — I50.22 CHRONIC SYSTOLIC HEART FAILURE (HCC): ICD-10-CM

## 2023-04-25 DIAGNOSIS — I20.9 ANGINA PECTORIS, UNSPECIFIED (HCC): ICD-10-CM

## 2023-04-25 DIAGNOSIS — I25.119 ATHEROSCLEROSIS OF NATIVE CORONARY ARTERY OF NATIVE HEART WITH ANGINA PECTORIS (HCC): ICD-10-CM

## 2023-04-25 DIAGNOSIS — E66.01 SEVERE OBESITY (BMI 35.0-39.9) WITH COMORBIDITY (HCC): ICD-10-CM

## 2023-04-25 DIAGNOSIS — Z79.4 TYPE 2 DIABETES MELLITUS WITH DIABETIC POLYNEUROPATHY, WITH LONG-TERM CURRENT USE OF INSULIN (HCC): Primary | ICD-10-CM

## 2023-04-25 LAB — HBA1C MFR BLD: 6.7 %

## 2023-04-25 PROCEDURE — 3079F DIAST BP 80-89 MM HG: CPT | Performed by: FAMILY MEDICINE

## 2023-04-25 PROCEDURE — 3074F SYST BP LT 130 MM HG: CPT | Performed by: FAMILY MEDICINE

## 2023-04-25 PROCEDURE — 99214 OFFICE O/P EST MOD 30 MIN: CPT | Performed by: FAMILY MEDICINE

## 2023-04-25 PROCEDURE — 83036 HEMOGLOBIN GLYCOSYLATED A1C: CPT | Performed by: FAMILY MEDICINE

## 2023-04-25 PROCEDURE — 3044F HG A1C LEVEL LT 7.0%: CPT | Performed by: FAMILY MEDICINE

## 2023-04-25 RX ORDER — LISINOPRIL 2.5 MG/1
5 TABLET ORAL 2 TIMES DAILY
Qty: 60 TABLET | Refills: 5 | Status: SHIPPED | OUTPATIENT
Start: 2023-04-25

## 2023-04-25 RX ORDER — CARVEDILOL 25 MG/1
25 TABLET ORAL 2 TIMES DAILY
Qty: 60 TABLET | Refills: 5 | Status: SHIPPED | OUTPATIENT
Start: 2023-04-25

## 2023-04-25 RX ORDER — PEN NEEDLE, DIABETIC 31 GX5/16"
1 NEEDLE, DISPOSABLE MISCELLANEOUS 2 TIMES DAILY
Qty: 100 EACH | Refills: 1 | Status: SHIPPED | OUTPATIENT
Start: 2023-04-25

## 2023-04-25 SDOH — ECONOMIC STABILITY: FOOD INSECURITY: WITHIN THE PAST 12 MONTHS, THE FOOD YOU BOUGHT JUST DIDN'T LAST AND YOU DIDN'T HAVE MONEY TO GET MORE.: NEVER TRUE

## 2023-04-25 SDOH — ECONOMIC STABILITY: FOOD INSECURITY: WITHIN THE PAST 12 MONTHS, YOU WORRIED THAT YOUR FOOD WOULD RUN OUT BEFORE YOU GOT MONEY TO BUY MORE.: NEVER TRUE

## 2023-04-25 SDOH — ECONOMIC STABILITY: HOUSING INSECURITY
IN THE LAST 12 MONTHS, WAS THERE A TIME WHEN YOU DID NOT HAVE A STEADY PLACE TO SLEEP OR SLEPT IN A SHELTER (INCLUDING NOW)?: NO

## 2023-04-25 SDOH — ECONOMIC STABILITY: INCOME INSECURITY: HOW HARD IS IT FOR YOU TO PAY FOR THE VERY BASICS LIKE FOOD, HOUSING, MEDICAL CARE, AND HEATING?: NOT HARD AT ALL

## 2023-04-25 ASSESSMENT — ENCOUNTER SYMPTOMS
DIARRHEA: 0
CONSTIPATION: 0
BLOOD IN STOOL: 0
SHORTNESS OF BREATH: 0
ALLERGIC/IMMUNOLOGIC NEGATIVE: 1
COUGH: 0
EYES NEGATIVE: 1
ABDOMINAL PAIN: 0

## 2023-04-25 ASSESSMENT — PATIENT HEALTH QUESTIONNAIRE - PHQ9
SUM OF ALL RESPONSES TO PHQ QUESTIONS 1-9: 0
SUM OF ALL RESPONSES TO PHQ9 QUESTIONS 1 & 2: 0
2. FEELING DOWN, DEPRESSED OR HOPELESS: 0
1. LITTLE INTEREST OR PLEASURE IN DOING THINGS: 0
SUM OF ALL RESPONSES TO PHQ QUESTIONS 1-9: 0

## 2023-04-25 NOTE — PROGRESS NOTES
pulmonary emboli without acute cor pulmonale ([facility]  Following with vascular. Stable on coumadin    5. Chronic systolic heart failure (HCC)  Stable, follow with Dr. Sobeida Pascal    6. Prostate cancer [facility])  Following with urologist.     7. Severe obesity (BMI 35.0-39. 9) with comorbidity ([facility]  Cont weight loss efforts with diet and increasing activity. 8. Angina pectoris, unspecified  No current sx. Cont risk reduction     9. Atherosclerosis of native coronary artery of native heart with angina pectoris ([facility]  Cont risk reduction with meds, diet, activity. Alfonso Roman received counseling on the following healthy behaviors: nutrition, exercise, and medication adherence  Reviewed prior labs and health maintenance. Continue current medications, diet and exercise. Discussed use, benefit, and side effects of prescribed medications. Barriers to medication compliance addressed. Patient given educational materials - see patient instructions. All patient questions answered. Patient voiced understanding. Return in about 4 months (around 8/25/2023) for diabetes, htn.         Electronically signed by Venkat Ackerman MD on 4/25/23 at 11:39 AM EDT

## 2023-05-14 DIAGNOSIS — E08.42 DIABETIC POLYNEUROPATHY ASSOCIATED WITH DIABETES MELLITUS DUE TO UNDERLYING CONDITION (HCC): ICD-10-CM

## 2023-05-15 RX ORDER — INSULIN ASPART 100 [IU]/ML
INJECTION, SOLUTION INTRAVENOUS; SUBCUTANEOUS
Qty: 15 ML | Refills: 5 | Status: SHIPPED | OUTPATIENT
Start: 2023-05-15

## 2023-05-15 RX ORDER — GABAPENTIN 800 MG/1
TABLET ORAL
Qty: 60 TABLET | Refills: 0 | Status: SHIPPED | OUTPATIENT
Start: 2023-05-15 | End: 2023-06-14

## 2023-06-01 RX ORDER — SITAGLIPTIN 100 MG/1
TABLET, FILM COATED ORAL
Qty: 90 TABLET | Refills: 3 | Status: SHIPPED | OUTPATIENT
Start: 2023-06-01

## 2023-07-10 ENCOUNTER — OFFICE VISIT (OUTPATIENT)
Dept: FAMILY MEDICINE CLINIC | Age: 58
End: 2023-07-10
Payer: COMMERCIAL

## 2023-07-10 VITALS
HEART RATE: 68 BPM | WEIGHT: 275.4 LBS | DIASTOLIC BLOOD PRESSURE: 72 MMHG | SYSTOLIC BLOOD PRESSURE: 106 MMHG | HEIGHT: 73 IN | BODY MASS INDEX: 36.5 KG/M2

## 2023-07-10 DIAGNOSIS — E11.65 TYPE 2 DIABETES MELLITUS WITH HYPERGLYCEMIA, WITH LONG-TERM CURRENT USE OF INSULIN (HCC): ICD-10-CM

## 2023-07-10 DIAGNOSIS — I25.119 ATHEROSCLEROSIS OF NATIVE CORONARY ARTERY OF NATIVE HEART WITH ANGINA PECTORIS (HCC): ICD-10-CM

## 2023-07-10 DIAGNOSIS — Z79.4 TYPE 2 DIABETES MELLITUS WITH HYPERGLYCEMIA, WITH LONG-TERM CURRENT USE OF INSULIN (HCC): ICD-10-CM

## 2023-07-10 DIAGNOSIS — E78.2 MIXED HYPERLIPIDEMIA: ICD-10-CM

## 2023-07-10 DIAGNOSIS — Z00.00 MEDICARE ANNUAL WELLNESS VISIT, SUBSEQUENT: Primary | ICD-10-CM

## 2023-07-10 PROCEDURE — 3044F HG A1C LEVEL LT 7.0%: CPT | Performed by: FAMILY MEDICINE

## 2023-07-10 PROCEDURE — 3078F DIAST BP <80 MM HG: CPT | Performed by: FAMILY MEDICINE

## 2023-07-10 PROCEDURE — 3074F SYST BP LT 130 MM HG: CPT | Performed by: FAMILY MEDICINE

## 2023-07-10 PROCEDURE — G0439 PPPS, SUBSEQ VISIT: HCPCS | Performed by: FAMILY MEDICINE

## 2023-07-10 ASSESSMENT — ENCOUNTER SYMPTOMS
CONSTIPATION: 0
SHORTNESS OF BREATH: 0
DIARRHEA: 0
ABDOMINAL PAIN: 0
BLOOD IN STOOL: 0
ALLERGIC/IMMUNOLOGIC NEGATIVE: 1
COUGH: 0
EYES NEGATIVE: 1

## 2023-07-10 ASSESSMENT — LIFESTYLE VARIABLES
HOW OFTEN DO YOU HAVE A DRINK CONTAINING ALCOHOL: NEVER
HOW MANY STANDARD DRINKS CONTAINING ALCOHOL DO YOU HAVE ON A TYPICAL DAY: PATIENT DOES NOT DRINK

## 2023-07-10 ASSESSMENT — PATIENT HEALTH QUESTIONNAIRE - PHQ9
SUM OF ALL RESPONSES TO PHQ9 QUESTIONS 1 & 2: 1
SUM OF ALL RESPONSES TO PHQ QUESTIONS 1-9: 1
2. FEELING DOWN, DEPRESSED OR HOPELESS: 0
1. LITTLE INTEREST OR PLEASURE IN DOING THINGS: 1
SUM OF ALL RESPONSES TO PHQ QUESTIONS 1-9: 1

## 2023-07-10 NOTE — PROGRESS NOTES
Medicare Annual Wellness Visit    Jojo Jacobson is here for Medicare AWV  AMW. Taking meds as prescribed. Seeing nephrologist and cardiologist. Diabetes under good control. Sees urologist for hx of prostate cancer. Assessment & Plan   Medicare annual wellness visit, subsequent  Mixed hyperlipidemia  -     Lipid Panel; Future, cont statin  Type 2 diabetes mellitus with hyperglycemia, with long-term current use of insulin (HCC)-well controlled, labs up to date. See nephrologist as planned  Atherosclerosis of native coronary artery of native heart with angina pectoris (HCC)-no current sx. Follow with cardiologist.   Recommendations for Preventive Services Due: see orders and patient instructions/AVS.  Recommended screening schedule for the next 5-10 years is provided to the patient in written form: see Patient Instructions/AVS.     Return in about 4 weeks (around 8/7/2023) for diabetes. Subjective     Review of Systems   Constitutional:  Negative for fatigue, fever and unexpected weight change. HENT: Negative. Eyes: Negative. Respiratory:  Negative for cough and shortness of breath. Cardiovascular:  Positive for leg swelling (controlled). Negative for chest pain. Gastrointestinal:  Negative for abdominal pain, blood in stool, constipation and diarrhea. Endocrine: Negative. Genitourinary:  Negative for frequency and urgency. Musculoskeletal: Negative. Skin: Negative. Allergic/Immunologic: Negative. Neurological:  Negative for dizziness and headaches. Hematological: Negative. Psychiatric/Behavioral:  Negative for sleep disturbance. The patient is not nervous/anxious. Patient's complete Health Risk Assessment and screening values have been reviewed and are found in Flowsheets. The following problems were reviewed today and where indicated follow up appointments were made and/or referrals ordered.     Positive Risk Factor Screenings with Interventions:    Fall Risk:  Do

## 2023-07-18 DIAGNOSIS — E78.2 MIXED HYPERLIPIDEMIA: ICD-10-CM

## 2023-07-26 DIAGNOSIS — E08.42 DIABETIC POLYNEUROPATHY ASSOCIATED WITH DIABETES MELLITUS DUE TO UNDERLYING CONDITION (HCC): ICD-10-CM

## 2023-07-26 RX ORDER — GABAPENTIN 800 MG/1
TABLET ORAL
Qty: 60 TABLET | Refills: 2 | Status: SHIPPED | OUTPATIENT
Start: 2023-07-26 | End: 2023-08-24 | Stop reason: SDUPTHER

## 2023-08-10 DIAGNOSIS — E11.42 TYPE 2 DIABETES MELLITUS WITH DIABETIC POLYNEUROPATHY, WITH LONG-TERM CURRENT USE OF INSULIN (HCC): ICD-10-CM

## 2023-08-10 DIAGNOSIS — E11.21 CONTROLLED TYPE 2 DIABETES MELLITUS WITH DIABETIC NEPHROPATHY, WITH LONG-TERM CURRENT USE OF INSULIN (HCC): ICD-10-CM

## 2023-08-10 DIAGNOSIS — Z79.4 TYPE 2 DIABETES MELLITUS WITH DIABETIC POLYNEUROPATHY, WITH LONG-TERM CURRENT USE OF INSULIN (HCC): ICD-10-CM

## 2023-08-10 DIAGNOSIS — Z79.4 CONTROLLED TYPE 2 DIABETES MELLITUS WITH DIABETIC NEPHROPATHY, WITH LONG-TERM CURRENT USE OF INSULIN (HCC): ICD-10-CM

## 2023-08-10 RX ORDER — INSULIN ASPART 100 [IU]/ML
INJECTION, SOLUTION INTRAVENOUS; SUBCUTANEOUS
Qty: 15 ML | Refills: 5 | Status: SHIPPED | OUTPATIENT
Start: 2023-08-10

## 2023-08-10 RX ORDER — INSULIN ASPART 100 [IU]/ML
INJECTION, SUSPENSION SUBCUTANEOUS
Qty: 15 ML | Refills: 5 | Status: SHIPPED | OUTPATIENT
Start: 2023-08-10

## 2023-08-10 RX ORDER — SYRINGE-NEEDLE,INSULIN,0.5 ML 27GX1/2"
SYRINGE, EMPTY DISPOSABLE MISCELLANEOUS
Qty: 100 EACH | Refills: 3 | Status: SHIPPED | OUTPATIENT
Start: 2023-08-10

## 2023-08-10 RX ORDER — PEN NEEDLE, DIABETIC 31 GX5/16"
1 NEEDLE, DISPOSABLE MISCELLANEOUS 2 TIMES DAILY
Qty: 100 EACH | Refills: 1 | Status: SHIPPED | OUTPATIENT
Start: 2023-08-10

## 2023-08-10 NOTE — TELEPHONE ENCOUNTER
So Coffman is calling to request a refill on the following medication(s):    Last Visit Date (If Applicable):  3/05/2882    Next Visit Date:    8/24/2023    Medication Request:  Requested Prescriptions     Pending Prescriptions Disp Refills    Insulin Pen Needle (DROPLET PEN NEEDLES) 31G X 8 MM MISC 100 each 1     Sig: Inject 1 each as directed 2 times daily    Insulin Syringe-Needle U-100 (BD INSULIN SYRINGE U/F) 31G X 5/16\" 1 ML MISC 100 each 3     Sig: use 1 PEN NEEDLE to inject MEDICATION subcutaneously twice a day    insulin aspart (NOVOLOG FLEXPEN) 100 UNIT/ML injection pen 15 mL 5     Sig: inject 60 units subcutaneously twice a day with meals    insulin aspart protamine-insulin aspart (NOVOLOG MIX 70/30 FLEXPEN) injection pen 15 mL 5     Sig: inject 60 units subcutaneously twice a day with meals

## 2023-08-19 DIAGNOSIS — E55.9 VITAMIN D DEFICIENCY: ICD-10-CM

## 2023-08-20 RX ORDER — ACETAMINOPHEN 160 MG
TABLET,DISINTEGRATING ORAL
Qty: 30 CAPSULE | Refills: 5 | Status: SHIPPED | OUTPATIENT
Start: 2023-08-20

## 2023-08-24 ENCOUNTER — OFFICE VISIT (OUTPATIENT)
Dept: FAMILY MEDICINE CLINIC | Age: 58
End: 2023-08-24

## 2023-08-24 VITALS
SYSTOLIC BLOOD PRESSURE: 116 MMHG | DIASTOLIC BLOOD PRESSURE: 76 MMHG | HEART RATE: 84 BPM | WEIGHT: 284.4 LBS | BODY MASS INDEX: 37.52 KG/M2

## 2023-08-24 DIAGNOSIS — M19.041 PRIMARY OSTEOARTHRITIS OF BOTH HANDS: ICD-10-CM

## 2023-08-24 DIAGNOSIS — Z79.4 TYPE 2 DIABETES MELLITUS WITH DIABETIC POLYNEUROPATHY, WITH LONG-TERM CURRENT USE OF INSULIN (HCC): Primary | ICD-10-CM

## 2023-08-24 DIAGNOSIS — M65.331 TRIGGER MIDDLE FINGER OF RIGHT HAND: ICD-10-CM

## 2023-08-24 DIAGNOSIS — M19.042 PRIMARY OSTEOARTHRITIS OF BOTH HANDS: ICD-10-CM

## 2023-08-24 DIAGNOSIS — E11.42 TYPE 2 DIABETES MELLITUS WITH DIABETIC POLYNEUROPATHY, WITH LONG-TERM CURRENT USE OF INSULIN (HCC): Primary | ICD-10-CM

## 2023-08-24 DIAGNOSIS — E08.42 DIABETIC POLYNEUROPATHY ASSOCIATED WITH DIABETES MELLITUS DUE TO UNDERLYING CONDITION (HCC): ICD-10-CM

## 2023-08-24 LAB — HBA1C MFR BLD: 7.8 %

## 2023-08-24 RX ORDER — GABAPENTIN 800 MG/1
800 TABLET ORAL 2 TIMES DAILY
Qty: 60 TABLET | Refills: 2 | Status: SHIPPED | OUTPATIENT
Start: 2023-08-24 | End: 2023-11-22

## 2023-08-24 RX ORDER — DULAGLUTIDE 0.75 MG/.5ML
0.75 INJECTION, SOLUTION SUBCUTANEOUS WEEKLY
Qty: 4 ADJUSTABLE DOSE PRE-FILLED PEN SYRINGE | Refills: 0 | Status: SHIPPED | OUTPATIENT
Start: 2023-08-24

## 2023-08-24 ASSESSMENT — ENCOUNTER SYMPTOMS
ALLERGIC/IMMUNOLOGIC NEGATIVE: 1
SHORTNESS OF BREATH: 1
ABDOMINAL PAIN: 0
COUGH: 0
DIARRHEA: 0
CONSTIPATION: 0
EYES NEGATIVE: 1
BACK PAIN: 1
BLOOD IN STOOL: 0

## 2023-08-24 NOTE — PROGRESS NOTES
Normal rate and regular rhythm. Heart sounds: Normal heart sounds. No murmur heard. Comments: Support socks in place  Pulmonary:      Effort: Pulmonary effort is normal.      Breath sounds: Normal breath sounds. No wheezing or rales. Abdominal:      Palpations: Abdomen is soft. Tenderness: There is no abdominal tenderness. There is no guarding or rebound. Musculoskeletal:         General: No tenderness or deformity. Normal range of motion. Cervical back: Normal range of motion and neck supple. Lymphadenopathy:      Cervical: No cervical adenopathy. Skin:     General: Skin is warm and dry. Neurological:      Mental Status: He is alert and oriented to person, place, and time. Psychiatric:         Mood and Affect: Mood normal.         Behavior: Behavior normal.         Thought Content: Thought content normal.         Judgment: Judgment normal.       ASSESSMENT/PLAN  1. Type 2 diabetes mellitus with diabetic polyneuropathy, with long-term current use of insulin ([facility])  Results for orders placed or performed in visit on 08/24/23   POCT glycosylated hemoglobin (Hb A1C)   Result Value Ref Range    Hemoglobin A1C 7.8 %     Spoke with Pharmacist who will schedule appt. He has filled both novolog 70/30 and plain which pharm will clarify with pt. Stop [place] and [place] and start trulicity. Cont using CGM. - POCT glycosylated hemoglobin (Hb A1C)  - Dulaglutide (TRULICITY) 5.65 PG/7.7OT SOPN; Inject 0.75 mg into the skin once a week  Dispense: 4 Adjustable Dose Pre-filled Pen Syringe; Refill: 0  - Ambulatory Referral to Primary Care Pharmacist    2. Trigger middle finger of right hand  Referral provided  - [facility]    3. Primary osteoarthritis of both hands  Cont activity within limitations   - [facility]    4.  Diabetic polyneuropathy associated with diabetes mellitus due to underlying condition ([facility])  Stable, cont neurontin   - gabapentin

## 2023-09-01 ENCOUNTER — TELEPHONE (OUTPATIENT)
Dept: FAMILY MEDICINE CLINIC | Age: 58
End: 2023-09-01

## 2023-09-01 NOTE — TELEPHONE ENCOUNTER
Edward Posadas from [facility] calls in today regarding the order for patient's diabetics supplies. This order was not completely filled out. She is sending over a new order because it  was not legible.   She states be sure doctor needs to fill in the following on order:    #2  times per day    #3 lizandro yes on all questions      Please advise and send to clinical staff

## 2023-09-06 ENCOUNTER — OFFICE VISIT (OUTPATIENT)
Dept: ORTHOPEDIC SURGERY | Age: 58
End: 2023-09-06

## 2023-09-06 VITALS — HEIGHT: 73 IN | BODY MASS INDEX: 37.64 KG/M2 | WEIGHT: 284 LBS

## 2023-09-06 DIAGNOSIS — Z01.818 PRE-OP TESTING: ICD-10-CM

## 2023-09-06 DIAGNOSIS — R52 PAIN: ICD-10-CM

## 2023-09-06 DIAGNOSIS — M65.331 TRIGGER FINGER, RIGHT MIDDLE FINGER: Primary | ICD-10-CM

## 2023-09-06 NOTE — PROGRESS NOTES
[address] [address]  Dept: [phone]  Dept Fax: [phone]        Ambulatory   Follow Up    Subjective:   Rashad Hernandez is a 60year old RHD male who presents to our office today for evaluation of his right middle trigger finger. Patient has been experiencing locking and catching over the MCP region of his right middle finger for the past several months. Patient states that he is now experiencing more pain and swelling in the finger with increased frequency of catching and locking. Patient has experienced trigger fingers in the past with release of his left thumb and ring trigger fingers in 2020. Patient states that he has had good success with surgery and would like to proceed with release of his right middle finger trigger at this point. Patient notes past medical history significant for pacemaker placement, PE, MI, blood clots, diabetes with most recent A1c of 7.2, vena cava filter, and warfarin treatment. Patient denies any new fever, chills, nausea, vomiting, chest pain, shortness of breath. Review of Systems   Constitutional: Negative for Fever and Chills. Respiratory: Negative for Cough, Shortness of Breath and Wheezing. Cardiovascular: Negative for Chest Pain and Palpitations. Gastrointestinal: Negative for Nausea. Negative for Vomiting. Musculoskeletal: Positive for pain and triggering of the right middle finger. Skin: Negative for Itching and Rash. Neurological: Negative for Dizziness, Sensory Change and Headaches. Psychiatric/Behavioral: Negative for Depression and Suicidal Ideas. Objective:   General: AAOx3, NAD. Ortho Exam  Neuro: Alert. Oriented. Eyes: Extra-Ocular Muscles Intact. Mouth: Oral Mucosa Moist. No Perioral Lesions. Pulm: Respirations Unlabored and Regular. Skin: Warm, Well Perfused.   Psych: Patient has good fund of knowledge and displays

## 2023-09-13 ENCOUNTER — HOSPITAL ENCOUNTER (OUTPATIENT)
Dept: PREADMISSION TESTING | Age: 58
Discharge: HOME | End: 2023-09-13
Payer: COMMERCIAL

## 2023-09-13 VITALS
OXYGEN SATURATION: 96 % | SYSTOLIC BLOOD PRESSURE: 107 MMHG | DIASTOLIC BLOOD PRESSURE: 68 MMHG | HEIGHT: 73 IN | WEIGHT: 280.1 LBS | TEMPERATURE: 97.3 F | HEART RATE: 89 BPM | BODY MASS INDEX: 37.12 KG/M2 | RESPIRATION RATE: 16 BRPM

## 2023-09-13 DIAGNOSIS — Z01.818 PRE-OP TESTING: ICD-10-CM

## 2023-09-13 LAB
ALBUMIN SERPL-MCNC: 3.9 G/DL (ref 3.5–5.2)
ALP SERPL-CCNC: 62 U/L (ref 40–129)
ALT SERPL-CCNC: 23 U/L (ref 5–41)
ANION GAP SERPL CALCULATED.3IONS-SCNC: 11 MMOL/L (ref 9–17)
AST SERPL-CCNC: 21 U/L
BILIRUB SERPL-MCNC: 0.2 MG/DL (ref 0.3–1.2)
BILIRUB UR QL STRIP: NEGATIVE
BUN SERPL-MCNC: 15 MG/DL (ref 6–20)
BUN/CREAT SERPL: 11 (ref 9–20)
CALCIUM SERPL-MCNC: 9.2 MG/DL (ref 8.6–10.4)
CASTS #/AREA URNS LPF: ABNORMAL /LPF
CASTS #/AREA URNS LPF: ABNORMAL /LPF
CHLORIDE SERPL-SCNC: 102 MMOL/L (ref 98–107)
CLARITY UR: ABNORMAL
CO2 SERPL-SCNC: 24 MMOL/L (ref 20–31)
COLOR UR: YELLOW
CREAT SERPL-MCNC: 1.4 MG/DL (ref 0.7–1.2)
EPI CELLS #/AREA URNS HPF: ABNORMAL /HPF (ref 0–5)
ERYTHROCYTE [DISTWIDTH] IN BLOOD BY AUTOMATED COUNT: 17.6 % (ref 11.8–14.4)
GFR SERPL CREATININE-BSD FRML MDRD: 58 ML/MIN/1.73M2
GLUCOSE SERPL-MCNC: 162 MG/DL (ref 70–99)
GLUCOSE UR STRIP-MCNC: ABNORMAL MG/DL
HCT VFR BLD AUTO: 42 % (ref 40.7–50.3)
HGB BLD-MCNC: 12.8 G/DL (ref 13–17)
HGB UR QL STRIP.AUTO: ABNORMAL
KETONES UR STRIP-MCNC: NEGATIVE MG/DL
LEUKOCYTE ESTERASE UR QL STRIP: NEGATIVE
MCH RBC QN AUTO: 24.7 PG (ref 25.2–33.5)
MCHC RBC AUTO-ENTMCNC: 30.5 G/DL (ref 28.4–34.8)
MCV RBC AUTO: 80.9 FL (ref 82.6–102.9)
MUCOUS THREADS URNS QL MICRO: ABNORMAL
NITRITE UR QL STRIP: NEGATIVE
NRBC BLD-RTO: 0 PER 100 WBC
PH UR STRIP: 5.5 [PH] (ref 5–8)
PLATELET # BLD AUTO: 223 K/UL (ref 138–453)
PMV BLD AUTO: 9.1 FL (ref 8.1–13.5)
POTASSIUM SERPL-SCNC: 4.6 MMOL/L (ref 3.7–5.3)
PROT SERPL-MCNC: 7.1 G/DL (ref 6.4–8.3)
PROT UR STRIP-MCNC: ABNORMAL MG/DL
RBC # BLD AUTO: 5.19 M/UL (ref 4.21–5.77)
RBC #/AREA URNS HPF: ABNORMAL /HPF (ref 0–2)
SODIUM SERPL-SCNC: 137 MMOL/L (ref 135–144)
SP GR UR STRIP: 1.04 (ref 1–1.03)
UROBILINOGEN UR STRIP-ACNC: NORMAL EU/DL (ref 0–1)
WBC #/AREA URNS HPF: ABNORMAL /HPF (ref 0–5)
WBC OTHER # BLD: 7.6 K/UL (ref 3.5–11.3)

## 2023-09-13 PROCEDURE — 93005 ELECTROCARDIOGRAM TRACING: CPT

## 2023-09-13 PROCEDURE — 81001 URINALYSIS AUTO W/SCOPE: CPT

## 2023-09-13 PROCEDURE — 36415 COLL VENOUS BLD VENIPUNCTURE: CPT

## 2023-09-13 PROCEDURE — 80053 COMPREHEN METABOLIC PANEL: CPT

## 2023-09-13 PROCEDURE — 85027 COMPLETE CBC AUTOMATED: CPT

## 2023-09-13 NOTE — PROGRESS NOTES
History:     Past Medical History:   Diagnosis Date    Anxiety     no treatment    Arthritis     left shoulder    Cardiomegaly     CHF (congestive heart failure) (HCC) EF 20%    Chronic back pain     GERD (gastroesophageal reflux disease)     Headache     Heart attack ([address])     History of lung thrombosis     Hx of blood clots     left leg to lungs     Hyperkalemia     Hyperlipidemia     Hypertension     Irregular heart beat     Neuropathy     feet    Obesity     WILLA (obstructive sleep apnea)     not using the machine    Pacemaker     AICD    PE (pulmonary embolism) 2001    \"about 5 yrs ago\"    Prostate cancer ([address]) 2016    Spinal stenosis at L4-L5 level 2019    Traumatic closed fx of eight or more ribs with minimal displacement 06/2017    pneumothorax. Pt fell 50 feet from a balcony. Pt denies history of a head injury. Type 2 diabetes mellitus without complication ([address])     Checks TID, waiting for CGM    Under care of team     Cardiology-    Under care of team     Nephrology-[facility]        Past Surgical History:     Past Surgical History:   Procedure Laterality Date    CHEST TUBE INSERTION  06/2017    COLONOSCOPY  2016    CORONARY ANGIOPLASTY WITH STENT PLACEMENT      EYE SURGERY Right     injury    FINGER TRIGGER RELEASE  2020    left ring and thumb    HERNIA REPAIR  2020    LUMBAR FUSION  01/29/2020    L5-S1    LUMBAR SPINE SURGERY Right 1/29/2020    RIGHT L5 -S1 TLIF MINIMALLY INVASIVE- 2CARMS,  NUVASIVE, NO CELLSAVER performed by Michelle Garcia MD at [address] [place] [address]  2008    ICD;  battery change 2016;  Dr. Peter Machcua.  DEFIB NOT SAFE MRI    PENILE PROSTHESIS PLACEMENT N/A 8/25/2021    PENIS PROSTHESIS INSERTION  - COLOPLAST performed by Melinda Bedolla MD at [address] [place] [address] 3/31/2022    PENIS IMPLANT REMOVAL performed by Melinda Bedolla MD at [address] [facility]  08/31/2016 mg/dL    Creatinine 1.4 (H) 0.7 - 1.2 mg/dL    Est, Glom Filt Rate 58 (L) >60 mL/min/1.73m2    Bun/Cre Ratio 11 9 - 20    Calcium 9.2 8.6 - 10.4 mg/dL    Total Protein 7.1 6.4 - 8.3 g/dL    Albumin 3.9 3.5 - 5.2 g/dL    Total Bilirubin 0.2 (L) 0.3 - 1.2 mg/dL    Alkaline Phosphatase 62 40 - 129 U/L    ALT 23 5 - 41 U/L    AST 21 <40 U/L       Recent Labs     09/13/23  0833   HGB 12.8*   HCT 42.0   WBC 7.6   MCV 80.9*      K 4.6      CO2 24   BUN 15   CREATININE 1.4*   GLUCOSE 162*   AST 21   ALT 23   LABALBU 3.9       No results for input(s): \"COVID19\" in the last 720 hours.     GWENDOLYN Walton - CNP    Electronically signed 9/13/2023 at 8:56 AM

## 2023-09-13 NOTE — PRE-PROCEDURE INSTRUCTIONS
On the Day of Your Surgery, Tuesday, 9/19/23, Dr. Arpita Vazquez office will call and inform you of the arrival time the day of surgery. Enter the hospital through the Main Entrance, take the lobby elevators to the second floor and check in at the Surgery Registration desk. Continue to take your home medications as you normally do up to and including the night before surgery with the exception of blood thinning medications. Blood Thinning Medications:  Please stop prescription blood thinning medications such as Apixaban (Eliquis); Clopidogrel (Plavix); Dabigatran (Pradaxa); Prasugrel (Effient); Rivaroxaban (Xarelto); Ticagrelor (Brilinta); Warfarin (Coumadin) only as directed by your surgeon and/or the prescribing physician    Some common examples of other medications that can thin your blood are: Aspirin, Ibuprofen (Advil, Motrin), Naproxen (Aleve), Meloxicam (Mobic), Celecoxib (Celebrex), Fish Oil, many Herbal Supplements. These medications should usually be stopped at least 7 days prior to surgery. Call  to see when to stop taking warfarin. Do not take Trulicity on Sunday 5/26. Stop taking Vitamin D3 7 days prior to surgery. Tylenol is OK to take for pain the week prior to surgery. Failure to stop certain medications may interfere with your scheduled surgery. If you receive instructions from your surgeon regarding what medications to stop prior to surgery, please follow those specific instructions. If You Have Diabetes:  Do not take any of your diabetic medications, (injectables or by mouth) the morning of surgery unless otherwise instructed by the doctor who manages your diabetes. If you are taking insulin, contact the doctor the manages your diabetes for instructions about any changes to your insulin dosages the day before surgery. Check your blood sugar the morning of surgery.   If your blood sugar is less than 70, please call the Pre-op department at [phone] for further

## 2023-09-15 LAB
EKG ATRIAL RATE: 92 BPM
EKG P AXIS: 67 DEGREES
EKG P-R INTERVAL: 160 MS
EKG Q-T INTERVAL: 372 MS
EKG QRS DURATION: 84 MS
EKG QTC CALCULATION (BAZETT): 460 MS
EKG R AXIS: 27 DEGREES
EKG T AXIS: 31 DEGREES
EKG VENTRICULAR RATE: 92 BPM

## 2023-09-15 PROCEDURE — 93010 ELECTROCARDIOGRAM REPORT: CPT | Performed by: INTERNAL MEDICINE

## 2023-09-19 ENCOUNTER — HOSPITAL ENCOUNTER (OUTPATIENT)
Age: 58
Setting detail: OUTPATIENT SURGERY
Discharge: HOME | End: 2023-09-19
Attending: ORTHOPAEDIC SURGERY | Admitting: ORTHOPAEDIC SURGERY
Payer: COMMERCIAL

## 2023-09-19 ENCOUNTER — ANESTHESIA (OUTPATIENT)
Dept: OPERATING ROOM | Age: 58
End: 2023-09-19
Payer: COMMERCIAL

## 2023-09-19 ENCOUNTER — ANESTHESIA EVENT (OUTPATIENT)
Dept: OPERATING ROOM | Age: 58
End: 2023-09-19
Payer: COMMERCIAL

## 2023-09-19 VITALS
HEART RATE: 81 BPM | HEIGHT: 73 IN | BODY MASS INDEX: 37.11 KG/M2 | RESPIRATION RATE: 21 BRPM | TEMPERATURE: 96.8 F | DIASTOLIC BLOOD PRESSURE: 77 MMHG | SYSTOLIC BLOOD PRESSURE: 130 MMHG | OXYGEN SATURATION: 96 % | WEIGHT: 280 LBS

## 2023-09-19 DIAGNOSIS — G89.18 POSTOPERATIVE PAIN: Primary | ICD-10-CM

## 2023-09-19 PROBLEM — M65.331 ACQUIRED TRIGGER FINGER OF RIGHT MIDDLE FINGER: Status: ACTIVE | Noted: 2023-09-19

## 2023-09-19 LAB
GLUCOSE BLD-MCNC: 105 MG/DL (ref 75–110)
GLUCOSE BLD-MCNC: 120 MG/DL (ref 75–110)
GLUCOSE BLD-MCNC: 128 MG/DL (ref 75–110)
GLUCOSE BLD-MCNC: 153 MG/DL (ref 75–110)
GLUCOSE BLD-MCNC: 70 MG/DL (ref 75–110)
GLUCOSE BLD-MCNC: 70 MG/DL (ref 75–110)
INR PPP: 1
PARTIAL THROMBOPLASTIN TIME: 26.7 SEC (ref 23.9–33.8)
PROTHROMBIN TIME: 12.9 SEC (ref 11.5–14.2)

## 2023-09-19 PROCEDURE — 7100000001 HC PACU RECOVERY - ADDTL 15 MIN: Performed by: ORTHOPAEDIC SURGERY

## 2023-09-19 PROCEDURE — 6360000002 HC RX W HCPCS: Performed by: ORTHOPAEDIC SURGERY

## 2023-09-19 PROCEDURE — 3700000000 HC ANESTHESIA ATTENDED CARE: Performed by: ORTHOPAEDIC SURGERY

## 2023-09-19 PROCEDURE — 7100000011 HC PHASE II RECOVERY - ADDTL 15 MIN: Performed by: ORTHOPAEDIC SURGERY

## 2023-09-19 PROCEDURE — 6360000002 HC RX W HCPCS: Performed by: NURSE ANESTHETIST, CERTIFIED REGISTERED

## 2023-09-19 PROCEDURE — 85730 THROMBOPLASTIN TIME PARTIAL: CPT

## 2023-09-19 PROCEDURE — 6360000002 HC RX W HCPCS: Performed by: ANESTHESIOLOGY

## 2023-09-19 PROCEDURE — 2500000003 HC RX 250 WO HCPCS: Performed by: NURSE ANESTHETIST, CERTIFIED REGISTERED

## 2023-09-19 PROCEDURE — 85610 PROTHROMBIN TIME: CPT

## 2023-09-19 PROCEDURE — 2580000003 HC RX 258: Performed by: ORTHOPAEDIC SURGERY

## 2023-09-19 PROCEDURE — 2580000003 HC RX 258: Performed by: ANESTHESIOLOGY

## 2023-09-19 PROCEDURE — 2709999900 HC NON-CHARGEABLE SUPPLY: Performed by: ORTHOPAEDIC SURGERY

## 2023-09-19 PROCEDURE — 7100000010 HC PHASE II RECOVERY - FIRST 15 MIN: Performed by: ORTHOPAEDIC SURGERY

## 2023-09-19 PROCEDURE — 2500000003 HC RX 250 WO HCPCS: Performed by: ORTHOPAEDIC SURGERY

## 2023-09-19 PROCEDURE — 7100000000 HC PACU RECOVERY - FIRST 15 MIN: Performed by: ORTHOPAEDIC SURGERY

## 2023-09-19 PROCEDURE — 3600000002 HC SURGERY LEVEL 2 BASE: Performed by: ORTHOPAEDIC SURGERY

## 2023-09-19 PROCEDURE — 3600000012 HC SURGERY LEVEL 2 ADDTL 15MIN: Performed by: ORTHOPAEDIC SURGERY

## 2023-09-19 PROCEDURE — 2580000003 HC RX 258

## 2023-09-19 PROCEDURE — 82947 ASSAY GLUCOSE BLOOD QUANT: CPT

## 2023-09-19 PROCEDURE — 3700000001 HC ADD 15 MINUTES (ANESTHESIA): Performed by: ORTHOPAEDIC SURGERY

## 2023-09-19 RX ORDER — DOCUSATE SODIUM 100 MG/1
100 CAPSULE, LIQUID FILLED ORAL 2 TIMES DAILY PRN
Qty: 20 CAPSULE | Refills: 0 | Status: SHIPPED | OUTPATIENT
Start: 2023-09-19

## 2023-09-19 RX ORDER — LIDOCAINE HYDROCHLORIDE 10 MG/ML
1 INJECTION, SOLUTION EPIDURAL; INFILTRATION; INTRACAUDAL; PERINEURAL
Status: DISCONTINUED | OUTPATIENT
Start: 2023-09-20 | End: 2023-09-19 | Stop reason: HOSPADM

## 2023-09-19 RX ORDER — SODIUM CHLORIDE, SODIUM LACTATE, POTASSIUM CHLORIDE, CALCIUM CHLORIDE 600; 310; 30; 20 MG/100ML; MG/100ML; MG/100ML; MG/100ML
INJECTION, SOLUTION INTRAVENOUS CONTINUOUS
Status: DISCONTINUED | OUTPATIENT
Start: 2023-09-19 | End: 2023-09-19 | Stop reason: HOSPADM

## 2023-09-19 RX ORDER — SODIUM CHLORIDE 9 MG/ML
INJECTION, SOLUTION INTRAVENOUS CONTINUOUS
Status: DISCONTINUED | OUTPATIENT
Start: 2023-09-19 | End: 2023-09-19 | Stop reason: HOSPADM

## 2023-09-19 RX ORDER — ENOXAPARIN SODIUM 100 MG/ML
INJECTION SUBCUTANEOUS
COMMUNITY
Start: 2023-09-13

## 2023-09-19 RX ORDER — MIDAZOLAM HYDROCHLORIDE 1 MG/ML
INJECTION INTRAMUSCULAR; INTRAVENOUS PRN
Status: DISCONTINUED | OUTPATIENT
Start: 2023-09-19 | End: 2023-09-19 | Stop reason: SDUPTHER

## 2023-09-19 RX ORDER — TRAMADOL HYDROCHLORIDE 50 MG/1
50 TABLET ORAL EVERY 6 HOURS PRN
Qty: 20 TABLET | Refills: 0 | Status: SHIPPED | OUTPATIENT
Start: 2023-09-19 | End: 2023-09-24

## 2023-09-19 RX ORDER — LIDOCAINE HYDROCHLORIDE 20 MG/ML
INJECTION, SOLUTION EPIDURAL; INFILTRATION; INTRACAUDAL; PERINEURAL PRN
Status: DISCONTINUED | OUTPATIENT
Start: 2023-09-19 | End: 2023-09-19 | Stop reason: SDUPTHER

## 2023-09-19 RX ORDER — SODIUM CHLORIDE 0.9 % (FLUSH) 0.9 %
5-40 SYRINGE (ML) INJECTION PRN
Status: DISCONTINUED | OUTPATIENT
Start: 2023-09-19 | End: 2023-09-19 | Stop reason: HOSPADM

## 2023-09-19 RX ORDER — SODIUM CHLORIDE 9 MG/ML
INJECTION, SOLUTION INTRAVENOUS PRN
Status: DISCONTINUED | OUTPATIENT
Start: 2023-09-19 | End: 2023-09-19 | Stop reason: HOSPADM

## 2023-09-19 RX ORDER — FENTANYL CITRATE 50 UG/ML
INJECTION, SOLUTION INTRAMUSCULAR; INTRAVENOUS PRN
Status: DISCONTINUED | OUTPATIENT
Start: 2023-09-19 | End: 2023-09-19 | Stop reason: SDUPTHER

## 2023-09-19 RX ORDER — BUPIVACAINE HYDROCHLORIDE 2.5 MG/ML
INJECTION, SOLUTION EPIDURAL; INFILTRATION; INTRACAUDAL
Status: DISCONTINUED
Start: 2023-09-19 | End: 2023-09-19 | Stop reason: HOSPADM

## 2023-09-19 RX ORDER — LIDOCAINE HYDROCHLORIDE 10 MG/ML
INJECTION, SOLUTION INFILTRATION; PERINEURAL
Status: DISCONTINUED
Start: 2023-09-19 | End: 2023-09-19 | Stop reason: HOSPADM

## 2023-09-19 RX ORDER — PROPOFOL 10 MG/ML
INJECTION, EMULSION INTRAVENOUS CONTINUOUS PRN
Status: DISCONTINUED | OUTPATIENT
Start: 2023-09-19 | End: 2023-09-19 | Stop reason: SDUPTHER

## 2023-09-19 RX ORDER — ONDANSETRON 2 MG/ML
4 INJECTION INTRAMUSCULAR; INTRAVENOUS ONCE
Status: COMPLETED | OUTPATIENT
Start: 2023-09-19 | End: 2023-09-19

## 2023-09-19 RX ORDER — SODIUM CHLORIDE 0.9 % (FLUSH) 0.9 %
5-40 SYRINGE (ML) INJECTION EVERY 12 HOURS SCHEDULED
Status: DISCONTINUED | OUTPATIENT
Start: 2023-09-19 | End: 2023-09-19 | Stop reason: HOSPADM

## 2023-09-19 RX ADMIN — FENTANYL CITRATE 50 MCG: 50 INJECTION INTRAMUSCULAR; INTRAVENOUS at 15:03

## 2023-09-19 RX ADMIN — MIDAZOLAM 2 MG: 1 INJECTION INTRAMUSCULAR; INTRAVENOUS at 14:43

## 2023-09-19 RX ADMIN — SODIUM CHLORIDE, POTASSIUM CHLORIDE, SODIUM LACTATE AND CALCIUM CHLORIDE: 600; 310; 30; 20 INJECTION, SOLUTION INTRAVENOUS at 11:57

## 2023-09-19 RX ADMIN — DEXTROSE MONOHYDRATE 250 ML: 100 INJECTION, SOLUTION INTRAVENOUS at 14:22

## 2023-09-19 RX ADMIN — FENTANYL CITRATE 50 MCG: 50 INJECTION INTRAMUSCULAR; INTRAVENOUS at 14:48

## 2023-09-19 RX ADMIN — PROPOFOL 150 MCG/KG/MIN: 10 INJECTION, EMULSION INTRAVENOUS at 14:45

## 2023-09-19 RX ADMIN — CEFAZOLIN 3000 MG: 10 INJECTION, POWDER, FOR SOLUTION INTRAVENOUS at 14:48

## 2023-09-19 RX ADMIN — ONDANSETRON 4 MG: 2 INJECTION INTRAMUSCULAR; INTRAVENOUS at 14:06

## 2023-09-19 RX ADMIN — LIDOCAINE HYDROCHLORIDE 80 MG: 20 INJECTION, SOLUTION EPIDURAL; INFILTRATION; INTRACAUDAL; PERINEURAL at 14:45

## 2023-09-19 ASSESSMENT — PAIN - FUNCTIONAL ASSESSMENT: PAIN_FUNCTIONAL_ASSESSMENT: 0-10

## 2023-09-19 NOTE — PROGRESS NOTES
Pt c/o having nausea. Zofran order obtained from Dr. Ivy Serna. Blood sugar recheck done. Aware of results. Pharmacy called for dextrose order.

## 2023-09-19 NOTE — ANESTHESIA POSTPROCEDURE EVALUATION
Department of Anesthesiology  Postprocedure Note    Patient: Lina Watts  MRN: [id number]  YOB: 1965  Date of evaluation: 9/19/2023      Procedure Summary     Date: 09/19/23 Room / Location: [address] - INPATIENT    Anesthesia Start: 4977 Anesthesia Stop: 1543    Procedure: RIGHT FINGER TRIGGER RELEASE (Right: Fingers) Diagnosis:       Trigger middle finger of right hand      (Trigger middle finger of right hand [M65.331])    Surgeons: Nasreen Kline DO Responsible Provider: Arpita Gilliam DO    Anesthesia Type: General ASA Status: 4          Anesthesia Type: General    Patrick Phase I: Patrick Score: 10    Patrick Phase II:        Anesthesia Post Evaluation    Patient location during evaluation: PACU  Patient participation: complete - patient participated  Level of consciousness: awake and alert  Airway patency: patent  Nausea & Vomiting: no nausea and no vomiting  Complications: no  Cardiovascular status: hemodynamically stable  Respiratory status: acceptable  Hydration status: stable  Pain management: adequate

## 2023-09-19 NOTE — BRIEF OP NOTE
Brief Postoperative Note      Patient: So Coffman  YOB: 1965  MRN: [id number]    Date of Procedure: 9/19/2023    Pre-Op Diagnosis Codes:  Trigger middle finger of right hand. Post-Op Diagnosis: Same       Procedure(s):  RIGHT FINGER TRIGGER RELEASE    Surgeon(s):  Nikki Smith DO    Assistant:  Resident: Julián Farmer MD    Anesthesia: Monitor Anesthesia Care    Estimated Blood Loss (mL): 2cc  Fluids: 800cc Crystaloids  Local: 7cc 1:1 (.25% Marcaine/1% lidocaine)  TT: 14 mins. Complications: None    Specimens:   * No specimens in log *    Implants:  * No implants in log *      Drains: * No LDAs found *    Findings: trigger of the right middle finger at the A1 pulley.        Electronically signed by Dorie Olszewski, MD on 9/19/2023 at 3:34 PM

## 2023-09-19 NOTE — DISCHARGE INSTRUCTIONS
Orthopaedic Instructions:  -Weight bearing status: Partial weight bearing with the right arm no pushing pulling or lifting >5lbs.   -Starting three days after surgery, okay for daily dressing changes until wound/surgical incision site is dry. Dressing changes can be performed with simple Band-aids or gauze pads secured with tape/ace bandages. Once you no longer see drainage from your wounds on your dressings, it is okay to shower. Do not scrub vigorously, just let water run over wound/surgical sites. Additionally, one no longer needs to change dressings daily. It is important that you do not soak the wound/incision site underwater, though. This includes baths, hot tubs, swimming pools, etc.  -Always look for signs of compartment syndrome: pain out of proportion to the injury, pain not controlled with pain medication, numbness in digits, changing of color of digits (paleness). If these signs occur return to ED immediately for reassessment.  -Always work on finger motion (to non-injured fingers) to decrease swelling.  -Ice (20 minutes on and off 1 hour) and elevate above the level of the heart to reduce swelling and throbbing pain.  -Should urinate within 8 hours of surgery.  -Call the office or come to Emergency Room if signs of infection appear (hot, swollen, red, draining pus, fever). -Take medications as prescribed.  -Wean off narcotics (percocet/norco) as soon as possible. Do not take tylenol if still taking narcotics.  -Follow up with Dr. Francisco J Obando in his office 10-14 days after surgery. Call [phone]   to schedule/confirm or with any questions/concerns.

## 2023-09-19 NOTE — OP NOTE
Operative Note      Patient: Tere Rodriguez  YOB: 1965  MRN: [id number]    Date of Procedure: 9/19/2023    Pre-Op Diagnosis Codes:     * Trigger middle finger of right hand [M65.331]    Post-Op Diagnosis: Same       Procedure(s):  Right Middle Finger Trigger Release    Surgeon(s):  Michael Glaser DO    Assistant:   Resident: Rhett Grimm MD    Anesthesia: Monitor Anesthesia Care    Estimated Blood Loss (mL): 2cc    Complications: None    Specimens:   * No specimens in log *    Implants:  * No implants in log *      Drains: * No LDAs found *    Findings: Right middle finger triggering. Detailed Description of Procedure: On the day of surgery the patient was met in the pre-operative unit where written consent was obtained and the operative site was marked with permanent marker. The patient was then transported to the operating room, was laid in the supine position with the right arm on the hand table. MAC anesthesia was induced. Appropriate antibiotics were infused at this time. A time-out was held and after all members were in agreement we proceeded forward. We then turned our attention the volar aspect of the right hand. The middle finger was identified and the palmar crease over the A1 pulley and sterilized the area. We then proceeded to inject 7 cc of one-to-one ratio of 0.25% bupivacaine plain and 1% lidocaine plain into the subcutaneous tissue and into the flexor sheath. After standard sterile preparation and draping of the right upper extremity was complete we applied a Hemoclear tourniquet. We examined the involved digits under anesthesia and noted prominence over the right middle finger finger A1 pulley with clinical locking/triggering appreciated. A 2cm incision was made with a #15 blade directly over the A1 pulley through the palmar crease and blunt dissection was carried down through the subcutaneous tissue until the tendon sheath and overlying pulley were identified.

## 2023-10-04 ENCOUNTER — OFFICE VISIT (OUTPATIENT)
Dept: ORTHOPEDIC SURGERY | Age: 58
End: 2023-10-04

## 2023-10-04 VITALS — WEIGHT: 280 LBS | HEIGHT: 73 IN | BODY MASS INDEX: 37.11 KG/M2

## 2023-10-04 DIAGNOSIS — Z98.890 STATUS POST TRIGGER FINGER RELEASE: Primary | ICD-10-CM

## 2023-10-04 NOTE — PROGRESS NOTES
[address] [place] [id number]  Dept: [phone]  Dept Fax: [phone]        Ambulatory Follow Up    Subjective:       HPI:    Tiffanie Poole is a 60y.o. year old male who presents to our office today for routine follow-up regarding postoperative right middle finger trigger release date of surgery was 9/19/23 performed by Dr. Lord Butcher. Patient is able to close and open fist without any issues and has minimal to no pain. Overall patient is very happy with his outcome. At bedside we took out the 3 stitches. Review of Systems:  Constitutional: Negative for fever and chills. HENT: Negative for congestion. Eyes: Negative for blurred vision and double vision. Respiratory: Negative for cough, shortness of breath and wheezing. Cardiovascular: Negative for chest pain and palpitations. Gastrointestinal: Negative for nausea. Negative for vomiting. Musculoskeletal: Positive for (incision pain on right). Skin: Negative for itching and rash. Neurological: Negative for dizziness, sensory change and headaches. Psychiatric/Behavioral: Negative for depression and suicidal ideas. Objective :   General: AAOx3, NAD, appears stated age  CV: no obvious JVD, distal pulses 2+  Respiratory: chest rise symmetric, unlabored respirations, no audible wheezing  Skin: warm, well perfused, no obvious rashes or lesions  Psych: Patient displays understanding of exam, diagnosis, and plan. MSK:     RUE: Incision well approximated with 3 sutures in place. Incision stayed well approximated after sutures were removed. No drainage or erythema noted. Patient's digital cascade and tenodesis effect is intact. Distal to incision patient has SI LT. C2-T1 SILT. Radial pulse +2 with BCR. Radiology: No new imaging taken at today's visit.       Assessment:   -Patient is a 62-year-old male presenting with:    -Right

## 2023-10-07 DIAGNOSIS — I10 PRIMARY HYPERTENSION: ICD-10-CM

## 2023-10-07 NOTE — TELEPHONE ENCOUNTER
Jamey Nelson is calling to request a refill on the following medication(s):    Last Visit Date (If Applicable):  6/30/4452    Next Visit Date:    7/11/2024    Medication Request:  Requested Prescriptions     Pending Prescriptions Disp Refills    carvedilol (COREG) 25 MG tablet [Pharmacy Med Name: CARVEDILOL 25 MG TABLET] 60 tablet 5     Sig: take 1 tablet by mouth twice a day

## 2023-10-09 RX ORDER — CARVEDILOL 25 MG/1
25 TABLET ORAL 2 TIMES DAILY
Qty: 60 TABLET | Refills: 5 | Status: SHIPPED | OUTPATIENT
Start: 2023-10-09

## 2023-10-12 DIAGNOSIS — L30.9 DERMATITIS: ICD-10-CM

## 2023-10-12 DIAGNOSIS — Z79.4 TYPE 2 DIABETES MELLITUS WITH DIABETIC POLYNEUROPATHY, WITH LONG-TERM CURRENT USE OF INSULIN (HCC): ICD-10-CM

## 2023-10-12 DIAGNOSIS — E11.42 TYPE 2 DIABETES MELLITUS WITH DIABETIC POLYNEUROPATHY, WITH LONG-TERM CURRENT USE OF INSULIN (HCC): ICD-10-CM

## 2023-10-12 RX ORDER — DULAGLUTIDE 0.75 MG/.5ML
INJECTION, SOLUTION SUBCUTANEOUS
Qty: 4 ML | Refills: 0 | Status: SHIPPED | OUTPATIENT
Start: 2023-10-12 | End: 2023-11-27

## 2023-10-12 NOTE — TELEPHONE ENCOUNTER
Germaine Pisano is calling to request a refill on the following medication(s):    Last Visit Date (If Applicable):  4/64/3445    Next Visit Date:    7/11/2024    Medication Request:  Requested Prescriptions     Pending Prescriptions Disp Refills    TRULICITY 0.54 OG/4.4CS SOPN [Pharmacy Med Name: TRULICITY 9.26 LS/9.9 ML PEN] 4 mL 0     Sig: inject 0.75 milligram subcutaneously every week    ciclopirox (LOPROX) 0.77 % cream [Pharmacy Med Name: CICLOPIROX 0.77% CREAM] 90 g 2     Sig: apply topically twice a day to feet

## 2023-10-20 DIAGNOSIS — I10 PRIMARY HYPERTENSION: ICD-10-CM

## 2023-10-20 RX ORDER — LISINOPRIL 2.5 MG/1
5 TABLET ORAL 2 TIMES DAILY
Qty: 60 TABLET | Refills: 5 | Status: SHIPPED | OUTPATIENT
Start: 2023-10-20

## 2023-10-20 NOTE — TELEPHONE ENCOUNTER
Hermilo Taylor is calling to request a refill on the following medication(s):    Last Visit Date (If Applicable):  8/40/4556    Next Visit Date:    7/11/2024    Medication Request:  Requested Prescriptions     Pending Prescriptions Disp Refills    lisinopril (PRINIVIL;ZESTRIL) 2.5 MG tablet [Pharmacy Med Name: LISINOPRIL 2.5 MG TABLET] 60 tablet 5     Sig: take 2 tablets by mouth twice a day

## 2023-11-04 DIAGNOSIS — Z79.4 TYPE 2 DIABETES MELLITUS WITH DIABETIC POLYNEUROPATHY, WITH LONG-TERM CURRENT USE OF INSULIN (HCC): ICD-10-CM

## 2023-11-04 DIAGNOSIS — E11.42 TYPE 2 DIABETES MELLITUS WITH DIABETIC POLYNEUROPATHY, WITH LONG-TERM CURRENT USE OF INSULIN (HCC): ICD-10-CM

## 2023-11-06 RX ORDER — INSULIN ASPART 100 [IU]/ML
INJECTION, SUSPENSION SUBCUTANEOUS
Qty: 15 ML | Refills: 5 | Status: SHIPPED | OUTPATIENT
Start: 2023-11-06

## 2023-11-06 NOTE — TELEPHONE ENCOUNTER
Walter Randall is calling to request a refill on the following medication(s):    Last Visit Date (If Applicable):  1/96/3902    Next Visit Date:    7/11/2024    Medication Request:  Requested Prescriptions     Pending Prescriptions Disp Refills    insulin aspart prot & aspart (NOVOLOG MIX 70/30 FLEXPEN) injection pen [Pharmacy Med Name: NOVOLOG MIX 70/30 FLEXPEN 5S] 15 mL 5     Sig: inject 60 units twice a day with meals

## 2023-11-10 DIAGNOSIS — E08.42 DIABETIC POLYNEUROPATHY ASSOCIATED WITH DIABETES MELLITUS DUE TO UNDERLYING CONDITION (HCC): ICD-10-CM

## 2023-11-10 RX ORDER — DULOXETIN HYDROCHLORIDE 60 MG/1
CAPSULE, DELAYED RELEASE ORAL
Qty: 90 CAPSULE | Refills: 2 | Status: SHIPPED | OUTPATIENT
Start: 2023-11-10

## 2023-11-16 ENCOUNTER — TELEPHONE (OUTPATIENT)
Dept: FAMILY MEDICINE CLINIC | Age: 58
End: 2023-11-16

## 2023-11-16 NOTE — TELEPHONE ENCOUNTER
Medication Management Service    Date: 11/16/2023  Patient's Name: Salma Dorado YOB: 1965            _____________________________________________________________________________________________    Salma Dorado is a 60 y.o. male referred to ambulatory pharmacy by Nitish Smith MD for diabetes management. Patient's A1c has been consistently >7%. Patient is currently prescribed Novolog, Novolog 95/15, and Trulicity. In addition, patient is current prescribed pravastatin. Per chart review, patient has previously been on n/a prior to insulin and Trulicity. Lab Results   Component Value Date    LABA1C 7.8 08/24/2023    LABA1C 6.7 04/25/2023    LABA1C 8.5 11/21/2022       Spoke to patient to discuss DM management referral. Patient scheduled 12/14/2023.     Arcelia Quiros, PharmD, [address] [facility] Specialty Medication Service  Phone: [phone] [address] [facility]  Phone: [phone] option [address] [facility] Only    Program: Medical Group  CPA in place:  No  Recommendation Provided To: Patient/Caregiver: 1 via Telephone  Intervention Detail: Scheduled Appointment  Intervention Accepted By: Patient/Caregiver: 1  Gap Closed?: No   Time Spent (min): 15

## 2023-11-22 DIAGNOSIS — Z79.4 TYPE 2 DIABETES MELLITUS WITH DIABETIC POLYNEUROPATHY, WITH LONG-TERM CURRENT USE OF INSULIN (HCC): ICD-10-CM

## 2023-11-22 DIAGNOSIS — E11.42 TYPE 2 DIABETES MELLITUS WITH DIABETIC POLYNEUROPATHY, WITH LONG-TERM CURRENT USE OF INSULIN (HCC): ICD-10-CM

## 2023-11-22 RX ORDER — PEN NEEDLE, DIABETIC 31 GX5/16"
NEEDLE, DISPOSABLE MISCELLANEOUS
Qty: 100 EACH | Refills: 1 | Status: SHIPPED | OUTPATIENT
Start: 2023-11-22

## 2023-11-27 DIAGNOSIS — E11.42 TYPE 2 DIABETES MELLITUS WITH DIABETIC POLYNEUROPATHY, WITH LONG-TERM CURRENT USE OF INSULIN (HCC): ICD-10-CM

## 2023-11-27 DIAGNOSIS — Z79.4 TYPE 2 DIABETES MELLITUS WITH DIABETIC POLYNEUROPATHY, WITH LONG-TERM CURRENT USE OF INSULIN (HCC): ICD-10-CM

## 2023-11-27 RX ORDER — DULAGLUTIDE 0.75 MG/.5ML
INJECTION, SOLUTION SUBCUTANEOUS
Qty: 4 ML | Refills: 5 | Status: SHIPPED | OUTPATIENT
Start: 2023-11-27

## 2023-12-07 DIAGNOSIS — E08.42 DIABETIC POLYNEUROPATHY ASSOCIATED WITH DIABETES MELLITUS DUE TO UNDERLYING CONDITION (HCC): ICD-10-CM

## 2023-12-07 RX ORDER — GABAPENTIN 800 MG/1
800 TABLET ORAL 2 TIMES DAILY
Qty: 60 TABLET | Refills: 2 | Status: SHIPPED | OUTPATIENT
Start: 2023-12-07 | End: 2024-03-06

## 2023-12-13 ENCOUNTER — TELEPHONE (OUTPATIENT)
Dept: FAMILY MEDICINE CLINIC | Age: 58
End: 2023-12-13

## 2023-12-13 NOTE — TELEPHONE ENCOUNTER
Patient called would like to cancel his appointment with dk on 12/14 at 10am and to reschedule. Please advise.

## 2023-12-19 ENCOUNTER — TELEPHONE (OUTPATIENT)
Dept: FAMILY MEDICINE CLINIC | Age: 58
End: 2023-12-19

## 2023-12-19 NOTE — TELEPHONE ENCOUNTER
Medication Management Service    Date: 12/19/2023  Patient's Name: Jovanny Espinoza YOB: 1965            _____________________________________________________________________________________________    Patient is unavailable to reschedule appointment for DM management. Will continue to outreach as appropriate. Call [phone] on 12/20/2023 around 9 am     Noah Robertson PharmD, [facility]  Ambulatory Clinical Pharmacist   [facility] Specialty Medication Service  Phone: [phone]  [facility] Medicine  Phone: [phone] option 1

## 2024-01-09 NOTE — ED NOTES
Jing Tenorio presented for a follow-up Medicare AWV today. The following components were reviewed and updated:    Medical history  Family History  Social history  Allergies and Current Medications  Health Risk Assessment  Health Maintenance  Care Team    **See Completed Assessments for Annual Wellness visit with in the encounter summary    The following assessments were completed:  Depression Screening  Cognitive function Screening    Timed Get Up Test  Whisper Test      Opioid documentation:      Patient does have a current opioid prescription.      Patient accepted further discussion regarding opioid medication use.      Patient is currently taking tramadol narcotic for back pain.        Pain level today is 3/10.       In addition to narcotic pain medications, patient is also using (no other oral, topical, or alternative treatments) for pain control.       Patient is followed by a specialist currently for their pain and will not be referred today.       Patient's opioid risk potential based on ORT-OUD tool:       Sathya each box that applies   No   Yes     Family history of substance abuse   Alcohol [x] []   Illegal drugs [x] []   Rx drugs [x] []     Personal history of substance abuse   Alcohol [x] []   Illegal drugs [x] []   Rx drugs [] [x]     Age between 16-45 years   [x]   []     Patient with ADD, OCD, Bipolar disorder, schizoprenia   [x]   []     Patient with depression   [x]   []                         Scoring total                                                                 Non-opioid treatment options have been discussed today and added to the patient's after visit summary.          Vitals:    01/09/24 1246   BP: (!) 148/89   Pulse: 88   SpO2: 98%   Weight: 69.9 kg (154 lb)     Body mass index is 25.24 kg/m².       Physical Exam  Constitutional:       Appearance: She is overweight.   HENT:      Head: Normocephalic.      Right Ear: Decreased hearing noted.      Left Ear: Decreased hearing noted.     Patient presents to ED with left hand swelling and pain. He states he fell a week ago and his hand has had limited ROM, pain and swelling.       Osmel Frank RN  08/06/20 2603   Nose: Nose normal.   Eyes:      Pupils: Pupils are equal, round, and reactive to light.   Cardiovascular:      Pulses: Normal pulses.      Heart sounds: Normal heart sounds.   Pulmonary:      Effort: Pulmonary effort is normal.      Breath sounds: Normal breath sounds.   Abdominal:      General: Bowel sounds are normal.   Musculoskeletal:         General: Normal range of motion.      Cervical back: Normal range of motion.      Right lower leg: No edema.      Left lower leg: No edema.   Skin:     General: Skin is warm and dry.   Neurological:      Mental Status: She is alert and oriented to person, place, and time.      Gait: Gait abnormal (cane).           Diagnoses and health risks identified today and associated recommendations/orders:  1. Encounter for preventive health examination  - Above assessments completed. Preventive measures and health maintenance reviewed with patient.  -discussed overdue vaccines and advised she can have done at any pharmacy    2. Malignant neoplasm involving both nipple and areola of left breast in female, unspecified estrogen receptor status  Stable, followed by Oncology  -on arimidex    3. Inflammatory spondylopathy of multiple sites in spine  Stable, followed by PCP  -on tramadol prn    4. Aortic atherosclerosis  Stable, followed by PCP  -on statin    5. Chemotherapy-induced neuropathy  Stable, followed by PCP  -mild, not requiring medication    6. Essential hypertension  Stable, followed by PCP  -on lisinopril    7. Hyperlipidemia LDL goal <130  Stable, followed by PCP  -on statin    8. Osteoporosis, senile  Stable, followed by PCP  -prolia q 6 months    9. Gastroesophageal reflux disease without esophagitis  Stable, followed by PCP  -on PPI    10. Insomnia, unspecified type  Stable, followed by PCP  -on trazodone    11. Dependence on other enabling machines and devices  -uses walker, no falls   -discussed safety and fall precautions      Provided Jing with a 5-10 year  written screening schedule and personal prevention plan. Recommendations were developed using the USPSTF age appropriate recommendations. Education, counseling, and referrals were provided as needed.  After Visit Summary printed and given to patient which includes a list of additional screenings\tests needed.    Follow up in about 1 year (around 1/9/2025) for your next annual wellness visit.      Tia Pedraza NP

## 2024-01-09 NOTE — TELEPHONE ENCOUNTER
Medication Management Service    Date: 1/9/2024  Patient's Name: Jovanny Espinoza YOB: 1965            _____________________________________________________________________________________________    Patient was unavailable at time of call. Was asked to call back around 3:00 PM.    Noah Robertson PharmD, [facility]  Ambulatory Clinical Pharmacist   [facility] Specialty Medication Service  Phone: [phone]  [facility] Medicine  Phone: [phone] option 1

## 2024-01-23 ENCOUNTER — TELEPHONE (OUTPATIENT)
Dept: FAMILY MEDICINE CLINIC | Age: 59
End: 2024-01-23

## 2024-01-23 NOTE — TELEPHONE ENCOUNTER
Medication Management Service    Date: 1/23/2024  Patient's Name: Jovanny Espinoza YOB: 1965            _____________________________________________________________________________________________    Reached patient to reschedule appointment for DM management. Patient scheduled 1/25/2024 .    Noah Robertson PharmD, [facility]  Ambulatory Clinical Pharmacist   [facility] Specialty Medication Service  Phone: [phone]  [facility] Medicine  Phone: [phone] option 1    For Pharmacy Admin Tracking Only    Program: Medical Group  CPA in place:  No  Recommendation Provided To: Patient/Caregiver: 1 via Telephone  Intervention Detail: Scheduled Appointment  Intervention Accepted By: Patient/Caregiver: 1  Gap Closed?: No   Time Spent (min): 20

## 2024-01-23 NOTE — TELEPHONE ENCOUNTER
Medication Management Service    Date: 1/23/2024  Patient's Name: Jovanny Espinoza YOB: 1965            _____________________________________________________________________________________________    Patient is unavailable at the time of call  to reschedule appointment for DM management. Asked to call back around 3:30 today.    Noah Robertson PharmD, [facility]  Ambulatory Clinical Pharmacist   [facility] Specialty Medication Service  Phone: [phone]  [facility] Medicine  Phone: [phone] option 1

## 2024-01-24 NOTE — TELEPHONE ENCOUNTER
Medication Management Service    Date: 1/24/2024  Patient's Name: Jovanny Espinoza YOB: 1965            _____________________________________________________________________________________________    Patient scheduled for dm management.    Noah Robertson, PharmD, [facility]  Ambulatory Clinical Pharmacist   [facility] Specialty Medication Service  Phone: [phone]  [facility] Medicine  Phone: [phone] option 1    For Pharmacy Admin Tracking Only    Program: Medical Group  CPA in place:  No  Time Spent (min): 10

## 2024-01-25 ENCOUNTER — PHARMACY VISIT (OUTPATIENT)
Dept: FAMILY MEDICINE CLINIC | Age: 59
End: 2024-01-25
Payer: COMMERCIAL

## 2024-01-25 VITALS — BODY MASS INDEX: 38.37 KG/M2 | WEIGHT: 290.8 LBS

## 2024-01-25 DIAGNOSIS — F17.210 TOBACCO DEPENDENCE DUE TO CIGARETTES: ICD-10-CM

## 2024-01-25 DIAGNOSIS — Z79.4 TYPE 2 DIABETES MELLITUS WITH DIABETIC POLYNEUROPATHY, WITH LONG-TERM CURRENT USE OF INSULIN (HCC): Primary | ICD-10-CM

## 2024-01-25 DIAGNOSIS — E11.42 TYPE 2 DIABETES MELLITUS WITH DIABETIC POLYNEUROPATHY, WITH LONG-TERM CURRENT USE OF INSULIN (HCC): Primary | ICD-10-CM

## 2024-01-25 DIAGNOSIS — I50.22 CHRONIC SYSTOLIC HEART FAILURE (HCC): ICD-10-CM

## 2024-01-25 LAB — HBA1C MFR BLD: 9.3 %

## 2024-01-25 PROCEDURE — 83036 HEMOGLOBIN GLYCOSYLATED A1C: CPT | Performed by: PHARMACIST

## 2024-01-25 PROCEDURE — 99999 PR OFFICE/OUTPT VISIT,PROCEDURE ONLY: CPT | Performed by: PHARMACIST

## 2024-01-25 RX ORDER — NICOTINE 21 MG/24HR
1 PATCH, TRANSDERMAL 24 HOURS TRANSDERMAL DAILY
Qty: 14 PATCH | Refills: 0 | Status: SHIPPED | OUTPATIENT
Start: 2024-01-25 | End: 2024-02-08

## 2024-01-25 RX ORDER — DULAGLUTIDE 1.5 MG/.5ML
1.5 INJECTION, SOLUTION SUBCUTANEOUS WEEKLY
Qty: 2 ML | Refills: 2 | Status: SHIPPED | OUTPATIENT
Start: 2024-01-25

## 2024-01-25 RX ORDER — NICOTINE 21 MG/24HR
1 PATCH, TRANSDERMAL 24 HOURS TRANSDERMAL DAILY
Qty: 42 PATCH | Refills: 0 | Status: SHIPPED | OUTPATIENT
Start: 2024-01-25 | End: 2024-03-07

## 2024-01-25 RX ORDER — INSULIN GLARGINE 100 [IU]/ML
40 INJECTION, SOLUTION SUBCUTANEOUS 2 TIMES DAILY
Qty: 5 ADJUSTABLE DOSE PRE-FILLED PEN SYRINGE | Refills: 1 | Status: SHIPPED | OUTPATIENT
Start: 2024-01-25

## 2024-01-25 ASSESSMENT — PATIENT HEALTH QUESTIONNAIRE - PHQ9
2. FEELING DOWN, DEPRESSED OR HOPELESS: 0
SUM OF ALL RESPONSES TO PHQ QUESTIONS 1-9: 0
1. LITTLE INTEREST OR PLEASURE IN DOING THINGS: 0
SUM OF ALL RESPONSES TO PHQ QUESTIONS 1-9: 0
SUM OF ALL RESPONSES TO PHQ QUESTIONS 1-9: 0
SUM OF ALL RESPONSES TO PHQ9 QUESTIONS 1 & 2: 0
SUM OF ALL RESPONSES TO PHQ QUESTIONS 1-9: 0

## 2024-01-25 NOTE — PROGRESS NOTES
Clinical Pharmacy Note    Jovanny Espinoza is a 58 y.o. male, referred to Clinical Pharmacy for medication management by Marianne Osorio MD. Jovanny Espinoza was seen today for diabetes management.     Allergies   Allergen Reactions    Adhesive Tape Other (See Comments)     Blister,skin tears with silk tape        Past Medical History:   Diagnosis Date    Anxiety     no treatment    Arthritis     left shoulder    Cardiomegaly     CHF (congestive heart failure) ([facility]) EF 20%    Chronic back pain     GERD (gastroesophageal reflux disease)     Headache     Heart attack (HCC)     History of lung thrombosis     Hx of blood clots     left leg to lungs     Hyperkalemia     Hyperlipidemia     Hypertension     Irregular heart beat     Neuropathy     feet    Obesity     WILLA (obstructive sleep apnea)     not using the machine    Pacemaker     AICD    PE (pulmonary embolism) 2001    \"about 5 yrs ago\"    Prostate cancer ([facility]) 2016    Spinal stenosis at L4-L5 level 2019    Traumatic closed fx of eight or more ribs with minimal displacement 06/2017    pneumothorax.  Pt fell 50 feet from a balcony.  Pt denies history of a head injury.    Type 2 diabetes mellitus without complication ([facility])     Checks TID, waiting for CGM    Under care of team     Cardiology-    Under care of team     Nephrology-[facility]      Social History     Tobacco Use    Smoking status: Every Day     Current packs/day: 0.25     Average packs/day: 0.3 packs/day for 20.0 years (5.0 ttl pk-yrs)     Types: Cigarettes    Smokeless tobacco: Never   Substance Use Topics    Alcohol use: No     Family History   Problem Relation Age of Onset    Diabetes Father     Hypertension Father     Heart Disease Father     Hypertension Mother     Prostate Cancer Brother     Diabetes Brother     Diabetes Brother     Diabetes Brother        REVIEW OF CURRENT DISEASE STATE  Diabetes Mellitus: Patient here for an evaluation of Type 2 diabetes mellitus.  Current

## 2024-01-25 NOTE — PATIENT INSTRUCTIONS
Begin taking Trulicity 1.5 mg once a week  Begin taking Basaglar 40 units twice a day  Stop taking Novolog 70-30 mix insulin  Begin taking Jardiance 10 mg once daily

## 2024-02-21 DIAGNOSIS — Z79.4 TYPE 2 DIABETES MELLITUS WITH DIABETIC POLYNEUROPATHY, WITH LONG-TERM CURRENT USE OF INSULIN (HCC): ICD-10-CM

## 2024-02-21 DIAGNOSIS — E11.42 TYPE 2 DIABETES MELLITUS WITH DIABETIC POLYNEUROPATHY, WITH LONG-TERM CURRENT USE OF INSULIN (HCC): ICD-10-CM

## 2024-02-21 RX ORDER — PEN NEEDLE, DIABETIC 31 GX5/16"
NEEDLE, DISPOSABLE MISCELLANEOUS
Qty: 100 EACH | Refills: 1 | Status: SHIPPED | OUTPATIENT
Start: 2024-02-21

## 2024-02-22 ENCOUNTER — PHARMACY VISIT (OUTPATIENT)
Dept: FAMILY MEDICINE CLINIC | Age: 59
End: 2024-02-22

## 2024-02-22 VITALS
HEART RATE: 79 BPM | SYSTOLIC BLOOD PRESSURE: 128 MMHG | WEIGHT: 275 LBS | BODY MASS INDEX: 36.28 KG/M2 | DIASTOLIC BLOOD PRESSURE: 75 MMHG

## 2024-02-22 DIAGNOSIS — E11.42 TYPE 2 DIABETES MELLITUS WITH DIABETIC POLYNEUROPATHY, WITH LONG-TERM CURRENT USE OF INSULIN (HCC): Primary | ICD-10-CM

## 2024-02-22 DIAGNOSIS — Z79.4 TYPE 2 DIABETES MELLITUS WITH DIABETIC POLYNEUROPATHY, WITH LONG-TERM CURRENT USE OF INSULIN (HCC): Primary | ICD-10-CM

## 2024-02-22 RX ORDER — DULAGLUTIDE 3 MG/.5ML
3 INJECTION, SOLUTION SUBCUTANEOUS WEEKLY
Qty: 2 ML | Refills: 1 | Status: SHIPPED | OUTPATIENT
Start: 2024-02-22

## 2024-02-22 ASSESSMENT — PATIENT HEALTH QUESTIONNAIRE - PHQ9
SUM OF ALL RESPONSES TO PHQ9 QUESTIONS 1 & 2: 0
1. LITTLE INTEREST OR PLEASURE IN DOING THINGS: 0
SUM OF ALL RESPONSES TO PHQ QUESTIONS 1-9: 0
2. FEELING DOWN, DEPRESSED OR HOPELESS: 0
SUM OF ALL RESPONSES TO PHQ QUESTIONS 1-9: 0

## 2024-02-22 NOTE — PATIENT INSTRUCTIONS
Increase Trulicity to 3 mg once a week  Continue Jardiance 10 mg once daily, and Basaglar 40 units twice a day

## 2024-02-22 NOTE — PROGRESS NOTES
take 2 tablets by mouth twice a day 60 tablet 5    ciclopirox (LOPROX) 0.77 % cream apply topically twice a day to feet 90 g 2    carvedilol (COREG) 25 MG tablet take 1 tablet by mouth twice a day 60 tablet 5    docusate sodium (COLACE) 100 MG capsule Take 1 capsule by mouth 2 times daily as needed for Constipation 20 capsule 0    omeprazole (PRILOSEC) 20 MG delayed release capsule take 1 capsule by mouth once daily 30 MINUTES PRIOR TO FOOD 120 capsule 2    Handicap Placard MISC by Does not apply route Expires five years form original written date 1 each 0    hydrALAZINE (APRESOLINE) 25 MG tablet take 1 tablet by mouth twice a day      colesevelam (WELCHOL) 625 MG tablet Take 3 tablets by mouth 2 times daily 180 tablet 5    isosorbide mononitrate (IMDUR) 30 MG extended release tablet take 1 tablet by mouth once daily 120 tablet 3    warfarin (COUMADIN) 7.5 MG tablet Take 1 tablet by mouth daily      Insulin Pen Needle 31G X 8 MM MISC 1 each by Does not apply route 2 times daily 100 each 0    furosemide (LASIX) 20 MG tablet take 1 tablet by mouth once daily 30 tablet 3    pravastatin (PRAVACHOL) 40 MG tablet Take 1 tablet by mouth nightly 90 tablet 0     No current facility-administered medications for this visit.        ALLERGIES  Allergies   Allergen Reactions    Adhesive Tape Other (See Comments)     Blister,skin tears with silk tape     Current Pharmacy:  [facility]  Current testing supplies/frequency: unknown   Pen needles/syringes: generic     LABS  Diabetes Goals: Using ADA Standards of Care      Goal A1c:  Less than 7%   Fasting Blood Sugars:  80-130mg/dL  Postprandial glucose:  Less than 180mg/dL      No results found for: \"[id number]\"  Lab Results   Component Value Date    LABA1C 9.3 01/25/2024    LABA1C 7.8 08/24/2023    LABA1C 6.7 04/25/2023     Lab Results   Component Value Date    CHOL 168 11/19/2020    TRIG 140 11/19/2020    HDL 36 (L) 11/19/2020    LDLCHOLESTEROL 104 11/19/2020    LDLCALC 61 12/05/2018

## 2024-03-02 DIAGNOSIS — E11.42 TYPE 2 DIABETES MELLITUS WITH DIABETIC POLYNEUROPATHY, WITH LONG-TERM CURRENT USE OF INSULIN (HCC): ICD-10-CM

## 2024-03-02 DIAGNOSIS — Z79.4 TYPE 2 DIABETES MELLITUS WITH DIABETIC POLYNEUROPATHY, WITH LONG-TERM CURRENT USE OF INSULIN (HCC): ICD-10-CM

## 2024-03-04 RX ORDER — INSULIN GLARGINE 100 [IU]/ML
INJECTION, SOLUTION SUBCUTANEOUS
Qty: 15 ML | Refills: 3 | Status: SHIPPED | OUTPATIENT
Start: 2024-03-04

## 2024-03-05 RX ORDER — OMEPRAZOLE 20 MG/1
CAPSULE, DELAYED RELEASE ORAL
Qty: 120 CAPSULE | Refills: 2 | Status: SHIPPED | OUTPATIENT
Start: 2024-03-05

## 2024-03-05 NOTE — TELEPHONE ENCOUNTER
Last Visit:  8/24/2023     Next Visit Date:  Future Appointments   Date Time Provider Department Center   3/21/2024 11:00 AM Noah Robertson RPH [facility]   7/11/2024 10:30 AM Marianne Osorio MD [facility]       Health Maintenance   Topic Date Due    Hepatitis B vaccine (1 of 3 - 3-dose series) Never done    Shingles vaccine (1 of 2) Never done    Prostate Specific Antigen (PSA) Screening or Monitoring  09/19/2017    Diabetic foot exam  03/07/2020    Diabetic Alb to Cr ratio (uACR) test  06/19/2020    Lipids  11/19/2021    COVID-19 Vaccine (4 - 2023-24 season) 09/01/2023    Annual Wellness Visit (Medicare Advantage)  01/01/2024    A1C test (Diabetic or Prediabetic)  04/25/2024    Colorectal Cancer Screen  09/09/2024    GFR test (Diabetes, CKD 3-4, OR last GFR 15-59)  09/13/2024    Diabetic retinal exam  10/26/2024    DTaP/Tdap/Td vaccine (2 - Td or Tdap) 01/01/2025    Depression Screen  02/22/2025    Pneumococcal 0-64 years Vaccine (3 - PPSV23 or PCV20) 07/22/2029    Flu vaccine  Completed    Hepatitis C screen  Completed    HIV screen  Completed    Hepatitis A vaccine  Aged Out    Hib vaccine  Aged Out    Polio vaccine  Aged Out    Meningococcal (ACWY) vaccine  Aged Out       Hemoglobin A1C (%)   Date Value   01/25/2024 9.3   08/24/2023 7.8   04/25/2023 6.7             ( goal A1C is < 7)   No components found for: \"LABMICR\"  LDL Cholesterol (mg/dL)   Date Value   11/19/2020 104   08/18/2017 81     LDL Calculated (mg/dL)   Date Value   12/05/2018 61       (goal LDL is <100)   AST (U/L)   Date Value   09/13/2023 21     ALT (U/L)   Date Value   09/13/2023 23     BUN (mg/dL)   Date Value   09/13/2023 15     BP Readings from Last 3 Encounters:   02/22/24 128/75   09/19/23 130/77   09/13/23 107/68          (goal 120/80)    All Future Testing planned in [facility]  Lab Frequency Next Occurrence               Patient Active Problem List:     Prostate cancer (HCC)     Type 2 diabetes mellitus with

## 2024-03-13 DIAGNOSIS — E08.42 DIABETIC POLYNEUROPATHY ASSOCIATED WITH DIABETES MELLITUS DUE TO UNDERLYING CONDITION (HCC): ICD-10-CM

## 2024-03-13 RX ORDER — GABAPENTIN 800 MG/1
800 TABLET ORAL 2 TIMES DAILY
Qty: 60 TABLET | Refills: 2 | Status: SHIPPED | OUTPATIENT
Start: 2024-03-13 | End: 2024-06-11

## 2024-03-13 NOTE — TELEPHONE ENCOUNTER
daily 120 tablet 3    warfarin (COUMADIN) 7.5 MG tablet Take 1 tablet by mouth daily      Insulin Pen Needle 31G X 8 MM MISC 1 each by Does not apply route 2 times daily 100 each 0    furosemide (LASIX) 20 MG tablet take 1 tablet by mouth once daily 30 tablet 3    pravastatin (PRAVACHOL) 40 MG tablet Take 1 tablet by mouth nightly 90 tablet 0     No current facility-administered medications on file prior to visit.

## 2024-03-18 DIAGNOSIS — Z79.4 TYPE 2 DIABETES MELLITUS WITH DIABETIC POLYNEUROPATHY, WITH LONG-TERM CURRENT USE OF INSULIN (HCC): ICD-10-CM

## 2024-03-18 DIAGNOSIS — E11.42 TYPE 2 DIABETES MELLITUS WITH DIABETIC POLYNEUROPATHY, WITH LONG-TERM CURRENT USE OF INSULIN (HCC): ICD-10-CM

## 2024-03-18 DIAGNOSIS — I50.22 CHRONIC SYSTOLIC HEART FAILURE (HCC): ICD-10-CM

## 2024-03-18 RX ORDER — EMPAGLIFLOZIN 10 MG/1
10 TABLET, FILM COATED ORAL EVERY MORNING
Qty: 30 TABLET | Refills: 5 | Status: SHIPPED | OUTPATIENT
Start: 2024-03-18

## 2024-03-25 DIAGNOSIS — I10 PRIMARY HYPERTENSION: ICD-10-CM

## 2024-03-25 RX ORDER — CARVEDILOL 25 MG/1
25 TABLET ORAL 2 TIMES DAILY
Qty: 60 TABLET | Refills: 5 | Status: SHIPPED | OUTPATIENT
Start: 2024-03-25

## 2024-03-25 NOTE — TELEPHONE ENCOUNTER
Jovanny Espinoza is calling to request a refill on the following medication(s):    Last Visit Date (If Applicable):  8/24/2023    Next Visit Date:    3/29/2024    Medication Request:  Requested Prescriptions     Pending Prescriptions Disp Refills    carvedilol (COREG) 25 MG tablet [Pharmacy Med Name: CARVEDILOL 25 MG TABLET] 60 tablet 5     Sig: take 1 tablet by mouth twice a day

## 2024-03-28 ENCOUNTER — PHARMACY VISIT (OUTPATIENT)
Dept: FAMILY MEDICINE CLINIC | Age: 59
End: 2024-03-28

## 2024-03-28 DIAGNOSIS — Z79.4 TYPE 2 DIABETES MELLITUS WITH DIABETIC POLYNEUROPATHY, WITH LONG-TERM CURRENT USE OF INSULIN (HCC): Primary | ICD-10-CM

## 2024-03-28 DIAGNOSIS — E11.42 TYPE 2 DIABETES MELLITUS WITH DIABETIC POLYNEUROPATHY, WITH LONG-TERM CURRENT USE OF INSULIN (HCC): Primary | ICD-10-CM

## 2024-03-28 NOTE — PROGRESS NOTES
diabetes mellitus.  Current symptoms/problems include hyperglycemia and have been worsening. Symptoms have been present for several months.      Interim Update: Patient was last seen by clinical pharmacy 2/22/2024. Patient has been taking medications as prescribed. Patient reports feeling tsai faster since being on Trulicity. Patient reports BG have been running between 100-130 throughout the day. Patient has reduced cigarettes from 10 per day to 5 per day. Patient is still utilizing patches and gum.     DIABETES MEDICATIONS  Current Therapy: Basaglar, Trulicity, and Jardiance     Previous Therapy: Novolog    MEDICATIONS  Current Outpatient Medications   Medication Sig Dispense Refill    carvedilol (COREG) 25 MG tablet take 1 tablet by mouth twice a day 60 tablet 5    JARDIANCE 10 MG tablet take 1 tablet by mouth every morning 30 tablet 5    gabapentin (NEURONTIN) 800 MG tablet Take 1 tablet by mouth 2 times daily for 90 days. 60 tablet 2    omeprazole (PRILOSEC) 20 MG delayed release capsule take 1 capsule by mouth once daily 30 MINUTES PRIOR TO FOOD 120 capsule 2    insulin glargine (BASAGLAR KWIKPEN) 100 UNIT/ML injection pen inject 40 units subcutaneously twice a day 15 mL 3    Dulaglutide (TRULICITY) 3 MG/0.5ML SOPN Inject 3 mg into the skin once a week 2 mL 1    B-D ULTRAFINE III SHORT PEN 31G X 8 MM MISC use 1 PEN NEEDLE to inject MEDICATION subcutaneously twice a day 100 each 1    nicotine (NICODERM CQ) 21 MG/24HR Place 1 patch onto the skin daily 42 patch 0    nicotine (NICODERM CQ) 14 MG/24HR Place 1 patch onto the skin daily for 14 days 14 patch 0    nicotine (NICODERM CQ) 7 MG/24HR Place 1 patch onto the skin daily for 14 days 14 patch 0    nicotine polacrilex (NICORETTE) 4 MG gum Take 1 each by mouth as needed for Smoking cessation 120 each 3    DULoxetine (CYMBALTA) 60 MG extended release capsule take 1 capsule by mouth once daily 90 capsule 2    lisinopril (PRINIVIL;ZESTRIL) 2.5 MG tablet take 2

## 2024-03-28 NOTE — PATIENT INSTRUCTIONS
Start taking docusate (Colace) once daily as needed for constipation  Start taking two Jardiance 10 mg tablets daily. Then start taking 25 mg tablets  Continue Trulicity 3 mg once weekly and Basaglar 40 units twice daily

## 2024-03-29 ENCOUNTER — OFFICE VISIT (OUTPATIENT)
Dept: FAMILY MEDICINE CLINIC | Age: 59
End: 2024-03-29

## 2024-03-29 VITALS
HEART RATE: 78 BPM | DIASTOLIC BLOOD PRESSURE: 84 MMHG | SYSTOLIC BLOOD PRESSURE: 124 MMHG | WEIGHT: 271.6 LBS | BODY MASS INDEX: 35.83 KG/M2

## 2024-03-29 DIAGNOSIS — E66.01 SEVERE OBESITY (BMI 35.0-39.9) WITH COMORBIDITY (HCC): ICD-10-CM

## 2024-03-29 DIAGNOSIS — E78.2 MIXED HYPERLIPIDEMIA: ICD-10-CM

## 2024-03-29 DIAGNOSIS — Z79.4 TYPE 2 DIABETES MELLITUS WITH DIABETIC POLYNEUROPATHY, WITH LONG-TERM CURRENT USE OF INSULIN (HCC): Primary | ICD-10-CM

## 2024-03-29 DIAGNOSIS — E11.42 TYPE 2 DIABETES MELLITUS WITH DIABETIC POLYNEUROPATHY, WITH LONG-TERM CURRENT USE OF INSULIN (HCC): Primary | ICD-10-CM

## 2024-03-29 DIAGNOSIS — I25.119 ATHEROSCLEROSIS OF NATIVE CORONARY ARTERY OF NATIVE HEART WITH ANGINA PECTORIS (HCC): ICD-10-CM

## 2024-03-29 DIAGNOSIS — M48.061 SPINAL STENOSIS AT L4-L5 LEVEL: ICD-10-CM

## 2024-03-29 DIAGNOSIS — C61 PROSTATE CANCER (HCC): ICD-10-CM

## 2024-03-29 DIAGNOSIS — I50.22 CHRONIC SYSTOLIC HEART FAILURE (HCC): ICD-10-CM

## 2024-03-29 PROBLEM — I26.94 MULTIPLE SUBSEGMENTAL PULMONARY EMBOLI WITHOUT ACUTE COR PULMONALE (HCC): Status: RESOLVED | Noted: 2020-12-03 | Resolved: 2024-03-29

## 2024-03-29 ASSESSMENT — PATIENT HEALTH QUESTIONNAIRE - PHQ9
SUM OF ALL RESPONSES TO PHQ QUESTIONS 1-9: 0
1. LITTLE INTEREST OR PLEASURE IN DOING THINGS: NOT AT ALL
SUM OF ALL RESPONSES TO PHQ QUESTIONS 1-9: 0
SUM OF ALL RESPONSES TO PHQ QUESTIONS 1-9: 0
2. FEELING DOWN, DEPRESSED OR HOPELESS: NOT AT ALL
SUM OF ALL RESPONSES TO PHQ QUESTIONS 1-9: 0
SUM OF ALL RESPONSES TO PHQ9 QUESTIONS 1 & 2: 0

## 2024-03-29 ASSESSMENT — ENCOUNTER SYMPTOMS
ABDOMINAL PAIN: 0
CONSTIPATION: 1
BACK PAIN: 1
EYES NEGATIVE: 1
BLOOD IN STOOL: 0
COUGH: 0
ALLERGIC/IMMUNOLOGIC NEGATIVE: 1
DIARRHEA: 0
SHORTNESS OF BREATH: 0

## 2024-03-29 NOTE — PROGRESS NOTES
Stability: Unknown (4/25/2023)    Housing Stability Vital Sign     Unstable Housing in the Last Year: No       SURGICAL HISTORY    Past Surgical History:   Procedure Laterality Date    CHEST TUBE INSERTION  06/2017    COLONOSCOPY  2016    CORONARY ANGIOPLASTY WITH STENT PLACEMENT      EYE SURGERY Right     injury    FINGER TRIGGER RELEASE  2020    left ring and thumb    FINGER TRIGGER RELEASE Right 9/19/2023    RIGHT FINGER TRIGGER RELEASE performed by Carmelo Lipscomb DO at [facility] OR    HERNIA REPAIR  2020    LUMBAR FUSION  01/29/2020    L5-S1    LUMBAR SPINE SURGERY Right 1/29/2020    RIGHT L5 -S1 TLIF MINIMALLY INVASIVE- 2CARMS,  NUVASIVE, NO CELLSAVER performed by Cesar Gonzalez MD at [facility] OR    OTHER SURGICAL HISTORY  2000    [employer]     PACEMAKER PLACEMENT  2008    ICD;  battery change 2016;  Dr. Cueva. DEFIB NOT SAFE MRI    PENILE PROSTHESIS PLACEMENT N/A 8/25/2021    PENIS PROSTHESIS INSERTION  - COLOPLAST performed by Lonnie Yan MD at [facility] OR    PENILE PROSTHESIS PLACEMENT N/A 3/31/2022    PENIS IMPLANT REMOVAL performed by Lonnie Yan MD at [facility] OR    PROSTATE BIOPSY      PROSTATECTOMY  08/31/2016    carmen lymph node dissection    VENA CAVA FILTER PLACEMENT  2000       CURRENT MEDICATIONS    Current Outpatient Medications   Medication Sig Dispense Refill    empagliflozin (JARDIANCE) 25 MG tablet Take 1 tablet by mouth daily 90 tablet 1    carvedilol (COREG) 25 MG tablet take 1 tablet by mouth twice a day 60 tablet 5    gabapentin (NEURONTIN) 800 MG tablet Take 1 tablet by mouth 2 times daily for 90 days. 60 tablet 2    omeprazole (PRILOSEC) 20 MG delayed release capsule take 1 capsule by mouth once daily 30 MINUTES PRIOR TO FOOD 120 capsule 2    insulin glargine (BASAGLAR KWIKPEN) 100 UNIT/ML injection pen inject 40 units subcutaneously twice a day 15 mL 3    Dulaglutide (TRULICITY) 3 MG/0.5ML SOPN Inject 3 mg into the skin once a week 2 mL 1    B-D ULTRAFINE III SHORT PEN 31G X 8

## 2024-04-17 DIAGNOSIS — I10 PRIMARY HYPERTENSION: ICD-10-CM

## 2024-04-17 RX ORDER — LISINOPRIL 2.5 MG/1
5 TABLET ORAL 2 TIMES DAILY
Qty: 60 TABLET | Refills: 5 | Status: SHIPPED | OUTPATIENT
Start: 2024-04-17

## 2024-04-20 DIAGNOSIS — E11.42 TYPE 2 DIABETES MELLITUS WITH DIABETIC POLYNEUROPATHY, WITH LONG-TERM CURRENT USE OF INSULIN (HCC): ICD-10-CM

## 2024-04-20 DIAGNOSIS — Z79.4 TYPE 2 DIABETES MELLITUS WITH DIABETIC POLYNEUROPATHY, WITH LONG-TERM CURRENT USE OF INSULIN (HCC): ICD-10-CM

## 2024-04-22 RX ORDER — DULAGLUTIDE 3 MG/.5ML
INJECTION, SOLUTION SUBCUTANEOUS
Qty: 2 ML | Refills: 1 | Status: SHIPPED | OUTPATIENT
Start: 2024-04-22

## 2024-05-09 ENCOUNTER — PHARMACY VISIT (OUTPATIENT)
Dept: FAMILY MEDICINE CLINIC | Age: 59
End: 2024-05-09

## 2024-05-09 DIAGNOSIS — E11.42 TYPE 2 DIABETES MELLITUS WITH DIABETIC POLYNEUROPATHY, WITH LONG-TERM CURRENT USE OF INSULIN (HCC): Primary | ICD-10-CM

## 2024-05-09 DIAGNOSIS — Z79.4 TYPE 2 DIABETES MELLITUS WITH DIABETIC POLYNEUROPATHY, WITH LONG-TERM CURRENT USE OF INSULIN (HCC): Primary | ICD-10-CM

## 2024-05-09 LAB — HBA1C MFR BLD: 7.2 %

## 2024-05-09 PROCEDURE — 83036 HEMOGLOBIN GLYCOSYLATED A1C: CPT | Performed by: FAMILY MEDICINE

## 2024-05-09 RX ORDER — TIRZEPATIDE 2.5 MG/.5ML
2.5 INJECTION, SOLUTION SUBCUTANEOUS WEEKLY
Qty: 2 ML | Refills: 0 | Status: SHIPPED | OUTPATIENT
Start: 2024-05-09

## 2024-05-09 NOTE — PATIENT INSTRUCTIONS
Start Mounjaro 2.5 mg once weekly  Stop Trulicity  Continue Basaglar 40 units twice daily and Jardiance 25 mg once daily

## 2024-05-09 NOTE — PROGRESS NOTES
Clinical Pharmacy Note    Jovanny Espinoza is a 58 y.o. male, referred to Clinical Pharmacy for medication management by Marianne Osorio MD. Jovanny Espinoza was seen today for diabetes management.     Staying under 140  Occasionally lower  2 cigarettes ever couple days  Allergies   Allergen Reactions    Adhesive Tape Other (See Comments)     Blister,skin tears with silk tape        Past Medical History:   Diagnosis Date    Anxiety     no treatment    Arthritis     left shoulder    Cardiomegaly     CHF (congestive heart failure) ([facility]) EF 20%    Chronic back pain     GERD (gastroesophageal reflux disease)     Headache     Heart attack (HCC)     History of lung thrombosis     Hx of blood clots     left leg to lungs     Hyperlipidemia     Hypertension     Irregular heart beat     Neuropathy     feet    Obesity     WILLA (obstructive sleep apnea)     not using the machine    Pacemaker     AICD    PE (pulmonary embolism) 2001    \"about 5 yrs ago\"    Prostate cancer ([facility]) 2016    Spinal stenosis at L4-L5 level 2019    Traumatic closed fx of eight or more ribs with minimal displacement 06/2017    pneumothorax.  Pt fell 50 feet from a balcony.  Pt denies history of a head injury.    Type 2 diabetes mellitus without complication ([facility])     Checks TID, waiting for CGM    Under care of team     Cardiology-    Under care of team     Nephrology-[facility]      Social History     Tobacco Use    Smoking status: Every Day     Current packs/day: 0.25     Average packs/day: 0.3 packs/day for 20.0 years (5.0 ttl pk-yrs)     Types: Cigarettes    Smokeless tobacco: Never   Substance Use Topics    Alcohol use: No     Family History   Problem Relation Age of Onset    Hypertension Mother     Diabetes Father     Hypertension Father     Heart Disease Father     Prostate Cancer Brother     Diabetes Brother     Diabetes Brother     Diabetes Brother        REVIEW OF CURRENT DISEASE STATE  Diabetes Mellitus: Patient here for an

## 2024-05-10 VITALS
HEART RATE: 80 BPM | BODY MASS INDEX: 35.12 KG/M2 | SYSTOLIC BLOOD PRESSURE: 125 MMHG | WEIGHT: 266.2 LBS | DIASTOLIC BLOOD PRESSURE: 80 MMHG

## 2024-05-10 DIAGNOSIS — Z79.4 TYPE 2 DIABETES MELLITUS WITH DIABETIC POLYNEUROPATHY, WITH LONG-TERM CURRENT USE OF INSULIN (HCC): ICD-10-CM

## 2024-05-10 DIAGNOSIS — E11.42 TYPE 2 DIABETES MELLITUS WITH DIABETIC POLYNEUROPATHY, WITH LONG-TERM CURRENT USE OF INSULIN (HCC): ICD-10-CM

## 2024-05-10 RX ORDER — INSULIN GLARGINE 100 [IU]/ML
INJECTION, SOLUTION SUBCUTANEOUS
Qty: 15 ML | Refills: 3 | Status: SHIPPED | OUTPATIENT
Start: 2024-05-10

## 2024-05-10 ASSESSMENT — PATIENT HEALTH QUESTIONNAIRE - PHQ9
SUM OF ALL RESPONSES TO PHQ QUESTIONS 1-9: 0
SUM OF ALL RESPONSES TO PHQ9 QUESTIONS 1 & 2: 0
2. FEELING DOWN, DEPRESSED OR HOPELESS: NOT AT ALL
SUM OF ALL RESPONSES TO PHQ QUESTIONS 1-9: 0
1. LITTLE INTEREST OR PLEASURE IN DOING THINGS: NOT AT ALL

## 2024-05-10 NOTE — TELEPHONE ENCOUNTER
Jovanny Espinoza is calling to request a refill on the following medication(s):    Last Visit Date (If Applicable):  3/29/2024    Next Visit Date:    7/11/2024    Medication Request:  Requested Prescriptions     Pending Prescriptions Disp Refills    BASAGLAR KWIKPEN 100 UNIT/ML injection pen [Pharmacy Med Name: BASAGLAR 100 UNIT/ML KWIKPEN] 15 mL 3     Sig: inject 40 units subcutaneously twice a day

## 2024-06-05 DIAGNOSIS — E11.42 TYPE 2 DIABETES MELLITUS WITH DIABETIC POLYNEUROPATHY, WITH LONG-TERM CURRENT USE OF INSULIN (HCC): ICD-10-CM

## 2024-06-05 DIAGNOSIS — E08.42 DIABETIC POLYNEUROPATHY ASSOCIATED WITH DIABETES MELLITUS DUE TO UNDERLYING CONDITION (HCC): ICD-10-CM

## 2024-06-05 DIAGNOSIS — Z79.4 TYPE 2 DIABETES MELLITUS WITH DIABETIC POLYNEUROPATHY, WITH LONG-TERM CURRENT USE OF INSULIN (HCC): ICD-10-CM

## 2024-06-05 RX ORDER — TIRZEPATIDE 2.5 MG/.5ML
2.5 INJECTION, SOLUTION SUBCUTANEOUS WEEKLY
Qty: 2 ML | Refills: 0 | Status: SHIPPED | OUTPATIENT
Start: 2024-06-05

## 2024-06-05 RX ORDER — PEN NEEDLE, DIABETIC 31 GX5/16"
NEEDLE, DISPOSABLE MISCELLANEOUS
Qty: 100 EACH | Refills: 1 | Status: SHIPPED | OUTPATIENT
Start: 2024-06-05

## 2024-06-05 RX ORDER — GABAPENTIN 800 MG/1
800 TABLET ORAL 2 TIMES DAILY
Qty: 60 TABLET | Refills: 2 | Status: SHIPPED | OUTPATIENT
Start: 2024-06-05 | End: 2024-09-03

## 2024-06-13 ENCOUNTER — PHARMACY VISIT (OUTPATIENT)
Dept: FAMILY MEDICINE CLINIC | Age: 59
End: 2024-06-13

## 2024-06-13 VITALS
BODY MASS INDEX: 34.75 KG/M2 | HEART RATE: 74 BPM | WEIGHT: 263.4 LBS | SYSTOLIC BLOOD PRESSURE: 121 MMHG | DIASTOLIC BLOOD PRESSURE: 81 MMHG

## 2024-06-13 DIAGNOSIS — Z79.4 TYPE 2 DIABETES MELLITUS WITH DIABETIC POLYNEUROPATHY, WITH LONG-TERM CURRENT USE OF INSULIN (HCC): Primary | ICD-10-CM

## 2024-06-13 DIAGNOSIS — E11.42 TYPE 2 DIABETES MELLITUS WITH DIABETIC POLYNEUROPATHY, WITH LONG-TERM CURRENT USE OF INSULIN (HCC): Primary | ICD-10-CM

## 2024-06-13 RX ORDER — TIRZEPATIDE 5 MG/.5ML
5 INJECTION, SOLUTION SUBCUTANEOUS WEEKLY
Qty: 2 ML | Refills: 1 | Status: SHIPPED | OUTPATIENT
Start: 2024-06-13

## 2024-06-13 ASSESSMENT — PATIENT HEALTH QUESTIONNAIRE - PHQ9
SUM OF ALL RESPONSES TO PHQ QUESTIONS 1-9: 0
2. FEELING DOWN, DEPRESSED OR HOPELESS: NOT AT ALL
SUM OF ALL RESPONSES TO PHQ9 QUESTIONS 1 & 2: 0
SUM OF ALL RESPONSES TO PHQ QUESTIONS 1-9: 0
1. LITTLE INTEREST OR PLEASURE IN DOING THINGS: NOT AT ALL
SUM OF ALL RESPONSES TO PHQ QUESTIONS 1-9: 0
SUM OF ALL RESPONSES TO PHQ QUESTIONS 1-9: 0

## 2024-06-13 NOTE — PROGRESS NOTES
Clinical Pharmacy Note    Jovanny Espinoza is a 58 y.o. male, referred to Clinical Pharmacy for medication management by Marianne Osorio MD. Jovanny Espinoza was seen today for diabetes management.     Allergies   Allergen Reactions    Adhesive Tape Other (See Comments)     Blister,skin tears with silk tape        Past Medical History:   Diagnosis Date    Anxiety     no treatment    Arthritis     left shoulder    Cardiomegaly     CHF (congestive heart failure) (HCC) EF 20%    Chronic back pain     GERD (gastroesophageal reflux disease)     Headache     Heart attack (HCC)     History of lung thrombosis     Hx of blood clots     left leg to lungs     Hyperlipidemia     Hypertension     Irregular heart beat     Neuropathy     feet    Obesity     WILLA (obstructive sleep apnea)     not using the machine    Pacemaker     AICD    PE (pulmonary embolism) 2001    \"about 5 yrs ago\"    Prostate cancer (HCC) 2016    Spinal stenosis at L4-L5 level 2019    Traumatic closed fx of eight or more ribs with minimal displacement 06/2017    pneumothorax.  Pt fell 50 feet from a balcony.  Pt denies history of a head injury.    Type 2 diabetes mellitus without complication ([facility])     Checks TID, waiting for CGM    Under care of team     Cardiology-    Under care of team     Nephrology-[facility]      Social History     Tobacco Use    Smoking status: Every Day     Current packs/day: 0.25     Average packs/day: 0.3 packs/day for 20.0 years (5.0 ttl pk-yrs)     Types: Cigarettes    Smokeless tobacco: Never   Substance Use Topics    Alcohol use: No     Family History   Problem Relation Age of Onset    Hypertension Mother     Diabetes Father     Hypertension Father     Heart Disease Father     Prostate Cancer Brother     Diabetes Brother     Diabetes Brother     Diabetes Brother      REVIEW OF CURRENT DISEASE STATE  Diabetes Mellitus: Patient here for an evaluation of Type 2 diabetes mellitus.  Current symptoms/problems include

## 2024-06-13 NOTE — PATIENT INSTRUCTIONS
Increase Mounjaro to 5 mg once weekly (use 2 of your 2.5 mg pens that you currently have this Sunday)  Decrease Basaglar to 35 units twice daily  Continue Jardiance 25 mg once daily

## 2024-06-26 ENCOUNTER — OFFICE VISIT (OUTPATIENT)
Dept: PODIATRY | Age: 59
End: 2024-06-26

## 2024-06-26 VITALS — HEIGHT: 73 IN | BODY MASS INDEX: 33.93 KG/M2 | WEIGHT: 256 LBS

## 2024-06-26 DIAGNOSIS — M79.604 PAIN IN BOTH LOWER EXTREMITIES: ICD-10-CM

## 2024-06-26 DIAGNOSIS — M79.605 PAIN IN BOTH LOWER EXTREMITIES: ICD-10-CM

## 2024-06-26 DIAGNOSIS — G60.9 IDIOPATHIC PERIPHERAL NEUROPATHY: ICD-10-CM

## 2024-06-26 DIAGNOSIS — B35.1 DERMATOPHYTOSIS OF NAIL: Primary | ICD-10-CM

## 2024-06-26 NOTE — PROGRESS NOTES
PENILE PROSTHESIS PLACEMENT N/A 3/31/2022    PENIS IMPLANT REMOVAL performed by Lonnie Yan MD at [facility] OR    PROSTATE BIOPSY      PROSTATECTOMY  08/31/2016    carmen lymph node dissection    VENA CAVA FILTER PLACEMENT  2000       Family History   Problem Relation Age of Onset    Hypertension Mother     Diabetes Father     Hypertension Father     Heart Disease Father     Prostate Cancer Brother     Diabetes Brother     Diabetes Brother     Diabetes Brother        Social History     Tobacco Use    Smoking status: Every Day     Current packs/day: 0.25     Average packs/day: 0.3 packs/day for 20.0 years (5.0 ttl pk-yrs)     Types: Cigarettes    Smokeless tobacco: Never   Substance Use Topics    Alcohol use: No       Review of Systems    Review of Systems:   History obtained from chart review and the patient  General ROS: negative for - chills, fatigue, fever, night sweats or weight gain  Constitutional: Negative for chills, diaphoresis, fatigue, fever and unexpected weight change.  Musculoskeletal: Positive for arthralgias, gait problem and joint swelling.  Neurological ROS: negative for - behavioral changes, confusion, headaches or seizures. Negative for weakness and numbness.   Dermatological ROS: negative for - mole changes, rash  Cardiovascular: Negative for leg swelling.   Gastrointestinal: Negative for constipation, diarrhea, nausea and vomiting.               Lower Extremity Physical Examination:   Vitals: There were no vitals filed for this visit.  General: AAO x 3 in NAD.     Dermatologic Exam:  Skin lesion/ulceration Absent .   Skin No rashes or nodules noted..   Skin is thin, with flaky sloughing skin as well as decreased hair growth to the lower leg  Small red hemosiderin deposits seen dorsal foot   Musculoskeletal:     1st MPJ ROM decreased, Bilateral.  Muscle strength 5/5, Bilateral.  Pain present upon palpation of toenails 1-5, Bilateral. decreased medial longitudinal arch, Bilateral.  Ankle ROM

## 2024-07-11 ENCOUNTER — OFFICE VISIT (OUTPATIENT)
Dept: FAMILY MEDICINE CLINIC | Age: 59
End: 2024-07-11
Payer: COMMERCIAL

## 2024-07-11 VITALS
SYSTOLIC BLOOD PRESSURE: 120 MMHG | DIASTOLIC BLOOD PRESSURE: 82 MMHG | HEIGHT: 73 IN | HEART RATE: 80 BPM | WEIGHT: 254.8 LBS | BODY MASS INDEX: 33.77 KG/M2

## 2024-07-11 DIAGNOSIS — M25.512 ACUTE PAIN OF LEFT SHOULDER: ICD-10-CM

## 2024-07-11 DIAGNOSIS — E11.42 TYPE 2 DIABETES MELLITUS WITH DIABETIC POLYNEUROPATHY, WITH LONG-TERM CURRENT USE OF INSULIN (HCC): ICD-10-CM

## 2024-07-11 DIAGNOSIS — Z79.4 TYPE 2 DIABETES MELLITUS WITH DIABETIC POLYNEUROPATHY, WITH LONG-TERM CURRENT USE OF INSULIN (HCC): ICD-10-CM

## 2024-07-11 DIAGNOSIS — Z00.00 MEDICARE ANNUAL WELLNESS VISIT, SUBSEQUENT: Primary | ICD-10-CM

## 2024-07-11 DIAGNOSIS — E78.2 MIXED HYPERLIPIDEMIA: ICD-10-CM

## 2024-07-11 PROCEDURE — 3051F HG A1C>EQUAL 7.0%<8.0%: CPT | Performed by: FAMILY MEDICINE

## 2024-07-11 PROCEDURE — 3079F DIAST BP 80-89 MM HG: CPT | Performed by: FAMILY MEDICINE

## 2024-07-11 PROCEDURE — 3074F SYST BP LT 130 MM HG: CPT | Performed by: FAMILY MEDICINE

## 2024-07-11 PROCEDURE — G0439 PPPS, SUBSEQ VISIT: HCPCS | Performed by: FAMILY MEDICINE

## 2024-07-11 SDOH — ECONOMIC STABILITY: FOOD INSECURITY: WITHIN THE PAST 12 MONTHS, YOU WORRIED THAT YOUR FOOD WOULD RUN OUT BEFORE YOU GOT MONEY TO BUY MORE.: NEVER TRUE

## 2024-07-11 SDOH — ECONOMIC STABILITY: FOOD INSECURITY: WITHIN THE PAST 12 MONTHS, THE FOOD YOU BOUGHT JUST DIDN'T LAST AND YOU DIDN'T HAVE MONEY TO GET MORE.: NEVER TRUE

## 2024-07-11 SDOH — ECONOMIC STABILITY: INCOME INSECURITY: HOW HARD IS IT FOR YOU TO PAY FOR THE VERY BASICS LIKE FOOD, HOUSING, MEDICAL CARE, AND HEATING?: NOT HARD AT ALL

## 2024-07-11 ASSESSMENT — PATIENT HEALTH QUESTIONNAIRE - PHQ9
2. FEELING DOWN, DEPRESSED OR HOPELESS: NOT AT ALL
SUM OF ALL RESPONSES TO PHQ QUESTIONS 1-9: 0
SUM OF ALL RESPONSES TO PHQ9 QUESTIONS 1 & 2: 0
SUM OF ALL RESPONSES TO PHQ QUESTIONS 1-9: 0
1. LITTLE INTEREST OR PLEASURE IN DOING THINGS: NOT AT ALL
SUM OF ALL RESPONSES TO PHQ QUESTIONS 1-9: 0
SUM OF ALL RESPONSES TO PHQ QUESTIONS 1-9: 0

## 2024-07-11 ASSESSMENT — ENCOUNTER SYMPTOMS
EYES NEGATIVE: 1
BLOOD IN STOOL: 0
ALLERGIC/IMMUNOLOGIC NEGATIVE: 1
SHORTNESS OF BREATH: 0
COUGH: 0
DIARRHEA: 0
CONSTIPATION: 0
ABDOMINAL PAIN: 0

## 2024-07-11 NOTE — PATIENT INSTRUCTIONS
chest.     Sweating.     Shortness of breath.     Pain, pressure, or a strange feeling in the back, neck, jaw, or upper belly or in one or both shoulders or arms.     Lightheadedness or sudden weakness.     A fast or irregular heartbeat.   After you call 911, the  may tell you to chew 1 adult-strength or 2 to 4 low-dose aspirin. Wait for an ambulance. Do not try to drive yourself.  Watch closely for changes in your health, and be sure to contact your doctor if you have any problems.  Where can you learn more?  Go to https://www.[employer].net/patientEd and enter F075 to learn more about \"A Healthy Heart: Care Instructions.\"  Current as of: June 24, 2023  Content Version: 14.1  © 2742-3961 [employer].   Care instructions adapted under license by [employer]. If you have questions about a medical condition or this instruction, always ask your healthcare professional. [employer] disclaims any warranty or liability for your use of this information.      Personalized Preventive Plan for Jovanny Espinoza - 7/11/2024  Medicare offers a range of preventive health benefits. Some of the tests and screenings are paid in full while other may be subject to a deductible, co-insurance, and/or copay.    Some of these benefits include a comprehensive review of your medical history including lifestyle, illnesses that may run in your family, and various assessments and screenings as appropriate.    After reviewing your medical record and screening and assessments performed today your provider may have ordered immunizations, labs, imaging, and/or referrals for you.  A list of these orders (if applicable) as well as your Preventive Care list are included within your After Visit Summary for your review.    Other Preventive Recommendations:    A preventive eye exam performed by an eye specialist is recommended every 1-2 years to screen for glaucoma; cataracts, macular degeneration, and other eye

## 2024-07-11 NOTE — PROGRESS NOTES
MD   DULoxetine (CYMBALTA) 60 MG extended release capsule take 1 capsule by mouth once daily Yes Marianne Osorio MD   ciclopirox (LOPROX) 0.77 % cream apply topically twice a day to feet Yes Marianne Osorio MD   Handicap Placard MISC by Does not apply route Expires five years form original written date Yes Marianne Osorio MD   hydrALAZINE (APRESOLINE) 25 MG tablet take 1 tablet by mouth twice a day Yes ProviderKevin MD   colesevelam (WELCHOL) 625 MG tablet Take 3 tablets by mouth 2 times daily Yes Marianne Osorio MD   isosorbide mononitrate (IMDUR) 30 MG extended release tablet take 1 tablet by mouth once daily Yes Marianne Osorio MD   warfarin (COUMADIN) 7.5 MG tablet Take 1 tablet by mouth daily Yes Kevin Wadsworth MD   Insulin Pen Needle 31G X 8 MM MISC 1 each by Does not apply route 2 times daily Yes Marianne Osorio MD   furosemide (LASIX) 20 MG tablet take 1 tablet by mouth once daily Yes Marianne Osorio MD   pravastatin (PRAVACHOL) 40 MG tablet Take 1 tablet by mouth nightly Yes Marianne Osorio MD   nicotine (NICODERM CQ) 21 MG/24HR Place 1 patch onto the skin daily  Marianne Osorio MD   nicotine (NICODERM CQ) 7 MG/24HR Place 1 patch onto the skin daily for 14 days  Marianne Osorio MD       [facility] (Including outside providers/suppliers regularly involved in providing care):   Patient Care Team:  Marianne Osorio MD as PCP - General  Marianne Osorio MD as PCP - Empaneled Provider  Raul Cueva MD as Consulting Physician (Cardiology)  Virginia Coates DPM as Consulting Physician (Podiatry)  Lowell Tabares MD as Surgeon (Nephrology)  Noah Robertson [facility] as Pharmacist (Pharmacist)  Leatha Canchola DPM as Physician (Podiatry)      Reviewed and updated this visit:  Allergies  Meds  Med Hx  Surg Hx  Soc Hx  Fam Hx

## 2024-07-17 ENCOUNTER — OFFICE VISIT (OUTPATIENT)
Dept: ORTHOPEDIC SURGERY | Age: 59
End: 2024-07-17

## 2024-07-17 VITALS — BODY MASS INDEX: 33.48 KG/M2 | WEIGHT: 252.6 LBS | HEIGHT: 73 IN

## 2024-07-17 DIAGNOSIS — G56.02 CARPAL TUNNEL SYNDROME OF LEFT WRIST: ICD-10-CM

## 2024-07-17 DIAGNOSIS — M54.2 NECK PAIN: ICD-10-CM

## 2024-07-17 DIAGNOSIS — M25.512 LEFT SHOULDER PAIN, UNSPECIFIED CHRONICITY: Primary | ICD-10-CM

## 2024-07-17 RX ORDER — BUPIVACAINE HYDROCHLORIDE 2.5 MG/ML
2 INJECTION, SOLUTION INFILTRATION; PERINEURAL ONCE
Status: COMPLETED | OUTPATIENT
Start: 2024-07-17 | End: 2024-07-17

## 2024-07-17 RX ORDER — METHYLPREDNISOLONE ACETATE 80 MG/ML
80 INJECTION, SUSPENSION INTRA-ARTICULAR; INTRALESIONAL; INTRAMUSCULAR; SOFT TISSUE ONCE
Status: COMPLETED | OUTPATIENT
Start: 2024-07-17 | End: 2024-07-17

## 2024-07-17 RX ADMIN — BUPIVACAINE HYDROCHLORIDE 2 ML: 2.5 INJECTION, SOLUTION INFILTRATION; PERINEURAL at 09:36

## 2024-07-17 RX ADMIN — METHYLPREDNISOLONE ACETATE 80 MG: 80 INJECTION, SUSPENSION INTRA-ARTICULAR; INTRALESIONAL; INTRAMUSCULAR; SOFT TISSUE at 09:36

## 2024-07-17 NOTE — PROGRESS NOTES
[facility] PHYSICIANS [facility] ORTHO SPECIALISTS  [address]  [place] [address]  Dept: [phone]    Ambulatory Orthopedic Consult    CHIEF COMPLAINT:    Chief Complaint   Patient presents with    Joint Pain     LT shoulder pain x's 1 month        HISTORY OF PRESENT ILLNESS:      The patient is a 58 y.o. right HD male who is being seen for consultation and evaluation of left shoulder/neck pain. Pt describes pain in the neck and shoulder for the past month. Denies any trauma/inciting event.  Patient notices pain as well as numbness and tingling that radiates down from the neck down the arm into the third and fourth digits on the dorsal aspect.  Patient is also endorsing weakness in the hand, particularly with .  Patient also states for approximately past month he has been waking up with numbness and tingling in the first second and third digits that he would like to shake out to make the symptoms resolved.  Patient endorses some weakness in the shoulder while working out, notes that his arm and shoulder do get stiff when he wakes up in the morning but this goes away after 5 minutes.  Patient has been trying Excedrin for some of the symptoms, with no relief.  Patient does have a past medical history including type 2 diabetes, coronary artery disease, CHF with pacemaker placement, DVT/PE.  Prior orthopedic surgery history of a L5-S1 fusion, as well as a right trigger finger release.  Patient works as a .  Patient does take warfarin.    Past Medical History:    Past Medical History:   Diagnosis Date    Anxiety     no treatment    Arthritis     left shoulder    Cardiomegaly     CHF (congestive heart failure) (HCC) EF 20%    Chronic back pain     GERD (gastroesophageal reflux disease)     Headache     Heart attack (HCC)     History of lung thrombosis     Hx of blood clots     left leg to lungs     Hyperlipidemia     Hypertension     Irregular heart beat      swollen, hot, erythematous, or painful, or to go to the emergency room after business hours.    Dr. Coffman was present for the critical portions of the procedure.      No follow-ups on file.    Orders Placed This Encounter   Medications    BUPivacaine (MARCAINE) 0.25 % injection 5 mg    methylPREDNISolone acetate (DEPO-MEDROL) injection 80 mg       Orders Placed This Encounter   Procedures    XR SHOULDER LEFT (MIN 2 VIEWS)     Standing Status:   Future     Number of Occurrences:   1     Standing Expiration Date:   8/15/2024     Order Specific Question:   Reason for exam:     Answer:   dx    XR CERVICAL SPINE (2-3 VIEWS)     Standing Status:   Future     Number of Occurrences:   1     Standing Expiration Date:   7/17/2025     Order Specific Question:   Reason for exam:     Answer:   Neck pain    [facility] Physical [facility]     Referral Priority:   Routine     Referral Type:   Eval and Treat     Referral Reason:   Specialty Services Required     Requested Specialty:   Physical Therapist     Number of Visits Requested:   1    EMG     Standing Status:   Future     Standing Expiration Date:   7/17/2025     Order Specific Question:   Which body part?     Answer:   left upper extremity. Eval for carpal tunnel syndrome vs cervical radiulopathy    20610 - DRAIN/INJECT LARGE JOINT BURSA        Harjinder Weinberg DO  Orthopedic Surgery Resident, PGY-2  [place], [place]

## 2024-07-18 ENCOUNTER — PHARMACY VISIT (OUTPATIENT)
Dept: FAMILY MEDICINE CLINIC | Age: 59
End: 2024-07-18

## 2024-07-18 DIAGNOSIS — Z79.4 TYPE 2 DIABETES MELLITUS WITH DIABETIC POLYNEUROPATHY, WITH LONG-TERM CURRENT USE OF INSULIN (HCC): ICD-10-CM

## 2024-07-18 DIAGNOSIS — E11.42 TYPE 2 DIABETES MELLITUS WITH DIABETIC POLYNEUROPATHY, WITH LONG-TERM CURRENT USE OF INSULIN (HCC): ICD-10-CM

## 2024-07-18 RX ORDER — TIRZEPATIDE 5 MG/.5ML
5 INJECTION, SOLUTION SUBCUTANEOUS WEEKLY
Qty: 2 ML | Refills: 1 | Status: SHIPPED | OUTPATIENT
Start: 2024-07-18

## 2024-08-14 DIAGNOSIS — I10 PRIMARY HYPERTENSION: ICD-10-CM

## 2024-08-14 DIAGNOSIS — E08.42 DIABETIC POLYNEUROPATHY ASSOCIATED WITH DIABETES MELLITUS DUE TO UNDERLYING CONDITION (HCC): ICD-10-CM

## 2024-08-14 RX ORDER — LISINOPRIL 2.5 MG/1
5 TABLET ORAL 2 TIMES DAILY
Qty: 60 TABLET | Refills: 5 | Status: SHIPPED | OUTPATIENT
Start: 2024-08-14

## 2024-08-14 RX ORDER — GABAPENTIN 800 MG/1
800 TABLET ORAL 2 TIMES DAILY
Qty: 60 TABLET | Refills: 2 | Status: SHIPPED | OUTPATIENT
Start: 2024-08-14 | End: 2024-11-12

## 2024-08-19 ENCOUNTER — TELEPHONE (OUTPATIENT)
Dept: FAMILY MEDICINE CLINIC | Age: 59
End: 2024-08-19

## 2024-08-19 NOTE — TELEPHONE ENCOUNTER
Patient called wanting to get information about his dexcom stating that the home delivery sent over papers on Thursday or Friday last week and is requesting information about them to see if there done. Not sure if a pa is needed but he is running out of the dexcom. Please advise.

## 2024-08-21 NOTE — PROGRESS NOTES
I performed a history and physical examination of the patient and discussed management with the resident. I reviewed the /resident physician note and agree with the documented findings and plan of care. Any areas of disagreement are noted on the chart.   I have personally evaluated this patient and have completed at least one if not all key elements of the E/M (history, physical exam,procedure and MDM).  Additional findings are as noted. I agree with the chief complaint, past medical history, past surgical history, allergies, medications, social and family history as documented unless otherwise noted below.     Electronically signed by ROB DUARTE DO on 8/21/2024 at 10:04 AM

## 2024-08-22 DIAGNOSIS — Z79.4 TYPE 2 DIABETES MELLITUS WITH DIABETIC POLYNEUROPATHY, WITH LONG-TERM CURRENT USE OF INSULIN (HCC): ICD-10-CM

## 2024-08-22 DIAGNOSIS — E11.42 TYPE 2 DIABETES MELLITUS WITH DIABETIC POLYNEUROPATHY, WITH LONG-TERM CURRENT USE OF INSULIN (HCC): ICD-10-CM

## 2024-08-22 DIAGNOSIS — I10 PRIMARY HYPERTENSION: ICD-10-CM

## 2024-08-23 RX ORDER — CARVEDILOL 25 MG/1
25 TABLET ORAL 2 TIMES DAILY
Qty: 60 TABLET | Refills: 5 | Status: SHIPPED | OUTPATIENT
Start: 2024-08-23

## 2024-08-29 ENCOUNTER — PHARMACY VISIT (OUTPATIENT)
Dept: FAMILY MEDICINE CLINIC | Age: 59
End: 2024-08-29

## 2024-08-29 VITALS
WEIGHT: 251.8 LBS | BODY MASS INDEX: 33.22 KG/M2 | SYSTOLIC BLOOD PRESSURE: 120 MMHG | DIASTOLIC BLOOD PRESSURE: 80 MMHG | HEART RATE: 82 BPM

## 2024-08-29 DIAGNOSIS — F17.210 TOBACCO DEPENDENCE DUE TO CIGARETTES: ICD-10-CM

## 2024-08-29 DIAGNOSIS — E11.42 TYPE 2 DIABETES MELLITUS WITH DIABETIC POLYNEUROPATHY, WITH LONG-TERM CURRENT USE OF INSULIN (HCC): ICD-10-CM

## 2024-08-29 DIAGNOSIS — Z79.4 TYPE 2 DIABETES MELLITUS WITH DIABETIC POLYNEUROPATHY, WITH LONG-TERM CURRENT USE OF INSULIN (HCC): ICD-10-CM

## 2024-08-29 DIAGNOSIS — E78.2 MIXED HYPERLIPIDEMIA: Primary | ICD-10-CM

## 2024-08-29 DIAGNOSIS — L30.9 DERMATITIS: ICD-10-CM

## 2024-08-29 RX ORDER — TIRZEPATIDE 7.5 MG/.5ML
7.5 INJECTION, SOLUTION SUBCUTANEOUS WEEKLY
Qty: 2 ML | Refills: 1 | Status: SHIPPED | OUTPATIENT
Start: 2024-08-29

## 2024-08-29 RX ORDER — CICLOPIROX OLAMINE 7.7 MG/G
CREAM TOPICAL
Qty: 90 G | Refills: 2 | Status: SHIPPED | OUTPATIENT
Start: 2024-08-29

## 2024-08-29 NOTE — PATIENT INSTRUCTIONS
Increase Mounjaro to 7.5 mg once weekly  Decrease Basaglar to 30 units twice daily  Continue Jardiance 25 mg once daily

## 2024-08-29 NOTE — PROGRESS NOTES
Clinical Pharmacy Note    Jovanny Espinoza is a 59 y.o. male, referred to Clinical Pharmacy for medication management by Marianne Osorio MD. Jovanny Espinoza was seen today for diabetes management.     Allergies   Allergen Reactions    Adhesive Tape Other (See Comments)     Blister,skin tears with silk tape        Past Medical History:   Diagnosis Date    Anxiety     no treatment    Arthritis     left shoulder    Cardiomegaly     CHF (congestive heart failure) (HCC) EF 20%    Chronic back pain     GERD (gastroesophageal reflux disease)     Headache     Heart attack (HCC)     History of lung thrombosis     Hx of blood clots     left leg to lungs     Hyperlipidemia     Hypertension     Irregular heart beat     Neuropathy     feet    Obesity     WILLA (obstructive sleep apnea)     not using the machine    Pacemaker     AICD    PE (pulmonary embolism) 2001    \"about 5 yrs ago\"    Prostate cancer (HCC) 2016    Spinal stenosis at L4-L5 level 2019    Traumatic closed fx of eight or more ribs with minimal displacement 06/2017    pneumothorax.  Pt fell 50 feet from a balcony.  Pt denies history of a head injury.    Type 2 diabetes mellitus without complication ([facility])     Checks TID, waiting for CGM    Under care of team     Cardiology-    Under care of team     Nephrology-[facility]      Social History     Tobacco Use    Smoking status: Every Day     Current packs/day: 0.25     Average packs/day: 0.3 packs/day for 20.0 years (5.0 ttl pk-yrs)     Types: Cigarettes    Smokeless tobacco: Never   Substance Use Topics    Alcohol use: No     Family History   Problem Relation Age of Onset    Hypertension Mother     Diabetes Father     Hypertension Father     Heart Disease Father     Prostate Cancer Brother     Diabetes Brother     Diabetes Brother     Diabetes Brother        REVIEW OF CURRENT DISEASE STATE  Diabetes Mellitus: Patient here for an evaluation of Type 2 diabetes mellitus.  Current symptoms/problems include

## 2024-09-14 DIAGNOSIS — Z79.4 TYPE 2 DIABETES MELLITUS WITH DIABETIC POLYNEUROPATHY, WITH LONG-TERM CURRENT USE OF INSULIN (HCC): Primary | ICD-10-CM

## 2024-09-14 DIAGNOSIS — E11.42 TYPE 2 DIABETES MELLITUS WITH DIABETIC POLYNEUROPATHY, WITH LONG-TERM CURRENT USE OF INSULIN (HCC): Primary | ICD-10-CM

## 2024-09-16 RX ORDER — TIRZEPATIDE 5 MG/.5ML
INJECTION, SOLUTION SUBCUTANEOUS
Qty: 4 ADJUSTABLE DOSE PRE-FILLED PEN SYRINGE | Refills: 0 | Status: SHIPPED | OUTPATIENT
Start: 2024-09-16

## 2024-10-08 ENCOUNTER — PHARMACY VISIT (OUTPATIENT)
Dept: FAMILY MEDICINE CLINIC | Age: 59
End: 2024-10-08

## 2024-10-08 VITALS — WEIGHT: 248 LBS | BODY MASS INDEX: 32.72 KG/M2

## 2024-10-08 DIAGNOSIS — Z79.4 TYPE 2 DIABETES MELLITUS WITH DIABETIC POLYNEUROPATHY, WITH LONG-TERM CURRENT USE OF INSULIN (HCC): Primary | ICD-10-CM

## 2024-10-08 DIAGNOSIS — E11.42 TYPE 2 DIABETES MELLITUS WITH DIABETIC POLYNEUROPATHY, WITH LONG-TERM CURRENT USE OF INSULIN (HCC): Primary | ICD-10-CM

## 2024-10-08 DIAGNOSIS — Z79.4 CONTROLLED TYPE 2 DIABETES MELLITUS WITH DIABETIC NEPHROPATHY, WITH LONG-TERM CURRENT USE OF INSULIN (HCC): ICD-10-CM

## 2024-10-08 DIAGNOSIS — E11.21 CONTROLLED TYPE 2 DIABETES MELLITUS WITH DIABETIC NEPHROPATHY, WITH LONG-TERM CURRENT USE OF INSULIN (HCC): ICD-10-CM

## 2024-10-08 RX ORDER — INSULIN GLARGINE 100 [IU]/ML
30 INJECTION, SOLUTION SUBCUTANEOUS 2 TIMES DAILY
Qty: 15 ML | Refills: 3 | Status: SHIPPED | OUTPATIENT
Start: 2024-10-08

## 2024-10-08 RX ORDER — TIRZEPATIDE 10 MG/.5ML
10 INJECTION, SOLUTION SUBCUTANEOUS WEEKLY
Qty: 2 ML | Refills: 1 | Status: SHIPPED | OUTPATIENT
Start: 2024-10-08

## 2024-10-08 NOTE — PATIENT INSTRUCTIONS
Depending on current dose taking at home of Mounjaro:  If currently taking 5 mg once weekly, increase to 7.5 mg pens that are already at home  If currently taking 7.5 mg once weekly, increase to 10 mg that has been sent to [facility] on [address]  Decrease Basaglar to 30 units twice daily  Continue Jardiance 25 mg daily  Fast day of for upcoming labs that will be completed 11/7/2024

## 2024-10-08 NOTE — PROGRESS NOTES
Clinical Pharmacy Note    Jovanny Espinoza is a 59 y.o. male, referred to Clinical Pharmacy for medication management by Marianne Osorio MD. Jovanny Espinoza was seen today for diabetes management.   3/92/5  113  5 per day  Allergies   Allergen Reactions    Adhesive Tape Other (See Comments)     Blister,skin tears with silk tape        Past Medical History:   Diagnosis Date    Anxiety     no treatment    Arthritis     left shoulder    Cardiomegaly     CHF (congestive heart failure) ([facility]) EF 20%    Chronic back pain     GERD (gastroesophageal reflux disease)     Headache     Heart attack (HCC)     History of lung thrombosis     Hx of blood clots     left leg to lungs     Hyperlipidemia     Hypertension     Irregular heart beat     Neuropathy     feet    Obesity     WILLA (obstructive sleep apnea)     not using the machine    Pacemaker     AICD    PE (pulmonary embolism) 2001    \"about 5 yrs ago\"    Prostate cancer (HCC) 2016    Spinal stenosis at L4-L5 level 2019    Traumatic closed fx of eight or more ribs with minimal displacement 06/2017    pneumothorax.  Pt fell 50 feet from a balcony.  Pt denies history of a head injury.    Type 2 diabetes mellitus without complication ([facility])     Checks TID, waiting for CGM    Under care of team     Cardiology-    Under care of team     [facility] [place]      Social History     Tobacco Use    Smoking status: Every Day     Current packs/day: 0.25     Average packs/day: 0.3 packs/day for 20.0 years (5.0 ttl pk-yrs)     Types: Cigarettes    Smokeless tobacco: Never   Substance Use Topics    Alcohol use: No     Family History   Problem Relation Age of Onset    Hypertension Mother     Diabetes Father     Hypertension Father     Heart Disease Father     Prostate Cancer Brother     Diabetes Brother     Diabetes Brother     Diabetes Brother      REVIEW OF CURRENT DISEASE STATE  Diabetes Mellitus: Patient here for an evaluation of Type 2 diabetes mellitus.  Current

## 2024-10-09 LAB
CHOLESTEROL, TOTAL: 173 MG/DL
CHOLESTEROL/HDL RATIO: ABNORMAL
HDLC SERPL-MCNC: 32 MG/DL (ref 35–70)
LDL CHOLESTEROL: 103
NONHDLC SERPL-MCNC: ABNORMAL MG/DL
TRIGL SERPL-MCNC: 189 MG/DL
VLDLC SERPL CALC-MCNC: 32 MG/DL

## 2024-10-14 DIAGNOSIS — E08.42 DIABETIC POLYNEUROPATHY ASSOCIATED WITH DIABETES MELLITUS DUE TO UNDERLYING CONDITION (HCC): ICD-10-CM

## 2024-10-14 RX ORDER — DULOXETIN HYDROCHLORIDE 60 MG/1
60 CAPSULE, DELAYED RELEASE ORAL DAILY
Qty: 90 CAPSULE | Refills: 2 | Status: SHIPPED | OUTPATIENT
Start: 2024-10-14

## 2024-11-07 ENCOUNTER — PHARMACY VISIT (OUTPATIENT)
Dept: FAMILY MEDICINE CLINIC | Age: 59
End: 2024-11-07

## 2024-11-07 VITALS — WEIGHT: 244.8 LBS | BODY MASS INDEX: 32.3 KG/M2

## 2024-11-07 DIAGNOSIS — Z79.4 TYPE 2 DIABETES MELLITUS WITH DIABETIC POLYNEUROPATHY, WITH LONG-TERM CURRENT USE OF INSULIN (HCC): ICD-10-CM

## 2024-11-07 DIAGNOSIS — F17.210 TOBACCO DEPENDENCE DUE TO CIGARETTES: ICD-10-CM

## 2024-11-07 DIAGNOSIS — E78.2 MIXED HYPERLIPIDEMIA: Primary | ICD-10-CM

## 2024-11-07 DIAGNOSIS — E11.42 TYPE 2 DIABETES MELLITUS WITH DIABETIC POLYNEUROPATHY, WITH LONG-TERM CURRENT USE OF INSULIN (HCC): ICD-10-CM

## 2024-11-07 RX ORDER — TIRZEPATIDE 12.5 MG/.5ML
12.5 INJECTION, SOLUTION SUBCUTANEOUS WEEKLY
Qty: 2 ML | Refills: 2 | Status: SHIPPED | OUTPATIENT
Start: 2024-11-07

## 2024-11-07 RX ORDER — ROSUVASTATIN CALCIUM 40 MG/1
40 TABLET, COATED ORAL DAILY
Qty: 90 TABLET | Refills: 3 | Status: SHIPPED | OUTPATIENT
Start: 2024-11-07

## 2024-11-07 RX ORDER — INSULIN GLARGINE 100 [IU]/ML
15 INJECTION, SOLUTION SUBCUTANEOUS 2 TIMES DAILY
Qty: 15 ML | Refills: 3 | Status: SHIPPED | OUTPATIENT
Start: 2024-11-07

## 2024-11-07 NOTE — PATIENT INSTRUCTIONS
Decrease Basaglar to 25 units while finishing 10 mg Mounjaro and then decrease to 15 units twice daily once starting Mounjaro 12.5 mg weekly  Continue Jardiance 25 mg once daily  Get Shingrix and [id number] immunizations  Start rosuvastatin 40 mg once daily and stop pravastatin

## 2024-11-07 NOTE — PROGRESS NOTES
Clinical Pharmacy Note    Jovanny Espinoza is a 59 y.o. male, referred to Clinical Pharmacy for medication management by Marianne Osorio MD. Jovanny Espinoza was seen today for diabetes management.     101  10/88/2    Allergies   Allergen Reactions    Adhesive Tape Other (See Comments)     Blister,skin tears with silk tape     Past Medical History:   Diagnosis Date    Anxiety     no treatment    Arthritis     left shoulder    Cardiomegaly     CHF (congestive heart failure) (HCC) EF 20%    Chronic back pain     GERD (gastroesophageal reflux disease)     Headache     Heart attack (HCC)     History of lung thrombosis     Hx of blood clots     left leg to lungs     Hyperlipidemia     Hypertension     Irregular heart beat     Neuropathy     feet    Obesity     WILLA (obstructive sleep apnea)     not using the machine    Pacemaker     AICD    PE (pulmonary embolism) 2001    \"about 5 yrs ago\"    Prostate cancer (HCC) 2016    Spinal stenosis at L4-L5 level 2019    Traumatic closed fx of eight or more ribs with minimal displacement 06/2017    pneumothorax.  Pt fell 50 feet from a balcony.  Pt denies history of a head injury.    Type 2 diabetes mellitus without complication ([facility])     Checks TID, waiting for CGM    Under care of team     Cardiology-    Under care of team     [facility]      Social History     Tobacco Use    Smoking status: Every Day     Current packs/day: 0.25     Average packs/day: 0.3 packs/day for 20.0 years (5.0 ttl pk-yrs)     Types: Cigarettes    Smokeless tobacco: Never   Substance Use Topics    Alcohol use: No     Family History   Problem Relation Age of Onset    Hypertension Mother     Diabetes Father     Hypertension Father     Heart Disease Father     Prostate Cancer Brother     Diabetes Brother     Diabetes Brother     Diabetes Brother      REVIEW OF CURRENT DISEASE STATE  Diabetes Mellitus: Patient here for an evaluation of Type 2 diabetes mellitus.  Current

## 2024-11-14 DIAGNOSIS — E11.42 TYPE 2 DIABETES MELLITUS WITH DIABETIC POLYNEUROPATHY, WITH LONG-TERM CURRENT USE OF INSULIN (HCC): ICD-10-CM

## 2024-11-14 DIAGNOSIS — Z79.4 TYPE 2 DIABETES MELLITUS WITH DIABETIC POLYNEUROPATHY, WITH LONG-TERM CURRENT USE OF INSULIN (HCC): ICD-10-CM

## 2024-11-27 DIAGNOSIS — E08.42 DIABETIC POLYNEUROPATHY ASSOCIATED WITH DIABETES MELLITUS DUE TO UNDERLYING CONDITION (HCC): ICD-10-CM

## 2024-11-27 RX ORDER — GABAPENTIN 800 MG/1
TABLET ORAL
Qty: 60 TABLET | Refills: 2 | Status: SHIPPED | OUTPATIENT
Start: 2024-11-27 | End: 2025-02-25

## 2024-11-27 NOTE — TELEPHONE ENCOUNTER
Jovanny Espinoza is calling to request a refill on the following medication(s):    Last Visit Date (If Applicable):  7/11/2024    Next Visit Date:    7/17/2025    Medication Request:  Requested Prescriptions     Pending Prescriptions Disp Refills    gabapentin (NEURONTIN) 800 MG tablet [Pharmacy Med Name: GABAPENTIN 800MG TABLETS] 60 tablet 2     Sig: TAKE 1 TABLET BY MOUTH TWICE DAILY FOR 90 DAYS

## 2024-12-05 ENCOUNTER — PHARMACY VISIT (OUTPATIENT)
Dept: FAMILY MEDICINE CLINIC | Age: 59
End: 2024-12-05
Payer: COMMERCIAL

## 2024-12-05 VITALS — BODY MASS INDEX: 31.98 KG/M2 | WEIGHT: 242.4 LBS

## 2024-12-05 DIAGNOSIS — E11.42 TYPE 2 DIABETES MELLITUS WITH DIABETIC POLYNEUROPATHY, WITH LONG-TERM CURRENT USE OF INSULIN (HCC): Primary | ICD-10-CM

## 2024-12-05 DIAGNOSIS — F17.210 TOBACCO DEPENDENCE DUE TO CIGARETTES: ICD-10-CM

## 2024-12-05 DIAGNOSIS — Z79.4 TYPE 2 DIABETES MELLITUS WITH DIABETIC POLYNEUROPATHY, WITH LONG-TERM CURRENT USE OF INSULIN (HCC): Primary | ICD-10-CM

## 2024-12-05 LAB — HBA1C MFR BLD: 6.4 %

## 2024-12-05 PROCEDURE — 83036 HEMOGLOBIN GLYCOSYLATED A1C: CPT | Performed by: PHARMACIST

## 2024-12-05 RX ORDER — TIRZEPATIDE 15 MG/.5ML
15 INJECTION, SOLUTION SUBCUTANEOUS WEEKLY
Qty: 6 ML | Refills: 5 | Status: SHIPPED | OUTPATIENT
Start: 2024-12-05

## 2024-12-05 RX ORDER — INSULIN GLARGINE 100 [IU]/ML
10 INJECTION, SOLUTION SUBCUTANEOUS DAILY
Qty: 15 ML | Refills: 3 | Status: SHIPPED | OUTPATIENT
Start: 2024-12-05

## 2024-12-05 NOTE — PATIENT INSTRUCTIONS
Increase Mounjaro to 15 mg once weekly  Continue Jardiance 25 mg daily  Decrease Basaglar to 10 units once daily

## 2024-12-05 NOTE — PROGRESS NOTES
Clinical Pharmacy Note    Jovanny Espinoza is a 59 y.o. male, referred to Clinical Pharmacy for medication management by Marianne Osorio MD. Jovanny Espinoza was seen today for diabetes management.     Running similar before a little high during thanksgiving, typically under 170  Allergies   Allergen Reactions    Adhesive Tape Other (See Comments)     Blister,skin tears with silk tape        Past Medical History:   Diagnosis Date    Anxiety     no treatment    Arthritis     left shoulder    Cardiomegaly     CHF (congestive heart failure) ([facility]) EF 20%    Chronic back pain     GERD (gastroesophageal reflux disease)     Headache     Heart attack (HCC)     History of lung thrombosis     Hx of blood clots     left leg to lungs     Hyperlipidemia     Hypertension     Irregular heart beat     Neuropathy     feet    Obesity     WILLA (obstructive sleep apnea)     not using the machine    Pacemaker     AICD    PE (pulmonary embolism) 2001    \"about 5 yrs ago\"    Prostate cancer ([facility]) 2016    Spinal stenosis at L4-L5 level 2019    Traumatic closed fx of eight or more ribs with minimal displacement 06/2017    pneumothorax.  Pt fell 50 feet from a balcony.  Pt denies history of a head injury.    Type 2 diabetes mellitus without complication ([facility])     Checks TID, waiting for CGM    Under care of team     Cardiology-    Under care of team     Nephrology-[facility]      Social History     Tobacco Use    Smoking status: Every Day     Current packs/day: 0.25     Average packs/day: 0.3 packs/day for 20.0 years (5.0 ttl pk-yrs)     Types: Cigarettes    Smokeless tobacco: Never   Substance Use Topics    Alcohol use: No     Family History   Problem Relation Age of Onset    Hypertension Mother     Diabetes Father     Hypertension Father     Heart Disease Father     Prostate Cancer Brother     Diabetes Brother     Diabetes Brother     Diabetes Brother        REVIEW OF CURRENT DISEASE STATE  Diabetes Mellitus: Patient here

## 2025-01-30 ENCOUNTER — PHARMACY VISIT (OUTPATIENT)
Dept: FAMILY MEDICINE CLINIC | Age: 60
End: 2025-01-30

## 2025-01-30 VITALS — WEIGHT: 227.8 LBS | BODY MASS INDEX: 30.05 KG/M2

## 2025-01-30 DIAGNOSIS — E11.42 TYPE 2 DIABETES MELLITUS WITH DIABETIC POLYNEUROPATHY, WITH LONG-TERM CURRENT USE OF INSULIN (HCC): Primary | ICD-10-CM

## 2025-01-30 DIAGNOSIS — Z79.4 TYPE 2 DIABETES MELLITUS WITH DIABETIC POLYNEUROPATHY, WITH LONG-TERM CURRENT USE OF INSULIN (HCC): Primary | ICD-10-CM

## 2025-01-30 NOTE — PATIENT INSTRUCTIONS
Decrease Basaglar to 5 units once daily  Continue Mounjaro 15 mg once weekly, Jardiance 25 mg once daily

## 2025-01-30 NOTE — PROGRESS NOTES
10/08/24 112.5 kg (248 lb)     BP Readings from Last 3 Encounters:   08/29/24 120/80   07/11/24 120/82   06/13/24 121/81     Pulse Readings from Last 3 Encounters:   08/29/24 82   07/11/24 80   06/13/24 74     REVIEW OF SYSTEMS   Recent falls: No  Hyperglycemia: none   Hypoglycemia: none  Checking feet: Yes    NUTRITION  Breakfast: oatmeal (2 packages), scrambled eggs, toast  Lunch: does not typically  Dinner: 4 course meal, spaghetti and fish, fried chicken and cabbage  Snack(s): chips, honey buns (not very often)  Beverage(s): 1 liter of juice, water, 2 cups of coffee with 2 sweet and lows   Alcohol Consumption? No  Prior visit with dietician: no     PHYSICAL ACTIVITY  Walking    MEDICATION ADHERENCE ASSESSMENT  - Brought in home medications including OTCs: No  - Barriers to affording/accessing medications: No  - Uses pillbox/medication organizing method: Yes  - Adhering to current medication regimen: Yes    IMMUNIZATIONS  Immunization History   Administered Date(s) Administered    COVID-19, MODERNA BLUE border, Primary or Immunocompromised, (age 12y+), IM, 100 mcg/0.5mL 04/15/2021, 05/13/2021, 01/27/2022    Influenza Virus Vaccine 10/26/2015, 11/01/2017    Influenza, FLUARIX, FLULAVAL, FLUZONE (age 6 mo+) and AFLURIA, (age 3 y+), Quadv PF, 0.5mL 11/01/2017, 09/25/2019, 09/10/2020, 10/27/2021, 10/27/2023    Influenza, FLUARIX, FLULAVAL, FLUZONE, (age 6 mo+), AFLURIA, (age 3 y+), IM, Trivalent PF, 0.5mL 10/02/2024    Pneumococcal, PCV-13, PREVNAR 13, (age 6w+), IM, 0.5mL 03/28/2017    Pneumococcal, PPSV23, PNEUMOVAX 23, (age 2y+), SC/IM, 0.5mL 10/05/2012, 10/18/2018    TDaP, ADACEL (age 10y-64y), BOOSTRIX (age 10y+), IM, 0.5mL 01/01/2015        Immunizations:   Influenza: Complete  PCV20: DUE              Shingrix: DUE              Hepatitis B series: Consider     HEALTH MAINTENANCE  The 10-year ASCVD risk score (Cristopher RAI, et al., 2019) is: 36.9%    Values used to calculate the score:      Age: 59 years

## 2025-02-15 ENCOUNTER — HOSPITAL ENCOUNTER (EMERGENCY)
Age: 60
Discharge: HOME | End: 2025-02-15
Attending: EMERGENCY MEDICINE
Payer: COMMERCIAL

## 2025-02-15 VITALS
HEART RATE: 95 BPM | OXYGEN SATURATION: 96 % | DIASTOLIC BLOOD PRESSURE: 73 MMHG | SYSTOLIC BLOOD PRESSURE: 108 MMHG | TEMPERATURE: 98.1 F | RESPIRATION RATE: 18 BRPM | WEIGHT: 227.1 LBS | BODY MASS INDEX: 29.96 KG/M2

## 2025-02-15 DIAGNOSIS — H66.90 ACUTE OTITIS MEDIA, UNSPECIFIED OTITIS MEDIA TYPE: ICD-10-CM

## 2025-02-15 DIAGNOSIS — H92.02 OTALGIA OF LEFT EAR: Primary | ICD-10-CM

## 2025-02-15 PROCEDURE — 99283 EMERGENCY DEPT VISIT LOW MDM: CPT

## 2025-02-15 PROCEDURE — 6370000000 HC RX 637 (ALT 250 FOR IP): Performed by: EMERGENCY MEDICINE

## 2025-02-15 RX ORDER — CIPROFLOXACIN AND DEXAMETHASONE 3; 1 MG/ML; MG/ML
4 SUSPENSION/ DROPS AURICULAR (OTIC) ONCE
Status: COMPLETED | OUTPATIENT
Start: 2025-02-15 | End: 2025-02-15

## 2025-02-15 RX ADMIN — CIPROFLOXACIN AND DEXAMETHASONE 4 DROP: 3; 1 SUSPENSION/ DROPS AURICULAR (OTIC) at 09:45

## 2025-02-15 ASSESSMENT — ENCOUNTER SYMPTOMS
FACIAL SWELLING: 0
ABDOMINAL PAIN: 0
SHORTNESS OF BREATH: 0
EYE PAIN: 0
CHEST TIGHTNESS: 0
ABDOMINAL DISTENTION: 0
EYE DISCHARGE: 0
BACK PAIN: 0

## 2025-02-15 ASSESSMENT — PAIN - FUNCTIONAL ASSESSMENT: PAIN_FUNCTIONAL_ASSESSMENT: 0-10

## 2025-02-15 ASSESSMENT — PAIN SCALES - GENERAL: PAINLEVEL_OUTOF10: 7

## 2025-02-15 NOTE — ED PROVIDER NOTES
EMERGENCY DEPARTMENT ENCOUNTER    Pt Name: Jovanny Espinoza  MRN: [id number]  Birthdate 1965  Date of evaluation: 2/15/25  CHIEF COMPLAINT       Chief Complaint   Patient presents with    Ear Pain     Pt states left sided ear pain, Pt states ear pain is giving him a headache.Pt states he has been using OTC ear drops.     HISTORY OF PRESENT ILLNESS   HPI   The patient is a 59-year-old male who presented to the emergency room secondary to left ear pain.  Patient states that he got water in his left ear after washing his hair and taking a shower.  He noted increased pain has been using Q-tips but no other foreign body exposure.  Denied hearing loss.  He purchased over-the-counter eardrops no significant improvement.  Patient denied chest pain, shortness of breath, nausea, vomiting, fevers or chills.      REVIEW OF SYSTEMS     Review of Systems   Constitutional:  Negative for chills, diaphoresis and fever.   HENT:  Positive for ear pain. Negative for congestion and facial swelling.    Eyes:  Negative for pain, discharge and visual disturbance.   Respiratory:  Negative for chest tightness and shortness of breath.    Cardiovascular:  Negative for chest pain and palpitations.   Gastrointestinal:  Negative for abdominal distention and abdominal pain.   Genitourinary:  Negative for difficulty urinating and flank pain.   Musculoskeletal:  Negative for back pain.   Skin:  Negative for wound.   Neurological:  Negative for dizziness, light-headedness and headaches.     PASTMEDICAL HISTORY     Past Medical History:   Diagnosis Date    Anxiety     no treatment    Arthritis     left shoulder    Cardiomegaly     CHF (congestive heart failure) ([facility]) EF 20%    Chronic back pain     GERD (gastroesophageal reflux disease)     Headache     Heart attack (HCC)     History of lung thrombosis     Hx of blood clots     left leg to lungs     Hyperlipidemia     Hypertension     Irregular heart beat     Neuropathy     feet    Obesity     WILLA  FINGER TRIGGER RELEASE Right 9/19/2023    RIGHT FINGER TRIGGER RELEASE performed by Carmelo Lipscomb DO at [facility] OR    HERNIA REPAIR  2020    LUMBAR FUSION  01/29/2020    L5-S1    LUMBAR SPINE SURGERY Right 1/29/2020    RIGHT L5 -S1 TLIF MINIMALLY INVASIVE- 2CARMS,  NUVASIVE, NO CELLSAVER performed by Cesar Gonzalez MD at [facility] OR    OTHER SURGICAL HISTORY  2000    [employer]     PACEMAKER PLACEMENT  2008    ICD;  battery change 2016;  Dr. Cueva. DEFIB NOT SAFE MRI    PENILE PROSTHESIS PLACEMENT N/A 8/25/2021    PENIS PROSTHESIS INSERTION  - COLOPLAST performed by Lonnie Yan MD at [facility] OR    PENILE PROSTHESIS PLACEMENT N/A 3/31/2022    PENIS IMPLANT REMOVAL performed by Lonnie Yan MD at [facility] OR    PROSTATE BIOPSY      PROSTATECTOMY  08/31/2016    carmen lymph node dissection    VENA CAVA FILTER PLACEMENT  2000     CURRENT MEDICATIONS       Discharge Medication List as of 2/15/2025  9:40 AM        CONTINUE these medications which have NOT CHANGED    Details   insulin glargine (BASAGLAR KWIKPEN) 100 UNIT/ML injection pen Inject 10 Units into the skin daily, Disp-15 mL, R-3Normal      nicotine polacrilex (NICOTINE MINI) 4 MG lozenge Take 1 lozenge by mouth as needed for Smoking cessation (max of 24 in a day), Disp-540 each, R-3Normal      Tirzepatide (MOUNJARO) 15 MG/0.5ML SOAJ Inject 15 mg into the skin once a week, Disp-6 mL, R-5Normal      gabapentin (NEURONTIN) 800 MG tablet TAKE 1 TABLET BY MOUTH TWICE DAILY FOR 90 DAYS, Disp-60 tablet, R-2Normal      empagliflozin (JARDIANCE) 25 MG tablet Take 1 tablet by mouth daily, Disp-90 tablet, R-1Normal      nicotine (NICODERM CQ) 7 MG/24HR Place 1 patch onto the skin daily, Disp-28 patch, R-6Normal      rosuvastatin (CRESTOR) 40 MG tablet Take 1 tablet by mouth daily, Disp-90 tablet, R-3Normal      DULoxetine (CYMBALTA) 60 MG extended release capsule Take 1 capsule by mouth daily, Disp-90 capsule, R-2Normal      Insulin Pen Needle 31G X 8 MM MISC 2

## 2025-03-12 ENCOUNTER — OFFICE VISIT (OUTPATIENT)
Dept: FAMILY MEDICINE CLINIC | Age: 60
End: 2025-03-12
Payer: COMMERCIAL

## 2025-03-12 VITALS
HEIGHT: 73 IN | SYSTOLIC BLOOD PRESSURE: 80 MMHG | HEART RATE: 87 BPM | DIASTOLIC BLOOD PRESSURE: 56 MMHG | BODY MASS INDEX: 29.55 KG/M2 | WEIGHT: 223 LBS | OXYGEN SATURATION: 100 %

## 2025-03-12 DIAGNOSIS — I50.22 CHRONIC SYSTOLIC HEART FAILURE (HCC): ICD-10-CM

## 2025-03-12 DIAGNOSIS — E78.2 MIXED HYPERLIPIDEMIA: ICD-10-CM

## 2025-03-12 DIAGNOSIS — E11.42 TYPE 2 DIABETES MELLITUS WITH DIABETIC POLYNEUROPATHY, WITH LONG-TERM CURRENT USE OF INSULIN (HCC): ICD-10-CM

## 2025-03-12 DIAGNOSIS — M54.12 CERVICAL RADICULOPATHY: Primary | ICD-10-CM

## 2025-03-12 DIAGNOSIS — L30.9 DERMATITIS: ICD-10-CM

## 2025-03-12 DIAGNOSIS — C61 PROSTATE CANCER (HCC): ICD-10-CM

## 2025-03-12 DIAGNOSIS — Z79.4 TYPE 2 DIABETES MELLITUS WITH DIABETIC POLYNEUROPATHY, WITH LONG-TERM CURRENT USE OF INSULIN (HCC): ICD-10-CM

## 2025-03-12 PROCEDURE — 3074F SYST BP LT 130 MM HG: CPT | Performed by: FAMILY MEDICINE

## 2025-03-12 PROCEDURE — 3078F DIAST BP <80 MM HG: CPT | Performed by: FAMILY MEDICINE

## 2025-03-12 PROCEDURE — 99214 OFFICE O/P EST MOD 30 MIN: CPT | Performed by: FAMILY MEDICINE

## 2025-03-12 RX ORDER — CYCLOBENZAPRINE HCL 10 MG
10 TABLET ORAL NIGHTLY
Qty: 30 TABLET | Refills: 0 | Status: SHIPPED | OUTPATIENT
Start: 2025-03-12 | End: 2025-04-11

## 2025-03-12 RX ORDER — COLESEVELAM 180 1/1
1875 TABLET ORAL 2 TIMES DAILY
Qty: 180 TABLET | Refills: 5 | Status: SHIPPED | OUTPATIENT
Start: 2025-03-12

## 2025-03-12 RX ORDER — DICLOFENAC SODIUM 75 MG/1
75 TABLET, DELAYED RELEASE ORAL 2 TIMES DAILY
Qty: 60 TABLET | Refills: 3 | Status: SHIPPED | OUTPATIENT
Start: 2025-03-12

## 2025-03-12 RX ORDER — CICLOPIROX OLAMINE 7.7 MG/G
CREAM TOPICAL
Qty: 90 G | Refills: 2 | Status: SHIPPED | OUTPATIENT
Start: 2025-03-12

## 2025-03-12 SDOH — ECONOMIC STABILITY: FOOD INSECURITY: WITHIN THE PAST 12 MONTHS, THE FOOD YOU BOUGHT JUST DIDN'T LAST AND YOU DIDN'T HAVE MONEY TO GET MORE.: NEVER TRUE

## 2025-03-12 SDOH — ECONOMIC STABILITY: FOOD INSECURITY: WITHIN THE PAST 12 MONTHS, YOU WORRIED THAT YOUR FOOD WOULD RUN OUT BEFORE YOU GOT MONEY TO BUY MORE.: NEVER TRUE

## 2025-03-12 ASSESSMENT — PATIENT HEALTH QUESTIONNAIRE - PHQ9
1. LITTLE INTEREST OR PLEASURE IN DOING THINGS: NOT AT ALL
SUM OF ALL RESPONSES TO PHQ QUESTIONS 1-9: 0
2. FEELING DOWN, DEPRESSED OR HOPELESS: NOT AT ALL
SUM OF ALL RESPONSES TO PHQ QUESTIONS 1-9: 0

## 2025-03-12 NOTE — PROGRESS NOTES
[facility]  [facility]  [address]  [address]  [facility] [address]  Dept: [phone]      Jovanny Espinoza is a 59 y.o. male who presents today for follow up on his  medical conditions as noted below.      Chief Complaint   Patient presents with    Arm Pain     Left down to fingers     Neck Pain       Patient Active Problem List:     Prostate cancer (HCC)     Mixed hyperlipidemia     Coronary artery disease involving native coronary artery of native heart without angina pectoris     Pacemaker     Systolic heart failure (HCC)     History of lung thrombosis     Spinal stenosis at L4-L5 level     Chronic right-sided low back pain with right-sided sciatica     Erectile dysfunction following radical prostatectomy     Type 2 diabetes mellitus with diabetic polyneuropathy, with long-term current use of insulin (HCC)     HTN (hypertension)     Spondylolisthesis, acquired     Foraminal stenosis of lumbar region     Degeneration of intervertebral disc     Long term (current) use of anticoagulants     Hernia, inguinal, recurrent     Severe obesity (BMI 35.0-39.9) with comorbidity     Angina pectoris, unspecified     Atherosclerotic heart disease of native coronary artery with unspecified angina pectoris     Acquired trigger finger of right middle finger     Past Medical History:   Diagnosis Date    Anxiety     no treatment    Arthritis     left shoulder    Cardiomegaly     CHF (congestive heart failure) (HCC) EF 20%    Chronic back pain     GERD (gastroesophageal reflux disease)     Headache     Heart attack (HCC)     History of lung thrombosis     Hx of blood clots     left leg to lungs     Hyperlipidemia     Hypertension     Irregular heart beat     Neuropathy     feet    Obesity     WILLA (obstructive sleep apnea)     not using the machine    Pacemaker     AICD    PE (pulmonary embolism) 2001    \"about 5 yrs ago\"    Prostate cancer (HCC) 2016    Spinal stenosis at L4-L5

## 2025-03-19 DIAGNOSIS — I10 PRIMARY HYPERTENSION: ICD-10-CM

## 2025-03-19 DIAGNOSIS — E08.42 DIABETIC POLYNEUROPATHY ASSOCIATED WITH DIABETES MELLITUS DUE TO UNDERLYING CONDITION: ICD-10-CM

## 2025-03-19 RX ORDER — CARVEDILOL 25 MG/1
25 TABLET ORAL 2 TIMES DAILY
Qty: 180 TABLET | Refills: 3 | Status: SHIPPED | OUTPATIENT
Start: 2025-03-19

## 2025-03-19 RX ORDER — GABAPENTIN 800 MG/1
TABLET ORAL
Qty: 60 TABLET | Refills: 2 | Status: SHIPPED | OUTPATIENT
Start: 2025-03-19 | End: 2025-06-17

## 2025-03-26 DIAGNOSIS — I10 PRIMARY HYPERTENSION: ICD-10-CM

## 2025-03-26 RX ORDER — LISINOPRIL 2.5 MG/1
5 TABLET ORAL 2 TIMES DAILY
Qty: 120 TABLET | Refills: 5 | Status: SHIPPED | OUTPATIENT
Start: 2025-03-26

## 2025-04-03 DIAGNOSIS — E11.42 TYPE 2 DIABETES MELLITUS WITH DIABETIC POLYNEUROPATHY, WITH LONG-TERM CURRENT USE OF INSULIN (HCC): ICD-10-CM

## 2025-04-03 DIAGNOSIS — Z79.4 TYPE 2 DIABETES MELLITUS WITH DIABETIC POLYNEUROPATHY, WITH LONG-TERM CURRENT USE OF INSULIN (HCC): ICD-10-CM

## 2025-04-03 RX ORDER — INSULIN GLARGINE 100 [IU]/ML
10 INJECTION, SOLUTION SUBCUTANEOUS DAILY
Qty: 15 ML | Refills: 3 | Status: SHIPPED | OUTPATIENT
Start: 2025-04-03

## 2025-04-03 RX ORDER — TIRZEPATIDE 15 MG/.5ML
15 INJECTION, SOLUTION SUBCUTANEOUS WEEKLY
Qty: 6 ML | Refills: 5 | Status: SHIPPED | OUTPATIENT
Start: 2025-04-03

## 2025-04-03 NOTE — TELEPHONE ENCOUNTER
Jovanny Espinoza is calling to request a refill on the following medication(s):    Last Visit Date (If Applicable):  7/11/2024    Next Visit Date:    7/17/2025    Medication Request:  Requested Prescriptions     Pending Prescriptions Disp Refills    insulin glargine (BASAGLAR KWIKPEN) 100 UNIT/ML injection pen 15 mL 3     Sig: Inject 10 Units into the skin daily    Tirzepatide (MOUNJARO) 15 MG/0.5ML SOAJ 6 mL 5     Sig: Inject 15 mg into the skin once a week

## 2025-04-25 RX ORDER — OMEPRAZOLE 20 MG/1
CAPSULE, DELAYED RELEASE ORAL
Qty: 180 CAPSULE | Refills: 3 | Status: SHIPPED | OUTPATIENT
Start: 2025-04-25

## 2025-05-01 ENCOUNTER — PHARMACY VISIT (OUTPATIENT)
Dept: FAMILY MEDICINE CLINIC | Age: 60
End: 2025-05-01
Payer: COMMERCIAL

## 2025-05-01 VITALS — WEIGHT: 218.6 LBS | BODY MASS INDEX: 28.84 KG/M2

## 2025-05-01 DIAGNOSIS — E11.42 TYPE 2 DIABETES MELLITUS WITH DIABETIC POLYNEUROPATHY, WITH LONG-TERM CURRENT USE OF INSULIN (HCC): Primary | ICD-10-CM

## 2025-05-01 DIAGNOSIS — Z79.4 TYPE 2 DIABETES MELLITUS WITH DIABETIC POLYNEUROPATHY, WITH LONG-TERM CURRENT USE OF INSULIN (HCC): Primary | ICD-10-CM

## 2025-05-01 LAB — HBA1C MFR BLD: 6.6 %

## 2025-05-01 PROCEDURE — 83036 HEMOGLOBIN GLYCOSYLATED A1C: CPT | Performed by: PHARMACIST

## 2025-05-01 RX ORDER — METFORMIN HYDROCHLORIDE 500 MG/1
2000 TABLET, EXTENDED RELEASE ORAL
Qty: 360 TABLET | Refills: 1 | Status: SHIPPED | OUTPATIENT
Start: 2025-05-01

## 2025-05-01 NOTE — PATIENT INSTRUCTIONS
Start metformin 500 mg once daily for one week. If no side effects (nausea, vomiting, diarrhea), then increase to 500 mg twice daily for one week. If no side effects, then increase to 1000 mg in the morning and 500 mg in the evening for 1 week. If no side effects, then increase to 1000 mg twice daily  Continue Jardiance 25 mg daily and Mounjaro 15 mg weekly.  Stop Basaglar insulin  
4 = No assist / stand by assistance

## 2025-05-01 NOTE — PROGRESS NOTES
adherence   [x] Insulin technique and storage   [x] Healthy lifestyle including diet and exercise changes   [x] Hypoglycemia symptoms and management   [x] Blood glucose goals    [] Introduction to FreeStyle Desean/Dexcom and continuing glucose monitoring   [] Interpreting Ambulatory Glucose Profile (AGP) Report with focus on page 1, average number of scans and glucose levels per day, and snapshot     Next PharmD Visit: 6/5/2025, In-Person Consult  Next PCP Visit: 5/22/2025    Patient verbalized understanding of care plan. Patient advised to call Medication Management with any questions, concerns, or changes prior to next appointment.    Patient verbalized understanding of the information presented and all of the patient's questions were answered. AVS was handed to the patient. Patient advised to call the office with any additional questions or concerns.     Return to clinic in 5 week (s) for follow up.     Thank you for the consult,     Noah Robertson, PharmD, [facility]  Ambulatory Clinical Pharmacist   [facility] Special[facility] Medication Service  Phone: [phone]  [facility] Medicine  Phone: [phone] option 1    For Pharmacy Admin Tracking Only    Program: Medical Group  CPA in place:  Yes  Recommendation Provided To: Patient/Caregiver: 4 via In person  Intervention Detail: Discontinued Rx: 1, reason: Patient Preference, Lab(s) Ordered, New Rx: 1, reason: Needs Additional Therapy, Patient Preference, and Scheduled Appointment  Intervention Accepted By: Patient/Caregiver: 4  Gap Closed?: Yes   Time Spent (min): 45

## 2025-05-22 ENCOUNTER — OFFICE VISIT (OUTPATIENT)
Dept: FAMILY MEDICINE CLINIC | Age: 60
End: 2025-05-22

## 2025-05-22 VITALS
WEIGHT: 213.6 LBS | HEART RATE: 97 BPM | DIASTOLIC BLOOD PRESSURE: 68 MMHG | SYSTOLIC BLOOD PRESSURE: 102 MMHG | BODY MASS INDEX: 28.18 KG/M2

## 2025-05-22 DIAGNOSIS — Z79.4 TYPE 2 DIABETES MELLITUS WITH DIABETIC POLYNEUROPATHY, WITH LONG-TERM CURRENT USE OF INSULIN (HCC): ICD-10-CM

## 2025-05-22 DIAGNOSIS — I25.10 CORONARY ARTERY DISEASE INVOLVING NATIVE CORONARY ARTERY OF NATIVE HEART WITHOUT ANGINA PECTORIS: ICD-10-CM

## 2025-05-22 DIAGNOSIS — E11.42 TYPE 2 DIABETES MELLITUS WITH DIABETIC POLYNEUROPATHY, WITH LONG-TERM CURRENT USE OF INSULIN (HCC): ICD-10-CM

## 2025-05-22 DIAGNOSIS — I50.22 CHRONIC SYSTOLIC HEART FAILURE (HCC): ICD-10-CM

## 2025-05-22 DIAGNOSIS — Z95.0 PACEMAKER: ICD-10-CM

## 2025-05-22 DIAGNOSIS — I10 PRIMARY HYPERTENSION: ICD-10-CM

## 2025-05-22 DIAGNOSIS — M54.12 CERVICAL RADICULOPATHY: Primary | ICD-10-CM

## 2025-05-22 RX ORDER — LISINOPRIL 10 MG/1
10 TABLET ORAL DAILY
Qty: 90 TABLET | Refills: 1 | Status: SHIPPED | OUTPATIENT
Start: 2025-05-22

## 2025-05-22 NOTE — PROGRESS NOTES
180 tablet 3    ciclopirox (LOPROX) 0.77 % cream Apply topically 2 times daily. 90 g 2    colesevelam (WELCHOL) 625 MG tablet Take 3 tablets by mouth 2 times daily 180 tablet 5    diclofenac (VOLTAREN) 75 MG EC tablet Take 1 tablet by mouth 2 times daily 60 tablet 3    nicotine (NICODERM CQ) 7 MG/24HR Place 1 patch onto the skin daily 28 patch 6    rosuvastatin (CRESTOR) 40 MG tablet Take 1 tablet by mouth daily 90 tablet 3    DULoxetine (CYMBALTA) 60 MG extended release capsule Take 1 capsule by mouth daily 90 capsule 2    Insulin Pen Needle 31G X 8 MM MISC 1 each by Does not apply route 2 times daily 200 each 4    Handicap Placard MISC by Does not apply route Expires five years form original written date 1 each 0    hydrALAZINE (APRESOLINE) 25 MG tablet take 1 tablet by mouth twice a day      warfarin (COUMADIN) 7.5 MG tablet Take 1 tablet by mouth daily      furosemide (LASIX) 20 MG tablet take 1 tablet by mouth once daily 30 tablet 3     No current facility-administered medications for this visit.       ALLERGIES    Allergies   Allergen Reactions    Adhesive Tape Other (See Comments)     Blister,skin tears with silk tape       PHYSICAL EXAM   Physical Exam  Vitals reviewed.   Constitutional:       Appearance: He is well-developed.   HENT:      Head: Normocephalic.   Eyes:      Pupils: Pupils are equal, round, and reactive to light.   Neck:      Thyroid: No thyromegaly.   Cardiovascular:      Rate and Rhythm: Normal rate and regular rhythm.      Heart sounds: Normal heart sounds. No murmur heard.  Pulmonary:      Effort: Pulmonary effort is normal.      Breath sounds: Normal breath sounds. No wheezing or rales.   Abdominal:      Palpations: Abdomen is soft.      Tenderness: There is no abdominal tenderness. There is no guarding or rebound.   Musculoskeletal:         General: Tenderness (left  trapezoid) and deformity (unable to raise left shoulder past 60 degrees) present. Normal range of motion.      Cervical

## 2025-05-25 DIAGNOSIS — Z79.4 TYPE 2 DIABETES MELLITUS WITH DIABETIC POLYNEUROPATHY, WITH LONG-TERM CURRENT USE OF INSULIN (HCC): ICD-10-CM

## 2025-05-25 DIAGNOSIS — E11.42 TYPE 2 DIABETES MELLITUS WITH DIABETIC POLYNEUROPATHY, WITH LONG-TERM CURRENT USE OF INSULIN (HCC): ICD-10-CM

## 2025-05-27 RX ORDER — EMPAGLIFLOZIN 25 MG/1
25 TABLET, FILM COATED ORAL DAILY
Qty: 90 TABLET | Refills: 3 | Status: SHIPPED | OUTPATIENT
Start: 2025-05-27

## 2025-05-27 NOTE — TELEPHONE ENCOUNTER
Jovanny Espinoza is calling to request a refill on the following medication(s):    Last Visit Date (If Applicable):  5/22/2025    Next Visit Date:    7/17/2025    Medication Request:  Requested Prescriptions     Pending Prescriptions Disp Refills    JARDIANCE 25 MG tablet [Pharmacy Med Name: JARDIANCE 25MG TABLETS] 90 tablet 3     Sig: TAKE 1 TABLET BY MOUTH EVERY DAY

## 2025-06-05 ENCOUNTER — PHARMACY VISIT (OUTPATIENT)
Dept: FAMILY MEDICINE CLINIC | Age: 60
End: 2025-06-05

## 2025-06-05 DIAGNOSIS — Z79.4 TYPE 2 DIABETES MELLITUS WITH DIABETIC POLYNEUROPATHY, WITH LONG-TERM CURRENT USE OF INSULIN (HCC): Primary | ICD-10-CM

## 2025-06-05 DIAGNOSIS — E11.42 TYPE 2 DIABETES MELLITUS WITH DIABETIC POLYNEUROPATHY, WITH LONG-TERM CURRENT USE OF INSULIN (HCC): Primary | ICD-10-CM

## 2025-06-05 RX ORDER — INSULIN GLARGINE 100 [IU]/ML
10 INJECTION, SOLUTION SUBCUTANEOUS NIGHTLY
Qty: 5 ADJUSTABLE DOSE PRE-FILLED PEN SYRINGE | Refills: 2 | Status: SHIPPED | OUTPATIENT
Start: 2025-06-05

## 2025-06-12 ENCOUNTER — HOSPITAL ENCOUNTER (OUTPATIENT)
Dept: CT IMAGING | Age: 60
Discharge: HOME | End: 2025-06-14
Payer: COMMERCIAL

## 2025-06-12 ENCOUNTER — HOSPITAL ENCOUNTER (OUTPATIENT)
Age: 60
Discharge: HOME | End: 2025-06-12
Payer: COMMERCIAL

## 2025-06-12 DIAGNOSIS — M54.12 CERVICAL RADICULOPATHY: ICD-10-CM

## 2025-06-12 LAB
ALBUMIN SERPL-MCNC: 4.2 G/DL (ref 3.5–5.2)
ALBUMIN/GLOB SERPL: 1.4 {RATIO} (ref 1–2.5)
ALP SERPL-CCNC: 61 U/L (ref 40–129)
ALT SERPL-CCNC: 21 U/L (ref 10–50)
ANION GAP SERPL CALCULATED.3IONS-SCNC: 12 MMOL/L (ref 9–16)
AST SERPL-CCNC: 23 U/L (ref 10–50)
BASOPHILS # BLD: <0.03 K/UL (ref 0–0.2)
BASOPHILS NFR BLD: 0 % (ref 0–2)
BILIRUB SERPL-MCNC: 0.4 MG/DL (ref 0–1.2)
BUN SERPL-MCNC: 15 MG/DL (ref 8–23)
CALCIUM SERPL-MCNC: 9.9 MG/DL (ref 8.6–10.4)
CHLORIDE SERPL-SCNC: 104 MMOL/L (ref 98–107)
CO2 SERPL-SCNC: 24 MMOL/L (ref 20–31)
CREAT SERPL-MCNC: 1.3 MG/DL (ref 0.7–1.2)
EOSINOPHIL # BLD: 0.06 K/UL (ref 0–0.44)
EOSINOPHILS RELATIVE PERCENT: 1 % (ref 1–4)
ERYTHROCYTE [DISTWIDTH] IN BLOOD BY AUTOMATED COUNT: 14 % (ref 11.8–14.4)
GFR, ESTIMATED: 63 ML/MIN/1.73M2
GLUCOSE SERPL-MCNC: 85 MG/DL (ref 74–99)
HCT VFR BLD AUTO: 45.7 % (ref 40.7–50.3)
HGB BLD-MCNC: 14.9 G/DL (ref 13–17)
IMM GRANULOCYTES # BLD AUTO: <0.03 K/UL (ref 0–0.3)
IMM GRANULOCYTES NFR BLD: 0 %
LYMPHOCYTES NFR BLD: 2.09 K/UL (ref 1.1–3.7)
LYMPHOCYTES RELATIVE PERCENT: 31 % (ref 24–43)
MCH RBC QN AUTO: 29.2 PG (ref 25.2–33.5)
MCHC RBC AUTO-ENTMCNC: 32.6 G/DL (ref 28.4–34.8)
MCV RBC AUTO: 89.4 FL (ref 82.6–102.9)
MONOCYTES NFR BLD: 0.71 K/UL (ref 0.1–1.2)
MONOCYTES NFR BLD: 10 % (ref 3–12)
NEUTROPHILS NFR BLD: 58 % (ref 36–65)
NEUTS SEG NFR BLD: 3.91 K/UL (ref 1.5–8.1)
NRBC BLD-RTO: 0 PER 100 WBC
PLATELET # BLD AUTO: 182 K/UL (ref 138–453)
PMV BLD AUTO: 9.8 FL (ref 8.1–13.5)
POTASSIUM SERPL-SCNC: 5.2 MMOL/L (ref 3.7–5.3)
PROT SERPL-MCNC: 7.1 G/DL (ref 6.6–8.7)
RBC # BLD AUTO: 5.11 M/UL (ref 4.21–5.77)
SODIUM SERPL-SCNC: 140 MMOL/L (ref 136–145)
WBC OTHER # BLD: 6.8 K/UL (ref 3.5–11.3)

## 2025-06-12 PROCEDURE — 85025 COMPLETE CBC W/AUTO DIFF WBC: CPT

## 2025-06-12 PROCEDURE — 36415 COLL VENOUS BLD VENIPUNCTURE: CPT

## 2025-06-12 PROCEDURE — 72125 CT NECK SPINE W/O DYE: CPT

## 2025-06-12 PROCEDURE — 80053 COMPREHEN METABOLIC PANEL: CPT

## 2025-06-13 ENCOUNTER — TELEPHONE (OUTPATIENT)
Dept: FAMILY MEDICINE CLINIC | Age: 60
End: 2025-06-13

## 2025-06-13 NOTE — TELEPHONE ENCOUNTER
Carmen from [facility] called in regards to physician order for continuous monitoring and diabetic supplies. States the date the patient was last seen in line 1 has to match signature date signed by provider. Once updated fax number given is [phone]. Please advise.

## 2025-06-18 DIAGNOSIS — E08.42 DIABETIC POLYNEUROPATHY ASSOCIATED WITH DIABETES MELLITUS DUE TO UNDERLYING CONDITION (HCC): ICD-10-CM

## 2025-06-19 ENCOUNTER — RESULTS FOLLOW-UP (OUTPATIENT)
Dept: FAMILY MEDICINE CLINIC | Age: 60
End: 2025-06-19

## 2025-06-19 DIAGNOSIS — M54.12 CERVICAL RADICULOPATHY: Primary | ICD-10-CM

## 2025-06-19 RX ORDER — GABAPENTIN 800 MG/1
TABLET ORAL
Qty: 60 TABLET | Refills: 2 | Status: SHIPPED | OUTPATIENT
Start: 2025-06-19 | End: 2025-09-17

## 2025-06-25 ENCOUNTER — OFFICE VISIT (OUTPATIENT)
Dept: NEUROSURGERY | Age: 60
End: 2025-06-25
Payer: COMMERCIAL

## 2025-06-25 VITALS
DIASTOLIC BLOOD PRESSURE: 64 MMHG | BODY MASS INDEX: 27.38 KG/M2 | SYSTOLIC BLOOD PRESSURE: 93 MMHG | HEIGHT: 73 IN | WEIGHT: 206.6 LBS | HEART RATE: 99 BPM

## 2025-06-25 DIAGNOSIS — M47.12 CERVICAL SPONDYLOSIS WITH MYELOPATHY AND RADICULOPATHY: Primary | ICD-10-CM

## 2025-06-25 DIAGNOSIS — E08.42 DIABETIC POLYNEUROPATHY ASSOCIATED WITH DIABETES MELLITUS DUE TO UNDERLYING CONDITION (HCC): ICD-10-CM

## 2025-06-25 DIAGNOSIS — M47.22 CERVICAL SPONDYLOSIS WITH RADICULOPATHY: ICD-10-CM

## 2025-06-25 DIAGNOSIS — M47.22 CERVICAL SPONDYLOSIS WITH MYELOPATHY AND RADICULOPATHY: Primary | ICD-10-CM

## 2025-06-25 DIAGNOSIS — R53.1 WEAKNESS: ICD-10-CM

## 2025-06-25 PROCEDURE — 3074F SYST BP LT 130 MM HG: CPT

## 2025-06-25 PROCEDURE — 3078F DIAST BP <80 MM HG: CPT

## 2025-06-25 PROCEDURE — 99204 OFFICE O/P NEW MOD 45 MIN: CPT

## 2025-06-25 RX ORDER — FLASH GLUCOSE SENSOR
KIT MISCELLANEOUS
COMMUNITY

## 2025-06-25 RX ORDER — METOPROLOL SUCCINATE 200 MG/1
200 TABLET, EXTENDED RELEASE ORAL NIGHTLY
COMMUNITY
Start: 2025-05-20

## 2025-06-25 RX ORDER — GABAPENTIN 800 MG/1
TABLET ORAL
Qty: 60 TABLET | Refills: 2 | OUTPATIENT
Start: 2025-06-25 | End: 2025-09-23

## 2025-06-25 NOTE — TELEPHONE ENCOUNTER
Office spoke with [facility] and verified that prescription sent on 6/19/25 was not received by pharmacy.      Jovanny Espinoza is calling to request a refill on the following medication(s):    Last Visit Date (If Applicable):  5/22/2025    Next Visit Date:    7/17/2025    Medication Request:  Requested Prescriptions     Pending Prescriptions Disp Refills    gabapentin (NEURONTIN) 800 MG tablet [Pharmacy Med Name: GABAPENTIN 800MG TABLETS] 60 tablet 2     Sig: TAKE 1 TABLET BY MOUTH TWICE DAILY FOR 90 DAYS

## 2025-06-25 NOTE — PROGRESS NOTES
[facility] NEUROSURGERY [facility]  [address]  [id number]  [address] - GROUND FLOOR, MOB2  [facility] [id number]  Dept: [phone]    Patient:  Jovanny Espinoza  YOB: 1965  Date: 6/25/25    The patient is a 59 y.o. male who presents today for consult of the following problems:     Chief Complaint   Patient presents with    New Patient     M54.12 (ICD-10-CM) - Cervical radiculopathy    Neck Pain    Numbness     Patient has numbness and tinging on his left side neck down to his fingers in his left hand.         HPI:     Jovanny Espinoza is a 59 y.o. male on whom neurosurgical consultation was requested by Marianne Osorio MD for management of radiating neck pain.    Patient presents with neck pain and left-sided upper extremity numbness and weakness. The symptoms began approximately six months ago and are localized from the posterior neck down the left posterior arm and forearm, primarily involving the last three fingers. He now also reports numbness beginning in the right fingers. The pain and numbness are constant and described as numb, swollen, tight, sore, sharp, and tingling. The severity is rated 8/10, with the arm symptoms worse than the neck. Movements in certain directions aggravate his symptoms, while rubbing, pulling, or squeezing his arm offers mild relief. He is unable to lift his left arm above shoulder level and reports left-sided weakness, clumsiness, and frequent dropping of objects. He cannot open packages and feels off-balance, having fallen several times in the past week without clear cause. He denies bowel or bladder incontinence, saddle anesthesia, or lower extremity involvement. His history includes a pacemaker that is not MRI-compatible, prior lumbar fusion, and a history of DVT and PE.    History of pacemaker, MRI contraindicated      History:     Past Medical History:   Diagnosis Date    Anxiety     no treatment

## 2025-06-26 RX ORDER — GABAPENTIN 800 MG/1
800 TABLET ORAL 2 TIMES DAILY
Qty: 60 TABLET | Refills: 2 | Status: SHIPPED | OUTPATIENT
Start: 2025-06-26 | End: 2025-09-24

## 2025-07-09 ENCOUNTER — HOSPITAL ENCOUNTER (OUTPATIENT)
Age: 60
Setting detail: THERAPIES SERIES
Discharge: HOME | End: 2025-07-09
Payer: COMMERCIAL

## 2025-07-09 PROCEDURE — 97162 PT EVAL MOD COMPLEX 30 MIN: CPT

## 2025-07-09 PROCEDURE — 97110 THERAPEUTIC EXERCISES: CPT

## 2025-07-09 NOTE — FLOWSHEET NOTE
Raquel Fall Risk Assessment    Patient Name:  Jovanny Espinoza  : 1965    Risk Factor Scale  Score   History of Falls [] Yes  [x] No-isolated incident, tripped on uneven sidewalk while mowing grass 25  0 0   Secondary Diagnosis [] Yes  [x] No 15  0 0   Ambulatory Aid [] Furniture  [] Crutches/cane/walker  [x] None/bedrest/wheelchair/nurse 30  15  0 0   IV/Heparin Lock [] Yes  [x] No 20  0 0   Gait/Transferring [] Impaired  [] Weak  [x] Normal/bedrest/immobile 20  10  0 0   Mental Status [] Forgets limitations  [x] Oriented to own ability 15  0 0      Total: 0     Based on the Assessment score: check the appropriate box.    [x]  No intervention needed   Low =   Score of 0-24    []  Use standard prevention interventions Moderate =  Score of 24-44   [] Give patient handout and discuss fall prevention strategies   [] Establish goal of education for patient/family RE: fall prevention strategies    []  Use high risk prevention interventions High = Score of 45 and higher   [] Give patient handout and discuss fall prevention strategies   [] Establish goal of education for patient/family Re: fall prevention strategies   [] Discuss lifeline / other resources    Electronically signed by:   OCTAVIA NAZARIO PT  Date: 2025

## 2025-07-09 NOTE — CONSULTS
[x] [facility]  Outpatient Rehabilitation &  Therapy  [address] [place] [address]  [phone]  [phone]              Physical Therapy Spine Evaluation    Date:  2025  Patient: Jovanny Espinzoa\" Mohit\"  : 1965  MRN: [id number]  Physician: Ragini Gipson, GWENDOLYN - CNP      Insurance: AETNA MYCARE DUAL [AUTH REQUIRED AFTER EVAL/AETNA FAX COVER SHEET FAX#[phone], 100% DUAL COVERAGE PLAN (SUBMIT SAME DAY OF EVAL - FAX FORM)]  Medical Diagnosis: Cervical spondylosis with myelopathy and radiculopathy (M47.12,M47.22)  Cervical spondylosis with radiculopathy (M47.22)  Weakness (R53.1)    Rehab Codes: M54.2, M79.602,M62.512, M62.522, M62.542, M62.511, M62.521, R29.3,  R20.2  Onset Date: 2025  Next 's appt.: PCP , Neuro     Subjective:   HPI/CC: Per  Neurosurg note: Patient presents with neck pain and left-sided upper extremity numbness and weakness. The symptoms began approximately six months ago and are localized from the posterior neck down the left posterior arm and forearm, primarily involving the last three fingers. He now also reports numbness beginning in the right fingers. The pain and numbness are constant and described as numb, swollen, tight, sore, sharp, and tingling. The severity is rated 8/10, with the arm symptoms worse than the neck. Movements in certain directions aggravate his symptoms, while rubbing, pulling, or squeezing his arm offers mild relief. He is unable to lift his left arm above shoulder level and reports left-sided weakness, clumsiness, and frequent dropping of objects. He cannot open packages and feels off-balance, having fallen several times in the past week without clear cause.  Pt reports painful, stinging, nerv shooting, from neck down L arm, to last 3 fingers, that began approx 6 months ago.  His last 3 fingers are numb, and he can't lift his L arm up.  When looks up has \"zing\" feeling.  At times pain increases w/\"just

## 2025-07-16 ENCOUNTER — HOSPITAL ENCOUNTER (OUTPATIENT)
Age: 60
Setting detail: THERAPIES SERIES
Discharge: HOME | End: 2025-07-16
Payer: COMMERCIAL

## 2025-07-16 PROCEDURE — 97110 THERAPEUTIC EXERCISES: CPT

## 2025-07-16 PROCEDURE — 97012 MECHANICAL TRACTION THERAPY: CPT

## 2025-07-16 NOTE — FLOWSHEET NOTE
[x] [facility]  Outpatient Rehabilitation &  Therapy  [address] [place] [address]  [phone]  [phone]              Physical Therapy Daily Treatment Note    Date:  2025  Patient Name:  Jovanny Espinoza  \"Mohit\"  :  1965  MRN: [id number]  Physician: Ragini Gipson, GWENDOLYN - JOHNIE                                     Insurance: AETNA MYCARE DUAL [AUTH REQUIRED AFTER EVAL/AETNA FAX COVER SHEET FAX#[phone], 100% DUAL COVERAGE PLAN; Approved 16 visits, 25 to 25]  Medical Diagnosis: Cervical spondylosis with myelopathy and radiculopathy (M47.12,M47.22)  Cervical spondylosis with radiculopathy (M47.22)  Weakness (R53.1)                    Rehab Codes: M54.2, M79.602,M62.512, M62.522, M62.542, M62.511, M62.521, R29.3,  R20.2  Onset Date: 2025                    Next 's appt.: PCP , Neuro      Visit# / total visits: ; Progress note for Medicare patient due at visit 10     Cancels/No Shows: 0/1    Subjective:    Pain:  [] Yes  [x] No Location: Neck, L UE  Pain Rating: (0-10 scale) 7/10  Pain altered Tx:  [x] No  [] Yes  Action:  Comments:Pt reports doing his exercises from last Rx, but they are painful, sunny turning head to side.  Pt reports he wants to work on his hands, he is having trouble cooking etc due to his L hand.      Objective:  Modalities:   Treatment Location  Left      Right                          Position   Neck  []          []  [x] Supine    [] Prone   [] Side lying  [] Sitting          Treatment Modality    Hot Pack:    [] Large   [] Medium    [] Cervical     _____ min    Cold Pack   [] Large   [] Medium    [] Cervical     _____ min    Vasocompression    __° temp    ____pressure     _____ min    Electrical Stim:    [] IFC     [] MFAC      [] HVPC                          Protocol:_______  _____X_____min                          #Channels:  [] 1        [] 2       [] Other:    Ultrasound: ______W/cm2 x  ______min              [] 1MHz

## 2025-07-17 ENCOUNTER — HOSPITAL ENCOUNTER (OUTPATIENT)
Age: 60
Setting detail: SPECIMEN
Discharge: HOME | End: 2025-07-17

## 2025-07-17 ENCOUNTER — OFFICE VISIT (OUTPATIENT)
Dept: FAMILY MEDICINE CLINIC | Age: 60
End: 2025-07-17
Payer: COMMERCIAL

## 2025-07-17 ENCOUNTER — TELEPHONE (OUTPATIENT)
Dept: NEUROSURGERY | Age: 60
End: 2025-07-17

## 2025-07-17 VITALS
HEIGHT: 73 IN | WEIGHT: 205 LBS | DIASTOLIC BLOOD PRESSURE: 62 MMHG | SYSTOLIC BLOOD PRESSURE: 100 MMHG | BODY MASS INDEX: 27.17 KG/M2 | HEART RATE: 54 BPM

## 2025-07-17 DIAGNOSIS — Z79.4 TYPE 2 DIABETES MELLITUS WITH DIABETIC POLYNEUROPATHY, WITH LONG-TERM CURRENT USE OF INSULIN (HCC): ICD-10-CM

## 2025-07-17 DIAGNOSIS — E08.42 DIABETIC POLYNEUROPATHY ASSOCIATED WITH DIABETES MELLITUS DUE TO UNDERLYING CONDITION (HCC): ICD-10-CM

## 2025-07-17 DIAGNOSIS — I50.22 CHRONIC SYSTOLIC HEART FAILURE (HCC): ICD-10-CM

## 2025-07-17 DIAGNOSIS — Z00.00 MEDICARE ANNUAL WELLNESS VISIT, SUBSEQUENT: Primary | ICD-10-CM

## 2025-07-17 DIAGNOSIS — E11.42 TYPE 2 DIABETES MELLITUS WITH DIABETIC POLYNEUROPATHY, WITH LONG-TERM CURRENT USE OF INSULIN (HCC): ICD-10-CM

## 2025-07-17 DIAGNOSIS — E78.2 MIXED HYPERLIPIDEMIA: ICD-10-CM

## 2025-07-17 LAB
CREAT UR-MCNC: 154 MG/DL (ref 39–259)
MICROALBUMIN UR-MCNC: 18 MG/L (ref 0–20)
MICROALBUMIN/CREAT UR-RTO: 12 MCG/MG CREAT (ref 0–17)

## 2025-07-17 PROCEDURE — 3044F HG A1C LEVEL LT 7.0%: CPT | Performed by: FAMILY MEDICINE

## 2025-07-17 PROCEDURE — 99213 OFFICE O/P EST LOW 20 MIN: CPT | Performed by: FAMILY MEDICINE

## 2025-07-17 PROCEDURE — 3074F SYST BP LT 130 MM HG: CPT | Performed by: FAMILY MEDICINE

## 2025-07-17 PROCEDURE — G0439 PPPS, SUBSEQ VISIT: HCPCS | Performed by: FAMILY MEDICINE

## 2025-07-17 PROCEDURE — 3078F DIAST BP <80 MM HG: CPT | Performed by: FAMILY MEDICINE

## 2025-07-17 ASSESSMENT — PATIENT HEALTH QUESTIONNAIRE - PHQ9
1. LITTLE INTEREST OR PLEASURE IN DOING THINGS: NOT AT ALL
SUM OF ALL RESPONSES TO PHQ QUESTIONS 1-9: 0
SUM OF ALL RESPONSES TO PHQ QUESTIONS 1-9: 0
2. FEELING DOWN, DEPRESSED OR HOPELESS: NOT AT ALL
SUM OF ALL RESPONSES TO PHQ QUESTIONS 1-9: 0
SUM OF ALL RESPONSES TO PHQ QUESTIONS 1-9: 0

## 2025-07-17 NOTE — PATIENT INSTRUCTIONS
cholesterol. If you think you may have a problem with alcohol or drug use, talk to your doctor.   Medicines    Take your medicines exactly as prescribed. Call your doctor if you think you are having a problem with your medicine.     If your doctor recommends aspirin, take the amount directed each day. Make sure you take aspirin and not another kind of pain reliever, such as acetaminophen (Tylenol).   When should you call for help?   Call 911 if you have symptoms of a heart attack. These may include:    Chest pain or pressure, or a strange feeling in the chest.     Sweating.     Shortness of breath.     Pain, pressure, or a strange feeling in the back, neck, jaw, or upper belly or in one or both shoulders or arms.     Lightheadedness or sudden weakness.     A fast or irregular heartbeat.   After you call 911, the  may tell you to chew 1 adult-strength or 2 to 4 low-dose aspirin. Wait for an ambulance. Do not try to drive yourself.  Watch closely for changes in your health, and be sure to contact your doctor if you have any problems.  Where can you learn more?  Go to https://www.[employer].net/patientEd and enter F075 to learn more about \"A Healthy Heart: Care Instructions.\"  Current as of: July 31, 2024  Content Version: 14.5  © 6845-4320 [employer].   Care instructions adapted under license by [employer]. If you have questions about a medical condition or this instruction, always ask your healthcare professional. [employer] disclaims any warranty or liability for your use of this information.    Personalized Preventive Plan for Jovanny Espinoza - 7/17/2025  Medicare offers a range of preventive health benefits. Some of the tests and screenings are paid in full while other may be subject to a deductible, co-insurance, and/or copay.  Some of these benefits include a comprehensive review of your medical history including lifestyle, illnesses that may run in your family, and various

## 2025-07-17 NOTE — TELEPHONE ENCOUNTER
Saloni, notification representative with [employer] [phone] option 4  Reference#[id number]    Request for Ct Cervical Spine with contrast has been approved to be preformed at [facility].    Authorization# [id number]  7/14/25-1/10/26

## 2025-07-17 NOTE — PROGRESS NOTES
Medicare Annual Wellness Visit    Jovanny sEpinoza is here for Medicare AWV    Assessment & Plan   Medicare annual wellness visit, subsequent  Type 2 diabetes mellitus with diabetic polyneuropathy, with long-term current use of insulin (HCC)  -     Albumin/Creatinine Ratio, Urine; Future  Diabetic polyneuropathy associated with diabetes mellitus due to underlying condition (HCC)  Mixed hyperlipidemia  Chronic systolic heart failure (HCC)       Return in about 6 months (around 1/17/2026) for htn, diabetes, chronic pain.     Subjective       Patient's complete Health Risk Assessment and screening values have been reviewed and are found in Flowsheets. The following problems were reviewed today and where indicated follow up appointments were made and/or referrals ordered.    Positive Risk Factor Screenings with Interventions:    Fall Risk:  Do you feel unsteady or are you worried about falling? : (!) yes  2 or more falls in past year?: no  Fall with injury in past year?: no  Interventions:    Reviewed medications, home hazards, visual acuity, and co-morbidities that can increase risk for falls  See AVS for additional education material            General HRA Questions:  Select all that apply: (!) New or Increased Pain  Interventions - Pain:  Patient comments: cervical radicular pain, going to PT      Inactivity:  On average, how many days per week do you engage in moderate to strenuous exercise (like a brisk walk)?: 0 days (!) Abnormal  On average, how many minutes do you engage in exercise at this level?: 0 min  Interventions:  Patient comments: limited by neck pain        Vision Screen:  Do you have difficulty driving, watching TV, or doing any of your daily activities because of your eyesight?: (!) Yes  Have you had an eye exam within the past year?: (!) No  Interventions:   Patient encouraged to make appointment with their eye specialist       Advanced Directives:  Do you have a Living Will?: (!)

## 2025-07-23 ENCOUNTER — HOSPITAL ENCOUNTER (OUTPATIENT)
Age: 60
Setting detail: THERAPIES SERIES
Discharge: HOME | End: 2025-07-23
Payer: COMMERCIAL

## 2025-07-23 PROCEDURE — 97012 MECHANICAL TRACTION THERAPY: CPT

## 2025-07-23 PROCEDURE — 97110 THERAPEUTIC EXERCISES: CPT

## 2025-07-23 NOTE — FLOWSHEET NOTE
[x] [facility]  Outpatient Rehabilitation &  Therapy  [address] [place] [address]  [phone]  [phone]              Physical Therapy Daily Treatment Note    Date:  2025  Patient Name:  Jovanny Espinoza  \"Mohit\"  :  1965  MRN: [id number]  Physician: Ragini Gipson, GWENDOLYN - JOHNIE                                     Insurance: AETNA MYCARE DUAL [AUTH REQUIRED AFTER EVAL/[employer]ETNA FAX COVER SHEET FAX#[phone], 100% DUAL COVERAGE PLAN; Approved 16 visits, 25 to 25]  Medical Diagnosis: Cervical spondylosis with myelopathy and radiculopathy (M47.12,M47.22)  Cervical spondylosis with radiculopathy (M47.22)  Weakness (R53.1)                    Rehab Codes: M54.2, M79.602,M62.512, M62.522, M62.542, M62.511, M62.521, R29.3,  R20.2  Onset Date: 2025                    Next 's appt.: Neuro      Visit# / total visits: 3/16; Progress note for Medicare patient due at visit 10     Cancels/No Shows: 0/2    Subjective:    Pain:  [] Yes  [x] No Location: Neck, L UE  Pain Rating: (0-10 scale) 5-6/10  Pain altered Tx:  [x] No  [] Yes  Action:  Comments:Pt reports felt good traction for a little bit, but came back.  Pt c/o numbness in L hand, reports he has been doing the ball squeezes and finger ex at home \"all day.\"   It's hard cooking, doing dishes, and bathing without the feeling in his fingers.      Objective:  Modalities:   Treatment Location  Left      Right                          Position   Neck  []          []  [x] Supine    [] Prone   [] Side lying  [] Sitting          Treatment Modality    Hot Pack:    [] Large   [] Medium    [] Cervical     _____ min    Cold Pack   [] Large   [] Medium    [] Cervical     _____ min    Vasocompression    __° temp    ____pressure     _____ min    Electrical Stim:    [] IFC     [] MFAC      [] HVPC                          Protocol:_______  _____X_____min                          #Channels:  [] 1        [] 2       [] Other:

## 2025-07-24 ENCOUNTER — HOSPITAL ENCOUNTER (OUTPATIENT)
Dept: CT IMAGING | Age: 60
Discharge: HOME | End: 2025-07-26
Payer: COMMERCIAL

## 2025-07-24 ENCOUNTER — HOSPITAL ENCOUNTER (OUTPATIENT)
Dept: INTERVENTIONAL RADIOLOGY/VASCULAR | Age: 60
Discharge: HOME | End: 2025-07-26
Payer: COMMERCIAL

## 2025-07-24 VITALS
DIASTOLIC BLOOD PRESSURE: 78 MMHG | RESPIRATION RATE: 16 BRPM | BODY MASS INDEX: 27.04 KG/M2 | TEMPERATURE: 96.8 F | SYSTOLIC BLOOD PRESSURE: 113 MMHG | HEART RATE: 82 BPM | HEIGHT: 73 IN | OXYGEN SATURATION: 98 % | WEIGHT: 204 LBS

## 2025-07-24 DIAGNOSIS — M47.22 CERVICAL SPONDYLOSIS WITH RADICULOPATHY: ICD-10-CM

## 2025-07-24 DIAGNOSIS — M47.12 CERVICAL SPONDYLOSIS WITH MYELOPATHY AND RADICULOPATHY: ICD-10-CM

## 2025-07-24 DIAGNOSIS — M47.22 CERVICAL SPONDYLOSIS WITH MYELOPATHY AND RADICULOPATHY: ICD-10-CM

## 2025-07-24 DIAGNOSIS — E78.2 MIXED HYPERLIPIDEMIA: ICD-10-CM

## 2025-07-24 DIAGNOSIS — R53.1 WEAKNESS: ICD-10-CM

## 2025-07-24 LAB
INR PPP: 2.2
PLATELET # BLD AUTO: 148 K/UL (ref 138–453)
PROTHROMBIN TIME: 25.3 SEC (ref 11.5–14.2)

## 2025-07-24 PROCEDURE — 72126 CT NECK SPINE W/DYE: CPT

## 2025-07-24 PROCEDURE — 85610 PROTHROMBIN TIME: CPT

## 2025-07-24 PROCEDURE — 2709999900 IR MYELOGRAM CERVICAL

## 2025-07-24 PROCEDURE — 62302 MYELOGRAPHY LUMBAR INJECTION: CPT

## 2025-07-24 PROCEDURE — 6360000002 HC RX W HCPCS: Performed by: RADIOLOGY

## 2025-07-24 PROCEDURE — 6360000004 HC RX CONTRAST MEDICATION: Performed by: RADIOLOGY

## 2025-07-24 PROCEDURE — 85049 AUTOMATED PLATELET COUNT: CPT

## 2025-07-24 RX ORDER — COLESEVELAM 180 1/1
TABLET ORAL
Qty: 180 TABLET | Refills: 5 | Status: SHIPPED | OUTPATIENT
Start: 2025-07-24

## 2025-07-24 RX ORDER — ACETAMINOPHEN 325 MG/1
650 TABLET ORAL EVERY 4 HOURS PRN
Status: DISCONTINUED | OUTPATIENT
Start: 2025-07-24 | End: 2025-07-27 | Stop reason: HOSPADM

## 2025-07-24 RX ORDER — FENTANYL CITRATE 50 UG/ML
INJECTION, SOLUTION INTRAMUSCULAR; INTRAVENOUS PRN
Status: DISCONTINUED | OUTPATIENT
Start: 2025-07-24 | End: 2025-07-27 | Stop reason: HOSPADM

## 2025-07-24 RX ORDER — IOPAMIDOL 612 MG/ML
INJECTION, SOLUTION INTRATHECAL PRN
Status: DISCONTINUED | OUTPATIENT
Start: 2025-07-24 | End: 2025-07-27 | Stop reason: HOSPADM

## 2025-07-24 RX ORDER — LIDOCAINE HYDROCHLORIDE 10 MG/ML
INJECTION, SOLUTION EPIDURAL; INFILTRATION; INTRACAUDAL; PERINEURAL PRN
Status: DISCONTINUED | OUTPATIENT
Start: 2025-07-24 | End: 2025-07-27 | Stop reason: HOSPADM

## 2025-07-24 RX ADMIN — LIDOCAINE HYDROCHLORIDE 5 ML: 10 INJECTION, SOLUTION EPIDURAL; INFILTRATION; INTRACAUDAL; PERINEURAL at 10:17

## 2025-07-24 RX ADMIN — IOPAMIDOL 14 ML: 612 INJECTION, SOLUTION INTRATHECAL at 10:20

## 2025-07-24 ASSESSMENT — PAIN - FUNCTIONAL ASSESSMENT: PAIN_FUNCTIONAL_ASSESSMENT: 0-10

## 2025-07-24 ASSESSMENT — PAIN DESCRIPTION - DESCRIPTORS: DESCRIPTORS: ACHING;CRUSHING;DISCOMFORT

## 2025-07-24 NOTE — H&P
History and Physical Service   [facility]    HISTORY AND PHYSICAL EXAMINATION            Date of Evaluation: 7/24/2025  Patient name:  Jovanny Espinoza  MRN:   [id number]  YOB: 1965  PCP:    Marianne Osorio MD    History Obtained From:     Patient, medical records    History of Present Illness:     This is Jovanny Espinoza a 60 y.o. male who presents today for a IR CT cervical myelogram by Dr. Garcia for Cervical spondylosis with myelopathy and radiculopathy; Cervical spondylos*. States symptoms of neck pain, shoulder pain for the last year. Complains of left sided numbness and tingling left neck down arm and fingers. Denies fever, chills, shortness of breath, cough, congestion, wheezing, chest pain, open sores or wounds. Last took warfarin yesterday evening.  Last ate and drank yesterday prior to midnight.     Past Medical History:     Past Medical History:   Diagnosis Date    Anxiety     no treatment    Arthritis     left shoulder    Cardiomegaly     CHF (congestive heart failure) ([facility]) EF 20%    Chronic back pain     GERD (gastroesophageal reflux disease)     Headache     Heart attack ([facility])     History of lung thrombosis     Hx of blood clots     left leg to lungs     Hyperlipidemia     Hypertension     Irregular heart beat     Neuropathy     feet    Obesity     WILLA (obstructive sleep apnea)     not using the machine    Pacemaker     AICD    PE (pulmonary embolism) 2001    \"about 5 yrs ago\"    Prostate cancer ([facility]) 2016    Spinal stenosis at L4-L5 level 2019    Traumatic closed fx of eight or more ribs with minimal displacement 06/2017    pneumothorax.  Pt fell 50 feet from a balcony.  Pt denies history of a head injury.    Type 2 diabetes mellitus without complication ([facility])     Checks TID, waiting for CGM    Under care of team     Cardiology-    Under care of team     Nephrology-[facility]        Past Surgical History:     Past Surgical History:   Procedure

## 2025-07-30 ENCOUNTER — HOSPITAL ENCOUNTER (OUTPATIENT)
Age: 60
Setting detail: THERAPIES SERIES
Discharge: HOME | End: 2025-07-30
Payer: COMMERCIAL

## 2025-07-30 PROCEDURE — 97012 MECHANICAL TRACTION THERAPY: CPT

## 2025-07-30 PROCEDURE — 97110 THERAPEUTIC EXERCISES: CPT

## 2025-07-30 NOTE — FLOWSHEET NOTE
[] 1MHz   [] 3Mhz                      Duty Factor: [] 100%  [] 50%   [] 20%                  Add: [] Lidex   [] Stim    [] Gel pad      Last  Traction:   [] Prone   [x] Supine   X __10__min               [] Lumbar x ____lbs      [x] Cervical x __15_lbs max, 6 lbs min               [] Continuous     [x] Intermittent  -   On:_60sec__x Off:_10sec__   1st Other:  ex          Precautions [] No  [x] Yes: Adhesive Tape allergy   Exercises:  Exercise Reps/ Time Weight/ Level Completed   7/30/2025 Comments   SciFit  6min ML3.0 x partially attended, obtaining subjective          Ball roll on wall  10x 5sec x [place] green ball, Pt self limits ROM, only to approx 90°, 7/30 reports \"that hurt\"   Door stretch  3x 20 sec x           Tband     Added 7/30   -Triceps  15x lime x    -Shoulder Ext * lime  Add next Rx   -Rows  15x lime x           Seated        Postural correction  10x 5sec x    Scap retractions  10x 5sec x    ER w/scap retraction  10x 5sec     Pronation/supination  30x 2 lbs x B, added weight 7/30   Ball squeeze  HEP   Blue mercy ball, B   Clothespin pinch  20x R 4 lbs  L 2 lbs     Clothespin lat pinch  20x R 4 lbs  L 2 lbs     Upper trap stretch  3x 20 sec x B          Supine        Cerv rot  10x 3sec  B, emphasized to keep painfree   Cerv retractions  10x 5sec     Wand chest press  15x 1 lb  Added 7/23   -flex  15x 1 lb  Added 7/23, Pt only able to raise to approx 45°   -ER 15x 1 lb  Added 7/23          Other:         Treatment Charges: Mins Units   []  Modalities       [x]  Ther Exercise 47 3   []  Neuromuscular Re-ed     []  Gait Training     []  Manual Therapy     []  Ther Activities     []  Aquatics     []  Vasocompression     [x]  Cervical Traction 11 1   []  Other     Total Billable time 58 min           Assessment: [x] Progressing toward goals.  Initiated tband rows and triceps to increase post shoulder strength and improve pulling and pushing.  Progressed  weight pronation/supination to improve

## 2025-08-06 ENCOUNTER — HOSPITAL ENCOUNTER (OUTPATIENT)
Age: 60
Setting detail: THERAPIES SERIES
Discharge: HOME | End: 2025-08-06
Payer: COMMERCIAL

## 2025-08-06 PROCEDURE — 97012 MECHANICAL TRACTION THERAPY: CPT

## 2025-08-06 PROCEDURE — 97110 THERAPEUTIC EXERCISES: CPT

## 2025-08-06 PROCEDURE — 97140 MANUAL THERAPY 1/> REGIONS: CPT

## 2025-08-07 DIAGNOSIS — E08.42 DIABETIC POLYNEUROPATHY ASSOCIATED WITH DIABETES MELLITUS DUE TO UNDERLYING CONDITION (HCC): ICD-10-CM

## 2025-08-07 RX ORDER — DULOXETIN HYDROCHLORIDE 60 MG/1
60 CAPSULE, DELAYED RELEASE ORAL DAILY
Qty: 90 CAPSULE | Refills: 2 | Status: SHIPPED | OUTPATIENT
Start: 2025-08-07

## 2025-08-13 ENCOUNTER — HOSPITAL ENCOUNTER (OUTPATIENT)
Age: 60
Setting detail: THERAPIES SERIES
Discharge: HOME | End: 2025-08-13
Payer: COMMERCIAL

## 2025-08-13 PROCEDURE — 97012 MECHANICAL TRACTION THERAPY: CPT

## 2025-08-13 PROCEDURE — 97110 THERAPEUTIC EXERCISES: CPT

## 2025-08-20 ENCOUNTER — OFFICE VISIT (OUTPATIENT)
Dept: NEUROSURGERY | Age: 60
End: 2025-08-20
Payer: COMMERCIAL

## 2025-08-20 ENCOUNTER — HOSPITAL ENCOUNTER (OUTPATIENT)
Dept: GENERAL RADIOLOGY | Age: 60
Discharge: HOME | End: 2025-08-22
Payer: COMMERCIAL

## 2025-08-20 VITALS
HEART RATE: 99 BPM | SYSTOLIC BLOOD PRESSURE: 92 MMHG | BODY MASS INDEX: 26.77 KG/M2 | DIASTOLIC BLOOD PRESSURE: 59 MMHG | WEIGHT: 202 LBS | HEIGHT: 73 IN

## 2025-08-20 DIAGNOSIS — M47.22 CERVICAL SPONDYLOSIS WITH RADICULOPATHY: ICD-10-CM

## 2025-08-20 DIAGNOSIS — R53.1 WEAKNESS: ICD-10-CM

## 2025-08-20 DIAGNOSIS — G99.2 STENOSIS OF CERVICAL SPINE WITH MYELOPATHY (HCC): Primary | ICD-10-CM

## 2025-08-20 DIAGNOSIS — M48.02 STENOSIS OF CERVICAL SPINE WITH MYELOPATHY (HCC): Primary | ICD-10-CM

## 2025-08-20 DIAGNOSIS — M47.12 CERVICAL SPONDYLOSIS WITH MYELOPATHY AND RADICULOPATHY: ICD-10-CM

## 2025-08-20 DIAGNOSIS — M47.22 CERVICAL SPONDYLOSIS WITH MYELOPATHY AND RADICULOPATHY: ICD-10-CM

## 2025-08-20 PROCEDURE — 72050 X-RAY EXAM NECK SPINE 4/5VWS: CPT

## 2025-08-20 PROCEDURE — 3074F SYST BP LT 130 MM HG: CPT | Performed by: NEUROLOGICAL SURGERY

## 2025-08-20 PROCEDURE — 99215 OFFICE O/P EST HI 40 MIN: CPT | Performed by: NEUROLOGICAL SURGERY

## 2025-08-20 PROCEDURE — 3078F DIAST BP <80 MM HG: CPT | Performed by: NEUROLOGICAL SURGERY

## 2025-08-20 RX ORDER — CHOLECALCIFEROL (VITAMIN D3) 25 MCG
1000 TABLET ORAL DAILY
Qty: 60 TABLET | Refills: 0 | Status: SHIPPED | OUTPATIENT
Start: 2025-08-20

## 2025-08-21 PROBLEM — G99.2 STENOSIS OF CERVICAL SPINE WITH MYELOPATHY (HCC): Status: ACTIVE | Noted: 2025-08-21

## 2025-08-21 PROBLEM — M48.02 STENOSIS OF CERVICAL SPINE WITH MYELOPATHY (HCC): Status: ACTIVE | Noted: 2025-08-21

## 2025-08-22 ENCOUNTER — TELEPHONE (OUTPATIENT)
Dept: NEUROSURGERY | Age: 60
End: 2025-08-22

## (undated) DEVICE — SOLUTION IV 1000ML 0.9% SOD CHL PH 5 INJ USP VIAFLX PLAS

## (undated) DEVICE — CODMAN® SURGICAL PATTIES 1" X 1" (2.54CM X 2.54CM): Brand: CODMAN®

## (undated) DEVICE — GARMENT,MEDLINE,DVT,INT,CALF,MED, GEN2: Brand: MEDLINE

## (undated) DEVICE — THE MILL DISPOSABLE - MEDIUM

## (undated) DEVICE — BANDAGE COMPR W2INXL5YD WHT BGE POLY COT M E WRP WV HK AND

## (undated) DEVICE — STERILE POLYISOPRENE POWDER-FREE SURGICAL GLOVES: Brand: PROTEXIS

## (undated) DEVICE — Device

## (undated) DEVICE — CONTAINER,SPECIMEN,OR STERILE,4OZ: Brand: MEDLINE

## (undated) DEVICE — Z DISCONTINUED USE 2272114 DRAPE SURG UTIL 26X15 IN W/ TAPE N INVASIVE MULTLYR DISP

## (undated) DEVICE — SHEET, ORTHO, SPLIT, STERILE: Brand: MEDLINE

## (undated) DEVICE — SYRINGE MED 30ML STD CLR PLAS LUERLOCK TIP N CTRL DISP

## (undated) DEVICE — COVER,TABLE,HEAVY DUTY,50"X90",STRL: Brand: MEDLINE

## (undated) DEVICE — ADHESIVE SKIN CLSR 0.7ML TOP DERMBND ADV

## (undated) DEVICE — NEEDLE NRV STIM BVL TIP INSUL PEDCL ACCS SYS FOR EMG MON

## (undated) DEVICE — SUTURE PDS II SZ 3-0 L27IN ABSRB CLR SH L26MM 1/2 CIR TAPR Z416H

## (undated) DEVICE — BLADE ES L6IN ELASTOMERIC COAT EXT DURABLE BEND UPTO 90DEG

## (undated) DEVICE — GOWN,AURORA,NON-REINFORCED,2XL: Brand: MEDLINE

## (undated) DEVICE — INTENDED FOR TISSUE SEPARATION, AND OTHER PROCEDURES THAT REQUIRE A SHARP SURGICAL BLADE TO PUNCTURE OR CUT.: Brand: BARD-PARKER ® CARBON RIB-BACK BLADES

## (undated) DEVICE — NEEDLE HYPO 25GA L1.5IN BLU POLYPR HUB S STL REG BVL STR

## (undated) DEVICE — SURGICAL PROCEDURE KIT BASIN MAJ SET UP

## (undated) DEVICE — GLOVE SURG SZ 85 CRM LTX FREE POLYISOPRENE POLYMER BEAD ANTI

## (undated) DEVICE — TRAP,MUCUS SPECIMEN, 80CC: Brand: MEDLINE

## (undated) DEVICE — GLOVE SURG SZ 65 CRM LTX FREE POLYISOPRENE POLYMER BEAD ANTI

## (undated) DEVICE — CORD,CAUTERY,BIPOLAR,STERILE: Brand: MEDLINE

## (undated) DEVICE — TRANSFER SET 3": Brand: MEDLINE INDUSTRIES, INC.

## (undated) DEVICE — CATHETER,FOLEY,COUDE,LATEX,16FR,10ML: Brand: MEDLINE

## (undated) DEVICE — DRAINBAG,ANTI-REFLUX TOWER,L/F,2000ML,LL: Brand: MEDLINE

## (undated) DEVICE — GAUZE,SPONGE,4"X4",16PLY,XRAY,STRL,LF: Brand: MEDLINE

## (undated) DEVICE — BLANKET WRM W29.9XL79.1IN UP BODY FORC AIR MISTRAL-AIR

## (undated) DEVICE — SHEET, T, LAPAROTOMY, STERILE: Brand: MEDLINE

## (undated) DEVICE — SYRINGE MED 50ML LUERLOCK TIP

## (undated) DEVICE — GLOVE SURG SZ 75 CRM LTX FREE POLYISOPRENE POLYMER BEAD ANTI

## (undated) DEVICE — 4.0MM PRECISION ROUND

## (undated) DEVICE — 3M™ IOBAN™ 2 ANTIMICROBIAL INCISE DRAPE 6650EZ: Brand: IOBAN™ 2

## (undated) DEVICE — DRESSING GZ W3XL16IN CELOS ACETT OIL EMUL N ADH

## (undated) DEVICE — RETRACTOR SURG MAS TLIF HOOP SHIM DISP MAXCESS

## (undated) DEVICE — ELECTRODE PT RET AD L9FT HI MOIST COND ADH HYDRGEL CORDED

## (undated) DEVICE — SOLUTION IRRIG 500ML 0.9% SOD CHL USP POUR PLAS BTL

## (undated) DEVICE — BANDAGE,GAUZE,BULKEE II,4.5"X4.1YD,STRL: Brand: MEDLINE

## (undated) DEVICE — TITAN® TOUCH SCROTAL ZERO DEGREE ANGLE CYLINDER SET WITH PUMP
Type: IMPLANTABLE DEVICE | Site: SCROTUM | Status: NON-FUNCTIONAL
Brand: TITAN
Removed: 2021-08-25

## (undated) DEVICE — SYRINGE 20ML LL S/C 50

## (undated) DEVICE — APPLICATOR MEDICATED 26 CC SOLUTION HI LT ORNG CHLORAPREP

## (undated) DEVICE — 3M(TM) MEDIPORE(TM) +PAD SOFT CLOTH ADHESIVE WOUND DRESSING 3569: Brand: 3M™ MEDIPORE™

## (undated) DEVICE — DRAPE SURGICAL HAND PROX AURORA

## (undated) DEVICE — STERILE SURGICAL LUBRICANT, METAL TUBE: Brand: SURGILUBE

## (undated) DEVICE — ADAPTER,CATHETER/SYRINGE/LUER,STERILE: Brand: MEDLINE

## (undated) DEVICE — TOWEL,OR,DSP,ST,BLUE,DLX,XR,4/PK,20PK/CS: Brand: MEDLINE

## (undated) DEVICE — SYRINGE IRRIG 60ML SFT PLIABLE BLB EZ TO GRP 1 HND USE W/

## (undated) DEVICE — SUTURE MCRYL SZ 4-0 L27IN ABSRB UD L19MM PS-2 1/2 CIR PRIM Y426H

## (undated) DEVICE — TUBING, SUCTION, 1/4" X 12', STRAIGHT: Brand: MEDLINE

## (undated) DEVICE — CUSHION PRONEVIEW L HD NK FOAM

## (undated) DEVICE — YANKAUER,FLEXIBLE HANDLE,REGLR CAPACITY: Brand: MEDLINE INDUSTRIES, INC.

## (undated) DEVICE — CABLE FIX TRANSFORAMINAL LUM INTBDY FUS LT SELF RET MAS

## (undated) DEVICE — SOLUTION IRRIG 1000ML 0.9% SOD CHL USP POUR PLAS BTL

## (undated) DEVICE — 3M™ STERI-DRAPE™ U-DRAPE 1015: Brand: STERI-DRAPE™

## (undated) DEVICE — SUTURE ETHLN SZ 3-0 L18IN NONABSORBABLE BLK PS-2 L19MM 3/8 1669H

## (undated) DEVICE — PLUG,CATHETER,DRAINAGE PROTECTOR,TUBE: Brand: MEDLINE

## (undated) DEVICE — JCKSON TBL POSTNER NO HD REST: Brand: MEDLINE INDUSTRIES, INC.

## (undated) DEVICE — 1010 S-DRAPE TOWEL DRAPE 10/BX: Brand: STERI-DRAPE™

## (undated) DEVICE — 1016 S-DRAPE IRRIG POUCH 10/BOX: Brand: STERI-DRAPE™

## (undated) DEVICE — SYRINGE, LUER LOCK, 10ML: Brand: MEDLINE

## (undated) DEVICE — GOWN,SIRUS,NONRNF,SETINSLV,XL,20/CS: Brand: MEDLINE

## (undated) DEVICE — SUPPORT SCROT XL WHT COT ATH W/ LEG STRP ADJ E WAISTBAND

## (undated) DEVICE — DRAPE EQUIP XR 12 IN C ARM SET OEC

## (undated) DEVICE — TOTAL TRAY, DB, 100% SILI FOLEY, 16FR 10: Brand: MEDLINE

## (undated) DEVICE — DRAPE,REIN 53X77,STERILE: Brand: MEDLINE

## (undated) DEVICE — SPONGE LAP W18XL18IN WHT COT 4 PLY FLD STRUNG RADPQ DISP ST

## (undated) DEVICE — TOURNIQUET SURGICAL MED YELLOW HEMACLEAR

## (undated) DEVICE — HYPODERMIC SAFETY NEEDLE: Brand: MAGELLAN

## (undated) DEVICE — IMMOBILIZER HND L W24.13XL21.59CM ALUM RADPQ ALUMI-HND